# Patient Record
Sex: MALE | Race: WHITE | NOT HISPANIC OR LATINO | Employment: FULL TIME | URBAN - METROPOLITAN AREA
[De-identification: names, ages, dates, MRNs, and addresses within clinical notes are randomized per-mention and may not be internally consistent; named-entity substitution may affect disease eponyms.]

---

## 2019-03-24 ENCOUNTER — HOSPITAL ENCOUNTER (INPATIENT)
Facility: HOSPITAL | Age: 54
LOS: 30 days | Discharge: HOME | End: 2019-04-23
Attending: PHYSICAL MEDICINE & REHABILITATION | Admitting: PHYSICAL MEDICINE & REHABILITATION

## 2019-03-24 DIAGNOSIS — E66.01 MORBID OBESITY (HCC): ICD-10-CM

## 2019-03-24 DIAGNOSIS — I10 HYPERTENSION, UNSPECIFIED TYPE: ICD-10-CM

## 2019-03-24 DIAGNOSIS — I63.532 ACUTE ISCHEMIC LEFT PCA STROKE (HCC): Primary | ICD-10-CM

## 2019-03-24 PROBLEM — G43.909 MIGRAINE: Status: ACTIVE | Noted: 2019-03-24

## 2019-03-24 PROBLEM — Z87.19 H/O HERNIA REPAIR: Status: ACTIVE | Noted: 2019-03-24

## 2019-03-24 PROBLEM — Z98.890 H/O HERNIA REPAIR: Status: ACTIVE | Noted: 2019-03-24

## 2019-03-24 PROBLEM — M54.9 BACK PAIN: Status: ACTIVE | Noted: 2019-03-24

## 2019-03-24 PROBLEM — Z95.818 STATUS POST PLACEMENT OF IMPLANTABLE LOOP RECORDER: Status: ACTIVE | Noted: 2019-03-22

## 2019-03-24 PROBLEM — E11.9 DIABETES MELLITUS (HCC): Status: ACTIVE | Noted: 2019-03-24

## 2019-03-24 PROBLEM — E78.5 HYPERLIPIDEMIA: Status: ACTIVE | Noted: 2019-03-24

## 2019-03-24 PROBLEM — F41.9 ANXIETY DISORDER: Status: ACTIVE | Noted: 2019-03-24

## 2019-03-24 PROBLEM — I63.9 STROKE (CEREBRUM) (HCC): Status: ACTIVE | Noted: 2019-03-24

## 2019-03-24 LAB
GLUCOSE BLDC GLUCOMTR-MCNC: 136 MG/DL (ref 70–130)
GLUCOSE BLDC GLUCOMTR-MCNC: 157 MG/DL (ref 70–130)

## 2019-03-24 PROCEDURE — 82962 GLUCOSE BLOOD TEST: CPT

## 2019-03-24 PROCEDURE — 63710000001 INSULIN GLARGINE PER 5 UNITS: Performed by: PHYSICAL MEDICINE & REHABILITATION

## 2019-03-24 PROCEDURE — 63710000001 INSULIN LISPRO (HUMAN) PER 5 UNITS: Performed by: PHYSICAL MEDICINE & REHABILITATION

## 2019-03-24 RX ORDER — ALPRAZOLAM 0.5 MG/1
1 TABLET ORAL NIGHTLY PRN
Status: DISCONTINUED | OUTPATIENT
Start: 2019-03-24 | End: 2019-04-19

## 2019-03-24 RX ORDER — INSULIN GLARGINE 100 [IU]/ML
50 INJECTION, SOLUTION SUBCUTANEOUS NIGHTLY
Status: DISCONTINUED | OUTPATIENT
Start: 2019-03-24 | End: 2019-03-28

## 2019-03-24 RX ORDER — TRIAMTERENE AND HYDROCHLOROTHIAZIDE 37.5; 25 MG/1; MG/1
1 CAPSULE ORAL DAILY
Status: DISCONTINUED | OUTPATIENT
Start: 2019-03-25 | End: 2019-03-26

## 2019-03-24 RX ORDER — LISINOPRIL 40 MG/1
40 TABLET ORAL DAILY
Status: DISCONTINUED | OUTPATIENT
Start: 2019-03-25 | End: 2019-03-26

## 2019-03-24 RX ORDER — METHOCARBAMOL 750 MG/1
750 TABLET, FILM COATED ORAL EVERY 6 HOURS PRN
Status: DISCONTINUED | OUTPATIENT
Start: 2019-03-24 | End: 2019-03-24

## 2019-03-24 RX ORDER — ACETAMINOPHEN 325 MG/1
650 TABLET ORAL EVERY 6 HOURS PRN
Status: DISCONTINUED | OUTPATIENT
Start: 2019-03-24 | End: 2019-04-23 | Stop reason: HOSPADM

## 2019-03-24 RX ORDER — ALPRAZOLAM 1 MG/1
1 TABLET ORAL 3 TIMES DAILY PRN
Status: ON HOLD | COMMUNITY
End: 2019-04-23 | Stop reason: SDUPTHER

## 2019-03-24 RX ORDER — NICOTINE POLACRILEX 4 MG
15 LOZENGE BUCCAL
Status: DISCONTINUED | OUTPATIENT
Start: 2019-03-24 | End: 2019-04-23

## 2019-03-24 RX ORDER — ATENOLOL 50 MG/1
50 TABLET ORAL
Status: DISCONTINUED | OUTPATIENT
Start: 2019-03-25 | End: 2019-03-26

## 2019-03-24 RX ORDER — CLOPIDOGREL BISULFATE 75 MG/1
75 TABLET ORAL DAILY
Status: DISCONTINUED | OUTPATIENT
Start: 2019-03-25 | End: 2019-04-23 | Stop reason: HOSPADM

## 2019-03-24 RX ORDER — MIRTAZAPINE 15 MG/1
15 TABLET, FILM COATED ORAL NIGHTLY
Status: DISCONTINUED | OUTPATIENT
Start: 2019-03-24 | End: 2019-03-27

## 2019-03-24 RX ORDER — ATORVASTATIN CALCIUM 20 MG/1
20 TABLET, FILM COATED ORAL NIGHTLY
COMMUNITY
End: 2019-04-23 | Stop reason: HOSPADM

## 2019-03-24 RX ORDER — ATORVASTATIN CALCIUM 80 MG/1
80 TABLET, FILM COATED ORAL NIGHTLY
Status: DISCONTINUED | OUTPATIENT
Start: 2019-03-24 | End: 2019-04-23 | Stop reason: HOSPADM

## 2019-03-24 RX ORDER — OXYCODONE AND ACETAMINOPHEN 7.5; 325 MG/1; MG/1
1 TABLET ORAL EVERY 6 HOURS PRN
COMMUNITY

## 2019-03-24 RX ORDER — DICYCLOMINE HCL 20 MG
20 TABLET ORAL EVERY 6 HOURS PRN
COMMUNITY
End: 2019-04-23 | Stop reason: HOSPADM

## 2019-03-24 RX ORDER — DEXTROSE MONOHYDRATE 25 G/50ML
25 INJECTION, SOLUTION INTRAVENOUS
Status: DISCONTINUED | OUTPATIENT
Start: 2019-03-24 | End: 2019-04-23

## 2019-03-24 RX ORDER — HYDROCODONE BITARTRATE AND ACETAMINOPHEN 7.5; 325 MG/1; MG/1
1 TABLET ORAL EVERY 6 HOURS PRN
Status: DISCONTINUED | OUTPATIENT
Start: 2019-03-24 | End: 2019-03-24

## 2019-03-24 RX ORDER — CLONIDINE HYDROCHLORIDE 0.1 MG/1
0.1 TABLET ORAL EVERY 12 HOURS SCHEDULED
Status: DISCONTINUED | OUTPATIENT
Start: 2019-03-24 | End: 2019-03-26

## 2019-03-24 RX ORDER — AMLODIPINE BESYLATE 10 MG/1
10 TABLET ORAL DAILY
Status: DISCONTINUED | OUTPATIENT
Start: 2019-03-25 | End: 2019-03-26

## 2019-03-24 RX ORDER — HYDROCHLOROTHIAZIDE 25 MG/1
25 TABLET ORAL DAILY
Status: CANCELLED | OUTPATIENT
Start: 2019-03-25

## 2019-03-24 RX ORDER — ATENOLOL 100 MG/1
100 TABLET ORAL DAILY
COMMUNITY
End: 2019-04-23 | Stop reason: HOSPADM

## 2019-03-24 RX ORDER — BISACODYL 10 MG
10 SUPPOSITORY, RECTAL RECTAL DAILY PRN
Status: DISCONTINUED | OUTPATIENT
Start: 2019-03-24 | End: 2019-04-23

## 2019-03-24 RX ADMIN — INSULIN GLARGINE 50 UNITS: 100 INJECTION, SOLUTION SUBCUTANEOUS at 21:27

## 2019-03-24 RX ADMIN — ATORVASTATIN CALCIUM 80 MG: 80 TABLET, FILM COATED ORAL at 21:26

## 2019-03-24 RX ADMIN — CLONIDINE HYDROCHLORIDE 0.1 MG: 0.1 TABLET ORAL at 21:26

## 2019-03-24 RX ADMIN — INSULIN LISPRO 2 UNITS: 100 INJECTION, SOLUTION INTRAVENOUS; SUBCUTANEOUS at 21:27

## 2019-03-25 PROBLEM — E55.9 VITAMIN D DEFICIENCY: Status: ACTIVE | Noted: 2019-03-25

## 2019-03-25 PROBLEM — Z87.19 HISTORY OF FATTY INFILTRATION OF LIVER: Status: ACTIVE | Noted: 2019-03-25

## 2019-03-25 LAB
25(OH)D3 SERPL-MCNC: 19.1 NG/ML (ref 30–100)
GLUCOSE BLDC GLUCOMTR-MCNC: 164 MG/DL (ref 70–130)
GLUCOSE BLDC GLUCOMTR-MCNC: 176 MG/DL (ref 70–130)
GLUCOSE BLDC GLUCOMTR-MCNC: 180 MG/DL (ref 70–130)
GLUCOSE BLDC GLUCOMTR-MCNC: 218 MG/DL (ref 70–130)
VIT B12 BLD-MCNC: 457 PG/ML (ref 211–946)

## 2019-03-25 PROCEDURE — 83090 ASSAY OF HOMOCYSTEINE: CPT | Performed by: PHYSICAL MEDICINE & REHABILITATION

## 2019-03-25 PROCEDURE — 82607 VITAMIN B-12: CPT | Performed by: PHYSICAL MEDICINE & REHABILITATION

## 2019-03-25 PROCEDURE — 97110 THERAPEUTIC EXERCISES: CPT

## 2019-03-25 PROCEDURE — 97535 SELF CARE MNGMENT TRAINING: CPT

## 2019-03-25 PROCEDURE — 97162 PT EVAL MOD COMPLEX 30 MIN: CPT

## 2019-03-25 PROCEDURE — 97166 OT EVAL MOD COMPLEX 45 MIN: CPT

## 2019-03-25 PROCEDURE — 63710000001 INSULIN GLARGINE PER 5 UNITS: Performed by: PHYSICAL MEDICINE & REHABILITATION

## 2019-03-25 PROCEDURE — 82306 VITAMIN D 25 HYDROXY: CPT | Performed by: PHYSICAL MEDICINE & REHABILITATION

## 2019-03-25 PROCEDURE — 96125 COGNITIVE TEST BY HC PRO: CPT

## 2019-03-25 PROCEDURE — 63710000001 INSULIN LISPRO (HUMAN) PER 5 UNITS: Performed by: PHYSICAL MEDICINE & REHABILITATION

## 2019-03-25 PROCEDURE — 99254 IP/OBS CNSLTJ NEW/EST MOD 60: CPT | Performed by: INTERNAL MEDICINE

## 2019-03-25 PROCEDURE — 82962 GLUCOSE BLOOD TEST: CPT

## 2019-03-25 RX ORDER — ERGOCALCIFEROL 1.25 MG/1
50000 CAPSULE ORAL WEEKLY
Status: DISCONTINUED | OUTPATIENT
Start: 2019-03-25 | End: 2019-04-23 | Stop reason: HOSPADM

## 2019-03-25 RX ORDER — METFORMIN HYDROCHLORIDE 500 MG/1
500 TABLET, EXTENDED RELEASE ORAL 2 TIMES DAILY WITH MEALS
Status: DISCONTINUED | OUTPATIENT
Start: 2019-03-26 | End: 2019-04-23 | Stop reason: HOSPADM

## 2019-03-25 RX ADMIN — INSULIN LISPRO 4 UNITS: 100 INJECTION, SOLUTION INTRAVENOUS; SUBCUTANEOUS at 12:25

## 2019-03-25 RX ADMIN — CLONIDINE HYDROCHLORIDE 0.1 MG: 0.1 TABLET ORAL at 20:24

## 2019-03-25 RX ADMIN — MIRTAZAPINE 15 MG: 15 TABLET, FILM COATED ORAL at 20:24

## 2019-03-25 RX ADMIN — ATORVASTATIN CALCIUM 80 MG: 80 TABLET, FILM COATED ORAL at 20:24

## 2019-03-25 RX ADMIN — INSULIN LISPRO 2 UNITS: 100 INJECTION, SOLUTION INTRAVENOUS; SUBCUTANEOUS at 17:31

## 2019-03-25 RX ADMIN — CLOPIDOGREL 75 MG: 75 TABLET, FILM COATED ORAL at 08:52

## 2019-03-25 RX ADMIN — ERGOCALCIFEROL 50000 UNITS: 1.25 CAPSULE ORAL at 17:32

## 2019-03-25 RX ADMIN — AMLODIPINE BESYLATE 10 MG: 10 TABLET ORAL at 08:52

## 2019-03-25 RX ADMIN — TRIAMTERENE AND HYDROCHLOROTHIAZIDE 1 CAPSULE: 37.5; 25 CAPSULE ORAL at 08:53

## 2019-03-25 RX ADMIN — INSULIN LISPRO 2 UNITS: 100 INJECTION, SOLUTION INTRAVENOUS; SUBCUTANEOUS at 20:24

## 2019-03-25 RX ADMIN — INSULIN GLARGINE 50 UNITS: 100 INJECTION, SOLUTION SUBCUTANEOUS at 20:25

## 2019-03-25 RX ADMIN — LISINOPRIL 40 MG: 40 TABLET ORAL at 08:52

## 2019-03-25 RX ADMIN — CLONIDINE HYDROCHLORIDE 0.1 MG: 0.1 TABLET ORAL at 08:52

## 2019-03-25 RX ADMIN — ATENOLOL 50 MG: 50 TABLET ORAL at 08:52

## 2019-03-26 ENCOUNTER — APPOINTMENT (OUTPATIENT)
Dept: CT IMAGING | Facility: HOSPITAL | Age: 54
End: 2019-03-26

## 2019-03-26 LAB
ALBUMIN UR-MCNC: 8.6 MG/L
ANION GAP SERPL CALCULATED.3IONS-SCNC: 15.5 MMOL/L
BUN BLD-MCNC: 24 MG/DL (ref 6–20)
BUN/CREAT SERPL: 20.2 (ref 7–25)
CALCIUM SPEC-SCNC: 9 MG/DL (ref 8.6–10.5)
CHLORIDE SERPL-SCNC: 102 MMOL/L (ref 98–107)
CHOLEST SERPL-MCNC: 191 MG/DL (ref 0–200)
CO2 SERPL-SCNC: 24.5 MMOL/L (ref 22–29)
CREAT BLD-MCNC: 1.19 MG/DL (ref 0.76–1.27)
CREAT UR-MCNC: 127.9 MG/DL
GFR SERPL CREATININE-BSD FRML MDRD: 64 ML/MIN/1.73
GLUCOSE BLD-MCNC: 181 MG/DL (ref 65–99)
GLUCOSE BLDC GLUCOMTR-MCNC: 135 MG/DL (ref 70–130)
GLUCOSE BLDC GLUCOMTR-MCNC: 158 MG/DL (ref 70–130)
GLUCOSE BLDC GLUCOMTR-MCNC: 160 MG/DL (ref 70–130)
GLUCOSE BLDC GLUCOMTR-MCNC: 213 MG/DL (ref 70–130)
HCYS SERPL-SCNC: 14.8 UMOL/L (ref 0–15)
HDLC SERPL-MCNC: 27 MG/DL (ref 40–60)
LDLC SERPL CALC-MCNC: 133 MG/DL (ref 0–100)
LDLC/HDLC SERPL: 4.91 {RATIO}
MICROALBUMIN/CREAT UR: 67.2 MG/G
PA ADP PRP-ACNC: 151 PRU (ref 194–418)
POTASSIUM BLD-SCNC: 3.8 MMOL/L (ref 3.5–5.2)
SODIUM BLD-SCNC: 142 MMOL/L (ref 136–145)
TRIGL SERPL-MCNC: 157 MG/DL (ref 0–150)
TSH SERPL DL<=0.05 MIU/L-ACNC: 1.87 MIU/ML (ref 0.27–4.2)
VLDLC SERPL-MCNC: 31.4 MG/DL (ref 5–40)

## 2019-03-26 PROCEDURE — 97535 SELF CARE MNGMENT TRAINING: CPT

## 2019-03-26 PROCEDURE — 84244 ASSAY OF RENIN: CPT | Performed by: INTERNAL MEDICINE

## 2019-03-26 PROCEDURE — 82570 ASSAY OF URINE CREATININE: CPT | Performed by: INTERNAL MEDICINE

## 2019-03-26 PROCEDURE — 63710000001 INSULIN LISPRO (HUMAN) PER 5 UNITS: Performed by: PHYSICAL MEDICINE & REHABILITATION

## 2019-03-26 PROCEDURE — 82962 GLUCOSE BLOOD TEST: CPT

## 2019-03-26 PROCEDURE — 84443 ASSAY THYROID STIM HORMONE: CPT | Performed by: INTERNAL MEDICINE

## 2019-03-26 PROCEDURE — 80061 LIPID PANEL: CPT | Performed by: INTERNAL MEDICINE

## 2019-03-26 PROCEDURE — 82088 ASSAY OF ALDOSTERONE: CPT | Performed by: INTERNAL MEDICINE

## 2019-03-26 PROCEDURE — 80048 BASIC METABOLIC PNL TOTAL CA: CPT | Performed by: INTERNAL MEDICINE

## 2019-03-26 PROCEDURE — 82043 UR ALBUMIN QUANTITATIVE: CPT | Performed by: INTERNAL MEDICINE

## 2019-03-26 PROCEDURE — 92507 TX SP LANG VOICE COMM INDIV: CPT

## 2019-03-26 PROCEDURE — 99232 SBSQ HOSP IP/OBS MODERATE 35: CPT | Performed by: INTERNAL MEDICINE

## 2019-03-26 PROCEDURE — 63710000001 INSULIN GLARGINE PER 5 UNITS: Performed by: PHYSICAL MEDICINE & REHABILITATION

## 2019-03-26 PROCEDURE — 97110 THERAPEUTIC EXERCISES: CPT

## 2019-03-26 PROCEDURE — 85576 BLOOD PLATELET AGGREGATION: CPT | Performed by: PSYCHIATRY & NEUROLOGY

## 2019-03-26 PROCEDURE — 70450 CT HEAD/BRAIN W/O DYE: CPT

## 2019-03-26 PROCEDURE — 92610 EVALUATE SWALLOWING FUNCTION: CPT

## 2019-03-26 RX ORDER — SODIUM CHLORIDE 0.9 % (FLUSH) 0.9 %
5 SYRINGE (ML) INJECTION AS NEEDED
Status: DISCONTINUED | OUTPATIENT
Start: 2019-03-26 | End: 2019-04-23

## 2019-03-26 RX ORDER — SODIUM CHLORIDE 9 MG/ML
100 INJECTION, SOLUTION INTRAVENOUS CONTINUOUS
Status: DISCONTINUED | OUTPATIENT
Start: 2019-03-26 | End: 2019-03-28

## 2019-03-26 RX ORDER — ASPIRIN 325 MG
325 TABLET, DELAYED RELEASE (ENTERIC COATED) ORAL DAILY
Status: DISCONTINUED | OUTPATIENT
Start: 2019-03-26 | End: 2019-04-23 | Stop reason: HOSPADM

## 2019-03-26 RX ORDER — SODIUM CHLORIDE 0.9 % (FLUSH) 0.9 %
3 SYRINGE (ML) INJECTION EVERY 12 HOURS SCHEDULED
Status: DISCONTINUED | OUTPATIENT
Start: 2019-03-26 | End: 2019-04-01

## 2019-03-26 RX ADMIN — CLONIDINE HYDROCHLORIDE 0.1 MG: 0.1 TABLET ORAL at 08:11

## 2019-03-26 RX ADMIN — ATENOLOL 50 MG: 50 TABLET ORAL at 08:11

## 2019-03-26 RX ADMIN — INSULIN GLARGINE 50 UNITS: 100 INJECTION, SOLUTION SUBCUTANEOUS at 21:35

## 2019-03-26 RX ADMIN — INSULIN LISPRO 2 UNITS: 100 INJECTION, SOLUTION INTRAVENOUS; SUBCUTANEOUS at 21:35

## 2019-03-26 RX ADMIN — SODIUM CHLORIDE 500 ML: 9 INJECTION, SOLUTION INTRAVENOUS at 16:41

## 2019-03-26 RX ADMIN — TRIAMTERENE AND HYDROCHLOROTHIAZIDE 1 CAPSULE: 37.5; 25 CAPSULE ORAL at 08:13

## 2019-03-26 RX ADMIN — METFORMIN HYDROCHLORIDE 500 MG: 500 TABLET, EXTENDED RELEASE ORAL at 17:27

## 2019-03-26 RX ADMIN — ATORVASTATIN CALCIUM 80 MG: 80 TABLET, FILM COATED ORAL at 19:43

## 2019-03-26 RX ADMIN — SODIUM CHLORIDE, PRESERVATIVE FREE 5 ML: 5 INJECTION INTRAVENOUS at 17:27

## 2019-03-26 RX ADMIN — INSULIN LISPRO 4 UNITS: 100 INJECTION, SOLUTION INTRAVENOUS; SUBCUTANEOUS at 11:53

## 2019-03-26 RX ADMIN — INSULIN LISPRO 2 UNITS: 100 INJECTION, SOLUTION INTRAVENOUS; SUBCUTANEOUS at 08:12

## 2019-03-26 RX ADMIN — LISINOPRIL 40 MG: 40 TABLET ORAL at 08:11

## 2019-03-26 RX ADMIN — SODIUM CHLORIDE 100 ML/HR: 9 INJECTION, SOLUTION INTRAVENOUS at 23:00

## 2019-03-26 RX ADMIN — SODIUM CHLORIDE 500 ML: 9 INJECTION, SOLUTION INTRAVENOUS at 16:09

## 2019-03-26 RX ADMIN — SODIUM CHLORIDE, PRESERVATIVE FREE 3 ML: 5 INJECTION INTRAVENOUS at 19:44

## 2019-03-26 RX ADMIN — METFORMIN HYDROCHLORIDE 500 MG: 500 TABLET, EXTENDED RELEASE ORAL at 08:11

## 2019-03-26 RX ADMIN — AMLODIPINE BESYLATE 10 MG: 10 TABLET ORAL at 08:11

## 2019-03-26 RX ADMIN — CLOPIDOGREL 75 MG: 75 TABLET, FILM COATED ORAL at 08:11

## 2019-03-26 RX ADMIN — ASPIRIN 325 MG: 325 TABLET, COATED ORAL at 19:43

## 2019-03-27 LAB
ANION GAP SERPL CALCULATED.3IONS-SCNC: 15 MMOL/L
BUN BLD-MCNC: 23 MG/DL (ref 6–20)
BUN/CREAT SERPL: 19.7 (ref 7–25)
CALCIUM SPEC-SCNC: 8.6 MG/DL (ref 8.6–10.5)
CHLORIDE SERPL-SCNC: 102 MMOL/L (ref 98–107)
CO2 SERPL-SCNC: 24 MMOL/L (ref 22–29)
CREAT BLD-MCNC: 1.17 MG/DL (ref 0.76–1.27)
GFR SERPL CREATININE-BSD FRML MDRD: 65 ML/MIN/1.73
GLUCOSE BLD-MCNC: 130 MG/DL (ref 65–99)
GLUCOSE BLDC GLUCOMTR-MCNC: 125 MG/DL (ref 70–130)
GLUCOSE BLDC GLUCOMTR-MCNC: 134 MG/DL (ref 70–130)
GLUCOSE BLDC GLUCOMTR-MCNC: 134 MG/DL (ref 70–130)
GLUCOSE BLDC GLUCOMTR-MCNC: 140 MG/DL (ref 70–130)
MAGNESIUM SERPL-MCNC: 2 MG/DL (ref 1.6–2.6)
POTASSIUM BLD-SCNC: 3.8 MMOL/L (ref 3.5–5.2)
SODIUM BLD-SCNC: 141 MMOL/L (ref 136–145)

## 2019-03-27 PROCEDURE — 97535 SELF CARE MNGMENT TRAINING: CPT

## 2019-03-27 PROCEDURE — 82962 GLUCOSE BLOOD TEST: CPT

## 2019-03-27 PROCEDURE — 99232 SBSQ HOSP IP/OBS MODERATE 35: CPT | Performed by: INTERNAL MEDICINE

## 2019-03-27 PROCEDURE — 80048 BASIC METABOLIC PNL TOTAL CA: CPT | Performed by: PHYSICAL MEDICINE & REHABILITATION

## 2019-03-27 PROCEDURE — 92507 TX SP LANG VOICE COMM INDIV: CPT

## 2019-03-27 PROCEDURE — 83735 ASSAY OF MAGNESIUM: CPT | Performed by: PHYSICAL MEDICINE & REHABILITATION

## 2019-03-27 PROCEDURE — 97110 THERAPEUTIC EXERCISES: CPT

## 2019-03-27 PROCEDURE — 99253 IP/OBS CNSLTJ NEW/EST LOW 45: CPT | Performed by: PSYCHIATRY & NEUROLOGY

## 2019-03-27 PROCEDURE — 63710000001 INSULIN GLARGINE PER 5 UNITS: Performed by: PHYSICAL MEDICINE & REHABILITATION

## 2019-03-27 PROCEDURE — 97112 NEUROMUSCULAR REEDUCATION: CPT

## 2019-03-27 RX ORDER — PAROXETINE 10 MG/1
10 TABLET, FILM COATED ORAL DAILY
Status: DISCONTINUED | OUTPATIENT
Start: 2019-03-27 | End: 2019-04-23 | Stop reason: HOSPADM

## 2019-03-27 RX ORDER — ALPRAZOLAM 0.5 MG/1
0.5 TABLET ORAL 2 TIMES DAILY PRN
Status: ACTIVE | OUTPATIENT
Start: 2019-03-27 | End: 2019-04-06

## 2019-03-27 RX ORDER — OXYCODONE HYDROCHLORIDE AND ACETAMINOPHEN 5; 325 MG/1; MG/1
1 TABLET ORAL EVERY 12 HOURS PRN
Status: DISPENSED | OUTPATIENT
Start: 2019-03-27 | End: 2019-04-06

## 2019-03-27 RX ADMIN — INSULIN GLARGINE 50 UNITS: 100 INJECTION, SOLUTION SUBCUTANEOUS at 21:45

## 2019-03-27 RX ADMIN — OXYCODONE AND ACETAMINOPHEN 1 TABLET: 5; 325 TABLET ORAL at 20:12

## 2019-03-27 RX ADMIN — Medication 1 TABLET: at 15:51

## 2019-03-27 RX ADMIN — SODIUM CHLORIDE 100 ML/HR: 9 INJECTION, SOLUTION INTRAVENOUS at 08:59

## 2019-03-27 RX ADMIN — METFORMIN HYDROCHLORIDE 500 MG: 500 TABLET, EXTENDED RELEASE ORAL at 08:01

## 2019-03-27 RX ADMIN — CLOPIDOGREL 75 MG: 75 TABLET, FILM COATED ORAL at 08:01

## 2019-03-27 RX ADMIN — PAROXETINE HYDROCHLORIDE 10 MG: 10 TABLET, FILM COATED ORAL at 15:51

## 2019-03-27 RX ADMIN — SODIUM CHLORIDE, PRESERVATIVE FREE 3 ML: 5 INJECTION INTRAVENOUS at 08:02

## 2019-03-27 RX ADMIN — ASPIRIN 325 MG: 325 TABLET, COATED ORAL at 08:01

## 2019-03-27 RX ADMIN — METFORMIN HYDROCHLORIDE 500 MG: 500 TABLET, EXTENDED RELEASE ORAL at 17:54

## 2019-03-27 RX ADMIN — ATORVASTATIN CALCIUM 80 MG: 80 TABLET, FILM COATED ORAL at 20:11

## 2019-03-28 LAB
ANION GAP SERPL CALCULATED.3IONS-SCNC: 14.4 MMOL/L
BUN BLD-MCNC: 17 MG/DL (ref 6–20)
BUN/CREAT SERPL: 21.5 (ref 7–25)
CALCIUM SPEC-SCNC: 8.8 MG/DL (ref 8.6–10.5)
CHLORIDE SERPL-SCNC: 104 MMOL/L (ref 98–107)
CO2 SERPL-SCNC: 22.6 MMOL/L (ref 22–29)
CREAT BLD-MCNC: 0.79 MG/DL (ref 0.76–1.27)
GFR SERPL CREATININE-BSD FRML MDRD: 102 ML/MIN/1.73
GLUCOSE BLD-MCNC: 153 MG/DL (ref 65–99)
GLUCOSE BLDC GLUCOMTR-MCNC: 101 MG/DL (ref 70–130)
GLUCOSE BLDC GLUCOMTR-MCNC: 125 MG/DL (ref 70–130)
GLUCOSE BLDC GLUCOMTR-MCNC: 134 MG/DL (ref 70–130)
GLUCOSE BLDC GLUCOMTR-MCNC: 165 MG/DL (ref 70–130)
POTASSIUM BLD-SCNC: 3.8 MMOL/L (ref 3.5–5.2)
SODIUM BLD-SCNC: 141 MMOL/L (ref 136–145)

## 2019-03-28 PROCEDURE — 63710000001 INSULIN GLARGINE PER 5 UNITS: Performed by: INTERNAL MEDICINE

## 2019-03-28 PROCEDURE — 82962 GLUCOSE BLOOD TEST: CPT

## 2019-03-28 PROCEDURE — 97110 THERAPEUTIC EXERCISES: CPT

## 2019-03-28 PROCEDURE — 80048 BASIC METABOLIC PNL TOTAL CA: CPT | Performed by: PHYSICAL MEDICINE & REHABILITATION

## 2019-03-28 PROCEDURE — 63710000001 INSULIN LISPRO (HUMAN) PER 5 UNITS: Performed by: PHYSICAL MEDICINE & REHABILITATION

## 2019-03-28 PROCEDURE — 97535 SELF CARE MNGMENT TRAINING: CPT

## 2019-03-28 PROCEDURE — 99231 SBSQ HOSP IP/OBS SF/LOW 25: CPT | Performed by: INTERNAL MEDICINE

## 2019-03-28 PROCEDURE — 92507 TX SP LANG VOICE COMM INDIV: CPT

## 2019-03-28 PROCEDURE — 97112 NEUROMUSCULAR REEDUCATION: CPT

## 2019-03-28 RX ORDER — ATENOLOL 25 MG/1
12.5 TABLET ORAL EVERY 12 HOURS SCHEDULED
Status: DISCONTINUED | OUTPATIENT
Start: 2019-03-28 | End: 2019-03-29

## 2019-03-28 RX ORDER — INSULIN GLARGINE 100 [IU]/ML
46 INJECTION, SOLUTION SUBCUTANEOUS NIGHTLY
Status: DISCONTINUED | OUTPATIENT
Start: 2019-03-28 | End: 2019-03-29

## 2019-03-28 RX ORDER — LISINOPRIL 10 MG/1
10 TABLET ORAL EVERY 12 HOURS SCHEDULED
Status: DISCONTINUED | OUTPATIENT
Start: 2019-03-28 | End: 2019-03-29

## 2019-03-28 RX ADMIN — ATENOLOL 12.5 MG: 25 TABLET ORAL at 11:55

## 2019-03-28 RX ADMIN — ATENOLOL 12.5 MG: 25 TABLET ORAL at 20:44

## 2019-03-28 RX ADMIN — SODIUM CHLORIDE 100 ML/HR: 9 INJECTION, SOLUTION INTRAVENOUS at 05:57

## 2019-03-28 RX ADMIN — LISINOPRIL 10 MG: 10 TABLET ORAL at 11:55

## 2019-03-28 RX ADMIN — METFORMIN HYDROCHLORIDE 500 MG: 500 TABLET, EXTENDED RELEASE ORAL at 17:15

## 2019-03-28 RX ADMIN — INSULIN GLARGINE 46 UNITS: 100 INJECTION, SOLUTION SUBCUTANEOUS at 20:44

## 2019-03-28 RX ADMIN — OXYCODONE AND ACETAMINOPHEN 1 TABLET: 5; 325 TABLET ORAL at 17:13

## 2019-03-28 RX ADMIN — SODIUM CHLORIDE, PRESERVATIVE FREE 3 ML: 5 INJECTION INTRAVENOUS at 08:10

## 2019-03-28 RX ADMIN — Medication 1 TABLET: at 08:06

## 2019-03-28 RX ADMIN — PAROXETINE HYDROCHLORIDE 10 MG: 10 TABLET, FILM COATED ORAL at 08:06

## 2019-03-28 RX ADMIN — INSULIN LISPRO 2 UNITS: 100 INJECTION, SOLUTION INTRAVENOUS; SUBCUTANEOUS at 11:56

## 2019-03-28 RX ADMIN — ASPIRIN 325 MG: 325 TABLET, COATED ORAL at 08:06

## 2019-03-28 RX ADMIN — LISINOPRIL 10 MG: 10 TABLET ORAL at 20:44

## 2019-03-28 RX ADMIN — CLOPIDOGREL 75 MG: 75 TABLET, FILM COATED ORAL at 08:06

## 2019-03-28 RX ADMIN — ALPRAZOLAM 1 MG: 0.5 TABLET ORAL at 22:44

## 2019-03-28 RX ADMIN — ATORVASTATIN CALCIUM 80 MG: 80 TABLET, FILM COATED ORAL at 20:44

## 2019-03-28 RX ADMIN — METFORMIN HYDROCHLORIDE 500 MG: 500 TABLET, EXTENDED RELEASE ORAL at 08:06

## 2019-03-29 LAB
GLUCOSE BLDC GLUCOMTR-MCNC: 101 MG/DL (ref 70–130)
GLUCOSE BLDC GLUCOMTR-MCNC: 102 MG/DL (ref 70–130)
GLUCOSE BLDC GLUCOMTR-MCNC: 114 MG/DL (ref 70–130)
GLUCOSE BLDC GLUCOMTR-MCNC: 118 MG/DL (ref 70–130)

## 2019-03-29 PROCEDURE — 82962 GLUCOSE BLOOD TEST: CPT

## 2019-03-29 PROCEDURE — 99231 SBSQ HOSP IP/OBS SF/LOW 25: CPT | Performed by: INTERNAL MEDICINE

## 2019-03-29 PROCEDURE — 92507 TX SP LANG VOICE COMM INDIV: CPT

## 2019-03-29 PROCEDURE — 97110 THERAPEUTIC EXERCISES: CPT

## 2019-03-29 PROCEDURE — 97535 SELF CARE MNGMENT TRAINING: CPT

## 2019-03-29 PROCEDURE — 63710000001 INSULIN GLARGINE PER 5 UNITS: Performed by: INTERNAL MEDICINE

## 2019-03-29 RX ORDER — ATENOLOL 25 MG/1
25 TABLET ORAL EVERY 12 HOURS SCHEDULED
Status: DISCONTINUED | OUTPATIENT
Start: 2019-03-29 | End: 2019-04-23 | Stop reason: HOSPADM

## 2019-03-29 RX ORDER — INSULIN GLARGINE 100 [IU]/ML
42 INJECTION, SOLUTION SUBCUTANEOUS NIGHTLY
Status: DISCONTINUED | OUTPATIENT
Start: 2019-03-29 | End: 2019-03-31

## 2019-03-29 RX ORDER — LISINOPRIL 20 MG/1
20 TABLET ORAL EVERY 12 HOURS SCHEDULED
Status: DISCONTINUED | OUTPATIENT
Start: 2019-03-29 | End: 2019-04-23 | Stop reason: HOSPADM

## 2019-03-29 RX ADMIN — ATENOLOL 25 MG: 25 TABLET ORAL at 21:56

## 2019-03-29 RX ADMIN — METFORMIN HYDROCHLORIDE 500 MG: 500 TABLET, EXTENDED RELEASE ORAL at 08:27

## 2019-03-29 RX ADMIN — LISINOPRIL 20 MG: 20 TABLET ORAL at 21:56

## 2019-03-29 RX ADMIN — LISINOPRIL 20 MG: 20 TABLET ORAL at 09:22

## 2019-03-29 RX ADMIN — SODIUM CHLORIDE, PRESERVATIVE FREE 3 ML: 5 INJECTION INTRAVENOUS at 08:29

## 2019-03-29 RX ADMIN — ALPRAZOLAM 1 MG: 0.5 TABLET ORAL at 21:56

## 2019-03-29 RX ADMIN — ATORVASTATIN CALCIUM 80 MG: 80 TABLET, FILM COATED ORAL at 21:56

## 2019-03-29 RX ADMIN — PAROXETINE HYDROCHLORIDE 10 MG: 10 TABLET, FILM COATED ORAL at 08:28

## 2019-03-29 RX ADMIN — CLOPIDOGREL 75 MG: 75 TABLET, FILM COATED ORAL at 08:27

## 2019-03-29 RX ADMIN — METFORMIN HYDROCHLORIDE 500 MG: 500 TABLET, EXTENDED RELEASE ORAL at 17:01

## 2019-03-29 RX ADMIN — ASPIRIN 325 MG: 325 TABLET, COATED ORAL at 08:27

## 2019-03-29 RX ADMIN — Medication 1 TABLET: at 08:27

## 2019-03-29 RX ADMIN — ATENOLOL 25 MG: 25 TABLET ORAL at 09:22

## 2019-03-29 RX ADMIN — INSULIN GLARGINE 42 UNITS: 100 INJECTION, SOLUTION SUBCUTANEOUS at 21:56

## 2019-03-30 LAB
GLUCOSE BLDC GLUCOMTR-MCNC: 107 MG/DL (ref 70–130)
GLUCOSE BLDC GLUCOMTR-MCNC: 117 MG/DL (ref 70–130)
GLUCOSE BLDC GLUCOMTR-MCNC: 140 MG/DL (ref 70–130)
GLUCOSE BLDC GLUCOMTR-MCNC: 146 MG/DL (ref 70–130)

## 2019-03-30 PROCEDURE — 63710000001 INSULIN GLARGINE PER 5 UNITS: Performed by: INTERNAL MEDICINE

## 2019-03-30 PROCEDURE — 82962 GLUCOSE BLOOD TEST: CPT

## 2019-03-30 PROCEDURE — 97110 THERAPEUTIC EXERCISES: CPT

## 2019-03-30 RX ORDER — AMLODIPINE BESYLATE 2.5 MG/1
2.5 TABLET ORAL
Status: DISCONTINUED | OUTPATIENT
Start: 2019-03-30 | End: 2019-03-31

## 2019-03-30 RX ADMIN — INSULIN GLARGINE 42 UNITS: 100 INJECTION, SOLUTION SUBCUTANEOUS at 20:38

## 2019-03-30 RX ADMIN — METFORMIN HYDROCHLORIDE 500 MG: 500 TABLET, EXTENDED RELEASE ORAL at 17:37

## 2019-03-30 RX ADMIN — ATENOLOL 25 MG: 25 TABLET ORAL at 20:38

## 2019-03-30 RX ADMIN — ATENOLOL 25 MG: 25 TABLET ORAL at 08:47

## 2019-03-30 RX ADMIN — Medication 1 TABLET: at 08:47

## 2019-03-30 RX ADMIN — ASPIRIN 325 MG: 325 TABLET, COATED ORAL at 08:47

## 2019-03-30 RX ADMIN — METFORMIN HYDROCHLORIDE 500 MG: 500 TABLET, EXTENDED RELEASE ORAL at 08:47

## 2019-03-30 RX ADMIN — PAROXETINE HYDROCHLORIDE 10 MG: 10 TABLET, FILM COATED ORAL at 08:47

## 2019-03-30 RX ADMIN — AMLODIPINE BESYLATE 2.5 MG: 2.5 TABLET ORAL at 14:40

## 2019-03-30 RX ADMIN — OXYCODONE AND ACETAMINOPHEN 1 TABLET: 5; 325 TABLET ORAL at 20:18

## 2019-03-30 RX ADMIN — ATORVASTATIN CALCIUM 80 MG: 80 TABLET, FILM COATED ORAL at 20:38

## 2019-03-30 RX ADMIN — LISINOPRIL 20 MG: 20 TABLET ORAL at 08:48

## 2019-03-30 RX ADMIN — CLOPIDOGREL 75 MG: 75 TABLET, FILM COATED ORAL at 08:47

## 2019-03-30 RX ADMIN — LISINOPRIL 20 MG: 20 TABLET ORAL at 20:38

## 2019-03-31 LAB
GLUCOSE BLDC GLUCOMTR-MCNC: 109 MG/DL (ref 70–130)
GLUCOSE BLDC GLUCOMTR-MCNC: 118 MG/DL (ref 70–130)
GLUCOSE BLDC GLUCOMTR-MCNC: 122 MG/DL (ref 70–130)
GLUCOSE BLDC GLUCOMTR-MCNC: 95 MG/DL (ref 70–130)

## 2019-03-31 PROCEDURE — 99233 SBSQ HOSP IP/OBS HIGH 50: CPT | Performed by: INTERNAL MEDICINE

## 2019-03-31 PROCEDURE — 82962 GLUCOSE BLOOD TEST: CPT

## 2019-03-31 PROCEDURE — 63710000001 INSULIN GLARGINE PER 5 UNITS: Performed by: INTERNAL MEDICINE

## 2019-03-31 RX ORDER — INSULIN GLARGINE 100 [IU]/ML
32 INJECTION, SOLUTION SUBCUTANEOUS NIGHTLY
Status: DISCONTINUED | OUTPATIENT
Start: 2019-03-31 | End: 2019-04-23 | Stop reason: HOSPADM

## 2019-03-31 RX ORDER — AMLODIPINE BESYLATE 5 MG/1
5 TABLET ORAL
Status: DISCONTINUED | OUTPATIENT
Start: 2019-04-01 | End: 2019-04-01

## 2019-03-31 RX ADMIN — ALPRAZOLAM 1 MG: 0.5 TABLET ORAL at 22:17

## 2019-03-31 RX ADMIN — CLOPIDOGREL 75 MG: 75 TABLET, FILM COATED ORAL at 08:58

## 2019-03-31 RX ADMIN — METFORMIN HYDROCHLORIDE 500 MG: 500 TABLET, EXTENDED RELEASE ORAL at 20:28

## 2019-03-31 RX ADMIN — ASPIRIN 325 MG: 325 TABLET, COATED ORAL at 08:57

## 2019-03-31 RX ADMIN — INSULIN GLARGINE 32 UNITS: 100 INJECTION, SOLUTION SUBCUTANEOUS at 20:36

## 2019-03-31 RX ADMIN — ATORVASTATIN CALCIUM 80 MG: 80 TABLET, FILM COATED ORAL at 20:28

## 2019-03-31 RX ADMIN — Medication 1 TABLET: at 08:58

## 2019-03-31 RX ADMIN — ATENOLOL 25 MG: 25 TABLET ORAL at 20:28

## 2019-03-31 RX ADMIN — ALPRAZOLAM 1 MG: 0.5 TABLET ORAL at 00:05

## 2019-03-31 RX ADMIN — LISINOPRIL 20 MG: 20 TABLET ORAL at 08:57

## 2019-03-31 RX ADMIN — METFORMIN HYDROCHLORIDE 500 MG: 500 TABLET, EXTENDED RELEASE ORAL at 08:57

## 2019-03-31 RX ADMIN — ATENOLOL 25 MG: 25 TABLET ORAL at 08:57

## 2019-03-31 RX ADMIN — PAROXETINE HYDROCHLORIDE 10 MG: 10 TABLET, FILM COATED ORAL at 08:57

## 2019-03-31 RX ADMIN — AMLODIPINE BESYLATE 2.5 MG: 2.5 TABLET ORAL at 08:57

## 2019-03-31 RX ADMIN — LISINOPRIL 20 MG: 20 TABLET ORAL at 20:27

## 2019-04-01 LAB
ALBUMIN SERPL-MCNC: 3.7 G/DL (ref 3.5–5.2)
ALBUMIN/GLOB SERPL: 1.1 G/DL
ALDOST SERPL-MCNC: 3.8 NG/DL (ref 0–30)
ALP SERPL-CCNC: 101 U/L (ref 39–117)
ALT SERPL W P-5'-P-CCNC: 23 U/L (ref 1–41)
ANION GAP SERPL CALCULATED.3IONS-SCNC: 12.4 MMOL/L
AST SERPL-CCNC: 13 U/L (ref 1–40)
BASOPHILS # BLD AUTO: 0.05 10*3/MM3 (ref 0–0.2)
BASOPHILS NFR BLD AUTO: 0.6 % (ref 0–1.5)
BILIRUB SERPL-MCNC: 0.5 MG/DL (ref 0.2–1.2)
BUN BLD-MCNC: 13 MG/DL (ref 6–20)
BUN/CREAT SERPL: 15.9 (ref 7–25)
CALCIUM SPEC-SCNC: 9 MG/DL (ref 8.6–10.5)
CHLORIDE SERPL-SCNC: 104 MMOL/L (ref 98–107)
CO2 SERPL-SCNC: 26.6 MMOL/L (ref 22–29)
CREAT BLD-MCNC: 0.82 MG/DL (ref 0.76–1.27)
DEPRECATED RDW RBC AUTO: 40.6 FL (ref 37–54)
EOSINOPHIL # BLD AUTO: 0.28 10*3/MM3 (ref 0–0.4)
EOSINOPHIL NFR BLD AUTO: 3.5 % (ref 0.3–6.2)
ERYTHROCYTE [DISTWIDTH] IN BLOOD BY AUTOMATED COUNT: 13.1 % (ref 12.3–15.4)
GFR SERPL CREATININE-BSD FRML MDRD: 98 ML/MIN/1.73
GLOBULIN UR ELPH-MCNC: 3.5 GM/DL
GLUCOSE BLD-MCNC: 111 MG/DL (ref 65–99)
GLUCOSE BLDC GLUCOMTR-MCNC: 111 MG/DL (ref 70–130)
GLUCOSE BLDC GLUCOMTR-MCNC: 137 MG/DL (ref 70–130)
GLUCOSE BLDC GLUCOMTR-MCNC: 90 MG/DL (ref 70–130)
GLUCOSE BLDC GLUCOMTR-MCNC: 91 MG/DL (ref 70–130)
HCT VFR BLD AUTO: 40.3 % (ref 37.5–51)
HGB BLD-MCNC: 13 G/DL (ref 13–17.7)
IMM GRANULOCYTES # BLD AUTO: 0.02 10*3/MM3 (ref 0–0.05)
IMM GRANULOCYTES NFR BLD AUTO: 0.2 % (ref 0–0.5)
LYMPHOCYTES # BLD AUTO: 2.12 10*3/MM3 (ref 0.7–3.1)
LYMPHOCYTES NFR BLD AUTO: 26.3 % (ref 19.6–45.3)
MCH RBC QN AUTO: 27.5 PG (ref 26.6–33)
MCHC RBC AUTO-ENTMCNC: 32.3 G/DL (ref 31.5–35.7)
MCV RBC AUTO: 85.2 FL (ref 79–97)
MONOCYTES # BLD AUTO: 0.83 10*3/MM3 (ref 0.1–0.9)
MONOCYTES NFR BLD AUTO: 10.3 % (ref 5–12)
NEUTROPHILS # BLD AUTO: 4.75 10*3/MM3 (ref 1.4–7)
NEUTROPHILS NFR BLD AUTO: 59.1 % (ref 42.7–76)
NRBC BLD AUTO-RTO: 0 /100 WBC (ref 0–0)
PLATELET # BLD AUTO: 347 10*3/MM3 (ref 140–450)
PMV BLD AUTO: 10 FL (ref 6–12)
POTASSIUM BLD-SCNC: 3.8 MMOL/L (ref 3.5–5.2)
PROT SERPL-MCNC: 7.2 G/DL (ref 6–8.5)
RBC # BLD AUTO: 4.73 10*6/MM3 (ref 4.14–5.8)
RENIN PLAS-CCNC: 0.4 NG/ML/HR (ref 0.17–5.38)
SODIUM BLD-SCNC: 143 MMOL/L (ref 136–145)
WBC NRBC COR # BLD: 8.05 10*3/MM3 (ref 3.4–10.8)

## 2019-04-01 PROCEDURE — 85025 COMPLETE CBC W/AUTO DIFF WBC: CPT | Performed by: PHYSICAL MEDICINE & REHABILITATION

## 2019-04-01 PROCEDURE — 97535 SELF CARE MNGMENT TRAINING: CPT

## 2019-04-01 PROCEDURE — 80053 COMPREHEN METABOLIC PANEL: CPT | Performed by: PHYSICAL MEDICINE & REHABILITATION

## 2019-04-01 PROCEDURE — 82962 GLUCOSE BLOOD TEST: CPT

## 2019-04-01 PROCEDURE — 97110 THERAPEUTIC EXERCISES: CPT

## 2019-04-01 PROCEDURE — 63710000001 INSULIN GLARGINE PER 5 UNITS: Performed by: INTERNAL MEDICINE

## 2019-04-01 PROCEDURE — 92507 TX SP LANG VOICE COMM INDIV: CPT

## 2019-04-01 RX ORDER — AMLODIPINE BESYLATE 5 MG/1
5 TABLET ORAL
Status: DISCONTINUED | OUTPATIENT
Start: 2019-04-02 | End: 2019-04-23 | Stop reason: HOSPADM

## 2019-04-01 RX ADMIN — CLOPIDOGREL 75 MG: 75 TABLET, FILM COATED ORAL at 07:50

## 2019-04-01 RX ADMIN — ASPIRIN 325 MG: 325 TABLET, COATED ORAL at 07:50

## 2019-04-01 RX ADMIN — LISINOPRIL 20 MG: 20 TABLET ORAL at 20:56

## 2019-04-01 RX ADMIN — PAROXETINE HYDROCHLORIDE 10 MG: 10 TABLET, FILM COATED ORAL at 07:48

## 2019-04-01 RX ADMIN — AMLODIPINE BESYLATE 5 MG: 5 TABLET ORAL at 07:49

## 2019-04-01 RX ADMIN — Medication 1 TABLET: at 07:48

## 2019-04-01 RX ADMIN — ATENOLOL 25 MG: 25 TABLET ORAL at 20:56

## 2019-04-01 RX ADMIN — METFORMIN HYDROCHLORIDE 500 MG: 500 TABLET, EXTENDED RELEASE ORAL at 17:08

## 2019-04-01 RX ADMIN — LISINOPRIL 20 MG: 20 TABLET ORAL at 07:50

## 2019-04-01 RX ADMIN — INSULIN GLARGINE 32 UNITS: 100 INJECTION, SOLUTION SUBCUTANEOUS at 21:35

## 2019-04-01 RX ADMIN — METFORMIN HYDROCHLORIDE 500 MG: 500 TABLET, EXTENDED RELEASE ORAL at 07:50

## 2019-04-01 RX ADMIN — OXYCODONE AND ACETAMINOPHEN 1 TABLET: 5; 325 TABLET ORAL at 06:07

## 2019-04-01 RX ADMIN — ALPRAZOLAM 1 MG: 0.5 TABLET ORAL at 22:19

## 2019-04-01 RX ADMIN — ERGOCALCIFEROL 50000 UNITS: 1.25 CAPSULE ORAL at 07:50

## 2019-04-01 RX ADMIN — OXYCODONE AND ACETAMINOPHEN 1 TABLET: 5; 325 TABLET ORAL at 20:56

## 2019-04-01 RX ADMIN — ATORVASTATIN CALCIUM 80 MG: 80 TABLET, FILM COATED ORAL at 20:56

## 2019-04-01 RX ADMIN — ATENOLOL 25 MG: 25 TABLET ORAL at 07:50

## 2019-04-01 NOTE — PLAN OF CARE
Problem: Stroke (IRF) (Adult)  Goal: Promote Optimal Functional [place]  Outcome: Ongoing (interventions implemented as appropriate)   04/01/19 0124   Stroke (IRF) (Adult)   Promote Optimal Functional [place] demonstrating adequate progress       Problem: Fall Risk (Adult)  Goal: Absence of Fall  Outcome: Ongoing (interventions implemented as appropriate)   04/01/19 0124   Fall Risk (Adult)   Absence of Fall making progress toward outcome       Problem: Diabetes, Type 2 (Adult)  Goal: Signs and Symptoms of Listed Potential Problems Will be Absent, Minimized or Managed (Diabetes, Type 2)  Outcome: Ongoing (interventions implemented as appropriate)   04/01/19 0124   Goal/Outcome Evaluation   Problems Assessed (Type 2 Diabetes) all   Problems Present (Type 2 Diabetes) none       Problem: Skin Injury Risk (Adult)  Goal: Skin Health and Integrity  Outcome: Ongoing (interventions implemented as appropriate)   04/01/19 0124   Skin Injury Risk (Adult)   Skin Health and Integrity making progress toward outcome

## 2019-04-01 NOTE — PROGRESS NOTES
"   LOS: 8 days   Patient Care Team:  Qasim Asif MD as PCP - General  Qasim Asif MD as PCP - Family Medicine    Chief Complaint:     Subjective     History of Present Illness    Subjective \"I feel like a million bucks\" Pt was seen in dining room.     History taken from: patient    Objective     Vital Signs  Temp:  [98.1 °F (36.7 °C)-98.7 °F (37.1 °C)] 98.1 °F (36.7 °C)  Heart Rate:  [89-93] 93  Resp:  [18] 18  BP: (139-168)/(73-89) 168/89    Objective exam unchanged BP still running high . NIH remains stable.     Results Review:     I reviewed the patient's new clinical results.    Medication Review:     Assessment/Plan       Acute ischemic left PCA stroke (CMS/HCC)    Status post placement of implantable loop recorder    HTN (hypertension)    Diabetes mellitus (CMS/HCC)    Hyperlipidemia    Morbid obesity (CMS/HCC)    Anxiety disorder    Abdominal wall abscess    Stroke (cerebrum) (CMS/HCC)    Vitamin D deficiency    History of fatty infiltration of liver      Assessment & Plan Continue to prepare for dc. SBP target of 140. I increased Amlodipine from 5.0 to 7.5. Will monitor bp. DC 2-3 weeks. Will dc [facility]     Santo Noonan MD  04/01/19  10:00 AM    Time:       "

## 2019-04-01 NOTE — THERAPY TREATMENT NOTE
"Inpatient Rehabilitation - Occupational Therapy Treatment Note    [facility]     Patient Name: Nayan Ryan  : 1965  MRN: [id number]    Today's Date: 2019                 Admit Date: 3/24/2019      Visit Dx:  No diagnosis found.    Patient Active Problem List   Diagnosis   • Acute ischemic left PCA stroke (CMS/HCC)   • Status post placement of implantable loop recorder   • HTN (hypertension)   • Diabetes mellitus (CMS/HCC)   • Hyperlipidemia   • Morbid obesity (CMS/HCC)   • Anxiety disorder   • Migraine   • H/O hernia repair   • Abdominal wall abscess   • Back pain   • Stroke (cerebrum) (CMS/HCC)   • Vitamin D deficiency   • History of fatty infiltration of liver         Therapy Treatment    IRF Treatment Summary     Row Name 19 0952 19 0819 08       Evaluation/Treatment Time and Intent    Subjective Information  no complaints  -LH  no complaints  -RP  no complaints  -SA    Existing Precautions/Restrictions  fall  -LH  fall  -RP  fall swallow  -SA    Document Type  therapy note (daily note)  -LH  therapy note (daily note)  -RP  therapy note (daily note)  -SA    Mode of Treatment  individual therapy;physical therapy  -LH  occupational therapy  -RP  speech-language pathology  -SA    Patient/Family Observations  pt seated in WC no acute distress  -  pt seated in w/c w/ no signs of acute distress  -RP  up in w/c; pt stated \"I feel like i am doing better\"  -SA    Start Time (Evaluation/Treatment)  --  --  0800  -SA    Stop Time (Evaluation/Treatment)  --  --  0830  -SA    Recorded by [LH] Clau Ayon, PT [RP] Gloria Sinclair, OT [SA] Narcisa Sanchez MS CCC-SLP    Row Name 1952 19 0830          Cognition/Psychosocial- PT/OT    Affect/Mental Status (Cognitive)  flat/blunted affect  -LH  flat/blunted affect  -RP     Orientation Status (Cognition)  oriented x 4  -LH  oriented x 3  -RP     Follows Commands (Cognition)  follows one step commands;75-90% accuracy;verbal " cues/prompting required;repetition of directions required  -  follows one step commands;over 90% accuracy;repetition of directions required  -     Personal Safety Interventions  fall prevention program maintained;gait belt;nonskid shoes/slippers when out of bed;supervised activity  -  fall prevention program maintained;gait belt;muscle strengthening facilitated;nonskid shoes/slippers when out of bed  -     Cognitive Function (Cognitive)  executive function deficit  -  --     Executive Function Deficit (Cognition)  severe deficit  -  --     Safety Deficit (Cognitive)  --  severe deficit  -RP     Recorded by [] Clau Ayon, PT [RP] Gloria Sinclair, OT     Row Name 04/01/19 6430             Transfer Assessment/Treatment    Transfer Assessment/Treatment  shower transfer  -RP      Recorded by [RP] Gloria Sinclair, OT      Row Name 04/01/19 3652             Sit-Stand Transfer    Sit-Stand [place] (Transfers)  minimum assist (75% patient effort);2 person assist  -      Assistive Device (Sit-Stand Transfers)  walker, front-wheeled pistol  walker  -      Recorded by [] Clau Ayon, PT      Row Name 04/01/19 8052             Stand-Sit Transfer    Stand-Sit [place] (Transfers)  minimum assist (75% patient effort);2 person assist;verbal cues;nonverbal cues (demo/gesture);moderate assist (50% patient effort)  -      Assistive Device (Stand-Sit Transfers)  walker, front-wheeled pistol   -      Recorded by [] Clau Ayon, PT      Row Name 04/01/19 4530             Shower Transfer    Type (Shower Transfer)  stand pivot/stand step;sit-stand;stand-sit  -      [place] Level (Shower Transfer)  moderate assist (50% patient effort);verbal cues;nonverbal cues (demo/gesture)  -      Assistive Device (Shower Transfer)  grab bars/tub rail;shower chair;wheelchair  -RP      Recorded by [] Gloria Sinclair, OT      Row Name 04/01/19 3252             Gait/Stairs Assessment/Training     [place] Level (Gait)  minimum assist (75% patient effort);moderate assist (50% patient effort);2 person assist  -LH      Assistive Device (Gait)  walker, front-wheeled pistol   -LH      Distance in Feet (Gait)  25x2  -LH      Pattern (Gait)  step-to TC/VCs to adv to swing through RLE (inc step)  -LH      Deviations/Abnormal Patterns (Gait)  nancy decreased  -LH      Bilateral Gait Deviations  forward flexed posture;heel strike decreased  -LH      Left Sided Gait Deviations  weight shift ability decreased  -LH      Right Sided Gait Deviations  foot drop/toe drag;heel strike decreased;weight shift ability decreased;knee hyperextension dec step length  -LH      Comment (Gait/Stairs)  cueing for inc step length, slider RLE  -LH      Recorded by [LH] Clau Ayon, PT      Row Name 04/01/19 0830             Basic Activities of Daily Living (BADLs)    Basic Activities of Daily Living  bathing;upper body dressing;lower body dressing;grooming  -RP      Recorded by [RP] Gloria Sinclair, OT      Row Name 04/01/19 0858             Bathing Assessment/Treatment    Bathing [place] Level  bathing skills;upper body;lower body;moderate assist (50% patient effort);verbal cues;nonverbal cues (demo/gesture)  -RP      Assistive Device (Bathing)  grab bar/tub rail;hand held shower spray hose;shower chair  -RP      Bathing Position  supported sitting;supported standing  -RP      Bathing Setup Assistance  obtain supplies  -RP      Recorded by [RP] Gloria Sinclair, MADAY      Row Name 04/01/19 0829             Upper Body Dressing Assessment/Treatment    Upper Body Dressing Task  doff;don;pull over garment;standby assist;verbal cues  -RP      Upper Body Dressing Position  supported sitting  -RP      Set-up Assistance (Upper Body Dressing)  obtain clothing  -RP      Recorded by [RP] Gloria Sinclair, OT      Row Name 04/01/19 0830             Lower Body Dressing Assessment/Treatment    Lower Body Dressing [place] Level   doff;don;underwear;pants/bottoms;socks;shoes/slippers;shoelaces;moderate assist (50% patient effort);maximum assist (25% patient effort);verbal cues  -RP      Lower Body Dressing Position  supported sitting;supported standing  -RP      Lower Body Dressing Setup Assistance  obtain clothing  -RP      Comment (Lower Body Dressing)  jay dressing technique  -RP      Recorded by [RP] Gloria Sinclair, OT      Row Name 04/01/19 0830             Grooming Assessment/Treatment    Grooming [place] Level  grooming skills;deodorant application;hair care, combing/brushing;oral care regimen;shave face;wash face, hands;minimum assist (75% patient effort);verbal cues  -RP      Grooming Position  sink side;supported sitting  -RP      Grooming Setup Assistance  obtain supplies  -RP      Comment (Grooming)  min A for shaving   -RP      Recorded by [RP] Gloria Sinclair, OT      Row Name 04/01/19 0952 04/01/19 0830          Pain Scale: Numbers Pre/Post-Treatment    Pain Scale: Numbers, Pretreatment  0/10 - no pain  -LH  0/10 - no pain  -RP     Pain Scale: Numbers, Post-Treatment  0/10 - no pain  -LH  0/10 - no pain  -RP     Recorded by [LH] Clua Ayon, PT [RP] Gloria Sinclair, OT     Row Name 04/01/19 0952             Therapeutic Exercise    Therapeutic Exercise  seated, lower extremities  -      Recorded by [LH] Clau Ayon, PT      Row Name 04/01/19 0830             Upper Extremity Seated Therapeutic Exercise    Performed, Seated Upper Extremity (Therapeutic Exercise)  shoulder flexion/extension;shoulder external/internal rotation;shoulder horizontal abduction/adduction;scapular protraction/retraction;elbow flexion/extension  -RP      Exercise Type, Seated Upper Extremity (Therapeutic Exercise)  AROM (active range of motion)  -RP      Expected Outcomes, Seated Upper Extremity (Therapeutic Exercise)  improve functional tolerance, single extremity activity;improve functional tolerance, self-care activity;improve performance,  BADLs;improve performance, reaching/manipulating objects;improve performance, gross motor coordination skills;improve neuromuscular control  -      Sets/Reps Detail, Seated Upper Extremity (Therapeutic Exercise)  10 reps x 1 set  -RP      Recorded by [RP] Gloria Sinclair OT      Row Name 04/01/19 0952             Lower Extremity Seated Therapeutic Exercise    Performed, Seated Lower Extremity (Therapeutic Exercise)  hip flexion/extension;hip abduction/adduction;LAQ (long arc quad), knee extension;ankle dorsiflexion/plantarflexion  -      Device, Seated Lower Extremity (Therapeutic Exercise)  elastic bands/tubing RTB  -      Exercise Type, Seated Lower Extremity (Therapeutic Exercise)  AROM (active range of motion);AAROM (active assistive range of motion)  -      Sets/Reps Detail, Seated Lower Extremity (Therapeutic Exercise)  2/10  -      Comment, Seated Lower Extremity (Therapeutic Exercise)  assist for heel slides RLE - sheet for AAROM- poor hamstring activation  -      Recorded by [] Clau Ayon PT      Row Name 04/01/19 0952 04/01/19 0830          Positioning and Restraints    Pre-Treatment Position  sitting in chair/recliner  -  sitting in chair/recliner  -     Post Treatment Position  wheelchair  -  wheelchair  -RP     In Wheelchair  sitting;call light within reach;encouraged to call for assist;exit alarm on;patient within staff view  -  sitting;call light within reach;encouraged to call for assist;exit alarm on;patient within staff view  -     Recorded by [] Clau Ayon, PT [RP] Gloria Sinclair, OT       User Key  (r) = Recorded By, (t) = Taken By, (c) = Cosigned By    Initials Name Effective Dates     Clau Ayon, PT 04/03/18 -     Narcisa Gaston, MS CCC-SLP 04/03/18 -     Gloria Givens OT 05/03/18 -           Wound 03/26/19 0900 Left chest incision (Active)   Dressing Appearance open to air 4/1/2019  8:00 AM   Closure Liquid skin adhesive;Approximated 4/1/2019   8:00 AM   Base clean;dry 4/1/2019  8:00 AM   Drainage Amount none 3/31/2019  8:28 PM         OT Recommendation and Plan                 OT IRF GOALS     Row Name 03/25/19 1649             Bathing Goal 1 (OT-IRF)    Activity/Device (Bathing Goal 1, OT-IRF)  bathing skills, all;tub bench;hand-held shower spray hose;grab bar/tub rail  -DN      [place] Level (Bathing Goal 1, OT-IRF)  minimum assist (75% or more patient effort);verbal cues required;set-up required;tactile cues required  -DN      Time Frame (Bathing Goal 1, OT-IRF)  short term goal (STG)  -DN      Progress/Outcomes (Bathing Goal 1, OT-IRF)  continuing progress toward goal  -DN         Bathing Goal 2 (OT-IRF)    Activity/Device (Bathing Goal 2, OT-IRF)  bathing skills, all;tub bench;hand-held shower spray hose;grab bar/tub rail  -DN      [place] Level (Bathing Goal 2, OT-IRF)  supervision required;verbal cues required  -DN      Time Frame (Bathing Goal 2, OT-IRF)  long term goal (LTG)  -DN      Progress/Outcomes (Bathing Goal 2, OT-IRF)  continuing progress toward goal  -DN         UB Dressing Goal 1 (OT-IRF)    Activity/Device (UB Dressing Goal 1, OT-IRF)  upper body dressing  -DN      [place] (UB Dress Goal 1, OT-IRF)  minimum assist (75% or more patient effort);verbal cues required  -DN      Time Frame (UB Dressing Goal 1, OT-IRF)  short term goal (STG)  -DN      Progress/Outcomes (UB Dressing Goal 1, OT-IRF)  continuing progress toward goal  -DN         UB Dressing Goal 2 (OT-IRF)    Activity/Device (UB Dressing Goal 2, OT-IRF)  upper body dressing  -DN      [place] (UB Dress Goal 2, OT-IRF)  supervision required;verbal cues required  -DN      Time Frame (UB Dressing Goal 2, OT-IRF)  long term goal (LTG)  -DN      Progress/Outcomes (UB Dressing Goal 2, OT-IRF)  continuing progress toward goal  -DN         LB Dressing Goal 1 (OT-IRF)    Activity/Device (LB Dressing Goal 1, OT-IRF)  lower body dressing  -DN      [place] (LB  Dressing Goal 1, OT-IRF)  moderate assist (50-74% patient effort);verbal cues required;set-up required;tactile cues required  -DN      Time Frame (LB Dressing Goal 1, OT-IRF)  short term goal (STG)  -DN      Progress/Outcomes (LB Dressing Goal 1, OT-IRF)  continuing progress toward goal  -DN         LB Dressing Goal 2 (OT-IRF)    Activity/Device (LB Dressing Goal 2, OT-IRF)  lower body dressing  -DN      [place] (LB Dressing Goal 2, OT-IRF)  verbal cues required;tactile cues required;minimum assist (75% or more patient effort)  -DN      Time Frame (LB Dressing Goal 2, OT-IRF)  long term goal (LTG)  -DN      Progress/Outcomes (LB Dressing Goal 2, OT-IRF)  continuing progress toward goal  -DN         Grooming Goal 1 (OT-IRF)    Activity/Device (Grooming Goal 1, OT-IRF)  grooming skills, all  -DN      [place] (Grooming Goal 1, OT-IRF)  supervision required;verbal cues required;minimum assist (75% or more patient effort)  -DN      Time Frame (Grooming Goal 1, OT-IRF)  short term goal (STG)  -DN      Progress/Outcomes (Grooming Goal 1, OT-IRF)  continuing progress toward goal  -DN         Grooming Goal 2 (OT-IRF)    Activity/Device (Grooming Goal 2, OT-IRF)  grooming skills, all  -DN      [place] (Grooming Goal 2, OT-IRF)  set-up required;supervision required;verbal cues required;tactile cues required  -DN      Time Frame (Grooming Goal 2, OT-IRF)  long term goal (LTG)  -DN      Progress/Outcomes (Grooming Goal 2, OT-IRF)  continuing progress toward goal  -DN         Toileting Goal 1 (OT-IRF)    Activity/Device (Toileting Goal 1, OT-IRF)  toileting skills, all;adjust/manage clothing;perform perineal hygiene;grab bar/safety frame;raised toilet seat  -DN      [place] Level (Toileting Goal 1, OT-IRF)  moderate assist (50-74% patient effort);verbal cues required;tactile cues required  -DN      Time Frame (Toileting Goal 1, OT-IRF)  short term goal (STG)  -DN      Progress/Outcomes (Toileting Goal 1,  OT-IRF)  continuing progress toward goal  -DN         Toileting Goal 2 (OT-IRF)    Activity/Device (Toileting Goal 2, OT-IRF)  toileting skills, all;adjust/manage clothing;perform perineal hygiene  -DN      [place] Level (Toileting Goal 2, OT-IRF)  contact guard assist;verbal cues required;tactile cues required  -DN      Time Frame (Toileting Goal 2, OT-IRF)  long term goal (LTG)  -DN      Progress/Outcomes (Toileting Goal 2, OT-IRF)  continuing progress toward goal  -DN         Self-Feeding Goal 1 (OT-IRF)    Activity/Device (Self-Feeding Goal 1, OT-IRF)  self-feeding skills, all  -DN      [place] (Self-Feeding Goal 1, OT-IRF)  supervision required;tactile cues required  -DN      Time Frame (Self-Feeding Goal 1, OT-IRF)  short term goal (STG)  -DN      Progress/Outcomes 1 (Self-Feeding Goal, OT-IRF)  continuing progress toward goal  -DN         Self-Feeding Goal 2 (OT-IRF)    Activity/Device (Self-Feeding Goal 2, OT-IRF)  self-feeding skills, all  -DN      [place] (Self-Feeding Goal 2, OT-IRF)  supervision required;set-up required  -DN      Time Frame (Self-Feeding Goal 2, OT-IRF)  long term goal (LTG)  -DN      Progress/Outcomes (Self-Feeding Goal 2, OT-IRF)  continuing progress toward goal  -DN         Caregiver Training Goal 2 (OT-IRF)    Caregiver Training Goal 2 (OT-IRF)  pt caregiver to be independent with any assist pt needs with adls, transfers and HEP for d/c home  -DN      Time Frame (Caregiver Training Goal 2, OT-IRF)  long term goal (LTG)  -DN      Progress/Outcomes (Caregiver Training Goal 2, OT-IRF)  continuing progress toward goal  -DN        User Key  (r) = Recorded By, (t) = Taken By, (c) = Cosigned By    Initials Name Provider Type    Reg Bolden OT Occupational Therapist          Occupational Therapy Education     Title: PT OT SLP Therapies (In Progress)     Topic: Occupational Therapy (In Progress)     Point: ADL training (In Progress)     Description: Instruct learner(s)  on proper safety adaptation and remediation techniques during self care or transfers.   Instruct in proper use of assistive devices.    Learning Progress Summary           Patient Acceptance, E,TB,D, NR by DN at 3/26/2019 12:06 PM    Comment:  jay adls and commode/shower transfers, still max vc for all due to cognition and visual impairments    Acceptance, E,TB,D, VU,NR by DN at 3/25/2019  5:23 PM    Comment:  OT role in rehab, transfer traning, jay adls                   Point: Home exercise program (In Progress)     Description: Instruct learner(s) on appropriate technique for monitoring, assisting and/or progressing therapeutic exercises/activities.    Learning Progress Summary           Patient Acceptance, E,TB,D, NR by DN at 3/26/2019 12:06 PM    Comment:  jay adls and commode/shower transfers, still max vc for all due to cognition and visual impairments    Acceptance, E,TB,D, VU,NR by DN at 3/25/2019  5:23 PM    Comment:  OT role in rehab, transfer traning, jay adls                   Point: Precautions (In Progress)     Description: Instruct learner(s) on prescribed precautions during self-care and functional transfers.    Learning Progress Summary           Patient Acceptance, E,TB,D, NR by DN at 3/26/2019 12:06 PM    Comment:  jay adls and commode/shower transfers, still max vc for all due to cognition and visual impairments    Acceptance, E,TB,D, VU,NR by DN at 3/25/2019  5:23 PM    Comment:  OT role in rehab, transfer traning, jay adls                   Point: Body mechanics (In Progress)     Description: Instruct learner(s) on proper positioning and spine alignment during self-care, functional mobility activities and/or exercises.    Learning Progress Summary           Patient Acceptance, E,TB,D, NR by DN at 3/26/2019 12:06 PM    Comment:  jay adls and commode/shower transfers, still max vc for all due to cognition and visual impairments    Acceptance, E,TB,D, VU,NR by DN at 3/25/2019  5:23 PM     Comment:  OT role in rehab, transfer traning, jay adls                               User Key     Initials Effective Dates Name Provider Type Discipline    DN 06/08/18 -  Reg Mckinney OT Occupational Therapist OT                       Time Calculation:     Time Calculation- OT     Row Name 04/01/19 1156             Time Calculation- OT    OT Start Time  0830  -RP      OT Stop Time  0930  -RP      OT Time Calculation (min)  60 min  -RP      OT Received On  04/01/19  -RP        User Key  (r) = Recorded By, (t) = Taken By, (c) = Cosigned By    Initials Name Provider Type    RP Gloria Sinclair, OT Occupational Therapist          Therapy Charges for Today     Code Description Service Date Service Provider Modifiers Qty    63718769762 HC OT SELF CARE/MGMT/TRAIN EA 15 MIN 4/1/2019 Gloria Sinclair OT GO 3    22331785001 HC OT THER PROC EA 15 MIN 4/1/2019 Gloria Sinclair OT GO 1                   Gloria Sinclair OT  4/1/2019

## 2019-04-01 NOTE — THERAPY TREATMENT NOTE
Inpatient Rehabilitation - Speech Language Pathology Treatment Note    [facility]       Patient Name: Nayan Ryan  : 1965  MRN: [id number]    Today's Date: 2019           Admit Date: 3/24/2019      Visit Dx:      No diagnosis found.    Patient Active Problem List   Diagnosis   • Acute ischemic left PCA stroke (CMS/HCC)   • Status post placement of implantable loop recorder   • HTN (hypertension)   • Diabetes mellitus (CMS/HCC)   • Hyperlipidemia   • Morbid obesity (CMS/HCC)   • Anxiety disorder   • Migraine   • H/O hernia repair   • Abdominal wall abscess   • Back pain   • Stroke (cerebrum) (CMS/HCC)   • Vitamin D deficiency   • History of fatty infiltration of liver          Therapy Treatment    Evaluation/Coping    Evaluation/Treatment Time and Intent  Subjective Information: no complaints (19 1100 : Narcisa Sanchez MS CCC-SLP)  Existing Precautions/Restrictions: fall(swallow) (19 1100 : Narcisa Sanchez MS CCC-SLP)  Document Type: therapy note (daily note) (19 1100 : Narcisa Sanchez MS CCC-SLP)  Mode of Treatment: speech-language pathology (19 1100 : Narcisa Sanchez MS CCC-SLP)  Patient/Family Observations: up in w/c at [facility] station; ready for ST (19 1100 : Narcisa Sanchez MS CCC-SLP)  Start Time (Evaluation/Treatment): 1100 (19 1100 : Narcisa Sanchez MS CCC-SLP)  Stop Time (Evaluation/Treatment): 1130 (19 1100 : Narcisa Sanchez MS CCC-SLP)    Vitals/Pain/Safety         Cognition/Communication         Oral Motor/Eating         Mobility/Basic Activities/Instrumental Activities/Motor/Modality                   ROM/MMT                   Sensory/Myotome/Dermatome/Edema               Posture/Balance/Special Tests/Exercise/Transportation/Sexual Function                   Orthotics/Residual Limb/Prosthetic Management              Outcome Summary         EDUCATION    The patient has been educated in the following areas:     Communication Impairment.    SLP Recommendation and  Plan    SLP Diagnosis: Moderate Anomic Aphasia    SLP Diagnosis: Moderate Anomic Aphasia    Rehab Potential/Prognosis: good    Swallow Criteria for Skilled Therapeutic Interventions Met: no problems identified which require skilled intervention, other (see comments)(however, will monitor closely for s/s asp/dysphagia)    Anticipated Dischage Disposition: home with OP services, anticipate therapy at next level of care         Therapy Frequency (Swallow): 5 days per week    Predicted Duration Therapy Intervention (Days): until discharge           Plan of Care Reviewed With: patient      SLP GOALS     Row Name 04/01/19 1100 04/01/19 0800          Words/Phrases/Sentences Goal 1 (SLP)    Progress (Ability to Contruct Words/Phrases/Sentences Goal 1, SLP)  90%;independently (over 90% accuracy)  -SA  --     Progress/Outcomes (Identify Objects and Pictures Goal 1, SLP)  good progress toward goal  -SA  --     Comment (Words/Phrases/Sentences Goal 1, SLP)  CT: ID picture categories: level 1  -SA  --        Comprehend Questions Goal 1 (SLP)    Improve Ability to Comprehend Questions Goal 1 (SLP)  multi-unit questions  -SA  --     Progress (Ability to Comprehend Questions Goal 1, SLP)  50%;with moderate cues (50-74%)  -SA  --     Progress/Outcomes (Comprehend Questions Goal 1, SLP)  goal ongoing  -SA  --     Comment (Comprehend Questions Goal 1, SLP)  CT: Inferencing Voicemail: level 1; needed mod cues and repetition of listening to voicemail  -SA  --        Reading Comprehension at Word Level Goal 1 (SLP)    Progress (Reading Comprehension at Word Level Goal 1, SLP)  --  90%;independently (over 90% accuracy)  -SA     Progress/Outcomes (Reading Comprehension at Word Level Goal 1, SLP)  --  good progress toward goal  -SA     Comment (Reading Comprehension at Word Level Goal 1, SLP)  --  93% answering questions with choice of 3 answers  -SA        Word Retrieval Skills Goal 1 (SLP)    Progress (Word Retrieval Skills Goal 1, SLP)   independently (over 90% accuracy)  -SA  with moderate cues (50-74%)  -SA     Progress/Outcomes (Word Retrieval Goal 1, SLP)  good progress toward goal  -SA  goal ongoing;continuing progress toward goal  -SA     Comment (Word Retrieval Goal 1, SLP)  95%; completed open ended sentences verbally  -SA  confrontational naming objects: 67%; actions: 64%; assisted with phonemic cues 95%; open ended sentences  -SA        Awareness of Basic Personal Information Goal 1 (SLP)    Progress (Awareness of Basic Personal Information Goal 1, SLP)  --  with minimal cues (75-90%)  -SA     Progress/Outcomes (Awareness of Basic Personal Information Goal 1, SLP)  --  continuing progress toward goal  -SA     Comment (Awareness of Basic Personal Information Goal 1, SLP)  --  73%; answering questions related to personal info: name, address, city, state, wifes name, childrens names, dogs names, , situation; place; phone number home and cell and wife's phone number.   -       User Key  (r) = Recorded By, (t) = Taken By, (c) = Cosigned By    Initials Name Provider Type    Narcisa Gaston MS CCC-SLP Speech and Language Pathologist                  Time Calculation:       Time Calculation- SLP     Row Name 19 1236 19 0848          Time Calculation- [facility]    SLP Start Time  1100  -  0800  -     SLP Stop Time  1130  -  0830  -     SLP Time Calculation (min)  30 min  -  30 min  -     SLP Received On  --  19  -       User Key  (r) = Recorded By, (t) = Taken By, (c) = Cosigned By    Initials Name Provider Type    Narcisa Gaston MS CCC-SLP Speech and Language Pathologist            Therapy Charges for Today     Code Description Service Date Service Provider Modifiers Qty    [id number]  ST TREATMENT SPEECH 4 2019 Narcisa Sanchez MS CCC-SLP GN 1                           Narcisa Sanchez MS CCC-JOSIAS  2019

## 2019-04-01 NOTE — THERAPY TREATMENT NOTE
"Inpatient Rehabilitation - Physical Therapy Treatment Note  [facility]     Patient Name: Nayan Ryan  : 1965  MRN: [id number]    Today's Date: 2019                 Admit Date: 3/24/2019      Visit Dx:    No diagnosis found.    Patient Active Problem List   Diagnosis   • Acute ischemic left PCA stroke (CMS/HCC)   • Status post placement of implantable loop recorder   • HTN (hypertension)   • Diabetes mellitus (CMS/HCC)   • Hyperlipidemia   • Morbid obesity (CMS/HCC)   • Anxiety disorder   • Migraine   • H/O hernia repair   • Abdominal wall abscess   • Back pain   • Stroke (cerebrum) (CMS/HCC)   • Vitamin D deficiency   • History of fatty infiltration of liver       Therapy Treatment    IRF Treatment Summary     Row Name 19 1100 19 0952 19 0830       Evaluation/Treatment Time and Intent    Subjective Information  no complaints  -SA  no complaints  -  no complaints  -RP    Existing Precautions/Restrictions  fall swallow  -SA  fall  -LH  fall  -RP    Document Type  therapy note (daily note)  -SA  therapy note (daily note)  -  therapy note (daily note)  -    Mode of Treatment  speech-language pathology  -SA  individual therapy;physical therapy  -  occupational therapy  -RP    Patient/Family Observations  up in w/c at [facility] station; ready for ST  -  pt seated in WC no acute distress  -  pt seated in w/c w/ no signs of acute distress  -RP    Start Time (Evaluation/Treatment)  1100  -SA  --  --    Stop Time (Evaluation/Treatment)  1130  -SA  --  --    Recorded by [SA] Narcisa Sanchez, MS CCC-SLP [LH] Clau Ayon, PT [RP] Gloria Sinclair, MADAY    Row Name 19 0800             Evaluation/Treatment Time and Intent    Subjective Information  no complaints  -SA      Existing Precautions/Restrictions  fall swallow  -SA      Document Type  therapy note (daily note)  -SA      Mode of Treatment  speech-language pathology  -SA      Patient/Family Observations  up in w/c; pt stated \"I feel " "like i am doing better\"  -SA      Start Time (Evaluation/Treatment)  0800  -SA      Stop Time (Evaluation/Treatment)  0830  -SA      Recorded by [SA] Narcisa Sanchez MS CCC-SLP      Row Name 04/01/19 0952 04/01/19 0830          Cognition/Psychosocial- PT/OT    Affect/Mental Status (Cognitive)  flat/blunted affect  -  flat/blunted affect  -RP     Orientation Status (Cognition)  oriented x 4  -LH  oriented x 3  -RP     Follows Commands (Cognition)  follows one step commands;75-90% accuracy;verbal cues/prompting required;repetition of directions required  -LH  follows one step commands;over 90% accuracy;repetition of directions required  -RP     Personal Safety Interventions  fall prevention program maintained;gait belt;nonskid shoes/slippers when out of bed;supervised activity  -  fall prevention program maintained;gait belt;muscle strengthening facilitated;nonskid shoes/slippers when out of bed  -RP     Cognitive Function (Cognitive)  executive function deficit  -  --     Executive Function Deficit (Cognition)  severe deficit  -  --     Safety Deficit (Cognitive)  --  severe deficit  -RP     Recorded by [LH] Clau Ayon, PT [RP] Gloria Sinclair OT     Row Name 04/01/19 0830             Transfer Assessment/Treatment    Transfer Assessment/Treatment  shower transfer  -RP      Recorded by [RP] Gloria Sinclair OT      Row Name 04/01/19 0952             Sit-Stand Transfer    Sit-Stand [place] (Transfers)  minimum assist (75% patient effort);2 person assist;verbal cues;set up  -      Assistive Device (Sit-Stand Transfers)  walker, front-wheeled pistol  walker  -      Recorded by [] Clau Ayon PT      Row Name 04/01/19 0952             Stand-Sit Transfer    Stand-Sit [place] (Transfers)  minimum assist (75% patient effort);2 person assist;verbal cues;nonverbal cues (demo/gesture);moderate assist (50% patient effort)  -      Assistive Device (Stand-Sit Transfers)  walker, front-wheeled " pistol   -LH      Recorded by [] Clau Ayon, PT      Row Name 04/01/19 0830             Shower Transfer    Type (Shower Transfer)  stand pivot/stand step;sit-stand;stand-sit  -RP      [place] Level (Shower Transfer)  moderate assist (50% patient effort);verbal cues;nonverbal cues (demo/gesture)  -RP      Assistive Device (Shower Transfer)  grab bars/tub rail;shower chair;wheelchair  -RP      Recorded by [RP] Gloria Sinclair, OT      Row Name 04/01/19 0952             Gait/Stairs Assessment/Training    [place] Level (Gait)  minimum assist (75% patient effort);moderate assist (50% patient effort);2 person assist;set up;verbal cues;nonverbal cues (demo/gesture)  -      Assistive Device (Gait)  walker, front-wheeled pistol   -      Distance in Feet (Gait)  25x2  -      Pattern (Gait)  step-to TC/VCs to adv to swing through RLE (inc step)  -      Deviations/Abnormal Patterns (Gait)  nancy decreased  -      Bilateral Gait Deviations  forward flexed posture;heel strike decreased  -      Left Sided Gait Deviations  weight shift ability decreased  -      Right Sided Gait Deviations  foot drop/toe drag;heel strike decreased;weight shift ability decreased;knee hyperextension dec step length  -      Comment (Gait/Stairs)  cueing for inc step length, slider RLE. following w WC  -LH      Recorded by [] Clau Ayon, PT      Row Name 04/01/19 0830             Basic Activities of Daily Living (BADLs)    Basic Activities of Daily Living  bathing;upper body dressing;lower body dressing;grooming  -RP      Recorded by [RP] Gloria Sinclair, OT      Row Name 04/01/19 0830             Bathing Assessment/Treatment    Bathing [place] Level  bathing skills;upper body;lower body;moderate assist (50% patient effort);verbal cues;nonverbal cues (demo/gesture)  -RP      Assistive Device (Bathing)  grab bar/tub rail;hand held shower spray hose;shower chair  -RP      Bathing Position  supported  sitting;supported standing  -RP      Bathing Setup Assistance  obtain supplies  -RP      Recorded by [RP] Gloria Sinclair, OT      Row Name 04/01/19 0830             Upper Body Dressing Assessment/Treatment    Upper Body Dressing Task  doff;don;pull over garment;standby assist;verbal cues  -RP      Upper Body Dressing Position  supported sitting  -RP      Set-up Assistance (Upper Body Dressing)  obtain clothing  -RP      Recorded by [RP] Gloria Sinclair, OT      Row Name 04/01/19 0830             Lower Body Dressing Assessment/Treatment    Lower Body Dressing [place] Level  doff;don;underwear;pants/bottoms;socks;shoes/slippers;shoelaces;moderate assist (50% patient effort);maximum assist (25% patient effort);verbal cues  -RP      Lower Body Dressing Position  supported sitting;supported standing  -RP      Lower Body Dressing Setup Assistance  obtain clothing  -RP      Comment (Lower Body Dressing)  jay dressing technique  -RP      Recorded by [RP] Gloria Sinclair, OT      Row Name 04/01/19 0830             Grooming Assessment/Treatment    Grooming [place] Level  grooming skills;deodorant application;hair care, combing/brushing;oral care regimen;shave face;wash face, hands;minimum assist (75% patient effort);verbal cues  -RP      Grooming Position  sink side;supported sitting  -RP      Grooming Setup Assistance  obtain supplies  -RP      Comment (Grooming)  min A for shaving   -RP      Recorded by [RP] Gloria Sinclair, OT      Row Name 04/01/19 0952 04/01/19 0830          Pain Scale: Numbers Pre/Post-Treatment    Pain Scale: Numbers, Pretreatment  0/10 - no pain  -LH  0/10 - no pain  -RP     Pain Scale: Numbers, Post-Treatment  0/10 - no pain  -LH  0/10 - no pain  -RP     Recorded by [LH] Clau Ayon, PT [RP] Gloria Sinclair, OT     Row Name 04/01/19 0952             Therapeutic Exercise    Therapeutic Exercise  seated, lower extremities  -LH      Recorded by [LH] Clau Ayon, PT      Row Name 04/01/19  0830             Upper Extremity Seated Therapeutic Exercise    Performed, Seated Upper Extremity (Therapeutic Exercise)  shoulder flexion/extension;shoulder external/internal rotation;shoulder horizontal abduction/adduction;scapular protraction/retraction;elbow flexion/extension  -      Exercise Type, Seated Upper Extremity (Therapeutic Exercise)  AROM (active range of motion)  -      Expected Outcomes, Seated Upper Extremity (Therapeutic Exercise)  improve functional tolerance, single extremity activity;improve functional tolerance, self-care activity;improve performance, BADLs;improve performance, reaching/manipulating objects;improve performance, gross motor coordination skills;improve neuromuscular control  -      Sets/Reps Detail, Seated Upper Extremity (Therapeutic Exercise)  10 reps x 1 set  -RP      Recorded by [] Gloria Sinclair OT      Row Name 04/01/19 0952             Lower Extremity Seated Therapeutic Exercise    Performed, Seated Lower Extremity (Therapeutic Exercise)  hip flexion/extension;hip abduction/adduction;LAQ (long arc quad), knee extension;ankle dorsiflexion/plantarflexion  -      Device, Seated Lower Extremity (Therapeutic Exercise)  elastic bands/tubing RTB  -      Exercise Type, Seated Lower Extremity (Therapeutic Exercise)  AROM (active range of motion);AAROM (active assistive range of motion)  -      Sets/Reps Detail, Seated Lower Extremity (Therapeutic Exercise)  2/10  -      Comment, Seated Lower Extremity (Therapeutic Exercise)  assist for heel slides RLE - sheet for AAROM- poor hamstring activation  -      Recorded by [] Clau Ayon PT      Row Name 04/01/19 0952 04/01/19 0830          Positioning and Restraints    Pre-Treatment Position  sitting in chair/recliner  -  sitting in chair/recliner  -     Post Treatment Position  wheelchair  -  wheelchair  -     In Wheelchair  sitting;call light within reach;encouraged to call for assist;exit alarm  on;patient within staff view  -  sitting;call light within reach;encouraged to call for assist;exit alarm on;patient within staff view  -RP     Recorded by [] Clau Ayno, PT [RP] Gloria Sinclair OT       User Key  (r) = Recorded By, (t) = Taken By, (c) = Cosigned By    Initials Name Effective Dates     Clau Ayon, PT 04/03/18 -     SA Narcisa Sanchez, MS CCC-SLP 04/03/18 -     RP Gloria Sinclair OT 05/03/18 -         Wound 03/26/19 0900 Left chest incision (Active)   Dressing Appearance open to air 4/1/2019  8:00 AM   Closure Liquid skin adhesive;Approximated 4/1/2019  8:00 AM   Base clean;dry 4/1/2019  8:00 AM   Drainage Amount none 3/31/2019  8:28 PM     Physical Therapy Education     Title: PT OT SLP Therapies (In Progress)     Topic: Physical Therapy (In Progress)     Point: Mobility training (In Progress)     Learning Progress Summary           Patient Acceptance, E, NR by  at 4/1/2019 10:32 AM    Acceptance, E,TB,D, NR by ELISSA at 3/30/2019 11:44 AM    Acceptance, E,TB,D, NR by EE at 3/29/2019  2:58 PM    Acceptance, E,TB,D, NR by EE at 3/28/2019 11:38 AM    Acceptance, E,TB,D, NR by EE at 3/27/2019 10:05 AM    Acceptance, E,TB,D, NR by EE at 3/26/2019  9:48 AM    Acceptance, E,TB,D, NR by EE at 3/25/2019  3:09 PM                   Point: Home exercise program (In Progress)     Learning Progress Summary           Patient Acceptance, E, NR by  at 4/1/2019 10:32 AM    Acceptance, E,TB,D, NR by EE at 3/29/2019  2:58 PM    Acceptance, E,TB,D, NR by EE at 3/28/2019 11:38 AM    Acceptance, E,TB,D, NR by EE at 3/27/2019 10:05 AM    Acceptance, E,TB,D, NR by EE at 3/26/2019  9:48 AM    Acceptance, E,TB,D, NR by EE at 3/25/2019  3:09 PM                   Point: Body mechanics (In Progress)     Learning Progress Summary           Patient Acceptance, E, NR by  at 4/1/2019 10:32 AM    Acceptance, E,TBFLAVIO, NR by EE at 3/29/2019  2:58 PM    Acceptance, E,TB,D, NR by EE at 3/28/2019 11:38 AM    Acceptance,  E,TB,D, NR by EE at 3/27/2019 10:05 AM    Acceptance, E,TB,D, NR by EE at 3/26/2019  9:48 AM    Acceptance, E,TB,D, NR by EE at 3/25/2019  3:09 PM                   Point: Precautions (In Progress)     Learning Progress Summary           Patient Acceptance, E, NR by  at 4/1/2019 10:32 AM    Acceptance, E,TB,D, NR by EE at 3/29/2019  2:58 PM    Acceptance, E,TB,D, NR by EE at 3/28/2019 11:38 AM    Acceptance, E,TB,D, NR by EE at 3/27/2019 10:05 AM    Acceptance, E,TB,D, NR by EE at 3/26/2019  9:48 AM    Acceptance, E,TB,D, NR by EE at 3/25/2019  3:09 PM                               User Key     Initials Effective Dates Name Provider Type Discipline     06/08/18 -  Donna Inman, PT Physical Therapist PT     04/03/18 -  Clau Ayon, PT Physical Therapist PT     04/03/18 -  Ariadne Garcia, PT Physical Therapist PT                  PT Recommendation and Plan     Plan of Care Reviewed With: patient  Progress, Functional Goals: demonstrating adequate progress  Outcome Summary: pt has met 3 out of 4 STGs, all LTGs ongoing. cog deficits limiting optimal progress. transitioned last week to pistol  walker. needs TC/VCs for proper  advancement of RLE. will cont to prepare  for DC.       PT IRF GOALS     Row Name 04/01/19 1000             Bed Mobility Goal 1 (PT-IRF)    Progress/Outcomes (Bed Mobility Goal 1, PT-IRF)  goal met  -         Bed Mobility Goal 2 (PT-IRF)    Progress/Outcomes (Bed Mobility Goal 2, PT-IRF)  goal ongoing  -         Transfer Goal 1 (PT-IRF)    Progress/Outcomes (Transfer Goal 1, PT-IRF)  goal not met  -         Transfer Goal 2 (PT-IRF)    Progress/Outcomes (Transfer Goal 2, PT-IRF)  goal ongoing  -         Transfer Goal 3 (PT-IRF)    Progress/Outcomes (Transfer Goal 3, PT-IRF)  goal ongoing  -         Gait/Walking Locomotion Goal 1 (PT-IRF)    Progress/Outcomes (Gait/Walking Locomotion Goal 1, PT-IRF)  goal partially met  -         Gait/Walking Locomotion Goal 2 (PT-IRF)     Progress/Outcomes (Gait/Walking Locomotion Goal 2, PT-IRF)  goal ongoing  -         Wheelchair Locomotion Goal 1 (PT-IRF)    Progress/Outcomes (Wheelchair Locomotion Goal 1, PT-IRF)  goal met  -         Stairs Goal 1 (PT-IRF)    Progress/Outcomes (Stairs Goal 1, PT-IRF)  goal ongoing  -        User Key  (r) = Recorded By, (t) = Taken By, (c) = Cosigned By    Initials Name Provider Type     Clau Ayon, PT Physical Therapist               Time Calculation:     PT Charges     Row Name 04/01/19 1310 04/01/19 1032 04/01/19 0953       Time Calculation    Start Time  1230  -  --  0930  -    Stop Time  1300  -  --  1000  -    Time Calculation (min)  30 min  -  --  30 min  -    PT Received On  --  --  04/01/19  -    PT - Next Appointment  --  --  04/02/19  -    PT Goal Re-Cert Due Date  --  04/08/19  -  --      User Key  (r) = Recorded By, (t) = Taken By, (c) = Cosigned By    Initials Name Provider Type     Clau Ayon, PT Physical Therapist          Therapy Charges for Today     Code Description Service Date Service Provider Modifiers Qty    [id number]  PT THER PROC EA 15 MIN 4/1/2019 Clau Ayon, PT GP 4                   Clau Ayon, PT  4/1/2019

## 2019-04-01 NOTE — PROGRESS NOTES
Inpatient Rehabilitation Plan of Care Note    Plan of Care  Care Plan Reviewed - No updates at this time.    Sphincter Control    Performed Intervention(s)  Assistance with urinal  Assistance to bathroom  Incontinence care PRN    Signed by: Fina Torres RN

## 2019-04-01 NOTE — PLAN OF CARE
Problem: Stroke (IRF) (Adult)  Goal: Promote Optimal Functional [place]  Outcome: Ongoing (interventions implemented as appropriate)      Problem: Fall Risk (Adult)  Goal: Absence of Fall  Outcome: Ongoing (interventions implemented as appropriate)      Problem: Diabetes, Type 2 (Adult)  Goal: Signs and Symptoms of Listed Potential Problems Will be Absent, Minimized or Managed (Diabetes, Type 2)  Outcome: Ongoing (interventions implemented as appropriate)      Problem: Skin Injury Risk (Adult)  Goal: Skin Health and Integrity  Outcome: Ongoing (interventions implemented as appropriate)      Problem: Patient Care Overview  Goal: Plan of Care Review  Outcome: Ongoing (interventions implemented as appropriate)   04/01/19 1801   Patient Care Overview   IRF Plan of Care Review progress ongoing, continue   Progress, Functional Goals demonstrating adequate progress   Coping/Psychosocial   Plan of Care Reviewed With patient   OTHER   Outcome Summary Weakness of right side. Transfers mod assist of 1. Skin is intact. Voids per urinal. No unsafe behavior.

## 2019-04-01 NOTE — PROGRESS NOTES
Inpatient Rehabilitation Functional Measures Assessment    Functional Measures  VANITA Eating:  [facility] Grooming: [facility] Bathing:  [facility] Upper Body Dressing:  [facility] Lower Body Dressing:  [facility] Toileting:  [facility] Bladder Management  Level of Assistance:  [facility]  Frequency/Number of Accidents this Shift:  [facility] Bowel Management  Level of Assistance: [facility]  Frequency/Number of Accidents this Shift: [facility] Bed/Chair/Wheelchair Transfer:  [facility] Toilet Transfer:  [facility] Tub/Shower Transfer:  [facility]    Previously Documented Mode of Locomotion at Discharge: Field  VANITA Expected Mode of Locomotion at Discharge: [facility] Walk/Wheelchair:  [facility] Stairs:  [facility] Comprehension:  Auditory comprehension is the usual mode. Patient does not  comprehend complex/abstract information in their primary language without  assistance from a helper. Comprehension Score = 5, Supervision. Patient  comprehends basic daily needs or ideas greater than 90% of the time. Patient  requires stand by/rare prompting. Patient requires the following assistive  device(s): Glasses.  VANITA Expression:  Vocal expression is the usual mode. Patient does not express  complex/abstract information in their primary language without a helper.  Expression Score = 4, Minimal Prompting. Patient expresses basic daily needs or  ideas 75-90% of the time.  Patient requires minimal/occasional prompting. No  assistive devices were required.  VANITA Social Interaction:  Social Interaction Score = 6, Modified Independent.  Patient is modified independent for social interaction, requiring: Medications.    VANITA Problem Solving:  Activity was not observed.  VANITA Memory:  Memory Score = 3, Moderate Prompting. Patient recognizes and  remembers 50-74% of the time. Patient requires moderate/some prompting  for  memory for the following:    Therapy Mode Minutes  Occupational Therapy: [facility]  Physical Therapy:  Branch  Speech Language Pathology:  Branch    Signed by: Fina Torres RN

## 2019-04-01 NOTE — PROGRESS NOTES
Inpatient Rehabilitation Functional Measures Assessment    Functional Measures  VANITA Eating:  Branch  [facility] Grooming: [facility]  [facility] Bathing:  Branch  [facility] Upper Body Dressing:  Branch  [facility] Lower Body Dressing:  [facility] Toileting:  [facility] Bladder Management  Level of Assistance:  [place]  Frequency/Number of Accidents this Shift:  [facility] Bowel Management  Level of Assistance: [place]  Frequency/Number of Accidents this Shift: [facility] Bed/Chair/Wheelchair Transfer:  Activity was not observed.  VANITA Toilet Transfer:  [facility] Tub/Shower Transfer:  [place]    Previously Documented Mode of Locomotion at Discharge: Field  VANITA Expected Mode of Locomotion at Discharge: [facility] Walk/Wheelchair:  WHEELCHAIR OBSERVATION   Activity was not observed.    WALK OBSERVATION   Walk Distance Scale = 1.  Distance walked is less than 50 feet. Walk Score = 1.   Patient performs 50-74% of effort and requires moderate assistance of two or  more people for walking. safety . Patient walked a distance of 25 feet. Patient  requires the following assistive device(s): Rolling walker.  VANITA Stairs:  Activity was not observed.    VANITA Comprehension:  [facility] Expression:  [facility] Social Interaction:  [facility] Problem Solving:  [facility] Memory:  [place]    Therapy Mode Minutes  Occupational Therapy: [place]  Physical Therapy: Individual: 60 minutes.  Speech Language Pathology:  [place]    Signed by: Clau Ayon, PT

## 2019-04-01 NOTE — PROGRESS NOTES
Occupational Therapy: [facility]    Physical Therapy: [facility]    Speech Language Pathology:  Individual: 60 minutes.    Signed by: Narcisa Sanchez SLP

## 2019-04-01 NOTE — PROGRESS NOTES
Occupational Therapy: Individual: 60 minutes.    Physical Therapy: [facility]    Speech Language Pathology:  [facility]    Signed by: Gloria Sinclair OT

## 2019-04-01 NOTE — PROGRESS NOTES
Inpatient Rehabilitation Plan of Care Note    Plan of Care  Care Plan Reviewed - No updates at this time.    Safety    Performed Intervention(s)  Hourly rounding , items within reach  Assistance with out of bed activities  Falls protocol      Psychosocial    Performed Intervention(s)   PRN  Patient to verbalize feelings and cocnerns  Psychologist PRN      Body Systems    Performed Intervention(s)  Accuchecks ACHS  Medication as ordered  consistent carb diet      Sphincter Control    Performed Intervention(s)  Assistance with urinal  Assistance to bathroom  Incontinence care PRN    Signed by: Rosa Isela Fleming RN

## 2019-04-01 NOTE — PLAN OF CARE
Problem: Patient Care Overview  Goal: Plan of Care Review   04/01/19 1030   Patient Care Overview   Progress, Functional Goals demonstrating adequate progress   Coping/Psychosocial   Plan of Care Reviewed With patient   OTHER   Outcome Summary pt has met 3 out of 4 STGs, all LTGs ongoing. cog deficits limiting optimal progress. transitioned last week to pistol  walker. needs TC/VCs for proper advancement of RLE. will cont to prepare for DC.

## 2019-04-01 NOTE — PROGRESS NOTES
Inpatient Rehabilitation Functional Measures Assessment    Functional Measures  [facility] Eating:  [facility] Grooming: [facility] Bathing:  [facility] Upper Body Dressing:  [facility] Lower Body Dressing:  [facility] Toileting:  [facility] Bladder Management  Level of Assistance:  [place]  Frequency/Number of Accidents this Shift:  [facility] Bowel Management  Level of Assistance: [place]  Frequency/Number of Accidents this Shift: [facility] Bed/Chair/Wheelchair Transfer:  [facility] Toilet Transfer:  [facility] Tub/Shower Transfer:  [place]    Previously Documented Mode of Locomotion at Discharge: Field  VANITA Expected Mode of Locomotion at Discharge: [facility] Walk/Wheelchair:  [facility] Stairs:  [facility] Comprehension:  Auditory comprehension is the usual mode. Comprehension  Score = 6, Modified [place].  Patient comprehends complex/abstract  information in their primary language, requiring:  [facility] Expression:  Vocal expression is the usual mode. Expression Score = 6,  Modified Independent.  Patient expresses complex/abstract information in their  primary language, requiring:  [facility] Social Interaction:  Social Interaction Score = 6, Modified Independent.  Patient is modified independent for social interaction, requiring:  [facility] Problem Solving:  Activity was not observed.  [facility] Memory:  Memory Score = 5, Supervision.  Patient recognizes and remembers  with prompting only under stressful or unfamiliar conditions, but no more than  10% of the time, for the following behavior(s):    Therapy Mode Minutes  Occupational Therapy: [facility]  Physical Therapy: [place]  Speech Language Pathology:  [place]    Signed by: Rosa Isela Fleming RN

## 2019-04-01 NOTE — PROGRESS NOTES
Inpatient Rehabilitation Functional Measures Assessment and Plan of Care    Plan of Care  Updated Problems/Interventions  Mobility    [OT] Toilet Transfers(Active)  Current Status(04/01/2019): Mod/Min SPS  Weekly Goal(04/09/2019): CGA  Discharge Goal: `SBA    [OT] Tub/Shower Transfers(Active)  Current Status(04/01/2019): Mod/Min  Weekly Goal(04/09/2019): CGA  Discharge Goal: SBA        Self Care    [OT] Bathing(Active)  Current Status(04/01/2019): Mod  Weekly Goal(04/09/2019): Min  Discharge Goal: SBA    [OT] Dressing (Lower)(Active)  Current Status(04/01/2019): Max/Mod  Weekly Goal(04/09/2019): Mod  Discharge Goal: Min    [OT] Dressing (Upper)(Active)  Current Status(04/01/2019): SBA with max cues  Weekly Goal(04/09/2019): SBA  Discharge Goal: SBA    [OT] Eating(Active)  Current Status(04/01/2019): Min  Weekly Goal(04/09/2019): SBA  Discharge Goal: SBA    [OT] Grooming(Active)  Current Status(04/01/2019): Min A  Weekly Goal(04/09/2019): SBA  Discharge Goal: SBA    [OT] Toileting(Active)  Current Status(04/01/2019): dep/max  Weekly Goal(04/09/2019): Mod  Discharge Goal: SBA    Functional Measures  VANITA Eating:  [facility]anch  [facility]ANITA Grooming: [facility]  [facility]ANITA Bathing:  [facility]  [facility]ANITA Upper Body Dressing:  [facility]  [facility]AN[facility] Lower Body Dressing:  [facility]  [facility]ANITA Toileting:  [facility]anch    [facility] Bladder Management  Level of Assistance:  [facility]  Frequency/Number of Accidents this Shift:  [facility]anch    [facility] Bowel Management  Level of Assistance: [place]  Frequency/Number of Accidents this Shift: [facility]    VANITA Bed/Chair/Wheelchair Transfer:  [facility]  [facility]ANITA Toilet Transfer:  [facility]  VANITA Tub/Shower Transfer:  [facility]    Previously Documented Mode of Locomotion at Discharge: Field  VANITA Expected Mode of Locomotion at Discharge: [facility]  VANITA Walk/Wheelchair:  [facility]anch  VANITA Stairs:  [facility]anch    VANITA Comprehension:  [facility]  VANITA Expression:  [facility]anch  [facility]ANITA Social Interaction:  [facility]  [facility]ANITA Problem Solving:  [facility]  VANITA Memory:  [facility]anch    Therapy Mode Minutes  Occupational Therapy:  [facility]anch  Physical Therapy: [facility]  Speech Language Pathology:  Branch    Signed by: Gloria Sinclair OT

## 2019-04-01 NOTE — PROGRESS NOTES
Inpatient Rehabilitation Plan of Care Note    Plan of Care  Updated Problems/Interventions  Medical Problem(s)    BNE (Active)  Att'n. - Mild-Mod. Imp.  Exec. Fx. - Mod. Imp., flat affect, poor insight  Rsng/Jgmnt - Severely Imp. for abstract reasoning  Arith - Severely Imp.  Visuospatial Skills - Mod. Imp.  Visual mem. - Mildly Imp.  Vebral Mem. - Severely Imp.  Emot - Pt minimized emotional distress, appeared very flat    Signed by: Matthew Real Psy.d

## 2019-04-02 LAB
GLUCOSE BLDC GLUCOMTR-MCNC: 106 MG/DL (ref 70–130)
GLUCOSE BLDC GLUCOMTR-MCNC: 111 MG/DL (ref 70–130)
GLUCOSE BLDC GLUCOMTR-MCNC: 94 MG/DL (ref 70–130)
GLUCOSE BLDC GLUCOMTR-MCNC: 98 MG/DL (ref 70–130)

## 2019-04-02 PROCEDURE — 82962 GLUCOSE BLOOD TEST: CPT

## 2019-04-02 PROCEDURE — 97110 THERAPEUTIC EXERCISES: CPT

## 2019-04-02 PROCEDURE — 97535 SELF CARE MNGMENT TRAINING: CPT

## 2019-04-02 PROCEDURE — 63710000001 INSULIN GLARGINE PER 5 UNITS: Performed by: INTERNAL MEDICINE

## 2019-04-02 PROCEDURE — 92507 TX SP LANG VOICE COMM INDIV: CPT

## 2019-04-02 RX ADMIN — ASPIRIN 325 MG: 325 TABLET, COATED ORAL at 08:35

## 2019-04-02 RX ADMIN — INSULIN GLARGINE 32 UNITS: 100 INJECTION, SOLUTION SUBCUTANEOUS at 21:25

## 2019-04-02 RX ADMIN — LISINOPRIL 20 MG: 20 TABLET ORAL at 21:25

## 2019-04-02 RX ADMIN — METFORMIN HYDROCHLORIDE 500 MG: 500 TABLET, EXTENDED RELEASE ORAL at 08:35

## 2019-04-02 RX ADMIN — LISINOPRIL 20 MG: 20 TABLET ORAL at 08:35

## 2019-04-02 RX ADMIN — Medication 1 TABLET: at 08:35

## 2019-04-02 RX ADMIN — ALPRAZOLAM 1 MG: 0.5 TABLET ORAL at 21:24

## 2019-04-02 RX ADMIN — OXYCODONE AND ACETAMINOPHEN 1 TABLET: 5; 325 TABLET ORAL at 16:41

## 2019-04-02 RX ADMIN — PAROXETINE HYDROCHLORIDE 10 MG: 10 TABLET, FILM COATED ORAL at 08:34

## 2019-04-02 RX ADMIN — ATORVASTATIN CALCIUM 80 MG: 80 TABLET, FILM COATED ORAL at 21:25

## 2019-04-02 RX ADMIN — AMLODIPINE BESYLATE 5 MG: 5 TABLET ORAL at 08:34

## 2019-04-02 RX ADMIN — CLOPIDOGREL 75 MG: 75 TABLET, FILM COATED ORAL at 08:35

## 2019-04-02 RX ADMIN — METFORMIN HYDROCHLORIDE 500 MG: 500 TABLET, EXTENDED RELEASE ORAL at 16:41

## 2019-04-02 RX ADMIN — ATENOLOL 25 MG: 25 TABLET ORAL at 08:35

## 2019-04-02 RX ADMIN — ATENOLOL 25 MG: 25 TABLET ORAL at 21:25

## 2019-04-02 NOTE — PROGRESS NOTES
Inpatient Rehabilitation Functional Measures Assessment    Functional Measures  VANITA Eating:  [facility] Grooming: [facility] Bathing:  [facility] Upper Body Dressing:  [facility] Lower Body Dressing:  [facility] Toileting:  [facility] Bladder Management  Level of Assistance:  [place]  Frequency/Number of Accidents this Shift:  [facility] Bowel Management  Level of Assistance: [place]  Frequency/Number of Accidents this Shift: [facility] Bed/Chair/Wheelchair Transfer:  [facility] Toilet Transfer:  [facility] Tub/Shower Transfer:  [place]    Previously Documented Mode of Locomotion at Discharge: Field  VANITA Expected Mode of Locomotion at Discharge: [facility] Walk/Wheelchair:  [facility] Stairs:  [facility] Comprehension:  Auditory comprehension is the usual mode. Patient does not  comprehend complex/abstract information in their primary language without  assistance from a helper. Comprehension Score = 3, Moderate Prompting. Patient  comprehends basic daily needs or ideas 50-74% of the time. Patient requires  moderate/some prompting. No assistive devices were required.  VANITA Expression:  Vocal expression is the usual mode. Patient does not express  complex/abstract information in their primary language without a helper.  Expression Score = 4, Minimal Prompting. Patient expresses basic daily needs or  ideas 75-90% of the time.  Patient requires minimal/occasional prompting. No  assistive devices were required.  VANITA Social Interaction:  Social Interaction Score = 6, Modified Independent.  Patient is modified independent for social interaction, requiring: Medications.    VANITA Problem Solving:  Activity was not observed.  VANITA Memory:  Memory Score = 3, Moderate Prompting. Patient recognizes and  remembers 50-74% of the time. Patient requires moderate/some prompting  for  memory for the following:    Therapy Mode Minutes  Occupational Therapy: [facility]  Physical Therapy: [place]  Speech Language Pathology:   [place]    Signed by: Fina Torres RN

## 2019-04-02 NOTE — PROGRESS NOTES
Inpatient Rehabilitation Plan of Care Note    Plan of Care  Care Plan Reviewed - Updates as Follows    Body Systems    [RN] Endocrine(Active)  Current Status(04/02/2019): Patient at risk for altered blood sugars related to  recent health change.  Weekly Goal(04/09/2019): Patient will be compliant with accuchecks, diet, and  insulin coverage.  Discharge Goal: Patient will be independent with blood sugar management and be  compliant with treatment.    Performed Intervention(s)  Accuchecks ACHS  Medication as ordered  consistent carb diet      Psychosocial    [RN] Coping/Adjustment(Active)  Current Status(04/02/2019): Patient at risk for ineffective coping related to  recent change in health status and hospitalization.  Weekly Goal(04/09/2019): Patient will verbalize feelings and ask questions if he  has them.  Discharge Goal: Patient will develop coping skills to accomodate his health  changes.    Performed Intervention(s)   PRN  Patient to verbalize feelings and cocnerns      Safety    [RN] Potential for Injury(Active)  Current Status(04/02/2019): Patient at risk for falls related to impaired vision  and impaired mobility.  Weekly Goal(04/09/2019): Patient will be free of falls on rehab and will be  compliant with call light use.  Discharge Goal: No falls while here on rehab. Pt family aware of fall/safety in  the home setting.    Performed Intervention(s)  Hourly rounding , items within reach  Assistance with out of bed activities  Falls protocol  bed/chair alarm      Sphincter Control    [RN] Bladder Management(Active)  Current Status(04/02/2019): Patient is continent of bladder.  Weekly Goal(04/09/2019): Patient will remain continent.  Discharge Goal: Patient will remain continent.    [RN] Bowel Management(Active)  Current Status(04/02/2019): Patient is continent of bowels.  Weekly Goal(04/09/2019): Patient will remain continent.  Discharge Goal: Patient will remain continent.    Performed  Intervention(s)  Assistance with urinal  Assistance to bathroom  Incontinence care PRN    Signed by: Fina Torres RN

## 2019-04-02 NOTE — PROGRESS NOTES
Case Management  Inpatient Rehabilitation Team Conference    Conference Date/Time: 4/2/2019 9:01:42 AM    Team Conference Attendees:  Dr. Santo Real, Mercedes Madrid, Pharmacist  Germania Sampson, SARKISW  Clau Ayon, PT  Reg Mckinney, OT  Narcisa Sanchez, SLP  Narcisa Case, CTRS  Camilo Albright, RN  Any Torres, RN  Chaplain Sylvia    Demographics            Age: 53Y            Gender: Male    Admission Date: 3/24/2019 3:37:00 PM  Rehabilitation Diagnosis:  CVA  Past Medical History: HTN, HLD; hernia repair; migraines; IDDM; anxiety back  pain      Plan of Care  Anticipated Discharge Date/Estimated Length of Stay: ELOS: 3 weeks  Anticipated Discharge Destination: Community discharge with assistance  Discharge Plan : Pt lives with his wife and 2 adult children in a 2 story house  withsteps to enter. Pt will have 24 hour assistance at discharge.  Medical Necessity Expected Level Rationale: MIN  Intensity and Duration: an average of 3 hours/5 days per week  Medical Supervision and 24 Hour Rehab Nursing: x  Physical Therapy: x  PT Intensity/Duration: 60 minutes/day, 5 days/week  Occupational Therapy: x  OT Intensity/Duration: 60 minutes/day, 5 days/week  Speech and Language Therapy: x  SLP Intensity/Duration: 60 minutes/day, 5 days/week  Social Work: x  Therapeutic Recreation: x  Psychology: x  Updated (if changes indicated)    Anticipated Discharge Date/Estimated Length of Stay:   ELOS: 3 weeks    Based on the patient's medical and functional status, their prognosis and  expected level of functional improvement is: MIN      Interdisciplinary Problem/Goals/Status    All Rehab Problems:  Body Systems    [RN] Endocrine(Active)  Current Status(03/28/2019): Patient at risk for altered blood sugars related to  recent health change.  Weekly Goal(04/04/2019): Patient will be compliant with accuchecks, diet, and  insulin coverage.  Discharge Goal: Patient will be have stable blood sugars and compliant  with  treatment.        Cognition    [ST] Executive Functions(Active)  Current Status(04/01/2019): Severe cognitive deficits: mod to severe attention,  exec function, and visuospatial skills (right neglect). Moderate memory  deficits; impulsivity noted.  Weekly Goal(04/08/2019): Attend to tasks and recall details of daily activities  Discharge Goal: Improved cognitive skills        Communication    [ST] Expression(Active)  Current Status(04/01/2019): Moderate anomic aphasia; word finding and  comprehension deficits. Improving in both expression and comprehension  Weekly Goal(04/08/2019): improve word retrieval with min phonemic cues and  follow commands with min assist  Discharge Goal: Improve receptive and expressive language skills        Mobility    [OT] Toilet Transfers(Active)  Current Status(04/01/2019): Mod/Min SPS  Weekly Goal(04/09/2019): CGA  Discharge Goal: `SBA    [OT] Tub/Shower Transfers(Active)  Current Status(04/01/2019): Mod/Min  Weekly Goal(04/09/2019): CGA  Discharge Goal: SBA    [PT] Walk(Active)  Current Status(04/01/2019): 25' Min/Mod x 2 pistol  walker (R slider)  Weekly Goal(04/08/2019): PT only  Discharge Goal: 80' CGA with appropriate AD    [PT] Bed/Chair/Wheelchair(Active)  Current Status(04/01/2019): Min/Mod and CGA of another  Weekly Goal(04/08/2019): Mod  Discharge Goal: CGA    [PT] Bed Mobility(Active)  Current Status(04/01/2019): MIn  Weekly Goal(04/08/2019): CGA  Discharge Goal: SBA    [PT] Wheelchair(Active)  Current Status(04/01/2019): 100ft supervision  Weekly Goal(04/08/2019): 150ft MOd I  Discharge Goal: 150ft Mod I        Psychosocial    [RN] Coping/Adjustment(Active)  Current Status(03/28/2019): Patient at risk for ineffective coping related to  recent change in health status and hospitalization.  Weekly Goal(04/04/2019): Patient will verbalize feelings and ask questions if he  has them.  Discharge Goal: Patient will develop coping skills to accomodate his  health  changes.        Safety    [RN] Potential for Injury(Active)  Current Status(03/28/2019): Patient at risk for falls related to impaired vision  and impaired mobility.  Weekly Goal(04/04/2019): Patient will be free of falls on rehab and will be  compliant with call light use.  Discharge Goal: No falls while here on rehab.        Self Care    [OT] Bathing(Active)  Current Status(04/01/2019): Mod  Weekly Goal(04/09/2019): Min  Discharge Goal: SBA    [OT] Dressing (Lower)(Active)  Current Status(04/01/2019): Max/Mod  Weekly Goal(04/09/2019): Mod  Discharge Goal: Min    [OT] Dressing (Upper)(Active)  Current Status(04/01/2019): SBA with max cues  Weekly Goal(04/09/2019): SBA  Discharge Goal: SBA    [OT] Eating(Active)  Current Status(04/01/2019): Min  Weekly Goal(04/09/2019): SBA  Discharge Goal: SBA    [OT] Grooming(Active)  Current Status(04/01/2019): Min A  Weekly Goal(04/09/2019): SBA  Discharge Goal: SBA    [OT] Toileting(Active)  Current Status(04/01/2019): dep/max  Weekly Goal(04/09/2019): Mod  Discharge Goal: SBA        Sphincter Control    [RN] Bladder Management(Active)  Current Status(03/28/2019): Patient is continent of bladder.  Weekly Goal(04/04/2019): Patient will remain continent.  Discharge Goal: Patient will remain continent.    [RN] Bowel Management(Active)  Current Status(03/28/2019): Patient is continent of bowels.  Weekly Goal(04/04/2019): Patient will remain continent.  Discharge Goal: Patient will remain continent.        Swallow Function    [ST] Swallowing(Active)  Current Status(04/01/2019): On reg/thin; Mild dysphagia. Suspect mistiming of  swallow and question poss asp/pen. Pt needs verbal cues for small sips/bites and  NO talking with foodl/liquid in mouth. Monitoring for diet dilma and possible need  for VFSS if clinically warranted.  Weekly Goal(04/08/2019): Tolerate diet; Recall swallow strategies (no talking;  small sip/bite)  Discharge Goal: Tolerate least restrictive  diet        Comments: 3/26: RLE strength poor; inconsistent when answering questions re:  number of steps in home, ages of children, etc.; impulsive, very poor safety  awareness, NSG not noting any unsafe behavior at night; right visual  deficit/neglect; needing step by step cues; BNE ordered;    4/2: needs constant cues for sequencing;    Signed by: Camilo Albright RN    Physician CoSigned By: Santo Noonan 04/02/2019 09:26:45

## 2019-04-02 NOTE — PROGRESS NOTES
Occupational Therapy: Individual: 105 minutes.    Physical Therapy: [place]    Speech Language Pathology:  [place]    Signed by: RAMO Cox/SOFIA

## 2019-04-02 NOTE — PROGRESS NOTES
Inpatient Rehabilitation Functional Measures Assessment    Functional Measures  VANITA Eating:  [facility] Grooming: [facility] Bathing:  [facility] Upper Body Dressing:  [facility] Lower Body Dressing:  [facility] Toileting:  [facility] Bladder Management  Level of Assistance:  [place]  Frequency/Number of Accidents this Shift:  [facility] Bowel Management  Level of Assistance: [place]  Frequency/Number of Accidents this Shift: [facility] Bed/Chair/Wheelchair Transfer:  Activity was not observed.  VANITA Toilet Transfer:  [facility] Tub/Shower Transfer:  [place]    Previously Documented Mode of Locomotion at Discharge: Field  VANITA Expected Mode of Locomotion at Discharge: [facility] Walk/Wheelchair:  WHEELCHAIR OBSERVATION   Wheelchair locomotion was observed using a manual wheelchair. Wheelchair  Distance Scale = 2.  Distance traveled in wheelchair is 50 -149 feet. Wheelchair  Score = 2.  Patient is able to go at least 50 feet (household distance) in  wheelchair but requires assistance. Patient was able to propel a distance of  120 feet in a wheelchair.  No other assistive devices were required.    WALK OBSERVATION   Activity was not observed.  VANITA Stairs:  Stairs did not occur because activity was unsafe for patient.    VANITA Comprehension:  [facility] Expression:  [facility] Social Interaction:  [facility] Problem Solving:  [facility] Memory:  [place]    Therapy Mode Minutes  Occupational Therapy: [place]  Physical Therapy: Individual: 60 minutes.  Speech Language Pathology:  [place]    Signed by: Clau Ayon PT

## 2019-04-02 NOTE — PLAN OF CARE
Problem: Stroke (IRF) (Adult)  Goal: Promote Optimal Functional [place]  Outcome: Ongoing (interventions implemented as appropriate)   04/02/19 0101   Stroke (IRF) (Adult)   Promote Optimal Functional [place] demonstrating adequate progress       Problem: Fall Risk (Adult)  Goal: Absence of Fall  Outcome: Ongoing (interventions implemented as appropriate)   04/02/19 0101   Fall Risk (Adult)   Absence of Fall making progress toward outcome       Problem: Diabetes, Type 2 (Adult)  Goal: Signs and Symptoms of Listed Potential Problems Will be Absent, Minimized or Managed (Diabetes, Type 2)  Outcome: Ongoing (interventions implemented as appropriate)   04/02/19 0101   Goal/Outcome Evaluation   Problems Assessed (Type 2 Diabetes) all   Problems Present (Type 2 Diabetes) none       Problem: Skin Injury Risk (Adult)  Goal: Skin Health and Integrity  Outcome: Ongoing (interventions implemented as appropriate)   04/02/19 0101   Skin Injury Risk (Adult)   Skin Health and Integrity making progress toward outcome

## 2019-04-02 NOTE — THERAPY TREATMENT NOTE
Inpatient Rehabilitation - Occupational Therapy Treatment Note    [facility]     Patient Name: Nayan Ryan  : 1965  MRN: [id number]    Today's Date: 2019                 Admit Date: 3/24/2019      Visit Dx:  No diagnosis found.    Patient Active Problem List   Diagnosis   • Acute ischemic left PCA stroke (CMS/HCC)   • Status post placement of implantable loop recorder   • HTN (hypertension)   • Diabetes mellitus (CMS/HCC)   • Hyperlipidemia   • Morbid obesity (CMS/HCC)   • Anxiety disorder   • Migraine   • H/O hernia repair   • Abdominal wall abscess   • Back pain   • Stroke (cerebrum) (CMS/HCC)   • Vitamin D deficiency   • History of fatty infiltration of liver         Therapy Treatment    IRF Treatment Summary     Row Name 19 1210 19 1100 19 0800       Evaluation/Treatment Time and Intent    Subjective Information  complains of pt discouraged having 3 weeks of tx to go  -DN  no complaints  -SA  no complaints  -SA    Existing Precautions/Restrictions  fall  -DN  fall swallow  -SA  fall swallow  -SA    Document Type  therapy note (daily note)  -DN  therapy note (daily note)  -SA  therapy note (daily note)  -SA    Mode of Treatment  occupational therapy  -DN  speech-language pathology  -SA  speech-language pathology  -SA    Patient/Family Observations  sitting in w/c  -DN  up in w/c; on phone with sister; ready for tx  -SA  up in w/c; from DR  -SA    Start Time (Evaluation/Treatment)  --  1100  -SA  0800  -SA    Stop Time (Evaluation/Treatment)  --  1130  -SA  0830  -SA    Recorded by [DN] Reg Mckinney OT [SA] Narcisa Sanchez MS CCC-SLP [SA] Narcisa Sanchez MS CCC-SLP    Row Name 19 1210             Cognition/Psychosocial- PT/OT    Affect/Mental Status (Cognitive)  flat/blunted affect  -DN      Behavioral Issues (Cognitive)  withdrawn  -DN      Orientation Status (Cognition)  oriented to;person;place;time  -DN      Follows Commands (Cognition)  follows one step commands;50-74%  accuracy  -DN      Personal Safety Interventions  fall prevention program maintained;gait belt  -DN      Recorded by [DN] Reg Mckinney, OT      Row Name 04/02/19 1210             Shower Transfer    Type (Shower Transfer)  stand pivot/stand step  -DN      [place] Level (Shower Transfer)  minimum assist (75% patient effort);verbal cues use of grab bars for UE support  -DN      Assistive Device (Shower Transfer)  wheelchair;tub bench;grab bars/tub rail mod to max vc for sequencing adls and transfers  -DN      Recorded by [DN] Reg Mckinney, OT      Row Name 04/02/19 1210             Bathing Assessment/Treatment    Bathing [place] Level  bathing skills;minimum assist (75% patient effort);verbal cues;set up;nonverbal cues (demo/gesture)  -DN      Assistive Device (Bathing)  grab bar/tub rail;hand held shower spray hose;tub bench  -DN      Bathing Position  supported sitting;supported standing  -DN      Bathing Setup Assistance  obtain supplies  -DN      Comment (Bathing)  again max/mod vc for all sequencing, initiation during adls   -DN      Recorded by [DN] Reg Mckinney, OT      Row Name 04/02/19 1210             Upper Body Dressing Assessment/Treatment    Upper Body Dressing Task  upper body dressing skills;set up assistance;supervision;verbal cues;nonverbal cues (demo/gesture)  -DN      Upper Body Dressing Position  supported sitting  -DN      Set-up Assistance (Upper Body Dressing)  obtain clothing  -DN      Recorded by [DN] Reg Mckinney, OT      Row Name 04/02/19 1210             Lower Body Dressing Assessment/Treatment    Lower Body Dressing [place] Level  doff;don;pants/bottoms;shoes/slippers;socks;underwear;clothes fastener management;moderate assist (50% patient effort)  -DN      Lower Body Dressing Position  supported sitting  -DN      Lower Body Dressing Setup Assistance  obtain clothing  -DN      Comment (Lower Body Dressing)  jay dressing techniques utilized  -DN      Recorded by [DN]  Reg Mckinney OT      Row Name 04/02/19 1210             Grooming Assessment/Treatment    Grooming [place] Level  grooming skills;deodorant application;oral care regimen;supervision;set up;verbal cues;nonverbal cues (demo/gesture)  -DN      Grooming Position  sink side;supported sitting  -DN      Recorded by [BRENDAN] Reg Mckinney OT      Row Name 04/02/19 1210             Toileting Assessment/Treatment    Comment (Toileting)  pt did not need to use commode this am  -DN      Recorded by [BRENDAN] Reg Mckinney, OT      Row Name 04/02/19 1210 04/02/19 0800          Pain Scale: Numbers Pre/Post-Treatment    Pain Scale: Numbers, Pretreatment  0/10 - no pain  -DN  0/10 - no pain  -SA     Pain Scale: Numbers, Post-Treatment  0/10 - no pain  -DN  0/10 - no pain  -SA     Recorded by [DN] Reg Mckinney, MADAY [SA] Narcisa Sanchez MS CCC-SLP       User Key  (r) = Recorded By, (t) = Taken By, (c) = Cosigned By    Initials Name Effective Dates    Reg Bolden, OT 06/08/18 -     SA Narcisa Sanchez MS CCC-SLP 04/03/18 -           Wound 03/26/19 0900 Left chest incision (Active)   Dressing Appearance open to air 4/1/2019  8:56 PM   Closure Liquid skin adhesive;Approximated 4/1/2019  8:56 PM   Drainage Amount none 4/1/2019  8:56 PM         OT Recommendation and Plan    Anticipated Equipment Needs At Discharge (OT Eval): commode, 3-in-1, shower chair, tub bench  Planned Therapy Interventions (OT Eval): BADL retraining, cognitive/visual perception retraining, functional balance retraining, neuromuscular control/coordination retraining, strengthening exercise, transfer/mobility retraining            OT IRF GOALS     Row Name 03/25/19 6749             Bathing Goal 1 (OT-IRF)    Activity/Device (Bathing Goal 1, OT-IRF)  bathing skills, all;tub bench;hand-held shower spray hose;grab bar/tub rail  -DN      [place] Level (Bathing Goal 1, OT-IRF)  minimum assist (75% or more patient effort);verbal cues required;set-up required;tactile  cues required  -DN      Time Frame (Bathing Goal 1, OT-IRF)  short term goal (STG)  -DN      Progress/Outcomes (Bathing Goal 1, OT-IRF)  continuing progress toward goal  -DN         Bathing Goal 2 (OT-IRF)    Activity/Device (Bathing Goal 2, OT-IRF)  bathing skills, all;tub bench;hand-held shower spray hose;grab bar/tub rail  -DN      [place] Level (Bathing Goal 2, OT-IRF)  supervision required;verbal cues required  -DN      Time Frame (Bathing Goal 2, OT-IRF)  long term goal (LTG)  -DN      Progress/Outcomes (Bathing Goal 2, OT-IRF)  continuing progress toward goal  -DN         UB Dressing Goal 1 (OT-IRF)    Activity/Device (UB Dressing Goal 1, OT-IRF)  upper body dressing  -DN      [place] (UB Dress Goal 1, OT-IRF)  minimum assist (75% or more patient effort);verbal cues required  -DN      Time Frame (UB Dressing Goal 1, OT-IRF)  short term goal (STG)  -DN      Progress/Outcomes (UB Dressing Goal 1, OT-IRF)  continuing progress toward goal  -DN         UB Dressing Goal 2 (OT-IRF)    Activity/Device (UB Dressing Goal 2, OT-IRF)  upper body dressing  -DN      [place] (UB Dress Goal 2, OT-IRF)  supervision required;verbal cues required  -DN      Time Frame (UB Dressing Goal 2, OT-IRF)  long term goal (LTG)  -DN      Progress/Outcomes (UB Dressing Goal 2, OT-IRF)  continuing progress toward goal  -DN         LB Dressing Goal 1 (OT-IRF)    Activity/Device (LB Dressing Goal 1, OT-IRF)  lower body dressing  -DN      [place] (LB Dressing Goal 1, OT-IRF)  moderate assist (50-74% patient effort);verbal cues required;set-up required;tactile cues required  -DN      Time Frame (LB Dressing Goal 1, OT-IRF)  short term goal (STG)  -DN      Progress/Outcomes (LB Dressing Goal 1, OT-IRF)  continuing progress toward goal  -DN         LB Dressing Goal 2 (OT-IRF)    Activity/Device (LB Dressing Goal 2, OT-IRF)  lower body dressing  -DN      [place] (LB Dressing Goal 2, OT-IRF)  verbal cues required;tactile  cues required;minimum assist (75% or more patient effort)  -DN      Time Frame (LB Dressing Goal 2, OT-IRF)  long term goal (LTG)  -DN      Progress/Outcomes (LB Dressing Goal 2, OT-IRF)  continuing progress toward goal  -DN         Grooming Goal 1 (OT-IRF)    Activity/Device (Grooming Goal 1, OT-IRF)  grooming skills, all  -DN      [place] (Grooming Goal 1, OT-IRF)  supervision required;verbal cues required;minimum assist (75% or more patient effort)  -DN      Time Frame (Grooming Goal 1, OT-IRF)  short term goal (STG)  -DN      Progress/Outcomes (Grooming Goal 1, OT-IRF)  continuing progress toward goal  -DN         Grooming Goal 2 (OT-IRF)    Activity/Device (Grooming Goal 2, OT-IRF)  grooming skills, all  -DN      [place] (Grooming Goal 2, OT-IRF)  set-up required;supervision required;verbal cues required;tactile cues required  -DN      Time Frame (Grooming Goal 2, OT-IRF)  long term goal (LTG)  -DN      Progress/Outcomes (Grooming Goal 2, OT-IRF)  continuing progress toward goal  -DN         Toileting Goal 1 (OT-IRF)    Activity/Device (Toileting Goal 1, OT-IRF)  toileting skills, all;adjust/manage clothing;perform perineal hygiene;grab bar/safety frame;raised toilet seat  -DN      [place] Level (Toileting Goal 1, OT-IRF)  moderate assist (50-74% patient effort);verbal cues required;tactile cues required  -DN      Time Frame (Toileting Goal 1, OT-IRF)  short term goal (STG)  -DN      Progress/Outcomes (Toileting Goal 1, OT-IRF)  continuing progress toward goal  -DN         Toileting Goal 2 (OT-IRF)    Activity/Device (Toileting Goal 2, OT-IRF)  toileting skills, all;adjust/manage clothing;perform perineal hygiene  -DN      [place] Level (Toileting Goal 2, OT-IRF)  contact guard assist;verbal cues required;tactile cues required  -DN      Time Frame (Toileting Goal 2, OT-IRF)  long term goal (LTG)  -DN      Progress/Outcomes (Toileting Goal 2, OT-IRF)  continuing progress toward goal  -DN          Self-Feeding Goal 1 (OT-IRF)    Activity/Device (Self-Feeding Goal 1, OT-IRF)  self-feeding skills, all  -DN      [place] (Self-Feeding Goal 1, OT-IRF)  supervision required;tactile cues required  -DN      Time Frame (Self-Feeding Goal 1, OT-IRF)  short term goal (STG)  -DN      Progress/Outcomes 1 (Self-Feeding Goal, OT-IRF)  continuing progress toward goal  -DN         Self-Feeding Goal 2 (OT-IRF)    Activity/Device (Self-Feeding Goal 2, OT-IRF)  self-feeding skills, all  -DN      [place] (Self-Feeding Goal 2, OT-IRF)  supervision required;set-up required  -DN      Time Frame (Self-Feeding Goal 2, OT-IRF)  long term goal (LTG)  -DN      Progress/Outcomes (Self-Feeding Goal 2, OT-IRF)  continuing progress toward goal  -DN         Caregiver Training Goal 2 (OT-IRF)    Caregiver Training Goal 2 (OT-IRF)  pt caregiver to be independent with any assist pt needs with adls, transfers and HEP for d/c home  -DN      Time Frame (Caregiver Training Goal 2, OT-IRF)  long term goal (LTG)  -DN      Progress/Outcomes (Caregiver Training Goal 2, OT-IRF)  continuing progress toward goal  -DN        User Key  (r) = Recorded By, (t) = Taken By, (c) = Cosigned By    Initials Name Provider Type    Reg Bolden OT Occupational Therapist          Occupational Therapy Education     Title: PT OT SLP Therapies (In Progress)     Topic: Occupational Therapy (In Progress)     Point: ADL training (In Progress)     Description: Instruct learner(s) on proper safety adaptation and remediation techniques during self care or transfers.   Instruct in proper use of assistive devices.    Learning Progress Summary           Patient Acceptance, E,TB,D, NR by DN at 3/26/2019 12:06 PM    Comment:  jay adls and commode/shower transfers, still max vc for all due to cognition and visual impairments    Acceptance, E,TB,D, VU,NR by DN at 3/25/2019  5:23 PM    Comment:  OT role in rehab, transfer traning, jay adls                   Point:  Home exercise program (In Progress)     Description: Instruct learner(s) on appropriate technique for monitoring, assisting and/or progressing therapeutic exercises/activities.    Learning Progress Summary           Patient Acceptance, E,TB,D, NR by DN at 3/26/2019 12:06 PM    Comment:  jay adls and commode/shower transfers, still max vc for all due to cognition and visual impairments    Acceptance, E,TB,D, VU,NR by DN at 3/25/2019  5:23 PM    Comment:  OT role in rehab, transfer traning, jay adls                   Point: Precautions (In Progress)     Description: Instruct learner(s) on prescribed precautions during self-care and functional transfers.    Learning Progress Summary           Patient Acceptance, E,TB,D, NR by DN at 3/26/2019 12:06 PM    Comment:  jay adls and commode/shower transfers, still max vc for all due to cognition and visual impairments    Acceptance, E,TB,D, VU,NR by DN at 3/25/2019  5:23 PM    Comment:  OT role in rehab, transfer traning, jay adls                   Point: Body mechanics (In Progress)     Description: Instruct learner(s) on proper positioning and spine alignment during self-care, functional mobility activities and/or exercises.    Learning Progress Summary           Patient Acceptance, E,TB,D, NR by DN at 3/26/2019 12:06 PM    Comment:  jay adls and commode/shower transfers, still max vc for all due to cognition and visual impairments    Acceptance, E,TB,D, VU,NR by DN at 3/25/2019  5:23 PM    Comment:  OT role in rehab, transfer traning, jay adls                               User Key     Initials Effective Dates Name Provider Type Discipline    DN 06/08/18 -  Reg Mckinney OT Occupational Therapist OT                       Time Calculation:     Time Calculation- OT     Row Name 04/02/19 1215             Time Calculation- OT    OT Start Time  0830  -DN      OT Stop Time  0930  -DN      OT Time Calculation (min)  60 min  -DN      OT Non-Billable Time (min)  15 min team  rounds  -DN        User Key  (r) = Recorded By, (t) = Taken By, (c) = Cosigned By    Initials Name Provider Type    Reg Bolden OT Occupational Therapist          Therapy Charges for Today     Code Description Service Date Service Provider Modifiers Qty    21171928986 HC OT SELF CARE/MGMT/TRAIN EA 15 MIN 4/2/2019 Reg Mckinney, OT GO 3    11074725213 HC OT THER PROC EA 15 MIN 4/2/2019 Reg Mckinney, OT GO 2    31234179618 HC OT CARE PLAN EA 15 MIN 4/2/2019 Reg Mckinney OT GO 1                   Reg Mckinney OT  4/2/2019

## 2019-04-02 NOTE — THERAPY TREATMENT NOTE
Inpatient Rehabilitation - Speech Language Pathology Treatment Note    [facility]       Patient Name: Nayan Ryan  : 1965  MRN: [id number]    Today's Date: 2019           Admit Date: 3/24/2019      Visit Dx:      No diagnosis found.    Patient Active Problem List   Diagnosis   • Acute ischemic left PCA stroke (CMS/HCC)   • Status post placement of implantable loop recorder   • HTN (hypertension)   • Diabetes mellitus (CMS/HCC)   • Hyperlipidemia   • Morbid obesity (CMS/HCC)   • Anxiety disorder   • Migraine   • H/O hernia repair   • Abdominal wall abscess   • Back pain   • Stroke (cerebrum) (CMS/HCC)   • Vitamin D deficiency   • History of fatty infiltration of liver          Therapy Treatment    Evaluation/Coping    Evaluation/Treatment Time and Intent  Subjective Information: no complaints (19 1100 : Narcisa Sanchez MS CCC-SLP)  Existing Precautions/Restrictions: fall(swallow) (19 1100 : Narcisa Sanchez MS CCC-SLP)  Document Type: therapy note (daily note) (19 1100 : Narcisa Sanchez MS CCC-SLP)  Mode of Treatment: speech-language pathology (19 1100 : Narcisa Sanchez MS CCC-SLP)  Patient/Family Observations: up in w/c; on phone with sister; ready for tx (19 1100 : Narcisa Sanchez MS CCC-SLP)  Start Time (Evaluation/Treatment): 1100 (19 1100 : Narcisa Sanchez MS CCC-SLP)  Stop Time (Evaluation/Treatment): 1130 (19 1100 : Narcisa Sanchez MS CCC-SLP)    Vitals/Pain/Safety    Pain Scale: Numbers Pre/Post-Treatment  Pain Scale: Numbers, Pretreatment: 0/10 - no pain (19 0800 : Narcisa Sanchez MS CCC-SLP)  Pain Scale: Numbers, Post-Treatment: 0/10 - no pain (19 0800 : Narcisa Sanchez MS CCC-SLP)    Cognition/Communication         Oral Motor/Eating         Mobility/Basic Activities/Instrumental Activities/Motor/Modality                   ROM/MMT                   Sensory/Myotome/Dermatome/Edema               Posture/Balance/Special Tests/Exercise/Transportation/Sexual  Function                   Orthotics/Residual Limb/Prosthetic Management              Outcome Summary         EDUCATION    The patient has been educated in the following areas:     Cognitive Impairment Communication Impairment Dysphagia (Swallowing Impairment).    SLP Recommendation and Plan    SLP Diagnosis: Moderate Anomic Aphasia    SLP Diagnosis: Moderate Anomic Aphasia    Rehab Potential/Prognosis: good    Swallow Criteria for Skilled Therapeutic Interventions Met: no problems identified which require skilled intervention, other (see comments)(however, will monitor closely for s/s asp/dysphagia)    Anticipated Dischage Disposition: home with OP services, anticipate therapy at next level of care         Therapy Frequency (Swallow): 5 days per week    Predicted Duration Therapy Intervention (Days): until discharge           Plan of Care Reviewed With: patient      SLP GOALS     Row Name 04/02/19 1100 04/02/19 0800 04/01/19 1100       Words/Phrases/Sentences Goal 1 (SLP)    Improve Ability to Comprehend Words/Phrases/Sentences Through: Goal 1 (SLP)  identify familiar objects  -SA  --  --    Progress (Ability to Contruct Words/Phrases/Sentences Goal 1, SLP)  100%;independently (over 90% accuracy)  -SA  --  90%;independently (over 90% accuracy)  -SA    Progress/Outcomes (Identify Objects and Pictures Goal 1, SLP)  goal met;good progress toward goal  -SA  --  good progress toward goal  -SA    Comment (Words/Phrases/Sentences Goal 1, SLP)  ID pics and point to 2 items on request  -SA  --  CT: ID picture categories: level 1  -SA       Comprehend Questions Goal 1 (SLP)    Improve Ability to Comprehend Questions Goal 1 (SLP)  simple general questions  -SA  complex yes/no questions  -SA  multi-unit questions  -SA    Progress (Ability to Comprehend Questions Goal 1, SLP)  100%;independently (over 90% accuracy)  -SA  100%;80%;with minimal cues (75-90%)  -SA  50%;with moderate cues (50-74%)  -SA    Progress/Outcomes  (Comprehend Questions Goal 1, SLP)  good progress toward goal  -SA  good progress toward goal  -SA  goal ongoing  -SA    Comment (Comprehend Questions Goal 1, SLP)  answer questions given choice of 3; independently read and circled ansewr  -SA  100%; yes/no mod complex questions; reading independently w/ min assist; answer questions given a choice of 3 answers: 83%  -SA  CT: Inferencing Voicemail: level 1; needed mod cues and repetition of listening to voicemail  -SA       Follow Directions Goal 2 (SLP)    Improve Ability to Follow Directions Goal 1 (SLP)  2 step commands  -SA  --  --    Progress (Ability to Follow Directions Goal 1, [facility])  100%;independently (over 90% accuracy)  -SA  --  --    Progress/Outcomes (Follow Directions Goal 1, SLP)  good progress toward goal  -SA  --  --    Comment (Follow Directions Goal 1, SLP)  2 step motor commands  -SA  --  --       Comprehension at Phrase and Sentence Level Goal 1 (SLP)    Improve Reading Comprehension at Phrase and Sentence Level Goal 1 (SLP)  answer written wh-questions  -SA  --  --    Time Frame (Reading Comprehension at Phrase and Sentence Level Goal 1, SLP)  by discharge  -SA  --  --    Comment (Reading Comprehension at Phrase and Sentence Level Goal 1, SLP)  reading simple wh questions and answering given a choice of 3  -SA  --  --       Word Retrieval Skills Goal 1 (SLP)    Improve Word Retrieval Skills By Goal 1 (SLP)  --  supplying an antonym;supplying a synonym;completing a divergent task  -SA  --    Progress (Word Retrieval Skills Goal 1, SLP)  --  90%;60%;independently (over 90% accuracy);with moderate cues (50-74%);100%  -SA  independently (over 90% accuracy)  -SA    Progress/Outcomes (Word Retrieval Goal 1, SLP)  --  good progress toward goal  -SA  good progress toward goal  -SA    Comment (Word Retrieval Goal 1, SLP)  --  92%: synonyms given choice of 3; opposites (naming) no cues: 67%; divergent task: able to name 12 colors in 1 min without cues;  perseverations observed: 100%  -SA  95%; completed open ended sentences verbally  -SA       Attention Goal 1 (SLP)    Improve Attention by Goal 1 (SLP)  complete selective attention task;complete divided attention task;complete sustained attention task;90%;independently (over 90% accuracy)  -SA  --  --    Time Frame (Attention Goal 1, SLP)  by discharge  -SA  --  --       Memory Skills Goal 1 (SLP)    Improve Memory Skills Through Goal 1 (SLP)  visual memory task;listen to a paragraph and answer questions;recalling unrelated word lists immediately;recalling unrelated word lists with an imposed delay;90%;select a word from a list by exclusion;independently (over 90% accuracy)  -SA  --  --    Time Frame (Memory Skills Goal 1, SLP)  by discharge  -SA  --  --       Organizational Skills Goal 1 (SLP)    Improve Thought Organization Through Goal 1 (SLP)  completing a divergent naming task;completing a convergent naming task;naming similarities and differences;naming by category exclusion;completing a verbal sequencing task;90%;independently (over 90% accuracy)  -SA  --  --    Time Frame (Thought Organization Skills Goal 1, SLP)  by discharge  -SA  --  --    Progress (Thought Organization Skills Goal 1, SLP)  50%;independently (over 90% accuracy);100%;with moderate cues (50-74%)  -SA  --  --    Progress/Outcomes (Thought Organization Skills Goal 1, SLP)  goal ongoing  -SA  --  --    Comment (Thought Organization Skills Goal 1, SLP)  label category items beginning with a specific letter  -SA  --  --       Reasoning Goal 1 (SLP)    Improve Reasoning Through Goal 1 (SLP)  complete deductive reasoning task;complete analogies;90%;independently (over 90% accuracy)  -SA  --  --    Time Frame (Reasoning Goal 1, SLP)  by discharge  -SA  --  --       Functional Problem Solving Skills Goal 1 (SLP)    Improve Problem Solving Through Goal 1 (SLP)  complete organization/home management task;sequence steps in a task;90%;independently  (over 90% accuracy)  -SA  --  --    Time Frame (Problem Solving Goal 1, SLP)  by discharge  -SA  --  --       Functional Math Skills Goal 1 (SLP)    Improve Functional Math Skills Through Goal 1 (SLP)  complete word problems involving time;complete word problems involving money;complete simple math problems;complete functional math task;complete checkbook task;90%;independently (over 90% accuracy)  -SA  --  --    Time Frame (Functional Math Skills Goal 1, SLP)  by discharge  -SA  --  --       Executive Functional Skills Goal 1 (SLP)    Improve Executive Function Skills Goal 1 (SLP)  home management activity;organization/planning activity;time management activity;90%;independently (over 90% accuracy)  -SA  --  --    Time Frame (Executive Function Skills Goal 1, SLP)  by discharge  -SA  --  --    Row Name 04/01/19 0800             Reading Comprehension at Word Level Goal 1 (SLP)    Progress (Reading Comprehension at Word Level Goal 1, SLP)  90%;independently (over 90% accuracy)  -SA      Progress/Outcomes (Reading Comprehension at Word Level Goal 1, SLP)  good progress toward goal  -SA      Comment (Reading Comprehension at Word Level Goal 1, SLP)  93% answering questions with choice of 3 answers  -SA         Word Retrieval Skills Goal 1 (SLP)    Progress (Word Retrieval Skills Goal 1, SLP)  with moderate cues (50-74%)  -SA      Progress/Outcomes (Word Retrieval Goal 1, SLP)  goal ongoing;continuing progress toward goal  -SA      Comment (Word Retrieval Goal 1, SLP)  confrontational naming objects: 67%; actions: 64%; assisted with phonemic cues 95%; open ended sentences  -         Awareness of Basic Personal Information Goal 1 (SLP)    Progress (Awareness of Basic Personal Information Goal 1, SLP)  with minimal cues (75-90%)  -SA      Progress/Outcomes (Awareness of Basic Personal Information Goal 1, SLP)  continuing progress toward goal  -SA      Comment (Awareness of Basic Personal Information Goal 1, SLP)  73%;  answering questions related to personal info: name, address, city, state, wifes name, childrens names, dogs names, , situation; place; phone number home and cell and wife's phone number.   -        User Key  (r) = Recorded By, (t) = Taken By, (c) = Cosigned By    Initials Name Provider Type    Narcisa Gaston MS CCC-SLP Speech and Language Pathologist                  Time Calculation:       Time Calculation- SLP     Row Name 19 1150 19 0841 19 0813       Time Calculation- SLP    SLP Start Time  1100  -SA  --  0800  -    SLP Stop Time  1130  -SA  --  0830  -    SLP Time Calculation (min)  30 min  -SA  --  30 min  -    SLP Non-Billable Time (min)  --  15 min rounds  -  --    SLP Received On  --  --  19  -      User Key  (r) = Recorded By, (t) = Taken By, (c) = Cosigned By    Initials Name Provider Type    Narcisa Gaston MS CCC-SLP Speech and Language Pathologist            Therapy Charges for Today     Code Description Service Date Service Provider Modifiers Qty    18413991133 HC ST TREATMENT SPEECH 4 2019 Narcisa Sanchez MS CCC-SLP GN 1    13288904766 HC ST TREATMENT SPEECH 4 2019 Narcisa Sanchez MS CCC-SLP GN 1                           MS CHARLES Meléndez  2019

## 2019-04-02 NOTE — PROGRESS NOTES
LOS: 9 days   Patient Care Team:  Qasim Asif MD as PCP - General  Qasim Asif MD as PCP - Family Medicine    Chief Complaint: same    Subjective     History of Present Illness    Subjective Pt is awake and alert. Pt c/o burning sensation in R leg yesterday. Resolved with repositioning of leg    History taken from: patient    Objective     Vital Signs  Temp:  [98.2 °F (36.8 °C)-98.8 °F (37.1 °C)] 98.8 °F (37.1 °C)  Heart Rate:  [80-86] 83  Resp:  [18-20] 20  BP: (122-151)/(57-86) 151/84    Objective    Results Review:     I reviewed the patient's new clinical results.    Medication Review:     Assessment/Plan       Acute ischemic left PCA stroke (CMS/HCC)    Status post placement of implantable loop recorder    HTN (hypertension)    Diabetes mellitus (CMS/HCC)    Hyperlipidemia    Morbid obesity (CMS/HCC)    Anxiety disorder    Abdominal wall abscess    Stroke (cerebrum) (CMS/HCC)    Vitamin D deficiency    History of fatty infiltration of liver      Assessment & Plan Continue to prepare for dc. I participated in care coordination conference and returned to room to discuss updated plan with pt. EBENEZER 3 weeks    Santo Noonan MD  04/02/19  9:03 AM    Time:

## 2019-04-02 NOTE — PLAN OF CARE
Problem: Stroke (IRF) (Adult)  Goal: Promote Optimal Functional [place]  Outcome: Ongoing (interventions implemented as appropriate)      Problem: Fall Risk (Adult)  Goal: Absence of Fall  Outcome: Ongoing (interventions implemented as appropriate)      Problem: Diabetes, Type 2 (Adult)  Goal: Signs and Symptoms of Listed Potential Problems Will be Absent, Minimized or Managed (Diabetes, Type 2)  Outcome: Ongoing (interventions implemented as appropriate)      Problem: Skin Injury Risk (Adult)  Goal: Skin Health and Integrity  Outcome: Ongoing (interventions implemented as appropriate)      Problem: Patient Care Overview  Goal: Plan of Care Review  Outcome: Ongoing (interventions implemented as appropriate)   04/02/19 1624   Patient Care Overview   IRF Plan of Care Review progress ongoing, continue   Progress, Functional Goals demonstrating adequate progress   Coping/Psychosocial   Plan of Care Reviewed With patient   OTHER   Outcome Summary Flat affect, cognitive and processing impairments and also rt visual field cut. Transfers mod assist of 1. No unsafe behavior. Continent of bladder/bowel. Skin is intact.

## 2019-04-02 NOTE — THERAPY TREATMENT NOTE
Inpatient Rehabilitation - Physical Therapy Treatment Note  [facility]     Patient Name: Nayan Ryan  : 1965  MRN: [id number]    Today's Date: 2019                 Admit Date: 3/24/2019      Visit Dx:    No diagnosis found.    Patient Active Problem List   Diagnosis   • Acute ischemic left PCA stroke (CMS/HCC)   • Status post placement of implantable loop recorder   • HTN (hypertension)   • Diabetes mellitus (CMS/HCC)   • Hyperlipidemia   • Morbid obesity (CMS/HCC)   • Anxiety disorder   • Migraine   • H/O hernia repair   • Abdominal wall abscess   • Back pain   • Stroke (cerebrum) (CMS/HCC)   • Vitamin D deficiency   • History of fatty infiltration of liver       Therapy Treatment    IRF Treatment Summary     Row Name 19 1455 19 1228 19 1210       Evaluation/Treatment Time and Intent    Subjective Information  complains of;fatigue  -DN  no complaints  -LH  complains of pt discouraged having 3 weeks of tx to go  -DN    Existing Precautions/Restrictions  fall  -DN  fall  -LH  fall  -DN    Document Type  therapy note (daily note)  -DN  therapy note (daily note)  -  therapy note (daily note)  -DN    Mode of Treatment  occupational therapy  -DN  individual therapy;physical therapy  -LH  occupational therapy  -DN    Patient/Family Observations  seated in w/c  -DN  seated in WC no acute distress  -LH  sitting in w/c  -DN    Recorded by [DN] Reg Mckinney, OT [] Clau Ayon PT [DN] Reg Mckinney, OT    Row Name 19 1100 19 0800          Evaluation/Treatment Time and Intent    Subjective Information  no complaints  -SA  no complaints  -SA     Existing Precautions/Restrictions  fall swallow  -SA  fall swallow  -SA     Document Type  therapy note (daily note)  -SA  therapy note (daily note)  -SA     Mode of Treatment  speech-language pathology  -SA  speech-language pathology  -SA     Patient/Family Observations  up in w/c; on phone with sister; ready for tx  -SA  up in w/c;  from DR  -SA     Start Time (Evaluation/Treatment)  1100  -SA  0800  -SA     Stop Time (Evaluation/Treatment)  1130  -SA  0830  -SA     Recorded by [SA] Narcisa Sanchez MS CCC-SLP [SA] Narcisa Sanchez MS CCC-SLP     Row Name 04/02/19 1228 04/02/19 1210          Cognition/Psychosocial- PT/OT    Affect/Mental Status (Cognitive)  flat/blunted affect  -LH  flat/blunted affect  -DN     Behavioral Issues (Cognitive)  withdrawn  -LH  withdrawn  -DN     Orientation Status (Cognition)  oriented x 3  -LH  oriented to;person;place;time  -DN     Follows Commands (Cognition)  follows one step commands;75-90% accuracy;initiation impaired;physical/tactile prompts required;repetition of directions required;verbal cues/prompting required  -LH  follows one step commands;50-74% accuracy  -DN     Personal Safety Interventions  fall prevention program maintained;gait belt;nonskid shoes/slippers when out of bed;supervised activity  -LH  fall prevention program maintained;gait belt  -DN     Attention Deficit (Cognitive)  severe deficit  -LH  --     Executive Function Deficit (Cognition)  severe deficit  -  --     Memory Deficit (Cognitive)  severe deficit  -LH  --     Recorded by [] Clau Ayon, PT [DN] Reg Mckinney, OT     Row Name 04/02/19 1228             Bed Mobility Assessment/Treatment    Comment (Bed Mobility)  NT  -LH      Recorded by [] Clau Ayno, PT      Row Name 04/02/19 1228             Transfer Assessment/Treatment    Comment (Transfers)  practiced sit pivot tsfs WC<->therapy mat. max directional cueing for set up. poor initiation/poor carryover  -      Recorded by [] Clau Ayon PT      Row Name 04/02/19 1228             Bed-Chair Transfer    Bed-Chair [place] (Transfers)  minimum assist (75% patient effort);1 person to manage equipment;moderate assist (50% patient effort)  -      Assistive Device (Bed-Chair Transfers)  wheelchair  -      Recorded by [] Clau Ayon PT      Row Name 04/02/19  1455 04/02/19 1228          Chair-Bed Transfer    Chair-Bed [place] (Transfers)  minimum assist (75% patient effort);nonverbal cues (demo/gesture);verbal cues  -DN  minimum assist (75% patient effort);moderate assist (50% patient effort);1 person to manage equipment  -LH     Assistive Device (Chair-Bed Transfers)  wheelchair  -DN  wheelchair  -LH     Recorded by [DN] Reg Mckinney, OT [] Clau Ayon, PT     Row Name 04/02/19 1228             Sit-Stand Transfer    Sit-Stand [place] (Transfers)  minimum assist (75% patient effort);1 person to manage equipment  -LH      Assistive Device (Sit-Stand Transfers)  other (see comments) hemibars  -LH      Recorded by [] Clau Ayon, PT      Row Name 04/02/19 1228             Stand-Sit Transfer    Stand-Sit [place] (Transfers)  minimum assist (75% patient effort);1 person to manage equipment  -LH      Assistive Device (Stand-Sit Transfers)  other (see comments) hemibars  -LH      Recorded by [] Clau Ayon, PT      Row Name 04/02/19 1210             Shower Transfer    Type (Shower Transfer)  stand pivot/stand step  -DN      [place] Level (Shower Transfer)  minimum assist (75% patient effort);verbal cues use of grab bars for UE support  -DN      Assistive Device (Shower Transfer)  wheelchair;tub bench;grab bars/tub rail mod to max vc for sequencing adls and transfers  -DN      Recorded by [DN] Reg Mckinney, OT      Row Name 04/02/19 1228             Gait/Stairs Assessment/Training    [place] Level (Gait)  moderate assist (50% patient effort);1 person to manage equipment;minimum assist (75% patient effort) 2nd person CGA for safety  -LH      Assistive Device (Gait)  jay-bars  -LH      Distance in Feet (Gait)  32x2  -LH      Pattern (Gait)  step-through  -LH      Deviations/Abnormal Patterns (Gait)  nancy decreased  -LH      Bilateral Gait Deviations  forward flexed posture;heel strike decreased  -LH      Left Sided Gait Deviations   weight shift ability decreased  -      Right Sided Gait Deviations  foot drop/toe drag;knee hyperextension;weight shift ability decreased  -      Comment (Gait/Stairs)  MIn A to advance/block RLE, slider ace wrap  -      Recorded by [] Clau Ayon, PT      Row Name 04/02/19 1228             Wheelchair Mobility/Management    Method of Wheelchair Locomotion (Mobility)  jay-bipedal propulsion (left sided);bimanual (upper extremity) propulsion  -      Mobility Activities (Wheelchair)  forward propulsion;steering;turning;doorway navigation  -      Forward Propulsion [place] (Wheelchair)  stand by assist  -      Steering [place] (Wheelchair)  stand by assist  -      Turning [place] (Wheelchair)  stand by assist  -      Doorway Navigation [place] (Wheelchair)  stand by assist;verbal cues  -      Distance Propelled in Feet (Wheelchair)  120  -LH      Recorded by [] Clau Ayon, PT      Row Name 04/02/19 1210             Bathing Assessment/Treatment    Bathing [place] Level  bathing skills;minimum assist (75% patient effort);verbal cues;set up;nonverbal cues (demo/gesture)  -DN      Assistive Device (Bathing)  grab bar/tub rail;hand held shower spray hose;tub bench  -DN      Bathing Position  supported sitting;supported standing  -DN      Bathing Setup Assistance  obtain supplies  -DN      Comment (Bathing)  again max/mod vc for all sequencing, initiation during adls   -DN      Recorded by [DN] Reg Mckinney, OT      Row Name 04/02/19 1210             Upper Body Dressing Assessment/Treatment    Upper Body Dressing Task  upper body dressing skills;set up assistance;supervision;verbal cues;nonverbal cues (demo/gesture)  -DN      Upper Body Dressing Position  supported sitting  -DN      Set-up Assistance (Upper Body Dressing)  obtain clothing  -DN      Recorded by [DN] Reg Mckinney, OT      Row Name 04/02/19 1210             Lower Body Dressing Assessment/Treatment    Lower  Body Dressing [place] Level  doff;don;pants/bottoms;shoes/slippers;socks;underwear;clothes fastener management;moderate assist (50% patient effort)  -DN      Lower Body Dressing Position  supported sitting  -DN      Lower Body Dressing Setup Assistance  obtain clothing  -DN      Comment (Lower Body Dressing)  jay dressing techniques utilized  -DN      Recorded by [DN] Reg Mckinney, OT      Row Name 04/02/19 1210             Grooming Assessment/Treatment    Grooming [place] Level  grooming skills;deodorant application;oral care regimen;supervision;set up;verbal cues;nonverbal cues (demo/gesture)  -DN      Grooming Position  sink side;supported sitting  -DN      Recorded by [DN] Reg Mckinney, OT      Row Name 04/02/19 1210             Toileting Assessment/Treatment    Comment (Toileting)  pt did not need to use commode this am  -DN      Recorded by [BRENDAN] Reg Mckinney, OT      Row Name 04/02/19 1455             Fine Motor Testing & Training    Comment, Fine Motor Coordination  nuts and bolts activity with RUE , need for vc to use RUE mostly for fasteners rather than LUE  -DN      Recorded by [DN] Reg Mckinney, OT      Row Name 04/02/19 1455 04/02/19 1228 04/02/19 1210       Pain Scale: Numbers Pre/Post-Treatment    Pain Scale: Numbers, Pretreatment  0/10 - no pain  -DN  0/10 - no pain  -LH  0/10 - no pain  -DN    Pain Scale: Numbers, Post-Treatment  0/10 - no pain  -DN  0/10 - no pain  -LH  0/10 - no pain  -DN    Recorded by [DN] Reg Mckinney, OT [LH] Clau Ayon, PT [DN] Reg Mckinney, OT    Row Name 04/02/19 0800             Pain Scale: Numbers Pre/Post-Treatment    Pain Scale: Numbers, Pretreatment  0/10 - no pain  -SA      Pain Scale: Numbers, Post-Treatment  0/10 - no pain  -SA      Recorded by [SA] Narcisa Sanchez MS CCC-SLP      Row Name 04/02/19 1228             Therapeutic Exercise    Therapeutic Exercise  standing, lower extremities  -LH      Recorded by [LH] Clau Ayon, PT      Ayana  Name 04/02/19 1455             Upper Extremity Standing Therapeutic Exercise    Performed, Standing Upper Extremity (Therapeutic Exercise)  shoulder flexion/extension;shoulder abduction/adduction;shoulder external/internal rotation;shoulder horizontal abduction/adduction;elbow flexion/extension;wrist flexion/extension  -DN      Comment, Standing Upper Extremity (Therapeutic Exercise)  standing at table pt needed work on sequencing, visual attention and FMC with RUE while standing while placeing beads and cothespins on dowel in specific order, Min vc for attention  -DN      Recorded by [DN] Reg Mckinney OT      Row Name 04/02/19 1228             Lower Extremity Standing Therapeutic Exercise    Performed, Standing Lower Extremity (Therapeutic Exercise)  mini-squats  -      Exercise Type, Standing Lower Extremity (Therapeutic Exercise)  AROM (active range of motion)  -      Sets/Reps Detail, Standing Lower Extremity (Therapeutic Exercise)  1/10  -      Comment, Standing Lower Extremity (Therapeutic Exercise)  BUE supported at hemibars. blocking RLE for inc control, Min A  -LH      Recorded by [LH] Clau Ayon, PT      Row Name 04/02/19 1228             Lower Extremity Seated Therapeutic Exercise    Performed, Seated Lower Extremity (Therapeutic Exercise)  hip flexion/extension;LAQ (long arc quad), knee extension;ankle dorsiflexion/plantarflexion  -      Exercise Type, Seated Lower Extremity (Therapeutic Exercise)  AROM (active range of motion);AAROM (active assistive range of motion)  -      Sets/Reps Detail, Seated Lower Extremity (Therapeutic Exercise)  2/15  -      Comment, Seated Lower Extremity (Therapeutic Exercise)  assist for heel slides RLE w sheet  -LH      Recorded by [LH] Clau Ayon, PT      Row Name 04/02/19 1455 04/02/19 1228          Positioning and Restraints    Pre-Treatment Position  sitting in chair/recliner  -DN  sitting in chair/recliner  -LH     Post Treatment Position  bed   -DN  wheelchair  -LH     In Bed  supine;call light within reach;encouraged to call for assist;exit alarm on;with family/caregiver;with other staff  -DN  --     In Wheelchair  --  sitting;call light within reach;encouraged to call for assist;exit alarm on  -LH     Recorded by [DN] Reg Mckinney, OT [] Clau Ayon, PT       User Key  (r) = Recorded By, (t) = Taken By, (c) = Cosigned By    Initials Name Effective Dates    Reg Bolden OT 06/08/18 -      Clau Ayon, PT 04/03/18 -     Narcisa Gaston, MS CCC-SLP 04/03/18 -         Wound 03/26/19 0900 Left chest incision (Active)   Dressing Appearance open to air 4/2/2019  8:00 AM   Closure Liquid skin adhesive;Approximated 4/2/2019  8:00 AM   Base clean;dry 4/2/2019  8:00 AM   Periwound dry;intact 4/2/2019  8:00 AM   Drainage Amount none 4/1/2019  8:56 PM     Physical Therapy Education     Title: PT OT SLP Therapies (In Progress)     Topic: Physical Therapy (In Progress)     Point: Mobility training (In Progress)     Learning Progress Summary           Patient Acceptance, E, NR by  at 4/2/2019  3:18 PM    Acceptance, E, NR by  at 4/1/2019 10:32 AM    Acceptance, E,TB,D, NR by  at 3/30/2019 11:44 AM    Acceptance, E,TB,D, NR by EE at 3/29/2019  2:58 PM    Acceptance, E,TB,D, NR by EE at 3/28/2019 11:38 AM    Acceptance, E,TB,D, NR by EE at 3/27/2019 10:05 AM    Acceptance, E,TB,D, NR by EE at 3/26/2019  9:48 AM    Acceptance, E,TB,D, NR by EE at 3/25/2019  3:09 PM                   Point: Home exercise program (In Progress)     Learning Progress Summary           Patient Acceptance, E, NR by  at 4/2/2019  3:18 PM    Acceptance, E, NR by  at 4/1/2019 10:32 AM    Acceptance, E,TB,D, NR by EE at 3/29/2019  2:58 PM    Acceptance, E,TB,D, NR by EE at 3/28/2019 11:38 AM    Acceptance, E,TB,D, NR by EE at 3/27/2019 10:05 AM    Acceptance, E,TB,D, NR by EE at 3/26/2019  9:48 AM    Acceptance, E,TB,D, NR by EE at 3/25/2019  3:09 PM                    Point: Body mechanics (In Progress)     Learning Progress Summary           Patient Acceptance, E, NR by  at 4/2/2019  3:18 PM    Acceptance, E, NR by  at 4/1/2019 10:32 AM    Acceptance, E,TB,D, NR by EE at 3/29/2019  2:58 PM    Acceptance, E,TB,D, NR by EE at 3/28/2019 11:38 AM    Acceptance, E,TB,D, NR by EE at 3/27/2019 10:05 AM    Acceptance, E,TB,D, NR by EE at 3/26/2019  9:48 AM    Acceptance, E,TB,D, NR by EE at 3/25/2019  3:09 PM                   Point: Precautions (In Progress)     Learning Progress Summary           Patient Acceptance, E, NR by  at 4/2/2019  3:18 PM    Acceptance, E, NR by  at 4/1/2019 10:32 AM    Acceptance, E,TB,D, NR by EE at 3/29/2019  2:58 PM    Acceptance, E,TB,D, NR by EE at 3/28/2019 11:38 AM    Acceptance, E,TB,D, NR by EE at 3/27/2019 10:05 AM    Acceptance, E,TB,D, NR by EE at 3/26/2019  9:48 AM    Acceptance, E,TB,D, NR by EE at 3/25/2019  3:09 PM                               User Key     Initials Effective Dates Name Provider Type Discipline     06/08/18 -  Donna Inman, PT Physical Therapist PT     04/03/18 -  Clau Ayon, PT Physical Therapist PT     04/03/18 -  Ariadne Garcia, PT Physical Therapist PT                  PT Recommendation and Plan     Plan of Care Reviewed With: patient  Progress, Functional Goals: demonstrating adequate progress  Outcome Summary: pt has met 3 out of 4 STGs, all LTGs ongoing. cog deficits limiting optimal progress. transitioned last week to pistol  walker. needs TC/VCs for proper  advancement of RLE. will cont to prepare  for DC.                Time Calculation:     PT Charges     Row Name 04/02/19 1313 04/02/19 131          Time Calculation    Start Time  1230  -  0930  -     Stop Time  1300  -LH  1000  -LH     Time Calculation (min)  30 min  -  30 min  -     PT Received On  --  04/02/19  -     PT - Next Appointment  --  04/03/19  -       User Key  (r) = Recorded By, (t) = Taken By, (c) = Cosigned By     Initials Name Provider Type     Clau Ayon, PT Physical Therapist          Therapy Charges for Today     Code Description Service Date Service Provider Modifiers Qty    63088327789 HC PT THER PROC EA 15 MIN 4/1/2019 Clau Ayon, PT GP 4    89276816204 HC PT THER PROC EA 15 MIN 4/2/2019 Clau Ayon, PT GP 4                   Clau Ayon, PT  4/2/2019

## 2019-04-02 NOTE — PROGRESS NOTES
Weekly progress report and length of stay discussed with pt, his wife and sister.  Initially, pt was upset by his length of stay, but he was accepting of the projected LOS after his family and several staff members talked with him about why it is to his benefit to continue inpatient treatment.  Will continue to provide support and assist with plans.

## 2019-04-02 NOTE — THERAPY TREATMENT NOTE
Inpatient Rehabilitation - Occupational Therapy Treatment Note    [facility]     Patient Name: Nayan Ryan  : 1965  MRN: [id number]    Today's Date: 2019                 Admit Date: 3/24/2019      Visit Dx:  No diagnosis found.    Patient Active Problem List   Diagnosis   • Acute ischemic left PCA stroke (CMS/HCC)   • Status post placement of implantable loop recorder   • HTN (hypertension)   • Diabetes mellitus (CMS/HCC)   • Hyperlipidemia   • Morbid obesity (CMS/HCC)   • Anxiety disorder   • Migraine   • H/O hernia repair   • Abdominal wall abscess   • Back pain   • Stroke (cerebrum) (CMS/HCC)   • Vitamin D deficiency   • History of fatty infiltration of liver         Therapy Treatment    IRF Treatment Summary     Row Name 19 1455 19 1228 19 1210       Evaluation/Treatment Time and Intent    Subjective Information  complains of;fatigue  -DN  no complaints  -LH  complains of pt discouraged having 3 weeks of tx to go  -DN    Existing Precautions/Restrictions  fall  -DN  fall  -LH  fall  -DN    Document Type  therapy note (daily note)  -DN  therapy note (daily note)  -  therapy note (daily note)  -DN    Mode of Treatment  occupational therapy  -DN  individual therapy;physical therapy  -LH  occupational therapy  -DN    Patient/Family Observations  seated in w/c  -DN  seated in wC no acute distress  -LH  sitting in w/c  -DN    Recorded by [DN] Reg Mckinney, OT [] Clau Ayon PT [DN] Reg Mckinney, OT    Row Name 19 1100 19 0800          Evaluation/Treatment Time and Intent    Subjective Information  no complaints  -SA  no complaints  -SA     Existing Precautions/Restrictions  fall swallow  -SA  fall swallow  -SA     Document Type  therapy note (daily note)  -SA  therapy note (daily note)  -SA     Mode of Treatment  speech-language pathology  -SA  speech-language pathology  -SA     Patient/Family Observations  up in w/c; on phone with sister; ready for tx  -SA  up in  w/c; from DR  -     Start Time (Evaluation/Treatment)  1100  -SA  0800  -SA     Stop Time (Evaluation/Treatment)  1130  -SA  0830  -SA     Recorded by [SA] Narcisa Sanchez MS CCC-SLP [SA] Narcias Sanchez MS CCC-SLP     Row Name 04/02/19 1228 04/02/19 1210          Cognition/Psychosocial- PT/OT    Affect/Mental Status (Cognitive)  flat/blunted affect  -LH  flat/blunted affect  -DN     Behavioral Issues (Cognitive)  withdrawn  -LH  withdrawn  -DN     Orientation Status (Cognition)  oriented x 3  -LH  oriented to;person;place;time  -DN     Follows Commands (Cognition)  follows one step commands;75-90% accuracy;initiation impaired;physical/tactile prompts required;repetition of directions required;verbal cues/prompting required  -LH  follows one step commands;50-74% accuracy  -DN     Personal Safety Interventions  fall prevention program maintained;gait belt;nonskid shoes/slippers when out of bed;supervised activity  -LH  fall prevention program maintained;gait belt  -DN     Attention Deficit (Cognitive)  severe deficit  -LH  --     Executive Function Deficit (Cognition)  severe deficit  -LH  --     Memory Deficit (Cognitive)  severe deficit  -LH  --     Recorded by [] Clau Ayon, PT [DN] Reg Mckinney OT     Row Name 04/02/19 1228             Bed Mobility Assessment/Treatment    Comment (Bed Mobility)  NT  -LH      Recorded by [] Clau Ayon PT      Row Name 04/02/19 1228             Transfer Assessment/Treatment    Comment (Transfers)  practiced sit pivot tsfs WC<->therapy mat  -LH      Recorded by [] Clau Ayon, PT      Row Name 04/02/19 1228             Bed-Chair Transfer    Bed-Chair [place] (Transfers)  minimum assist (75% patient effort);1 person to manage equipment;moderate assist (50% patient effort)  -      Assistive Device (Bed-Chair Transfers)  wheelchair  -LH      Recorded by [] Clau Ayon PT      Row Name 04/02/19 1455 04/02/19 1228          Chair-Bed Transfer    Chair-Bed  [employer] (Transfers)  minimum assist (75% patient effort);nonverbal cues (demo/gesture);verbal cues  -DN  minimum assist (75% patient effort);moderate assist (50% patient effort);1 person to manage equipment  -     Assistive Device (Chair-Bed Transfers)  wheelchair  -DN  wheelchair  -LH     Recorded by [DN] Reg Mckinney, OT [] Clau Ayon, PT     Row Name 04/02/19 1228             Sit-Stand Transfer    Sit-Stand [employer] (Transfers)  minimum assist (75% patient effort);1 person to manage equipment  -LH      Recorded by [] Clau Ayon, PT      Row Name 04/02/19 1228             Stand-Sit Transfer    Stand-Sit [employer] (Transfers)  minimum assist (75% patient effort);1 person to manage equipment  -LH      Recorded by [] Clau Ayon, PT      Row Name 04/02/19 1210             Shower Transfer    Type (Shower Transfer)  stand pivot/stand step  -DN      [employer] Level (Shower Transfer)  minimum assist (75% patient effort);verbal cues use of grab bars for UE support  -DN      Assistive Device (Shower Transfer)  wheelchair;tub bench;grab bars/tub rail mod to max vc for sequencing adls and transfers  -DN      Recorded by [DN] Reg Mckinnye, OT      Row Name 04/02/19 1228             Wheelchair Mobility/Management    Method of Wheelchair Locomotion (Mobility)  jay-bipedal propulsion (left sided);bimanual (upper extremity) propulsion  -      Mobility Activities (Wheelchair)  forward propulsion;steering;turning;doorway navigation  -      Forward Propulsion [employer] (Wheelchair)  stand by assist  -      Steering [employer] (Wheelchair)  stand by assist  -      Turning [employer] (Wheelchair)  stand by assist  -      Doorway Navigation [employer] (Wheelchair)  stand by assist;verbal cues  -      Distance Propelled in Feet (Wheelchair)  120  -LH      Recorded by [] Clau Ayon, PT      Row Name 04/02/19 1210             Bathing Assessment/Treatment    Bathing [employer]  Level  bathing skills;minimum assist (75% patient effort);verbal cues;set up;nonverbal cues (demo/gesture)  -DN      Assistive Device (Bathing)  grab bar/tub rail;hand held shower spray hose;tub bench  -DN      Bathing Position  supported sitting;supported standing  -DN      Bathing Setup Assistance  obtain supplies  -DN      Comment (Bathing)  again max/mod vc for all sequencing, initiation during adls   -DN      Recorded by [BRENDAN] Rge Mckinney, OT      Row Name 04/02/19 1210             Upper Body Dressing Assessment/Treatment    Upper Body Dressing Task  upper body dressing skills;set up assistance;supervision;verbal cues;nonverbal cues (demo/gesture)  -DN      Upper Body Dressing Position  supported sitting  -DN      Set-up Assistance (Upper Body Dressing)  obtain clothing  -DN      Recorded by [BRENDAN] Reg Mckinney, OT      Row Name 04/02/19 1210             Lower Body Dressing Assessment/Treatment    Lower Body Dressing [place] Level  doff;don;pants/bottoms;shoes/slippers;socks;underwear;clothes fastener management;moderate assist (50% patient effort)  -DN      Lower Body Dressing Position  supported sitting  -DN      Lower Body Dressing Setup Assistance  obtain clothing  -DN      Comment (Lower Body Dressing)  jay dressing techniques utilized  -DN      Recorded by [BRENDAN] Reg Mckinney, OT      Row Name 04/02/19 1210             Grooming Assessment/Treatment    Grooming [place] Level  grooming skills;deodorant application;oral care regimen;supervision;set up;verbal cues;nonverbal cues (demo/gesture)  -DN      Grooming Position  sink side;supported sitting  -DN      Recorded by [BRENDAN] Reg Mckinney, OT      Row Name 04/02/19 1210             Toileting Assessment/Treatment    Comment (Toileting)  pt did not need to use commode this am  -DN      Recorded by [BRENDAN] Reg Mckinney, OT      Row Name 04/02/19 4353             Fine Motor Testing & Training    Comment, Fine Motor Coordination  nuts and bolts  activity with RUE , need for vc to use RUE mostly for fasteners rather than LUE  -DN      Recorded by [DN] Reg Mckinney, OT      Row Name 04/02/19 1455 04/02/19 1228 04/02/19 1210       Pain Scale: Numbers Pre/Post-Treatment    Pain Scale: Numbers, Pretreatment  0/10 - no pain  -DN  0/10 - no pain  -LH  0/10 - no pain  -DN    Pain Scale: Numbers, Post-Treatment  0/10 - no pain  -DN  0/10 - no pain  -LH  0/10 - no pain  -DN    Recorded by [DN] Reg Mckinney, OT [LH] Clau Ayon, PT [DN] Reg Mckinney, OT    Row Name 04/02/19 0800             Pain Scale: Numbers Pre/Post-Treatment    Pain Scale: Numbers, Pretreatment  0/10 - no pain  -SA      Pain Scale: Numbers, Post-Treatment  0/10 - no pain  -SA      Recorded by [SA] Narcisa Sanchez MS CCC-SLP      Row Name 04/02/19 1455             Upper Extremity Standing Therapeutic Exercise    Performed, Standing Upper Extremity (Therapeutic Exercise)  shoulder flexion/extension;shoulder abduction/adduction;shoulder external/internal rotation;shoulder horizontal abduction/adduction;elbow flexion/extension;wrist flexion/extension  -DN      Comment, Standing Upper Extremity (Therapeutic Exercise)  standing at table pt needed work on sequencing, visual attention and FMC with RUE while standing while placeing beads and cothespins on dowel in specific order, Min vc for attention  -DN      Recorded by [BRENDAN] Reg Mckinney, OT      Row Name 04/02/19 1455 04/02/19 1228          Positioning and Restraints    Pre-Treatment Position  sitting in chair/recliner  -DN  sitting in chair/recliner  -LH     Post Treatment Position  bed  -DN  wheelchair  -LH     In Bed  supine;call light within reach;encouraged to call for assist;exit alarm on;with family/caregiver;with other staff  -DN  --     In Wheelchair  --  sitting;call light within reach;encouraged to call for assist;exit alarm on  -LH     Recorded by [DN] Reg Mckinney, OT [LH] Clau Ayon, PT       User Key  (r) = Recorded By, (t) =  Taken By, (c) = Cosigned By    Initials Name Effective Dates    DN Reg Mckinney, OT 06/08/18 -     LH Clau Ayon, PT 04/03/18 -     Narcisa Gaston, MS CCC-SLP 04/03/18 -           Wound 03/26/19 0900 Left chest incision (Active)   Dressing Appearance open to air 4/2/2019  8:00 AM   Closure Liquid skin adhesive;Approximated 4/2/2019  8:00 AM   Base clean;dry 4/2/2019  8:00 AM   Periwound dry;intact 4/2/2019  8:00 AM   Drainage Amount none 4/1/2019  8:56 PM         OT Recommendation and Plan    Anticipated Equipment Needs At Discharge (OT Eval): commode, 3-in-1, shower chair, tub bench  Planned Therapy Interventions (OT Eval): BADL retraining, cognitive/visual perception retraining, functional balance retraining, neuromuscular control/coordination retraining, strengthening exercise, transfer/mobility retraining            OT IRF GOALS     Row Name 04/02/19 1500 03/25/19 1649          Bathing Goal 1 (OT-IRF)    Activity/Device (Bathing Goal 1, OT-IRF)  bathing skills, all  -DN  bathing skills, all;tub bench;hand-held shower spray hose;grab bar/tub rail  -DN     [place] Level (Bathing Goal 1, OT-IRF)  contact guard assist;verbal cues required  -DN  minimum assist (75% or more patient effort);verbal cues required;set-up required;tactile cues required  -DN     Time Frame (Bathing Goal 1, OT-IRF)  short term goal (STG)  -DN  short term goal (STG)  -DN     Progress/Outcomes (Bathing Goal 1, OT-IRF)  goal met;goal revised this date  -DN  continuing progress toward goal  -DN        Bathing Goal 2 (OT-IRF)    Activity/Device (Bathing Goal 2, OT-IRF)  bathing skills, all  -DN  bathing skills, all;tub bench;hand-held shower spray hose;grab bar/tub rail  -DN     [place] Level (Bathing Goal 2, OT-IRF)  supervision required  -DN  supervision required;verbal cues required  -DN     Time Frame (Bathing Goal 2, OT-IRF)  long term goal (LTG)  -DN  long term goal (LTG)  -DN     Progress/Outcomes (Bathing Goal 2, OT-IRF)   goal ongoing  -DN  continuing progress toward goal  -DN        UB Dressing Goal 1 (OT-IRF)    Activity/Device (UB Dressing Goal 1, OT-IRF)  upper body dressing  -DN  upper body dressing  -DN     [place] (UB Dress Goal 1, OT-IRF)  supervision required  -DN  minimum assist (75% or more patient effort);verbal cues required  -DN     Time Frame (UB Dressing Goal 1, OT-IRF)  short term goal (STG)  -DN  short term goal (STG)  -DN     Progress/Outcomes (UB Dressing Goal 1, OT-IRF)  goal met;goal revised this date  -DN  continuing progress toward goal  -DN        UB Dressing Goal 2 (OT-IRF)    Activity/Device (UB Dressing Goal 2, OT-IRF)  upper body dressing  -DN  upper body dressing  -DN     [place] (UB Dress Goal 2, OT-IRF)  supervision required  -DN  supervision required;verbal cues required  -DN     Time Frame (UB Dressing Goal 2, OT-IRF)  long term goal (LTG)  -DN  long term goal (LTG)  -DN     Progress/Outcomes (UB Dressing Goal 2, OT-IRF)  goal ongoing;goal met  -DN  continuing progress toward goal  -DN        LB Dressing Goal 1 (OT-IRF)    Activity/Device (LB Dressing Goal 1, OT-IRF)  lower body dressing  -DN  lower body dressing  -DN     [place] (LB Dressing Goal 1, OT-IRF)  minimum assist (75% or more patient effort);verbal cues required;tactile cues required  -DN  moderate assist (50-74% patient effort);verbal cues required;set-up required;tactile cues required  -DN     Time Frame (LB Dressing Goal 1, OT-IRF)  short term goal (STG)  -DN  short term goal (STG)  -DN     Progress/Outcomes (LB Dressing Goal 1, OT-IRF)  goal met;goal revised this date  -DN  continuing progress toward goal  -DN        LB Dressing Goal 2 (OT-IRF)    Activity/Device (LB Dressing Goal 2, OT-IRF)  lower body dressing  -DN  lower body dressing  -DN     [place] (LB Dressing Goal 2, OT-IRF)  verbal cues required;tactile cues required;minimum assist (75% or more patient effort)  -DN  verbal cues required;tactile cues  required;minimum assist (75% or more patient effort)  -DN     Time Frame (LB Dressing Goal 2, OT-IRF)  long term goal (LTG)  -DN  long term goal (LTG)  -DN     Progress/Outcomes (LB Dressing Goal 2, OT-IRF)  goal ongoing  -DN  continuing progress toward goal  -DN        Grooming Goal 1 (OT-IRF)    Activity/Device (Grooming Goal 1, OT-IRF)  grooming skills, all  -DN  grooming skills, all  -DN     [place] (Grooming Goal 1, OT-IRF)  supervision required  -DN  supervision required;verbal cues required;minimum assist (75% or more patient effort)  -DN     Time Frame (Grooming Goal 1, OT-IRF)  short term goal (STG)  -DN  short term goal (STG)  -DN     Progress/Outcomes (Grooming Goal 1, OT-IRF)  goal partially met;goal ongoing  -DN  continuing progress toward goal  -DN        Grooming Goal 2 (OT-IRF)    Activity/Device (Grooming Goal 2, OT-IRF)  grooming skills, all  -DN  grooming skills, all  -DN     [place] (Grooming Goal 2, OT-IRF)  supervision required  -DN  set-up required;supervision required;verbal cues required;tactile cues required  -DN     Time Frame (Grooming Goal 2, OT-IRF)  long term goal (LTG)  -DN  long term goal (LTG)  -DN     Progress/Outcomes (Grooming Goal 2, OT-IRF)  goal revised this date  -DN  continuing progress toward goal  -DN        Toileting Goal 1 (OT-IRF)    Activity/Device (Toileting Goal 1, OT-IRF)  toileting skills, all  -DN  toileting skills, all;adjust/manage clothing;perform perineal hygiene;grab bar/safety frame;raised toilet seat  -DN     [place] Level (Toileting Goal 1, OT-IRF)  minimum assist (75% or more patient effort);verbal cues required;tactile cues required  -DN  moderate assist (50-74% patient effort);verbal cues required;tactile cues required  -DN     Time Frame (Toileting Goal 1, OT-IRF)  short term goal (STG)  -DN  short term goal (STG)  -DN     Progress/Outcomes (Toileting Goal 1, OT-IRF)  goal met;goal revised this date  -DN  continuing progress toward goal   -DN        Toileting Goal 2 (OT-IRF)    Activity/Device (Toileting Goal 2, OT-IRF)  toileting skills, all  -DN  toileting skills, all;adjust/manage clothing;perform perineal hygiene  -DN     [place] Level (Toileting Goal 2, OT-IRF)  contact guard assist;verbal cues required;tactile cues required  -DN  contact guard assist;verbal cues required;tactile cues required  -DN     Time Frame (Toileting Goal 2, OT-IRF)  long term goal (LTG)  -DN  long term goal (LTG)  -DN     Progress/Outcomes (Toileting Goal 2, OT-IRF)  goal ongoing  -DN  continuing progress toward goal  -DN        Self-Feeding Goal 1 (OT-IRF)    Activity/Device (Self-Feeding Goal 1, OT-IRF)  self-feeding skills, all  -DN  self-feeding skills, all  -DN     [place] (Self-Feeding Goal 1, OT-IRF)  supervision required  -DN  supervision required;tactile cues required  -DN     Time Frame (Self-Feeding Goal 1, OT-IRF)  short term goal (STG)  -DN  short term goal (STG)  -DN     Progress/Outcomes 1 (Self-Feeding Goal, OT-IRF)  goal met;goal ongoing  -DN  continuing progress toward goal  -DN        Self-Feeding Goal 2 (OT-IRF)    Activity/Device (Self-Feeding Goal 2, OT-IRF)  self-feeding skills, all  -DN  self-feeding skills, all  -DN     [place] (Self-Feeding Goal 2, OT-IRF)  supervision required  -DN  supervision required;set-up required  -DN     Time Frame (Self-Feeding Goal 2, OT-IRF)  long term goal (LTG)  -DN  long term goal (LTG)  -DN     Progress/Outcomes (Self-Feeding Goal 2, OT-IRF)  goal met;goal ongoing  -DN  continuing progress toward goal  -DN        Caregiver Training Goal 2 (OT-IRF)    Caregiver Training Goal 2 (OT-IRF)  pt caregiver to be independent with any assist pt needs with adls, transfers and HEP for d/c home  -DN  pt caregiver to be independent with any assist pt needs with adls, transfers and HEP for d/c home  -DN     Time Frame (Caregiver Training Goal 2, OT-IRF)  long term goal (LTG)  -DN  long term goal (LTG)  -DN      Progress/Outcomes (Caregiver Training Goal 2, OT-IRF)  goal ongoing  -DN  continuing progress toward goal  -DN       User Key  (r) = Recorded By, (t) = Taken By, (c) = Cosigned By    Initials Name Provider Type    Reg Bolden OT Occupational Therapist          Occupational Therapy Education     Title: PT OT SLP Therapies (In Progress)     Topic: Occupational Therapy (In Progress)     Point: ADL training (In Progress)     Description: Instruct learner(s) on proper safety adaptation and remediation techniques during self care or transfers.   Instruct in proper use of assistive devices.    Learning Progress Summary           Patient Acceptance, E,TB,D, NR by DN at 3/26/2019 12:06 PM    Comment:  jay adls and commode/shower transfers, still max vc for all due to cognition and visual impairments    Acceptance, E,TB,D, VU,NR by DN at 3/25/2019  5:23 PM    Comment:  OT role in rehab, transfer traning, jay adls                   Point: Home exercise program (In Progress)     Description: Instruct learner(s) on appropriate technique for monitoring, assisting and/or progressing therapeutic exercises/activities.    Learning Progress Summary           Patient Acceptance, E,TB,D, NR by DN at 3/26/2019 12:06 PM    Comment:  jay adls and commode/shower transfers, still max vc for all due to cognition and visual impairments    Acceptance, E,TB,D, VU,NR by DN at 3/25/2019  5:23 PM    Comment:  OT role in rehab, transfer traning, jay adls                   Point: Precautions (In Progress)     Description: Instruct learner(s) on prescribed precautions during self-care and functional transfers.    Learning Progress Summary           Patient Acceptance, E,TB,D, NR by DN at 3/26/2019 12:06 PM    Comment:  jay adls and commode/shower transfers, still max vc for all due to cognition and visual impairments    Acceptance, E,TB,D, VU,NR by DN at 3/25/2019  5:23 PM    Comment:  OT role in rehab, transfer traning, jay adls                    Point: Body mechanics (In Progress)     Description: Instruct learner(s) on proper positioning and spine alignment during self-care, functional mobility activities and/or exercises.    Learning Progress Summary           Patient Acceptance, E,TB,D, NR by DN at 3/26/2019 12:06 PM    Comment:  jay adls and commode/shower transfers, still max vc for all due to cognition and visual impairments    Acceptance, E,TB,D, VU,NR by DN at 3/25/2019  5:23 PM    Comment:  OT role in rehab, transfer traning, jay adls                               User Key     Initials Effective Dates Name Provider Type Discipline    DN 06/08/18 -  Reg Mckinney OT Occupational Therapist OT                       Time Calculation:     Time Calculation- OT     Row Name 04/02/19 1505 04/02/19 1215          Time Calculation- OT    OT Start Time  1400  -DN  0830  -DN     OT Stop Time  1445  -DN  0930  -DN     OT Time Calculation (min)  45 min  -DN  60 min  -DN     OT Non-Billable Time (min)  --  15 min team rounds  -DN     OT Received On  04/02/19  -DN  --       User Key  (r) = Recorded By, (t) = Taken By, (c) = Cosigned By    Initials Name Provider Type    Reg Bolden OT Occupational Therapist          Therapy Charges for Today     Code Description Service Date Service Provider Modifiers Qty    66856433783 HC OT SELF CARE/MGMT/TRAIN EA 15 MIN 4/2/2019 Reg Mckinney OT GO 3    46758169930 HC OT THER PROC EA 15 MIN 4/2/2019 Reg Mckinney OT GO 2    17262746101 HC OT CARE PLAN EA 15 MIN 4/2/2019 Reg Mckinney OT GO 1    72107076602 HC OT SELF CARE/MGMT/TRAIN EA 15 MIN 4/2/2019 Reg Mckinney OT GO 1    63394664946 HC OT THER PROC EA 15 MIN 4/2/2019 Reg Mckinney OT GO 2                   Reg Mckinney OT  4/2/2019

## 2019-04-02 NOTE — PROGRESS NOTES
Inpatient Rehabilitation Functional Measures Assessment    Functional Measures  [facility] Eating:  [facility] Grooming: [facility] Bathing:  [facility] Upper Body Dressing:  [facility] Lower Body Dressing:  [facility] Toileting:  [facility] Bladder Management  Level of Assistance:  [place]  Frequency/Number of Accidents this Shift:  [facility] Bowel Management  Level of Assistance: [place]  Frequency/Number of Accidents this Shift: [facility] Bed/Chair/Wheelchair Transfer:  [facility] Toilet Transfer:  [facility] Tub/Shower Transfer:  [place]    Previously Documented Mode of Locomotion at Discharge: Field  VANITA Expected Mode of Locomotion at Discharge: [facility] Walk/Wheelchair:  [facility] Stairs:  [facility] Comprehension:  Auditory comprehension is the usual mode. Comprehension  Score = 6, Modified [place].  Patient comprehends complex/abstract  information in their primary language, requiring:  [facility] Expression:  Vocal expression is the usual mode. Expression Score = 6,  Modified Independent.  Patient expresses complex/abstract information in their  primary language, requiring:  [facility] Social Interaction:  Social Interaction Score = 6, Modified Independent.  Patient is modified independent for social interaction, requiring:  [facility] Problem Solving:  Activity was not observed.  [facility] Memory:  Memory Score = 5, Supervision.  Patient recognizes and remembers  with prompting only under stressful or unfamiliar conditions, but no more than  10% of the time, for the following behavior(s):    Therapy Mode Minutes  Occupational Therapy: [place]  Physical Therapy: [place]  Speech Language Pathology:  [place]    Signed by: Rosa Isela Fleming RN

## 2019-04-03 LAB
GLUCOSE BLDC GLUCOMTR-MCNC: 102 MG/DL (ref 70–130)
GLUCOSE BLDC GLUCOMTR-MCNC: 105 MG/DL (ref 70–130)
GLUCOSE BLDC GLUCOMTR-MCNC: 124 MG/DL (ref 70–130)
GLUCOSE BLDC GLUCOMTR-MCNC: 97 MG/DL (ref 70–130)

## 2019-04-03 PROCEDURE — 63710000001 INSULIN GLARGINE PER 5 UNITS: Performed by: INTERNAL MEDICINE

## 2019-04-03 PROCEDURE — 82962 GLUCOSE BLOOD TEST: CPT

## 2019-04-03 PROCEDURE — 92526 ORAL FUNCTION THERAPY: CPT

## 2019-04-03 PROCEDURE — 97110 THERAPEUTIC EXERCISES: CPT

## 2019-04-03 PROCEDURE — 92507 TX SP LANG VOICE COMM INDIV: CPT

## 2019-04-03 PROCEDURE — 97535 SELF CARE MNGMENT TRAINING: CPT

## 2019-04-03 RX ADMIN — ALPRAZOLAM 1 MG: 0.5 TABLET ORAL at 21:36

## 2019-04-03 RX ADMIN — METFORMIN HYDROCHLORIDE 500 MG: 500 TABLET, EXTENDED RELEASE ORAL at 17:26

## 2019-04-03 RX ADMIN — ATORVASTATIN CALCIUM 80 MG: 80 TABLET, FILM COATED ORAL at 21:36

## 2019-04-03 RX ADMIN — AMLODIPINE BESYLATE 5 MG: 5 TABLET ORAL at 08:35

## 2019-04-03 RX ADMIN — ATENOLOL 25 MG: 25 TABLET ORAL at 08:35

## 2019-04-03 RX ADMIN — ASPIRIN 325 MG: 325 TABLET, COATED ORAL at 08:35

## 2019-04-03 RX ADMIN — METFORMIN HYDROCHLORIDE 500 MG: 500 TABLET, EXTENDED RELEASE ORAL at 08:35

## 2019-04-03 RX ADMIN — LISINOPRIL 20 MG: 20 TABLET ORAL at 21:36

## 2019-04-03 RX ADMIN — CLOPIDOGREL 75 MG: 75 TABLET, FILM COATED ORAL at 08:36

## 2019-04-03 RX ADMIN — PAROXETINE HYDROCHLORIDE 10 MG: 10 TABLET, FILM COATED ORAL at 08:35

## 2019-04-03 RX ADMIN — Medication 1 TABLET: at 08:35

## 2019-04-03 RX ADMIN — OXYCODONE AND ACETAMINOPHEN 1 TABLET: 5; 325 TABLET ORAL at 18:59

## 2019-04-03 RX ADMIN — LISINOPRIL 20 MG: 20 TABLET ORAL at 08:35

## 2019-04-03 RX ADMIN — INSULIN GLARGINE 32 UNITS: 100 INJECTION, SOLUTION SUBCUTANEOUS at 21:39

## 2019-04-03 RX ADMIN — ATENOLOL 25 MG: 25 TABLET ORAL at 21:36

## 2019-04-03 NOTE — PROGRESS NOTES
Inpatient Rehabilitation Functional Measures Assessment    Functional Measures  VANITA Eating:  [facility] Grooming: [facility] Bathing:  Branch  [facility] Upper Body Dressing:  Branch  [facility] Lower Body Dressing:  Branch  [facility] Toileting:  [facility] Bladder Management  Level of Assistance:  Nice  Frequency/Number of Accidents this Shift:  [facility] Bowel Management  Level of Assistance: Nice  Frequency/Number of Accidents this Shift: Branch    [facility] Bed/Chair/Wheelchair Transfer:  [facility] Toilet Transfer:  [facility] Tub/Shower Transfer:  Nice    Previously Documented Mode of Locomotion at Discharge: Field  VANITA Expected Mode of Locomotion at Discharge: [facility] Walk/Wheelchair:  [facility] Stairs:  [facility] Comprehension:  Auditory comprehension is the usual mode. Patient does not  comprehend complex/abstract information in their primary language without  assistance from a helper. Comprehension Score = 5, Supervision. Patient  comprehends basic daily needs or ideas greater than 90% of the time. Patient  requires stand by/rare prompting. No assistive devices were required.  VANITA Expression:  Vocal expression is the usual mode. Patient does not express  complex/abstract information in their primary language without a helper.  Expression Score = 4, Minimal Prompting. Patient expresses basic daily needs or  ideas 75-90% of the time.  Patient requires minimal/occasional prompting. No  assistive devices were required.  VANITA Social Interaction:  Social Interaction Score = 6, Modified Independent.  Patient is modified independent for social interaction, requiring: Requires  additional time.  VANITA Problem Solving:  Patient does not make appropriate decisions in order to  solve complex problems without assistance from a helper. Problem Solving Score =  4, Minimal Direction. Patient makes appropriate decisions in order to solve  routine problems 75-90% of the time. Patient requires minimal/occasional  direction for  the following behavior(s):  VANITA Memory:  Memory Score = 4, Minimal Prompting. Patient recognizes and  remembers 75-90% of the time. Patient requires minimal/occasional prompting for  memory for the following:    Therapy Mode Minutes  Occupational Therapy: Branch  Physical Therapy: Branch  Speech Language Pathology:  Branch    Signed by: Apoorva Love RN

## 2019-04-03 NOTE — PLAN OF CARE
Problem: Stroke (IRF) (Adult)  Goal: Promote Optimal Functional [place]  Outcome: Ongoing (interventions implemented as appropriate)   04/03/19 0128   Stroke (IRF) (Adult)   Promote Optimal Functional [place] demonstrating adequate progress       Problem: Fall Risk (Adult)  Goal: Absence of Fall  Outcome: Ongoing (interventions implemented as appropriate)   04/03/19 0128   Fall Risk (Adult)   Absence of Fall making progress toward outcome       Problem: Diabetes, Type 2 (Adult)  Goal: Signs and Symptoms of Listed Potential Problems Will be Absent, Minimized or Managed (Diabetes, Type 2)  Outcome: Ongoing (interventions implemented as appropriate)   04/03/19 0128   Goal/Outcome Evaluation   Problems Assessed (Type 2 Diabetes) all   Problems Present (Type 2 Diabetes) none       Problem: Skin Injury Risk (Adult)  Goal: Skin Health and Integrity  Outcome: Ongoing (interventions implemented as appropriate)   04/03/19 0128   Skin Injury Risk (Adult)   Skin Health and Integrity making progress toward outcome       Problem: Patient Care Overview  Goal: Plan of Care Review  Outcome: Ongoing (interventions implemented as appropriate)   04/03/19 0128   Patient Care Overview   IRF Plan of Care Review progress ongoing, continue   Progress, Functional Goals demonstrating adequate progress   Coping/Psychosocial   Plan of Care Reviewed With patient   OTHER   Outcome Summary Patient calm and cooperative. Very animated when daughter and friend came in to visit. Transfers mod assist x 1. Leans forward when transferring. Xanax given to help promote sleep. Pain medication given at 1641. Uses urinal at night. No unsafe behaviors.

## 2019-04-03 NOTE — PROGRESS NOTES
LOS: 10 days   Patient Care Team:  Qasim Asif MD as PCP - General  Qasim Asif MD as PCP - Family Medicine    Chief Complaint: same    Subjective     History of Present Illness    Subjective Pt is sleeping but s easily awakened    History taken from: patient    Objective     Vital Signs  Temp:  [98 °F (36.7 °C)-99 °F (37.2 °C)] 98.1 °F (36.7 °C)  Heart Rate:  [83-94] 83  Resp:  [20] 20  BP: (134-170)/(69-89) 170/89    Objective exam calve soft and NT resp unlabored and regular    Results Review:     I reviewed the patient's new clinical results.    Medication Review:     Assessment/Plan       Acute ischemic left PCA stroke (CMS/HCC)    Status post placement of implantable loop recorder    HTN (hypertension)    Diabetes mellitus (CMS/HCC)    Hyperlipidemia    Morbid obesity (CMS/HCC)    Anxiety disorder    Abdominal wall abscess    Stroke (cerebrum) (CMS/HCC)    Vitamin D deficiency    History of fatty infiltration of liver      Assessment & Plan Continue to prepare for dc . ELOS 3 weeks.     Santo Noonan MD  04/03/19  7:23 AM    Time:

## 2019-04-03 NOTE — THERAPY TREATMENT NOTE
Inpatient Rehabilitation - Occupational Therapy Treatment Note    [facility]     Patient Name: Nayan Ryan  : 1965  MRN: [id number]    Today's Date: 4/3/2019                 Admit Date: 3/24/2019      Visit Dx:  No diagnosis found.    Patient Active Problem List   Diagnosis   • Acute ischemic left PCA stroke (CMS/HCC)   • Status post placement of implantable loop recorder   • HTN (hypertension)   • Diabetes mellitus (CMS/HCC)   • Hyperlipidemia   • Morbid obesity (CMS/HCC)   • Anxiety disorder   • Migraine   • H/O hernia repair   • Abdominal wall abscess   • Back pain   • Stroke (cerebrum) (CMS/HCC)   • Vitamin D deficiency   • History of fatty infiltration of liver         Therapy Treatment    IRF Treatment Summary     Row Name 19 1453 19 1002 19 0800       Evaluation/Treatment Time and Intent    Subjective Information  complains of;fatigue  -DN  no complaints  -  no complaints  -SA    Existing Precautions/Restrictions  fall  -DN  fall  -LH  -- swallow  -SA    Document Type  therapy note (daily note)  -DN  therapy note (daily note)  -  therapy note (daily note)  -SA    Mode of Treatment  occupational therapy  -DN  individual therapy;physical therapy  -  speech-language pathology  -SA    Patient/Family Observations  sitting in w/c in am and in bed in pm  -DN  seated in WC no acute distress  -  up in w/c eating breakfast in DR; SLP observed pt for swallow tx during first 15 min of session; last 15 min returned to ST room for tx  -SA    Start Time (Evaluation/Treatment)  --  --  0800  -SA    Stop Time (Evaluation/Treatment)  --  --  0830  -SA    Recorded by [DN] Reg Mckinney, OT [LH] Clau Ayon, PT [SA] Narcisa Sanchez, MS CCC-SLP    Row Name 19 1453             Safety Awareness/Health Promotion    Additional Documentation  Fall Prevention (Row)  -DN      Recorded by [DN] Reg Mckinney OT      Row Name 19 1453 19 1002          Cognition/Psychosocial- PT/OT     Affect/Mental Status (Cognitive)  flat/blunted affect;sad/depressed affect  -DN  flat/blunted affect  -     Behavioral Issues (Cognitive)  withdrawn  -DN  withdrawn  -     Orientation Status (Cognition)  oriented x 3  -DN  oriented x 3  -LH     Follows Commands (Cognition)  follows one step commands;25-49% accuracy  -DN  follows one step commands;75-90% accuracy;initiation impaired;physical/tactile prompts required;repetition of directions required;verbal cues/prompting required  -     Personal Safety Interventions  gait belt;fall prevention program maintained  -DN  fall prevention program maintained;gait belt;nonskid shoes/slippers when out of bed;supervised activity  -     Cognitive Function (Cognitive)  attention deficit;executive function deficit;memory deficit;safety deficit  -DN  --     Attention Deficit (Cognitive)  moderate deficit  -DN  severe deficit  -LH     Executive Function Deficit (Cognition)  severe deficit  -DN  severe deficit  -LH     Memory Deficit (Cognitive)  severe deficit  -DN  severe deficit  -LH     Safety Deficit (Cognitive)  severe deficit  -DN  severe deficit per nsg report, pt attempted to get self back to bed   -LH     Recorded by [DN] Reg Mckinney, OT [] Clau Ayon, PT     Row Name 04/03/19 1002             Bed Mobility Assessment/Treatment    Supine-Sit [place] (Bed Mobility)  contact guard;minimum assist (75% patient effort);verbal cues;nonverbal cues (demo/gesture) at trunk  -LH      Sit-Supine [place] (Bed Mobility)  moderate assist (50% patient effort);verbal cues;nonverbal cues (demo/gesture) at BLEs  -LH      Bed Mobility, Safety Issues  cognitive deficits limit understanding;decreased use of arms for pushing/pulling;decreased use of legs for bridging/pushing  -      Comment (Bed Mobility)  tx mat   -      Recorded by [] Clau Ayon, PT      Row Name 04/03/19 1002             Transfer Assessment/Treatment    Comment (Transfers)  max VCs requires  for sit->standing tsf 2' poor carryover/poor sequenncing  -LH      Recorded by [] Clau Ayon, PT      Row Name 04/03/19 1002             Bed-Chair Transfer    Bed-Chair [place] (Transfers)  minimum assist (75% patient effort);moderate assist (50% patient effort);verbal cues;nonverbal cues (demo/gesture);set up WC<->tx mat  -      Assistive Device (Bed-Chair Transfers)  wheelchair sit pivot, max cueing for set up  -LH      Recorded by [] Clau Ayon, PT      Row Name 04/03/19 1002             Chair-Bed Transfer    Chair-Bed [place] (Transfers)  minimum assist (75% patient effort);moderate assist (50% patient effort);set up;verbal cues;nonverbal cues (demo/gesture)  -      Assistive Device (Chair-Bed Transfers)  wheelchair sit pivot, max cueing for set up  -      Recorded by [] Clau Ayon, PT      Row Name 04/03/19 1002             Sit-Stand Transfer    Sit-Stand [place] (Transfers)  minimum assist (75% patient effort);1 person to manage equipment  -      Assistive Device (Sit-Stand Transfers)  wheelchair at [facility]      Recorded by [] Clau Ayon, PT      Row Name 04/03/19 1002             Stand-Sit Transfer    Stand-Sit [place] (Transfers)  minimum assist (75% patient effort);1 person to manage equipment  -      Assistive Device (Stand-Sit Transfers)  wheelchair at [facility]      Recorded by [] Clau Ayon, PT      Row Name 04/03/19 1453             Toilet Transfer    Type (Toilet Transfer)  stand pivot/stand step  -DN      [place] Level (Toilet Transfer)  minimum assist (75% patient effort);nonverbal cues (demo/gesture);verbal cues  -DN      Assistive Device (Toilet Transfer)  raised toilet seat;grab bars/safety frame am and PM with need for max vc for technique  -DN      Recorded by [DN] Reg Mckinney OT      Row Name 04/03/19 1002             Gait/Stairs Assessment/Training    [place] Level (Gait)  moderate assist (50% patient effort);1  person to manage equipment  -      Assistive Device (Gait)  jay-bars  -      Distance in Feet (Gait)  40, 32  -      Pattern (Gait)  step-through  -      Deviations/Abnormal Patterns (Gait)  nancy decreased  -      Bilateral Gait Deviations  forward flexed posture;heel strike decreased  -      Left Sided Gait Deviations  weight shift ability decreased  -      Right Sided Gait Deviations  foot drop/toe drag;forward flexed posture;heel strike decreased;knee hyperextension  -      Comment (Gait/Stairs)  MOd A to advance/stabilize RLE (hyperextension and poor swing through)  -      Recorded by [] Clau Ayon, PT      Row Name 04/03/19 1002             Wheelchair Mobility/Management    Method of Wheelchair Locomotion (Mobility)  bimanual (upper extremity) propulsion;jay-bipedal propulsion (left sided)  -      Mobility Activities (Wheelchair)  forward propulsion;steering;turning;doorway navigation  -      Forward Propulsion [place] (Wheelchair)  stand by assist  -      Steering [place] (Wheelchair)  stand by assist  -      Turning [place] (Wheelchair)  stand by assist  -      Doorway Navigation [place] (Wheelchair)  stand by assist  -      Distance Propelled in Feet (Wheelchair)  150  -      Comment, Mobility Activities (Wheelchair)  Vcs to attend to R side  -      Recorded by [] Clau Ayon, PT      Row Name 04/03/19 1002             Safety Issues, Functional Mobility    Safety Issues Affecting Function (Mobility)  ability to follow commands;at risk behavior observed;awareness of need for assistance;judgment;problem solving;sequencing abilities  (Significant)   -      Impairments Affecting Function (Mobility)  balance;cognition  -      Recorded by [] Clau Ayon, PT      Row Name 04/03/19 1453             Bathing Assessment/Treatment    Bathing [place] Level  bathing skills;verbal cues;nonverbal cues (demo/gesture);minimum assist (75% patient  effort)  -DN      Assistive Device (Bathing)  grab bar/tub rail;hand held shower spray hose;tub bench  -DN      Bathing Position  supported sitting;supported standing  -DN      Bathing Setup Assistance  obtain supplies  -DN      Comment (Bathing)  max vc for sequencing  -DN      Recorded by [DN] Reg Mckinney, OT      Row Name 04/03/19 1453             Upper Body Dressing Assessment/Treatment    Upper Body Dressing Task  upper body dressing skills;doff;don;pull over garment;verbal cues;nonverbal cues (demo/gesture);supervision  -DN      Upper Body Dressing Position  supported sitting  -DN      Set-up Assistance (Upper Body Dressing)  obtain clothing  -DN      Recorded by [DN] Reg Mckinney, OT      Row Name 04/03/19 1453             Lower Body Dressing Assessment/Treatment    Lower Body Dressing [place] Level  doff;don;pants/bottoms;shoes/slippers;socks;underwear;moderate assist (50% patient effort);nonverbal cues (demo/gesture);verbal cues;set up  -DN      Lower Body Dressing Position  supported standing;supported sitting  -DN      Lower Body Dressing Setup Assistance  obtain clothing  -DN      Recorded by [DN] Reg Mckinney, OT      Row Name 04/03/19 1453             Grooming Assessment/Treatment    Grooming [place] Level  grooming skills;deodorant application;hair care, combing/brushing;oral care regimen;supervision;verbal cues;nonverbal cues (demo/gesture)  -DN      Grooming Position  sink side;supported sitting  -DN      Grooming Setup Assistance  obtain supplies  -DN      Comment (Grooming)  vc for sequencing  -DN      Recorded by [DN] Reg Mckinney, OT      Row Name 04/03/19 1453             Toileting Assessment/Treatment    Toileting [place] Level  toileting skills;adjust/manage clothing;perform perineal hygiene;minimum assist (75% patient effort);moderate assist (50% patient effort);verbal cues;nonverbal cues (demo/gesture) am and pm  -DN      Assistive Device Use (Toileting)  raised  toilet seat;grab bar/safety frame  -DN      Toileting Position  supported sitting;supported standing  -DN      Toileting Setup Assistance  change pad/brief;obtain supplies  -DN      Comment (Toileting)  pt inc in pm needed assist with change of clothing due to being soiled  -DN      Recorded by [DN] Reg Mckinney, OT      Row Name 04/03/19 1453             Fine Motor Testing & Training    Comment, Fine Motor Coordination  marble exercise with min assist difficulty with FMC and visual attention for pattern placement  -DN      Recorded by [DN] Reg Mckinney OT      Row Name 04/03/19 1002             Vision Assessment/Intervention    Visual Field Deficit  homonymous hemianopsia, right  (Significant)   -LH      Recorded by [] Clau Ayon, PT      Row Name 04/03/19 1453 04/03/19 1002          Pain Scale: Numbers Pre/Post-Treatment    Pain Scale: Numbers, Pretreatment  0/10 - no pain  -DN  0/10 - no pain  -LH     Pain Scale: Numbers, Post-Treatment  0/10 - no pain  -DN  0/10 - no pain  -LH     Recorded by [DN] Reg Mckinney, OT [LH] Clau Ayon, PT     Row Name 04/03/19 1002             Static Sitting Balance    Level of [place] (Unsupported Sitting, Static Balance)  contact guard assist  -      Sitting Position (Unsupported Sitting, Static Balance)  sitting edge of mat  -      Time Able to Maintain Position (Unsupported Sitting, Static Balance)  more than 5 minutes  -LH      Recorded by [] Clau Ayon, PT      Row Name 04/03/19 1002             Orthotic Management    Orthosis Location  lower extremity orthosis  -      Recorded by [] Clau Ayon, PT      Row Name 04/03/19 1002             Lower Extremity Orthosis Management    Type (Lower Extremity Orthosis)  posterior leaf carbon fiber AFO trial w/ ambulation this date at [facility]  -      Therapeutic Indications (Lower Extremity Orthosis)  compensation for lost function;stabilization and support;range of motion deficit improvement  -       Orthosis Training (Lower Extremity Orthosis)  patient  -      Skin Assessment (Lower Extremity Orthosis)  skin intact  -LH      Recorded by [] Clau Ayon PT      Row Name 04/03/19 1002             Lower Extremity Seated Therapeutic Exercise    Performed, Seated Lower Extremity (Therapeutic Exercise)  hip flexion/extension;LAQ (long arc quad), knee extension;ankle dorsiflexion/plantarflexion;hip abduction/adduction  -      Exercise Type, Seated Lower Extremity (Therapeutic Exercise)  AROM (active range of motion);AAROM (active assistive range of motion)  -      Sets/Reps Detail, Seated Lower Extremity (Therapeutic Exercise)  2/20  -      Comment, Seated Lower Extremity (Therapeutic Exercise)  assisted heel slided w pillow case RLE  -LH      Recorded by [] Clau Ayon PT      Row Name 04/03/19 1002             Lower Extremity Supine Therapeutic Exercise    Performed, Supine Lower Extremity (Therapeutic Exercise)  SAQ (short arc quad) over bolster;ankle pumps;bridging (bilateral w/bolster);SLR (straight leg raise);hip abduction/adduction  -      Exercise Type, Supine Lower Extremity (Therapeutic Exercise)  AROM (active range of motion);AAROM (active assistive range of motion)  -      Sets/Reps Detail, Supine Lower Extremity (Therapeutic Exercise)  2/10  -      Comment, Supine Lower Extremity (Therapeutic Exercise)  PNF diagonals RLE 10 reps  -      Recorded by [] Clau Ayon PT      Row Name 04/03/19 1453 04/03/19 1002          Positioning and Restraints    Pre-Treatment Position  sitting in chair/recliner  -DN  sitting in chair/recliner  -     Post Treatment Position  wheelchair  -DN  wheelchair  -LH     In Bed  with SLP  -DN  --     In Wheelchair  --  sitting;call light within reach;encouraged to call for assist;exit alarm on;notified nsg  -LH     Recorded by [DN] Reg Mckinney, OT [] Clau Ayon, PT       User Key  (r) = Recorded By, (t) = Taken By, (c) = Cosigned By     Initials Name Effective Dates    DN Reg Mckinney, OT 06/08/18 -     LH Clau Ayon, PT 04/03/18 -     Narcisa Gaston, MS CCC-SLP 04/03/18 -           Wound 03/26/19 0900 Left chest incision (Active)   Dressing Appearance open to air 4/3/2019  8:42 AM   Closure Liquid skin adhesive;Approximated 4/2/2019  9:24 PM   Base clean;dry 4/3/2019  8:42 AM   Drainage Amount none 4/2/2019  9:24 PM   Dressing Care, Wound open to air 4/2/2019  9:24 PM         OT Recommendation and Plan    Anticipated Equipment Needs At Discharge (OT Eval): commode, 3-in-1, shower chair, tub bench  Planned Therapy Interventions (OT Eval): BADL retraining, cognitive/visual perception retraining, functional balance retraining, neuromuscular control/coordination retraining, strengthening exercise, transfer/mobility retraining            OT IRF GOALS     Row Name 04/02/19 1500 03/25/19 1649          Bathing Goal 1 (OT-IRF)    Activity/Device (Bathing Goal 1, OT-IRF)  bathing skills, all  -DN  bathing skills, all;tub bench;hand-held shower spray hose;grab bar/tub rail  -DN     [place] Level (Bathing Goal 1, OT-IRF)  contact guard assist;verbal cues required  -DN  minimum assist (75% or more patient effort);verbal cues required;set-up required;tactile cues required  -DN     Time Frame (Bathing Goal 1, OT-IRF)  short term goal (STG)  -DN  short term goal (STG)  -DN     Progress/Outcomes (Bathing Goal 1, OT-IRF)  goal met;goal revised this date  -DN  continuing progress toward goal  -DN        Bathing Goal 2 (OT-IRF)    Activity/Device (Bathing Goal 2, OT-IRF)  bathing skills, all  -DN  bathing skills, all;tub bench;hand-held shower spray hose;grab bar/tub rail  -DN     [place] Level (Bathing Goal 2, OT-IRF)  supervision required  -DN  supervision required;verbal cues required  -DN     Time Frame (Bathing Goal 2, OT-IRF)  long term goal (LTG)  -DN  long term goal (LTG)  -DN     Progress/Outcomes (Bathing Goal 2, OT-IRF)  goal ongoing  -DN   continuing progress toward goal  -DN        UB Dressing Goal 1 (OT-IRF)    Activity/Device (UB Dressing Goal 1, OT-IRF)  upper body dressing  -DN  upper body dressing  -DN     [place] (UB Dress Goal 1, OT-IRF)  supervision required  -DN  minimum assist (75% or more patient effort);verbal cues required  -DN     Time Frame (UB Dressing Goal 1, OT-IRF)  short term goal (STG)  -DN  short term goal (STG)  -DN     Progress/Outcomes (UB Dressing Goal 1, OT-IRF)  goal met;goal revised this date  -DN  continuing progress toward goal  -DN        UB Dressing Goal 2 (OT-IRF)    Activity/Device (UB Dressing Goal 2, OT-IRF)  upper body dressing  -DN  upper body dressing  -DN     [place] (UB Dress Goal 2, OT-IRF)  supervision required  -DN  supervision required;verbal cues required  -DN     Time Frame (UB Dressing Goal 2, OT-IRF)  long term goal (LTG)  -DN  long term goal (LTG)  -DN     Progress/Outcomes (UB Dressing Goal 2, OT-IRF)  goal ongoing;goal met  -DN  continuing progress toward goal  -DN        LB Dressing Goal 1 (OT-IRF)    Activity/Device (LB Dressing Goal 1, OT-IRF)  lower body dressing  -DN  lower body dressing  -DN     [place] (LB Dressing Goal 1, OT-IRF)  minimum assist (75% or more patient effort);verbal cues required;tactile cues required  -DN  moderate assist (50-74% patient effort);verbal cues required;set-up required;tactile cues required  -DN     Time Frame (LB Dressing Goal 1, OT-IRF)  short term goal (STG)  -DN  short term goal (STG)  -DN     Progress/Outcomes (LB Dressing Goal 1, OT-IRF)  goal met;goal revised this date  -DN  continuing progress toward goal  -DN        LB Dressing Goal 2 (OT-IRF)    Activity/Device (LB Dressing Goal 2, OT-IRF)  lower body dressing  -DN  lower body dressing  -DN     [place] (LB Dressing Goal 2, OT-IRF)  verbal cues required;tactile cues required;minimum assist (75% or more patient effort)  -DN  verbal cues required;tactile cues required;minimum assist  (75% or more patient effort)  -DN     Time Frame (LB Dressing Goal 2, OT-IRF)  long term goal (LTG)  -DN  long term goal (LTG)  -DN     Progress/Outcomes (LB Dressing Goal 2, OT-IRF)  goal ongoing  -DN  continuing progress toward goal  -DN        Grooming Goal 1 (OT-IRF)    Activity/Device (Grooming Goal 1, OT-IRF)  grooming skills, all  -DN  grooming skills, all  -DN     [place] (Grooming Goal 1, OT-IRF)  supervision required  -DN  supervision required;verbal cues required;minimum assist (75% or more patient effort)  -DN     Time Frame (Grooming Goal 1, OT-IRF)  short term goal (STG)  -DN  short term goal (STG)  -DN     Progress/Outcomes (Grooming Goal 1, OT-IRF)  goal partially met;goal ongoing  -DN  continuing progress toward goal  -DN        Grooming Goal 2 (OT-IRF)    Activity/Device (Grooming Goal 2, OT-IRF)  grooming skills, all  -DN  grooming skills, all  -DN     [place] (Grooming Goal 2, OT-IRF)  supervision required  -DN  set-up required;supervision required;verbal cues required;tactile cues required  -DN     Time Frame (Grooming Goal 2, OT-IRF)  long term goal (LTG)  -DN  long term goal (LTG)  -DN     Progress/Outcomes (Grooming Goal 2, OT-IRF)  goal revised this date  -DN  continuing progress toward goal  -DN        Toileting Goal 1 (OT-IRF)    Activity/Device (Toileting Goal 1, OT-IRF)  toileting skills, all  -DN  toileting skills, all;adjust/manage clothing;perform perineal hygiene;grab bar/safety frame;raised toilet seat  -DN     [place] Level (Toileting Goal 1, OT-IRF)  minimum assist (75% or more patient effort);verbal cues required;tactile cues required  -DN  moderate assist (50-74% patient effort);verbal cues required;tactile cues required  -DN     Time Frame (Toileting Goal 1, OT-IRF)  short term goal (STG)  -DN  short term goal (STG)  -DN     Progress/Outcomes (Toileting Goal 1, OT-IRF)  goal met;goal revised this date  -DN  continuing progress toward goal  -DN        Toileting  Goal 2 (OT-IRF)    Activity/Device (Toileting Goal 2, OT-IRF)  toileting skills, all  -DN  toileting skills, all;adjust/manage clothing;perform perineal hygiene  -DN     [place] Level (Toileting Goal 2, OT-IRF)  contact guard assist;verbal cues required;tactile cues required  -DN  contact guard assist;verbal cues required;tactile cues required  -DN     Time Frame (Toileting Goal 2, OT-IRF)  long term goal (LTG)  -DN  long term goal (LTG)  -DN     Progress/Outcomes (Toileting Goal 2, OT-IRF)  goal ongoing  -DN  continuing progress toward goal  -DN        Self-Feeding Goal 1 (OT-IRF)    Activity/Device (Self-Feeding Goal 1, OT-IRF)  self-feeding skills, all  -DN  self-feeding skills, all  -DN     [place] (Self-Feeding Goal 1, OT-IRF)  supervision required  -DN  supervision required;tactile cues required  -DN     Time Frame (Self-Feeding Goal 1, OT-IRF)  short term goal (STG)  -DN  short term goal (STG)  -DN     Progress/Outcomes 1 (Self-Feeding Goal, OT-IRF)  goal met;goal ongoing  -DN  continuing progress toward goal  -DN        Self-Feeding Goal 2 (OT-IRF)    Activity/Device (Self-Feeding Goal 2, OT-IRF)  self-feeding skills, all  -DN  self-feeding skills, all  -DN     [place] (Self-Feeding Goal 2, OT-IRF)  supervision required  -DN  supervision required;set-up required  -DN     Time Frame (Self-Feeding Goal 2, OT-IRF)  long term goal (LTG)  -DN  long term goal (LTG)  -DN     Progress/Outcomes (Self-Feeding Goal 2, OT-IRF)  goal met;goal ongoing  -DN  continuing progress toward goal  -DN        Caregiver Training Goal 2 (OT-IRF)    Caregiver Training Goal 2 (OT-IRF)  pt caregiver to be independent with any assist pt needs with adls, transfers and HEP for d/c home  -DN  pt caregiver to be independent with any assist pt needs with adls, transfers and HEP for d/c home  -DN     Time Frame (Caregiver Training Goal 2, OT-IRF)  long term goal (LTG)  -DN  long term goal (LTG)  -DN     Progress/Outcomes  (Caregiver Training Goal 2, OT-IRF)  goal ongoing  -DN  continuing progress toward goal  -DN       User Key  (r) = Recorded By, (t) = Taken By, (c) = Cosigned By    Initials Name Provider Type    Reg Bolden OT Occupational Therapist          Occupational Therapy Education     Title: PT OT SLP Therapies (In Progress)     Topic: Occupational Therapy (In Progress)     Point: ADL training (In Progress)     Description: Instruct learner(s) on proper safety adaptation and remediation techniques during self care or transfers.   Instruct in proper use of assistive devices.    Learning Progress Summary           Patient Acceptance, E,TB,D, NR by DN at 3/26/2019 12:06 PM    Comment:  jay adls and commode/shower transfers, still max vc for all due to cognition and visual impairments    Acceptance, E,TB,D, VU,NR by DN at 3/25/2019  5:23 PM    Comment:  OT role in rehab, transfer traning, jay adls                   Point: Home exercise program (In Progress)     Description: Instruct learner(s) on appropriate technique for monitoring, assisting and/or progressing therapeutic exercises/activities.    Learning Progress Summary           Patient Acceptance, E,TB,D, NR by DN at 3/26/2019 12:06 PM    Comment:  jay adls and commode/shower transfers, still max vc for all due to cognition and visual impairments    Acceptance, E,TB,D, VU,NR by DN at 3/25/2019  5:23 PM    Comment:  OT role in rehab, transfer traning, jay adls                   Point: Precautions (In Progress)     Description: Instruct learner(s) on prescribed precautions during self-care and functional transfers.    Learning Progress Summary           Patient Acceptance, E,TB,D, NR by DN at 3/26/2019 12:06 PM    Comment:  jay adls and commode/shower transfers, still max vc for all due to cognition and visual impairments    Acceptance, E,TB,D, VU,NR by DN at 3/25/2019  5:23 PM    Comment:  OT role in rehab, transfer traning, jay adls                   Point:  Body mechanics (In Progress)     Description: Instruct learner(s) on proper positioning and spine alignment during self-care, functional mobility activities and/or exercises.    Learning Progress Summary           Patient Acceptance, E,TB,D, NR by DN at 3/26/2019 12:06 PM    Comment:  jay adls and commode/shower transfers, still max vc for all due to cognition and visual impairments    Acceptance, E,TB,D, VU,NR by DN at 3/25/2019  5:23 PM    Comment:  OT role in rehab, transfer traning, jay adls                               User Key     Initials Effective Dates Name Provider Type Discipline    DN 06/08/18 -  Reg Mckinney OT Occupational Therapist OT                       Time Calculation:     Time Calculation- OT     Row Name 04/03/19 1510 04/03/19 0845          Time Calculation- OT    OT Start Time  1400  -DN  0845  -DN     OT Stop Time  1430  -DN  0930  -DN     OT Time Calculation (min)  30 min  -DN  45 min  -DN     OT Non-Billable Time (min)  --  0 min  -DN     OT Received On  04/03/19  -DN  --       User Key  (r) = Recorded By, (t) = Taken By, (c) = Cosigned By    Initials Name Provider Type    Reg Bolden, OT Occupational Therapist          Therapy Charges for Today     Code Description Service Date Service Provider Modifiers Qty    16044899965 HC OT SELF CARE/MGMT/TRAIN EA 15 MIN 4/2/2019 Reg Mckinney OT GO 3    76440207343 HC OT THER PROC EA 15 MIN 4/2/2019 Reg Mckinney OT GO 2    12169469492 HC OT CARE PLAN EA 15 MIN 4/2/2019 Reg Mckinney OT GO 1    72126850416 HC OT SELF CARE/MGMT/TRAIN EA 15 MIN 4/2/2019 Reg Mckinney OT GO 1    70060701201 HC OT THER PROC EA 15 MIN 4/2/2019 Reg Mckinney OT GO 2    44995750004 HC OT SELF CARE/MGMT/TRAIN EA 15 MIN 4/3/2019 Reg Mckinney OT GO 4    42138125208 HC OT THER PROC EA 15 MIN 4/3/2019 Reg Mckinney OT GO 1                   Reg Mckinney OT  4/3/2019

## 2019-04-03 NOTE — PROGRESS NOTES
Inpatient Rehabilitation Functional Measures Assessment    Functional Measures  VANITA Eating:  [facility] Grooming: [facility] Bathing:  [facility] Upper Body Dressing:  [facility] Lower Body Dressing:  [facility] Toileting:  [facility] Bladder Management  Level of Assistance:  [place]  Frequency/Number of Accidents this Shift:  [facility] Bowel Management  Level of Assistance: [place]  Frequency/Number of Accidents this Shift: [facility] Bed/Chair/Wheelchair Transfer:  [facility] Toilet Transfer:  [facility] Tub/Shower Transfer:  [place]    Previously Documented Mode of Locomotion at Discharge: Field  VANITA Expected Mode of Locomotion at Discharge: [facility] Walk/Wheelchair:  [facility] Stairs:  [facility] Comprehension:  Auditory comprehension is the usual mode. Comprehension  Score = 6, Modified [place].  Patient comprehends complex/abstract  information in their primary language with only mild difficulty.  VANITA Expression:  Vocal expression is the usual mode. Expression Score = 6,  Modified Independent.  Patient expresses complex/abstract information in their  primary language, requiring:  [facility] Social Interaction:  Activity was not observed.  VANITA Problem Solving:  Activity was not observed.  VANITA Memory:  Memory Score = 4, Minimal Prompting. Patient recognizes and  remembers 75-90% of the time. Patient requires minimal/occasional prompting for  memory for the following: Disoriented to person, place, time or situation. word  finding problems    Therapy Mode Minutes  Occupational Therapy: Branch  Physical Therapy: Branch  Speech Language Pathology:  [place]    Signed by: Ana Thomas RN

## 2019-04-03 NOTE — PROGRESS NOTES
Inpatient Rehabilitation Plan of Care Note    Plan of Care  Care Plan Reviewed - No updates at this time.    Safety    Performed Intervention(s)  Hourly rounding , items within reach  Assistance with out of bed activities  Falls protocol  bed/chair alarm      Psychosocial    Performed Intervention(s)   PRN  Patient to verbalize feelings and cocnerns  Psychologist PRN      Body Systems    Performed Intervention(s)  Accuchecks ACHS  Medication as ordered  consistent carb diet      Sphincter Control    Performed Intervention(s)  Assistance with urinal  Assistance to bathroom  Incontinence care PRN    Signed by: Apoorva Love RN

## 2019-04-03 NOTE — PROGRESS NOTES
Occupational Therapy: Individual: 75 minutes.    Physical Therapy: Branch    Speech Language Pathology:  [facility]    Signed by: RAMO Cox/SOFIA

## 2019-04-03 NOTE — PROGRESS NOTES
Inpatient Rehabilitation Plan of Care Note    Plan of Care  Care Plan Reviewed - No updates at this time.    Safety    Performed Intervention(s)  Hourly rounding , items within reach  Assistance with out of bed activities  Falls protocol  bed/chair alarm      Psychosocial    Performed Intervention(s)   PRN  Patient to verbalize feelings and cocnerns  Psychologist PRN      Body Systems    Performed Intervention(s)  Accuchecks ACHS  Medication as ordered  consistent carb diet      Sphincter Control    Performed Intervention(s)  Assistance with urinal  Assistance to bathroom  Incontinence care PRN    Signed by: Ana Thomas RN

## 2019-04-03 NOTE — THERAPY TREATMENT NOTE
"Inpatient Rehabilitation - Speech Language Pathology Treatment Note    [facility]       Patient Name: Nayan Ryan  : 1965  MRN: [id number]    Today's Date: 4/3/2019           Admit Date: 3/24/2019      Visit Dx:      No diagnosis found.    Patient Active Problem List   Diagnosis   • Acute ischemic left PCA stroke (CMS/HCC)   • Status post placement of implantable loop recorder   • HTN (hypertension)   • Diabetes mellitus (CMS/HCC)   • Hyperlipidemia   • Morbid obesity (CMS/HCC)   • Anxiety disorder   • Migraine   • H/O hernia repair   • Abdominal wall abscess   • Back pain   • Stroke (cerebrum) (CMS/HCC)   • Vitamin D deficiency   • History of fatty infiltration of liver          Therapy Treatment    Evaluation/Coping    Evaluation/Treatment Time and Intent  Subjective Information: no complaints (19 1500 : Narcisa Sanchez MS CCC-SLP)  Existing Precautions/Restrictions: fall (19 1500 : Narcisa Sanchez MS CCC-SLP)  Document Type: therapy note (daily note) (19 1500 : Narcisa Sanchez MS CCC-SLP)  Mode of Treatment: speech-language pathology (19 1500 : Narcisa Sanchez, MS CCC-SLP)  Patient/Family Observations: up in w/c; wife entered into ST tx session ~ 10 minutes into tx; initially pt upset d/t having an \"accident\". SLP provided encouragement and support by listening.  (19 1500 : Narcisa Sanchez, MS CCC-SLP)  Start Time (Evaluation/Treatment): 1430 (19 1500 : Narcisa Sanchez, MS CCC-SLP)  Stop Time (Evaluation/Treatment): 1500 (19 1500 : Narcisa Sanchez, MS CCC-SLP)    Vitals/Pain/Safety         Cognition/Communication         Oral Motor/Eating         Mobility/Basic Activities/Instrumental Activities/Motor/Modality                   ROM/MMT                   Sensory/Myotome/Dermatome/Edema               Posture/Balance/Special Tests/Exercise/Transportation/Sexual Function                   Orthotics/Residual Limb/Prosthetic Management              Outcome Summary     "     EDUCATION    The patient has been educated in the following areas:     Communication Impairment; Cognitive Impairment    SLP Recommendation and Plan    SLP Diagnosis: Moderate Anomic Aphasia    SLP Diagnosis: Moderate Anomic Aphasia    Rehab Potential/Prognosis: good    [place] Criteria for Skilled Therapeutic Interventions Met: no problems identified which require skilled intervention, other (see comments)(however, will monitor closely for s/s asp/dysphagia)    Anticipated Dischage Disposition: home with OP services, anticipate therapy at next level of care         Therapy Frequency (Swallow): 5 days per week    Predicted Duration Therapy Intervention (Days): until discharge           Plan of Care Reviewed With: patient      SLP GOALS     Row Name 04/03/19 1500 04/03/19 0800 04/02/19 1100       Oral Nutrition/Hydration Goal 1 (SLP)    Oral Nutrition/Hydration Goal 1, SLP  --  Tolerate diet w/o clinical s/s asp/dysphagia  -SA  --    Time Frame (Oral Nutrition/Hydration Goal 1, SLP)  --  by discharge  -SA  --    Barriers (Oral Nutrition/Hydration Goal 1, SLP)  --  On regular/ thin diet. Pt observed to be using consecutive drinking and throat clear x 3; SLP encouraged pt to take small sips; pt verbalized understanding. Afebrile, breath sounds are clear per RN note; and po intake good % each meal. Pt stated no difficulty with coughing or choking with meals. Will follow for diet dilma while in rehab.  -SA  --    Progress/Outcomes (Oral Nutrition/Hydration Goal 1, SLP)  --  good progress toward goal  -SA  --       Words/Phrases/Sentences Goal 1 (SLP)    Improve Ability to Comprehend Words/Phrases/Sentences Through: Goal 1 (SLP)  --  --  identify familiar objects  -SA    Progress (Ability to Contruct Words/Phrases/Sentences Goal 1, SLP)  --  --  100%;independently (over 90% accuracy)  -SA    Progress/Outcomes (Identify Objects and Pictures Goal 1, SLP)  --  --  goal met;good progress toward goal  -SA    Comment  (Words/Phrases/Sentences Goal 1, SLP)  --  --  ID pics and point to 2 items on request  -SA       Comprehend Questions Goal 1 (SLP)    Improve Ability to Comprehend Questions Goal 1 (SLP)  --  --  simple general questions  -SA    Progress (Ability to Comprehend Questions Goal 1, SLP)  --  --  100%;independently (over 90% accuracy)  -SA    Progress/Outcomes (Comprehend Questions Goal 1, SLP)  --  --  good progress toward goal  -SA    Comment (Comprehend Questions Goal 1, SLP)  --  --  answer questions given choice of 3; independently read and circled ansewr  -SA       Follow Directions Goal 2 (SLP)    Improve Ability to Follow Directions Goal 1 (SLP)  --  --  2 step commands  -SA    Progress (Ability to Follow Directions Goal 1, SLP)  --  --  100%;independently (over 90% accuracy)  -SA    Progress/Outcomes (Follow Directions Goal 1, SLP)  --  --  good progress toward goal  -SA    Comment (Follow Directions Goal 1, SLP)  --  --  2 step motor commands  -SA       Comprehension at Phrase and Sentence Level Goal 1 (SLP)    Improve Reading Comprehension at Phrase and Sentence Level Goal 1 (SLP)  --  --  answer written wh-questions  -SA    Time Frame (Reading Comprehension at Phrase and Sentence Level Goal 1, SLP)  --  --  by discharge  -SA    Comment (Reading Comprehension at Phrase and Sentence Level Goal 1, SLP)  --  --  reading simple wh questions and answering given a choice of 3  -SA       Word Retrieval Skills Goal 1 (SLP)    Progress (Word Retrieval Skills Goal 1, SLP)  90%;independently (over 90% accuracy)  -SA  --  --    Progress/Outcomes (Word Retrieval Goal 1, SLP)  good progress toward goal  -SA  --  --    Comment (Word Retrieval Goal 1, SLP)  96% naming antonyms: no cues  -SA  --  --       Awareness of Basic Personal Information Goal 1 (SLP)    Progress (Awareness of Basic Personal Information Goal 1, SLP)  --  independently (over 90% accuracy)  -SA  --    Progress/Outcomes (Awareness of Basic Personal  "Information Goal 1, SLP)  --  good progress toward goal  -SA  --    Comment (Awareness of Basic Personal Information Goal 1, SLP)  --  93%; answering questions related to personal info: name, address, city, state, wifes name, childrens names, dogs names, , situation; place; phone number home and cell and wife's phone number.   -  --       Attention Goal 1 (SLP)    Improve Attention by Goal 1 (SLP)  --  --  complete selective attention task;complete divided attention task;complete sustained attention task;90%;independently (over 90% accuracy)  -SA    Time Frame (Attention Goal 1, SLP)  --  --  by discharge  -SA    Progress (Attention Goal 1, SLP)  --  90%;independently (over 90% accuracy)  -SA  --    Progress/Outcomes (Attention Goal 1, SLP)  --  goal ongoing  -SA  --    Comment (Attention Goal 1, SLP)  --  92%; find the coin; sustained and divided attention to task; initially pt neglected right half of page; but then pt independently returned to right half of the page and completed without any cues; good attention to task; pt verbalized \"i messed up\"   -  --       Memory Skills Goal 1 (SLP)    Improve Memory Skills Through Goal 1 (SLP)  --  --  visual memory task;listen to a paragraph and answer questions;recalling unrelated word lists immediately;recalling unrelated word lists with an imposed delay;90%;select a word from a list by exclusion;independently (over 90% accuracy)  -SA    Time Frame (Memory Skills Goal 1, SLP)  --  --  by discharge  -SA    Progress (Memory Skills Goal 1, SLP)  40%;independently (over 90% accuracy)  -SA  --  --    Progress/Outcomes (Memory Skills Goal 1, SLP)  goal ongoing  -  --  --    Comment (Memory Skills Goal 1, SLP)  visual memory of 5 first names only (name to pic assoc)  -  --  --       Organizational Skills Goal 1 (SLP)    Improve Thought Organization Through Goal 1 (SLP)  --  --  completing a divergent naming task;completing a convergent naming task;naming similarities " and differences;naming by category exclusion;completing a verbal sequencing task;90%;independently (over 90% accuracy)  -SA    Time Frame (Thought Organization Skills Goal 1, SLP)  --  --  by discharge  -SA    Progress (Thought Organization Skills Goal 1, SLP)  --  --  50%;independently (over 90% accuracy);100%;with moderate cues (50-74%)  -SA    Progress/Outcomes (Thought Organization Skills Goal 1, SLP)  --  --  goal ongoing  -SA    Comment (Thought Organization Skills Goal 1, SLP)  --  --  label category items beginning with a specific letter  -SA       Reasoning Goal 1 (SLP)    Improve Reasoning Through Goal 1 (SLP)  --  --  complete deductive reasoning task;complete analogies;90%;independently (over 90% accuracy)  -SA    Time Frame (Reasoning Goal 1, SLP)  --  --  by discharge  -SA       Functional Problem Solving Skills Goal 1 (SLP)    Improve Problem Solving Through Goal 1 (SLP)  sequence steps in a task  -SA  --  complete organization/home management task;sequence steps in a task;90%;independently (over 90% accuracy)  -SA    Time Frame (Problem Solving Goal 1, SLP)  --  --  by discharge  -SA    Progress (Problem Solving Goal 1, SLP)  with minimal cues (75-90%)  -SA  --  --    Progress/Outcomes (Problem Solving Goal 1, SLP)  continuing progress toward goal  -SA  --  --    Comment (Problem Solving Goal 1, SLP)  88%; 4 step sequencing of ADL's  -SA  --  --       Functional Math Skills Goal 1 (SLP)    Improve Functional Math Skills Through Goal 1 (SLP)  --  --  complete word problems involving time;complete word problems involving money;complete simple math problems;complete functional math task;complete checkbook task;90%;independently (over 90% accuracy)  -SA    Time Frame (Functional Math Skills Goal 1, SLP)  --  --  by discharge  -SA    Progress (Functional Math Skills Goal 1, SLP)  100%;independently (over 90% accuracy)  -SA  --  --    Progress/Outcomes (Functional Math Skills Goal 1, SLP)  good progress  toward goal  -SA  --  --    Comment (Functional Math Skills Goal 1, SLP)  math language: telling time and answering questions re: using a clock   -  --  --       Executive Functional Skills Goal 1 (SLP)    Improve Executive Function Skills Goal 1 (SLP)  --  --  home management activity;organization/planning activity;time management activity;90%;independently (over 90% accuracy)  -SA    Time Frame (Executive Function Skills Goal 1, SLP)  --  --  by discharge  -SA    Row Name 04/02/19 0800 04/01/19 1100 04/01/19 0800       Words/Phrases/Sentences Goal 1 (SLP)    Progress (Ability to Contruct Words/Phrases/Sentences Goal 1, SLP)  --  90%;independently (over 90% accuracy)  -  --    Progress/Outcomes (Identify Objects and Pictures Goal 1, SLP)  --  good progress toward goal  -SA  --    Comment (Words/Phrases/Sentences Goal 1, SLP)  --  CT: ID picture categories: level 1  -SA  --       Comprehend Questions Goal 1 (SLP)    Improve Ability to Comprehend Questions Goal 1 (SLP)  complex yes/no questions  -SA  multi-unit questions  -SA  --    Progress (Ability to Comprehend Questions Goal 1, SLP)  100%;80%;with minimal cues (75-90%)  -SA  50%;with moderate cues (50-74%)  -SA  --    Progress/Outcomes (Comprehend Questions Goal 1, SLP)  good progress toward goal  -SA  goal ongoing  -SA  --    Comment (Comprehend Questions Goal 1, SLP)  100%; yes/no mod complex questions; reading independently w/ min assist; answer questions given a choice of 3 answers: 83%  -SA  CT: Inferencing Voicemail: level 1; needed mod cues and repetition of listening to voicemail  -SA  --       Reading Comprehension at Word Level Goal 1 (SLP)    Progress (Reading Comprehension at Word Level Goal 1, SLP)  --  --  90%;independently (over 90% accuracy)  -SA    Progress/Outcomes (Reading Comprehension at Word Level Goal 1, SLP)  --  --  good progress toward goal  -SA    Comment (Reading Comprehension at Word Level Goal 1, SLP)  --  --  93% answering  questions with choice of 3 answers  -       Word Retrieval Skills Goal 1 (SLP)    Improve Word Retrieval Skills By Goal 1 (SLP)  supplying an antonym;supplying a synonym;completing a divergent task  -  --  --    Progress (Word Retrieval Skills Goal 1, SLP)  90%;60%;independently (over 90% accuracy);with moderate cues (50-74%);100%  -SA  independently (over 90% accuracy)  -SA  with moderate cues (50-74%)  -SA    Progress/Outcomes (Word Retrieval Goal 1, SLP)  good progress toward goal  -SA  good progress toward goal  -SA  goal ongoing;continuing progress toward goal  -SA    Comment (Word Retrieval Goal 1, SLP)  92%: synonyms given choice of 3; opposites (naming) no cues: 67%; divergent task: able to name 12 colors in 1 min without cues; perseverations observed: 100%  -SA  95%; completed open ended sentences verbally  -  confrontational naming objects: 67%; actions: 64%; assisted with phonemic cues 95%; open ended sentences  -       Awareness of Basic Personal Information Goal 1 (SLP)    Progress (Awareness of Basic Personal Information Goal 1, SLP)  --  --  with minimal cues (75-90%)  -SA    Progress/Outcomes (Awareness of Basic Personal Information Goal 1, SLP)  --  --  continuing progress toward goal  -SA    Comment (Awareness of Basic Personal Information Goal 1, SLP)  --  --  73%; answering questions related to personal info: name, address, city, state, wifes name, childrens names, dogs names, , situation; place; phone number home and cell and wife's phone number.   -      User Key  (r) = Recorded By, (t) = Taken By, (c) = Cosigned By    Initials Name Provider Type    Narcisa Gaston MS CCC-SLP Speech and Language Pathologist                  Time Calculation:       Time Calculation- SLP     Row Name 19 1545 19 0825          Time Calculation- [facility]    SLP Start Time  1430  -SA  0800  -     SLP Stop Time  1500  -SA  0830  -     SLP Time Calculation (min)  30 min  -  30 min  [facility]      SLP Received On  04/03/19  -  04/03/19  -       User Key  (r) = Recorded By, (t) = Taken By, (c) = Cosigned By    Initials Name Provider Type    Narcisa Gaston MS CCC-SLP Speech and Language Pathologist            Therapy Charges for Today     Code Description Service Date Service Provider Modifiers Qty    93592344125 HC ST TREATMENT SPEECH 4 4/2/2019 Narcisa Sanchez MS CCC-SLP GN 1    59599442953 HC ST TREATMENT SWALLOW 1 4/3/2019 Narcisa Sanchez MS CCC-SLP GN 1    [id number] HC ST TREATMENT SPEECH 3 4/3/2019 Narcisa Sanchez MS CCC-SLP GN 1                           Narcisa Sanchez MS CCC-JOSIAS  4/3/2019

## 2019-04-03 NOTE — PLAN OF CARE
Problem: Stroke (IRF) (Adult)  Goal: Promote Optimal Functional [place]  Outcome: Ongoing (interventions implemented as appropriate)      Problem: Fall Risk (Adult)  Goal: Absence of Fall  Outcome: Ongoing (interventions implemented as appropriate)      Problem: Diabetes, Type 2 (Adult)  Goal: Signs and Symptoms of Listed Potential Problems Will be Absent, Minimized or Managed (Diabetes, Type 2)  Outcome: Ongoing (interventions implemented as appropriate)      Problem: Skin Injury Risk (Adult)  Goal: Skin Health and Integrity  Outcome: Ongoing (interventions implemented as appropriate)      Problem: Patient Care Overview  Goal: Plan of Care Review  Outcome: Ongoing (interventions implemented as appropriate)   04/03/19 0842 04/03/19 1727   Coping/Psychosocial   Plan of Care Reviewed With patient --    OTHER   Outcome Summary --  Pt has attended all therpaies and is making progress regarding word finding issues.      Goal: Individualization and Mutuality  Outcome: Ongoing (interventions implemented as appropriate)    Goal: Discharge Needs Assessment  Outcome: Ongoing (interventions implemented as appropriate)    Goal: Home Safety Plan  Outcome: Ongoing (interventions implemented as appropriate)    Goal: Coping Plan  Outcome: Ongoing (interventions implemented as appropriate)    Goal: Community Reintegration Plan  Outcome: Ongoing (interventions implemented as appropriate)

## 2019-04-03 NOTE — THERAPY TREATMENT NOTE
Inpatient Rehabilitation - Physical Therapy Treatment Note  [facility]     Patient Name: Nayan Ryan  : 1965  MRN: [id number]    Today's Date: 4/3/2019                 Admit Date: 3/24/2019      Visit Dx:    No diagnosis found.    Patient Active Problem List   Diagnosis   • Acute ischemic left PCA stroke (CMS/HCC)   • Status post placement of implantable loop recorder   • HTN (hypertension)   • Diabetes mellitus (CMS/HCC)   • Hyperlipidemia   • Morbid obesity (CMS/HCC)   • Anxiety disorder   • Migraine   • H/O hernia repair   • Abdominal wall abscess   • Back pain   • Stroke (cerebrum) (CMS/HCC)   • Vitamin D deficiency   • History of fatty infiltration of liver       Therapy Treatment    IRF Treatment Summary     Row Name 19 1002 19 0800          Evaluation/Treatment Time and Intent    Subjective Information  no complaints  -  no complaints  -     Existing Precautions/Restrictions  fall  -  -- swallow  -SA     Document Type  therapy note (daily note)  -  therapy note (daily note)  -SA     Mode of Treatment  individual therapy;physical therapy  -  speech-language pathology  -SA     Patient/Family Observations  seated in WC no acute distress  -  up in w/c eating breakfast in DR; SLP observed pt for swallow tx during first 15 min of session; last 15 min returned to ST room for tx  -SA     Start Time (Evaluation/Treatment)  --  0800  -SA     Stop Time (Evaluation/Treatment)  --  0830  -SA     Recorded by [] Clau Ayon, PT [SA] Narcisa Sanchez MS CCC-SLP     Row Name 19 1002             Cognition/Psychosocial- PT/OT    Affect/Mental Status (Cognitive)  flat/blunted affect  -      Behavioral Issues (Cognitive)  withdrawn  -      Orientation Status (Cognition)  oriented x 3  -      Follows Commands (Cognition)  follows one step commands;75-90% accuracy;initiation impaired;physical/tactile prompts required;repetition of directions required;verbal cues/prompting required  -       Personal Safety Interventions  fall prevention program maintained;gait belt;nonskid shoes/slippers when out of bed;supervised activity  -      Attention Deficit (Cognitive)  severe deficit  -      Executive Function Deficit (Cognition)  severe deficit  -LH      Memory Deficit (Cognitive)  severe deficit  -LH      Safety Deficit (Cognitive)  severe deficit per nsg report, pt attempted to get self back to bed   -LH      Recorded by [] Clau Ayon, PT      Row Name 04/03/19 1002             Bed Mobility Assessment/Treatment    Supine-Sit [place] (Bed Mobility)  contact guard;minimum assist (75% patient effort);verbal cues;nonverbal cues (demo/gesture) at trunk  -LH      Sit-Supine [place] (Bed Mobility)  moderate assist (50% patient effort);verbal cues;nonverbal cues (demo/gesture) at BLEs  -LH      Bed Mobility, Safety Issues  cognitive deficits limit understanding;decreased use of arms for pushing/pulling;decreased use of legs for bridging/pushing  -      Comment (Bed Mobility)  tx mat   -      Recorded by [] Clau Ayon, PT      Row Name 04/03/19 1002             Transfer Assessment/Treatment    Comment (Transfers)  max VCs requires for sit->standing tsf 2' poor carryover/poor sequenncing  -      Recorded by [] Clau Ayon, PT      Row Name 04/03/19 1002             Bed-Chair Transfer    Bed-Chair [place] (Transfers)  minimum assist (75% patient effort);moderate assist (50% patient effort);verbal cues;nonverbal cues (demo/gesture);set up WC<->tx mat  -      Assistive Device (Bed-Chair Transfers)  wheelchair sit pivot, max cueing for set up  -      Recorded by [] Clau Ayon, PT      Row Name 04/03/19 1002             Chair-Bed Transfer    Chair-Bed [place] (Transfers)  minimum assist (75% patient effort);moderate assist (50% patient effort);set up;verbal cues;nonverbal cues (demo/gesture)  -      Assistive Device (Chair-Bed Transfers)  wheelchair sit pivot, max  cueing for set up  -      Recorded by [] Clau Ayon, PT      Row Name 04/03/19 1002             Sit-Stand Transfer    Sit-Stand [place] (Transfers)  minimum assist (75% patient effort);1 person to manage equipment  -      Assistive Device (Sit-Stand Transfers)  wheelchair at [facility]  -      Recorded by [] Clau Ayon, PT      Row Name 04/03/19 1002             Stand-Sit Transfer    Stand-Sit [place] (Transfers)  minimum assist (75% patient effort);1 person to manage equipment  -      Assistive Device (Stand-Sit Transfers)  wheelchair at [facility]  -      Recorded by [] Clau Ayon, PT      Row Name 04/03/19 1002             Gait/Stairs Assessment/Training    [place] Level (Gait)  moderate assist (50% patient effort);1 person to manage equipment  -      Assistive Device (Gait)  jay-bars  -      Distance in Feet (Gait)  40, 32  -      Pattern (Gait)  step-through  -      Deviations/Abnormal Patterns (Gait)  nancy decreased  -      Bilateral Gait Deviations  forward flexed posture;heel strike decreased  -      Left Sided Gait Deviations  weight shift ability decreased  -      Right Sided Gait Deviations  foot drop/toe drag;forward flexed posture;heel strike decreased;knee hyperextension  -      Comment (Gait/Stairs)  MOd A to advance/stabilize RLE (hyperextension and poor swing through)  -      Recorded by [] Clau Ayon, PT      Row Name 04/03/19 1002             Wheelchair Mobility/Management    Method of Wheelchair Locomotion (Mobility)  bimanual (upper extremity) propulsion;jay-bipedal propulsion (left sided)  -      Mobility Activities (Wheelchair)  forward propulsion;steering;turning;doorway navigation  -      Forward Propulsion [place] (Wheelchair)  stand by assist  -      Steering [place] (Wheelchair)  stand by assist  -      Turning [place] (Wheelchair)  stand by assist  -      Doorway Navigation [place] (Wheelchair)   stand by assist  -      Distance Propelled in Feet (Wheelchair)  150  -      Comment, Mobility Activities (Wheelchair)  Vcs to attend to R side  -      Recorded by [] Clau Ayon, PT      Row Name 04/03/19 1002             Safety Issues, Functional Mobility    Safety Issues Affecting Function (Mobility)  ability to follow commands;at risk behavior observed;awareness of need for assistance;judgment;problem solving;sequencing abilities  (Significant)   -      Impairments Affecting Function (Mobility)  balance;cognition  -      Recorded by [] Clau Ayon, PT      Row Name 04/03/19 1002             Vision Assessment/Intervention    Visual Field Deficit  homonymous hemianopsia, right  (Significant)   -LH      Recorded by [] Clau Ayon, PT      Row Name 04/03/19 1002             Pain Scale: Numbers Pre/Post-Treatment    Pain Scale: Numbers, Pretreatment  0/10 - no pain  -      Pain Scale: Numbers, Post-Treatment  0/10 - no pain  -      Recorded by [] Clau Ayon, PT      Row Name 04/03/19 1002             Static Sitting Balance    Level of [place] (Unsupported Sitting, Static Balance)  contact guard assist  -      Sitting Position (Unsupported Sitting, Static Balance)  sitting edge of mat  -      Time Able to Maintain Position (Unsupported Sitting, Static Balance)  more than 5 minutes  -      Recorded by [] Clau Ayon, PT      Row Name 04/03/19 1002             Orthotic Management    Orthosis Location  lower extremity orthosis  -      Recorded by [] Clau Ayon, PT      Row Name 04/03/19 1002             Lower Extremity Orthosis Management    Type (Lower Extremity Orthosis)  posterior leaf carbon fiber AFO trial w/ ambulation this date at [facility]  -      Therapeutic Indications (Lower Extremity Orthosis)  compensation for lost function;stabilization and support;range of motion deficit improvement  -      Orthosis Training (Lower Extremity Orthosis)  patient  -       Skin Assessment (Lower Extremity Orthosis)  skin intact  -      Recorded by [] Clau Ayon PT      Row Name 04/03/19 1002             Lower Extremity Seated Therapeutic Exercise    Performed, Seated Lower Extremity (Therapeutic Exercise)  hip flexion/extension;LAQ (long arc quad), knee extension;ankle dorsiflexion/plantarflexion;hip abduction/adduction  -      Exercise Type, Seated Lower Extremity (Therapeutic Exercise)  AROM (active range of motion);AAROM (active assistive range of motion)  -      Sets/Reps Detail, Seated Lower Extremity (Therapeutic Exercise)  2/20  -      Comment, Seated Lower Extremity (Therapeutic Exercise)  assisted heel slided w pillow case RLE  -      Recorded by [] Clau Ayon PT      Row Name 04/03/19 1002             Lower Extremity Supine Therapeutic Exercise    Performed, Supine Lower Extremity (Therapeutic Exercise)  SAQ (short arc quad) over bolster;ankle pumps;bridging (bilateral w/bolster);SLR (straight leg raise);hip abduction/adduction  -      Exercise Type, Supine Lower Extremity (Therapeutic Exercise)  AROM (active range of motion);AAROM (active assistive range of motion)  -      Sets/Reps Detail, Supine Lower Extremity (Therapeutic Exercise)  2/10  -      Comment, Supine Lower Extremity (Therapeutic Exercise)  PNF diagonals RLE 10 reps  -      Recorded by [] Clau Ayon PT      Row Name 04/03/19 1002             Positioning and Restraints    Pre-Treatment Position  sitting in chair/recliner  -      Post Treatment Position  wheelchair  -      In Wheelchair  sitting;call light within reach;encouraged to call for assist;exit alarm on;notified nsg  -LH      Recorded by [] Clau Ayon, PT        User Key  (r) = Recorded By, (t) = Taken By, (c) = Cosigned By    Initials Name Effective Dates     Clau Ayon PT 04/03/18 -     Narcisa Gaston MS CCC-SLP 04/03/18 -         Wound 03/26/19 0900 Left chest incision (Active)   Dressing  Appearance open to air 4/3/2019  8:42 AM   Closure Liquid skin adhesive;Approximated 4/2/2019  9:24 PM   Base clean;dry 4/3/2019  8:42 AM   Drainage Amount none 4/2/2019  9:24 PM   Dressing Care, Wound open to air 4/2/2019  9:24 PM     Physical Therapy Education     Title: PT OT SLP Therapies (In Progress)     Topic: Physical Therapy (In Progress)     Point: Mobility training (In Progress)     Learning Progress Summary           Patient Acceptance, E, NR by  at 4/3/2019 10:16 AM    Acceptance, E, NR by  at 4/2/2019  3:18 PM    Acceptance, E, NR by  at 4/1/2019 10:32 AM    Acceptance, E,TB,D, NR by  at 3/30/2019 11:44 AM    Acceptance, E,TB,D, NR by  at 3/29/2019  2:58 PM    Acceptance, E,TB,D, NR by  at 3/28/2019 11:38 AM    Acceptance, E,TB,D, NR by  at 3/27/2019 10:05 AM    Acceptance, E,TB,D, NR by  at 3/26/2019  9:48 AM    Acceptance, E,TB,D, NR by  at 3/25/2019  3:09 PM                   Point: Home exercise program (In Progress)     Learning Progress Summary           Patient Acceptance, E, NR by  at 4/3/2019 10:16 AM    Acceptance, E, NR by  at 4/2/2019  3:18 PM    Acceptance, E, NR by  at 4/1/2019 10:32 AM    Acceptance, E,TB,D, NR by  at 3/29/2019  2:58 PM    Acceptance, E,TB,D, NR by  at 3/28/2019 11:38 AM    Acceptance, E,TB,D, NR by  at 3/27/2019 10:05 AM    Acceptance, E,TB,D, NR by EE at 3/26/2019  9:48 AM    Acceptance, E,TB,D, NR by  at 3/25/2019  3:09 PM                   Point: Body mechanics (In Progress)     Learning Progress Summary           Patient Acceptance, E, NR by  at 4/3/2019 10:16 AM    Acceptance, E, NR by  at 4/2/2019  3:18 PM    Acceptance, E, NR by  at 4/1/2019 10:32 AM    Acceptance, E,TB,D, NR by EE at 3/29/2019  2:58 PM    Acceptance, E,TB,D, NR by EE at 3/28/2019 11:38 AM    Acceptance, E,TB,D, NR by EE at 3/27/2019 10:05 AM    Acceptance, E,TB,D, NR by EE at 3/26/2019  9:48 AM    Acceptance, E,TB,D, NR by EE at 3/25/2019  3:09 PM                    Point: Precautions (In Progress)     Learning Progress Summary           Patient Acceptance, E, NR by  at 4/3/2019 10:16 AM    Acceptance, E, NR by  at 4/2/2019  3:18 PM    Acceptance, E, NR by  at 4/1/2019 10:32 AM    Acceptance, E,TB,D, NR by  at 3/29/2019  2:58 PM    Acceptance, E,TB,D, NR by  at 3/28/2019 11:38 AM    Acceptance, E,TB,D, NR by  at 3/27/2019 10:05 AM    Acceptance, E,TB,D, NR by  at 3/26/2019  9:48 AM    Acceptance, E,TB,D, NR by  at 3/25/2019  3:09 PM                               User Key     Initials Effective Dates Name Provider Type [facility] 06/08/18 -  Donna Inman, PT Physical Therapist PT     04/03/18 -  Clau Ayon, PT Physical Therapist PT     04/03/18 -  Ariadne Garcia, PT Physical Therapist PT                  PT Recommendation and Plan     Plan of Care Reviewed With: patient  Progress, Functional Goals: demonstrating adequate progress  Outcome Summary: pt has met 3 out of 4 STGs, all LTGs ongoing. cog deficits limiting optimal progress. transitioned last week to pistol  walker. needs TC/VCs for proper  advancement of RLE. will cont to prepare  for DC.                Time Calculation:     PT Charges     Row Name 04/03/19 1443 04/03/19 1016          Time Calculation    Start Time  1230  -  0930  -     Stop Time  1300  -  1000  -     Time Calculation (min)  30 min  -  30 min  -     PT Received On  --  04/03/19  -     PT - Next Appointment  --  04/04/19  -       User Key  (r) = Recorded By, (t) = Taken By, (c) = Cosigned By    Initials Name Provider Type     Clau Ayon, PT Physical Therapist          Therapy Charges for Today     Code Description Service Date Service Provider Modifiers Qty    00565893516 HC PT THER PROC EA 15 MIN 4/2/2019 Clau Ayon, PT GP 4    36248123804 HC PT THER PROC EA 15 MIN 4/3/2019 Clau Ayon, PT GP 4                   Clau Ayon PT  4/3/2019

## 2019-04-03 NOTE — PROGRESS NOTES
Inpatient Rehabilitation Functional Measures Assessment    Functional Measures  VANITA Eating:  [facility] Grooming: [facility] Bathing:  [facility] Upper Body Dressing:  [facility] Lower Body Dressing:  [facility] Toileting:  [facility] Bladder Management  Level of Assistance:  [place]  Frequency/Number of Accidents this Shift:  [facility] Bowel Management  Level of Assistance: [place]  Frequency/Number of Accidents this Shift: [facility] Bed/Chair/Wheelchair Transfer:  Bed/chair/wheelchair Transfer Score = 3.  Patient performs 50-74% of effort and requires moderate assistance (some  lifting)  for transferring to and from the bed/chair/wheelchair, including  assist lifting both legs. Patient requires the following assistive device(s):  Arm rest. Seating system of wheelchair.  VANITA Toilet Transfer:  [facility] Tub/Shower Transfer:  [place]    Previously Documented Mode of Locomotion at Discharge: Field  VANITA Expected Mode of Locomotion at Discharge: [facility] Walk/Wheelchair:  WHEELCHAIR OBSERVATION   Wheelchair locomotion was observed using a manual wheelchair. Wheelchair  Distance Scale = 3.  Distance traveled in wheelchair is greater than 150 feet.  Wheelchair Score = 5.  Patient is supervision or set up only for propelling  wheelchair, requiring: Stand by assistance. Patient was able to propel a  distance of 150 feet in a wheelchair. No other assistive devices were required.    WALK OBSERVATION   Activity was not observed.  VANITA Stairs:  Stairs did not occur because activity was unsafe for patient.    VANITA Comprehension:  [facility] Expression:  [facility] Social Interaction:  [facility] Problem Solving:  [facility] Memory:  [place]    Therapy Mode Minutes  Occupational Therapy: [place]  Physical Therapy: Individual: 60 minutes.  Speech Language Pathology:  [place]    Signed by: Clau Ayon PT

## 2019-04-04 LAB
GLUCOSE BLDC GLUCOMTR-MCNC: 101 MG/DL (ref 70–130)
GLUCOSE BLDC GLUCOMTR-MCNC: 102 MG/DL (ref 70–130)
GLUCOSE BLDC GLUCOMTR-MCNC: 103 MG/DL (ref 70–130)
GLUCOSE BLDC GLUCOMTR-MCNC: 135 MG/DL (ref 70–130)
GLUCOSE BLDC GLUCOMTR-MCNC: 97 MG/DL (ref 70–130)

## 2019-04-04 PROCEDURE — 97110 THERAPEUTIC EXERCISES: CPT

## 2019-04-04 PROCEDURE — 97535 SELF CARE MNGMENT TRAINING: CPT

## 2019-04-04 PROCEDURE — 92507 TX SP LANG VOICE COMM INDIV: CPT

## 2019-04-04 PROCEDURE — 82962 GLUCOSE BLOOD TEST: CPT

## 2019-04-04 PROCEDURE — 63710000001 INSULIN GLARGINE PER 5 UNITS: Performed by: INTERNAL MEDICINE

## 2019-04-04 PROCEDURE — 97112 NEUROMUSCULAR REEDUCATION: CPT

## 2019-04-04 RX ADMIN — CLOPIDOGREL 75 MG: 75 TABLET, FILM COATED ORAL at 07:56

## 2019-04-04 RX ADMIN — OXYCODONE AND ACETAMINOPHEN 1 TABLET: 5; 325 TABLET ORAL at 20:26

## 2019-04-04 RX ADMIN — AMLODIPINE BESYLATE 5 MG: 5 TABLET ORAL at 07:57

## 2019-04-04 RX ADMIN — ASPIRIN 325 MG: 325 TABLET, COATED ORAL at 07:58

## 2019-04-04 RX ADMIN — PAROXETINE HYDROCHLORIDE 10 MG: 10 TABLET, FILM COATED ORAL at 07:59

## 2019-04-04 RX ADMIN — ATENOLOL 25 MG: 25 TABLET ORAL at 20:26

## 2019-04-04 RX ADMIN — LISINOPRIL 20 MG: 20 TABLET ORAL at 07:56

## 2019-04-04 RX ADMIN — LISINOPRIL 20 MG: 20 TABLET ORAL at 20:26

## 2019-04-04 RX ADMIN — ATENOLOL 25 MG: 25 TABLET ORAL at 07:58

## 2019-04-04 RX ADMIN — INSULIN GLARGINE 32 UNITS: 100 INJECTION, SOLUTION SUBCUTANEOUS at 20:22

## 2019-04-04 RX ADMIN — Medication 1 TABLET: at 07:58

## 2019-04-04 RX ADMIN — METFORMIN HYDROCHLORIDE 500 MG: 500 TABLET, EXTENDED RELEASE ORAL at 07:56

## 2019-04-04 RX ADMIN — METFORMIN HYDROCHLORIDE 500 MG: 500 TABLET, EXTENDED RELEASE ORAL at 16:49

## 2019-04-04 RX ADMIN — ATORVASTATIN CALCIUM 80 MG: 80 TABLET, FILM COATED ORAL at 20:26

## 2019-04-04 NOTE — PLAN OF CARE
Problem: Stroke (IRF) (Adult)  Goal: Promote Optimal Functional [place]  Outcome: Ongoing (interventions implemented as appropriate)      Problem: Fall Risk (Adult)  Goal: Absence of Fall  Outcome: Ongoing (interventions implemented as appropriate)      Problem: Diabetes, Type 2 (Adult)  Goal: Signs and Symptoms of Listed Potential Problems Will be Absent, Minimized or Managed (Diabetes, Type 2)  Outcome: Ongoing (interventions implemented as appropriate)      Problem: Skin Injury Risk (Adult)  Goal: Skin Health and Integrity  Outcome: Ongoing (interventions implemented as appropriate)      Problem: Patient Care Overview  Goal: Plan of Care Review  Outcome: Ongoing (interventions implemented as appropriate)   04/04/19 6965   Patient Care Overview   IRF Plan of Care Review progress ongoing, continue   Progress, Functional Goals demonstrating adequate progress   Coping/Psychosocial   Plan of Care Reviewed With patient   OTHER   Outcome Summary Pt. participated in therapies today. Take meds whole with water. Can be forgetful at times. No unsafe behaviors. Denies pain. Episode of diarrhea during the day, will continue to monitor.

## 2019-04-04 NOTE — PLAN OF CARE
Problem: Stroke (IRF) (Adult)  Goal: Promote Optimal Functional [place]  Outcome: Ongoing (interventions implemented as appropriate)   04/04/19 0239   Stroke (IRF) (Adult)   Promote Optimal Functional [place] demonstrating adequate progress       Problem: Fall Risk (Adult)  Goal: Absence of Fall  Outcome: Ongoing (interventions implemented as appropriate)   04/04/19 0239   Fall Risk (Adult)   Absence of Fall making progress toward outcome       Problem: Diabetes, Type 2 (Adult)  Goal: Signs and Symptoms of Listed Potential Problems Will be Absent, Minimized or Managed (Diabetes, Type 2)  Outcome: Ongoing (interventions implemented as appropriate)   04/04/19 0239   Goal/Outcome Evaluation   Problems Assessed (Type 2 Diabetes) all   Problems Present (Type 2 Diabetes) none       Problem: Skin Injury Risk (Adult)  Goal: Skin Health and Integrity  Outcome: Ongoing (interventions implemented as appropriate)   04/04/19 0239   Skin Injury Risk (Adult)   Skin Health and Integrity making progress toward outcome       Problem: Patient Care Overview  Goal: Plan of Care Review   04/04/19 0239   Patient Care Overview   IRF Plan of Care Review progress ongoing, continue   Progress, Functional Goals demonstrating adequate progress   Coping/Psychosocial   Plan of Care Reviewed With patient   OTHER   Outcome Summary Patient calm and cooperative. Presents with aphasia. Uses urinal at night. Takes meds with H2O. Took a pain pill for lower back pain and Xanax to help promotes sleep. No unsafe behaviors.

## 2019-04-04 NOTE — PROGRESS NOTES
Inpatient Rehabilitation Functional Measures Assessment    Functional Measures  VANITA Eating:  [facility] Grooming: [facility] Bathing:  [facility] Upper Body Dressing:  [facility] Lower Body Dressing:  [facility] Toileting:  [facility] Bladder Management  Level of Assistance:  [place]  Frequency/Number of Accidents this Shift:  [facility] Bowel Management  Level of Assistance: [place]  Frequency/Number of Accidents this Shift: [facility] Bed/Chair/Wheelchair Transfer:  Activity was not observed.  VANITA Toilet Transfer:  [facility] Tub/Shower Transfer:  [place]    Previously Documented Mode of Locomotion at Discharge: Field  VANITA Expected Mode of Locomotion at Discharge: [facility] Walk/Wheelchair:  WHEELCHAIR OBSERVATION   Wheelchair locomotion was observed using a manual wheelchair. Wheelchair  Distance Scale = 3.  Distance traveled in wheelchair is greater than 150 feet.  Wheelchair Score = 5.  Patient is supervision or set up only for propelling  wheelchair, requiring: Stand by assistance. Patient was able to propel a  distance of 150 feet in a wheelchair. No other assistive devices were required.    WALK OBSERVATION   Activity was not observed.  VANITA Stairs:  Stairs did not occur because activity was unsafe for patient.    VANITA Comprehension:  [facility] Expression:  [facility] Social Interaction:  [facility] Problem Solving:  [facility] Memory:  [place]    Therapy Mode Minutes  Occupational Therapy: [place]  Physical Therapy: Individual: 60 minutes.  Speech Language Pathology:  [place]    Signed by: Clau Ayon PT

## 2019-04-04 NOTE — THERAPY TREATMENT NOTE
Inpatient Rehabilitation - Speech Language Pathology Treatment Note    [facility]       Patient Name: Nayan Ryan  : 1965  MRN: [id number]    Today's Date: 2019           Admit Date: 3/24/2019      Visit Dx:      No diagnosis found.    Patient Active Problem List   Diagnosis   • Acute ischemic left PCA stroke (CMS/HCC)   • Status post placement of implantable loop recorder   • HTN (hypertension)   • Diabetes mellitus (CMS/HCC)   • Hyperlipidemia   • Morbid obesity (CMS/HCC)   • Anxiety disorder   • Migraine   • H/O hernia repair   • Abdominal wall abscess   • Back pain   • Stroke (cerebrum) (CMS/HCC)   • Vitamin D deficiency   • History of fatty infiltration of liver          Therapy Treatment    Evaluation/Coping    Evaluation/Treatment Time and Intent  Subjective Information: no complaints (19 1100 : Narcisa Sanchez MS CCC-SLP)  Existing Precautions/Restrictions: fall(swallow) (19 1100 : Narcisa Sanchez MS CCC-SLP)  Document Type: therapy note (daily note) (19 1100 : Narcisa Sanchez MS CCC-SLP)  Mode of Treatment: speech-language pathology (19 1100 : Narcisa Sanchez MS CCC-SLP)  Patient/Family Observations: up in w/c; from RN station (19 1100 : Narcisa Sanchez MS CCC-SLP)  Start Time (Evaluation/Treatment): 1105 (19 1100 : Narcisa Sanchez MS CCC-SLP)  Stop Time (Evaluation/Treatment): 1135 (19 1100 : Narcisa Sanchez MS CCC-SLP)    Vitals/Pain/Safety         Cognition/Communication         Oral Motor/Eating         Mobility/Basic Activities/Instrumental Activities/Motor/Modality                   ROM/MMT                   Sensory/Myotome/Dermatome/Edema               Posture/Balance/Special Tests/Exercise/Transportation/Sexual Function                   Orthotics/Residual Limb/Prosthetic Management              Outcome Summary         EDUCATION    The patient has been educated in the following areas:     Cognitive Impairment Communication Impairment.    SLP Recommendation  and Plan    SLP Diagnosis: Moderate Anomic Aphasia    SLP Diagnosis: Moderate Anomic Aphasia    Rehab Potential/Prognosis: good    Swallow Criteria for Skilled Therapeutic Interventions Met: no problems identified which require skilled intervention, other (see comments)(however, will monitor closely for s/s asp/dysphagia)    Anticipated Dischage Disposition: home with OP services, anticipate therapy at next level of care         Therapy Frequency (Swallow): 5 days per week    Predicted Duration Therapy Intervention (Days): until discharge           Plan of Care Reviewed With: patient      SLP GOALS     Row Name 04/04/19 1100 04/04/19 0800 04/03/19 1500       Comprehend Questions Goal 1 (SLP)    Progress (Ability to Comprehend Questions Goal 1, SLP)  100%;independently (over 90% accuracy)  -SA  --  --    Progress/Outcomes (Comprehend Questions Goal 1, SLP)  goal met  -SA  --  --    Comment (Comprehend Questions Goal 1, SLP)  answer wh questions general information  -SA  --  --       Word Retrieval Skills Goal 1 (SLP)    Progress (Word Retrieval Skills Goal 1, SLP)  --  --  90%;independently (over 90% accuracy)  -SA    Progress/Outcomes (Word Retrieval Goal 1, SLP)  --  --  good progress toward goal  -SA    Comment (Word Retrieval Goal 1, SLP)  --  --  96% naming antonyms: no cues  -SA       Memory Skills Goal 1 (SLP)    Progress (Memory Skills Goal 1, SLP)  --  --  40%;independently (over 90% accuracy)  -SA    Progress/Outcomes (Memory Skills Goal 1, SLP)  --  --  goal ongoing  -SA    Comment (Memory Skills Goal 1, SLP)  --  --  visual memory of 5 first names only (name to pic assoc)  -SA       Organizational Skills Goal 1 (SLP)    Improve Thought Organization Through Goal 1 (SLP)  completing a divergent naming task  -SA  completing a divergent naming task  -SA  --    Progress (Thought Organization Skills Goal 1, SLP)  70%;independently (over 90% accuracy)  -SA  --  --    Progress/Outcomes (Thought Organization  Skills Goal 1, SLP)  continuing progress toward goal  -SA  continuing progress toward goal  -SA  --    Comment (Thought Organization Skills Goal 1, SLP)  75% Id'ly naming 8/12 items in a category within 1 minute  -SA  56% Id'ly and 100% with semantic cues; label category items beginning with a specific letter  -SA  --       Functional Problem Solving Skills Goal 1 (SLP)    Improve Problem Solving Through Goal 1 (SLP)  --  --  sequence steps in a task  -SA    Progress (Problem Solving Goal 1, SLP)  --  --  with minimal cues (75-90%)  -SA    Progress/Outcomes (Problem Solving Goal 1, SLP)  --  --  continuing progress toward goal  -SA    Comment (Problem Solving Goal 1, SLP)  --  --  88%; 4 step sequencing of ADL's  -SA       Functional Math Skills Goal 1 (SLP)    Progress (Functional Math Skills Goal 1, SLP)  40%;independently (over 90% accuracy)  -SA  --  100%;independently (over 90% accuracy)  -SA    Progress/Outcomes (Functional Math Skills Goal 1, SLP)  goal ongoing  -SA  goal ongoing  -SA  good progress toward goal  -SA    Comment (Functional Math Skills Goal 1, SLP)  Continued and Completed money management set 1 figuring change using a calculator; much more difficulty this pm session.   -SA  initiated money management set 1 figuring change using a calculator; unable to complete d/t time constraints; thus far achieved 100%  -SA  math language: telling time and answering questions re: using a clock   -SA    Row Name 04/03/19 0800 04/02/19 1100 04/02/19 0800       Oral Nutrition/Hydration Goal 1 (SLP)    Oral Nutrition/Hydration Goal 1, SLP  Tolerate diet w/o clinical s/s asp/dysphagia  -SA  --  --    Time Frame (Oral Nutrition/Hydration Goal 1, SLP)  by discharge  -SA  --  --    Barriers (Oral Nutrition/Hydration Goal 1, SLP)  On regular/ thin diet. Pt observed to be using consecutive drinking and throat clear x 3; SLP encouraged pt to take small sips; pt verbalized understanding. Afebrile, breath sounds are  clear per RN note; and po intake good % each meal. Pt stated no difficulty with coughing or choking with meals. Will follow for diet dilma while in rehab.  -SA  --  --    Progress/Outcomes (Oral Nutrition/Hydration Goal 1, SLP)  good progress toward goal  -SA  --  --       Words/Phrases/Sentences Goal 1 (SLP)    Improve Ability to Comprehend Words/Phrases/Sentences Through: Goal 1 (SLP)  --  identify familiar objects  -SA  --    Progress (Ability to Contruct Words/Phrases/Sentences Goal 1, SLP)  --  100%;independently (over 90% accuracy)  -SA  --    Progress/Outcomes (Identify Objects and Pictures Goal 1, SLP)  --  goal met;good progress toward goal  -SA  --    Comment (Words/Phrases/Sentences Goal 1, SLP)  --  ID pics and point to 2 items on request  -SA  --       Comprehend Questions Goal 1 (SLP)    Improve Ability to Comprehend Questions Goal 1 (SLP)  --  simple general questions  -SA  complex yes/no questions  -SA    Progress (Ability to Comprehend Questions Goal 1, SLP)  --  100%;independently (over 90% accuracy)  -SA  100%;80%;with minimal cues (75-90%)  -SA    Progress/Outcomes (Comprehend Questions Goal 1, SLP)  --  good progress toward goal  -SA  good progress toward goal  -SA    Comment (Comprehend Questions Goal 1, SLP)  --  answer questions given choice of 3; independently read and circled ansewr  -SA  100%; yes/no mod complex questions; reading independently w/ min assist; answer questions given a choice of 3 answers: 83%  -SA       Follow Directions Goal 2 (SLP)    Improve Ability to Follow Directions Goal 1 (SLP)  --  2 step commands  -SA  --    Progress (Ability to Follow Directions Goal 1, SLP)  --  100%;independently (over 90% accuracy)  -SA  --    Progress/Outcomes (Follow Directions Goal 1, SLP)  --  good progress toward goal  -SA  --    Comment (Follow Directions Goal 1, SLP)  --  2 step motor commands  -SA  --       Comprehension at Phrase and Sentence Level Goal 1 (SLP)    Improve Reading  Comprehension at Phrase and Sentence Level Goal 1 (SLP)  --  answer written wh-questions  -SA  --    Time Frame (Reading Comprehension at Phrase and Sentence Level Goal 1, SLP)  --  by discharge  -SA  --    Comment (Reading Comprehension at Phrase and Sentence Level Goal 1, SLP)  --  reading simple wh questions and answering given a choice of 3  -SA  --       Word Retrieval Skills Goal 1 (SLP)    Improve Word Retrieval Skills By Goal 1 (SLP)  --  --  supplying an antonym;supplying a synonym;completing a divergent task  -SA    Progress (Word Retrieval Skills Goal 1, SLP)  --  --  90%;60%;independently (over 90% accuracy);with moderate cues (50-74%);100%  -SA    Progress/Outcomes (Word Retrieval Goal 1, SLP)  --  --  good progress toward goal  -SA    Comment (Word Retrieval Goal 1, SLP)  --  --  92%: synonyms given choice of 3; opposites (naming) no cues: 67%; divergent task: able to name 12 colors in 1 min without cues; perseverations observed: 100%  -SA       Awareness of Basic Personal Information Goal 1 (SLP)    Progress (Awareness of Basic Personal Information Goal 1, SLP)  independently (over 90% accuracy)  -SA  --  --    Progress/Outcomes (Awareness of Basic Personal Information Goal 1, SLP)  good progress toward goal  -SA  --  --    Comment (Awareness of Basic Personal Information Goal 1, SLP)  93%; answering questions related to personal info: name, address, city, state, wifes name, childrens names, dogs names, , situation; place; phone number home and cell and wife's phone number.   -SA  --  --       Attention Goal 1 (SLP)    Improve Attention by Goal 1 (SLP)  --  complete selective attention task;complete divided attention task;complete sustained attention task;90%;independently (over 90% accuracy)  -SA  --    Time Frame (Attention Goal 1, SLP)  --  by discharge  -SA  --    Progress (Attention Goal 1, SLP)  90%;independently (over 90% accuracy)  -SA  --  --    Progress/Outcomes (Attention Goal 1, SLP)   "goal ongoing  -SA  --  --    Comment (Attention Goal 1, SLP)  92%; find the coin; sustained and divided attention to task; initially pt neglected right half of page; but then pt independently returned to right half of the page and completed without any cues; good attention to task; pt verbalized \"i messed up\"   -SA  --  --       Memory Skills Goal 1 (SLP)    Improve Memory Skills Through Goal 1 (SLP)  --  visual memory task;listen to a paragraph and answer questions;recalling unrelated word lists immediately;recalling unrelated word lists with an imposed delay;90%;select a word from a list by exclusion;independently (over 90% accuracy)  -SA  --    Time Frame (Memory Skills Goal 1, SLP)  --  by discharge  -SA  --       Organizational Skills Goal 1 (SLP)    Improve Thought Organization Through Goal 1 (SLP)  --  completing a divergent naming task;completing a convergent naming task;naming similarities and differences;naming by category exclusion;completing a verbal sequencing task;90%;independently (over 90% accuracy)  -SA  --    Time Frame (Thought Organization Skills Goal 1, SLP)  --  by discharge  -SA  --    Progress (Thought Organization Skills Goal 1, SLP)  --  50%;independently (over 90% accuracy);100%;with moderate cues (50-74%)  -SA  --    Progress/Outcomes (Thought Organization Skills Goal 1, SLP)  --  goal ongoing  -SA  --    Comment (Thought Organization Skills Goal 1, SLP)  --  label category items beginning with a specific letter  -SA  --       Reasoning Goal 1 (SLP)    Improve Reasoning Through Goal 1 (SLP)  --  complete deductive reasoning task;complete analogies;90%;independently (over 90% accuracy)  -SA  --    Time Frame (Reasoning Goal 1, SLP)  --  by discharge  -SA  --       Functional Problem Solving Skills Goal 1 (SLP)    Improve Problem Solving Through Goal 1 (SLP)  --  complete organization/home management task;sequence steps in a task;90%;independently (over 90% accuracy)  -SA  --    Time Frame " (Problem Solving Goal 1, SLP)  --  by discharge  -SA  --       Functional Math Skills Goal 1 (SLP)    Improve Functional Math Skills Through Goal 1 (SLP)  --  complete word problems involving time;complete word problems involving money;complete simple math problems;complete functional math task;complete checkbook task;90%;independently (over 90% accuracy)  -SA  --    Time Frame (Functional Math Skills Goal 1, SLP)  --  by discharge  -SA  --       Executive Functional Skills Goal 1 (SLP)    Improve Executive Function Skills Goal 1 (SLP)  --  home management activity;organization/planning activity;time management activity;90%;independently (over 90% accuracy)  -SA  --    Time Frame (Executive Function Skills Goal 1, SLP)  --  by discharge  -SA  --      User Key  (r) = Recorded By, (t) = Taken By, (c) = Cosigned By    Initials Name Provider Type    Narcisa Gaston MS CCC-SLP Speech and Language Pathologist                  Time Calculation:       Time Calculation- SLP     Row Name 04/04/19 1130 04/04/19 0900          Time Calculation- SLP    SLP Start Time  1105  -SA  0800  -     SLP Stop Time  1135  -SA  0830  -     SLP Time Calculation (min)  30 min  -SA  30 min  -     SLP Received On  --  04/04/19  -       User Key  (r) = Recorded By, (t) = Taken By, (c) = Cosigned By    Initials Name Provider Type    Narcisa Gaston MS CCC-SLP Speech and Language Pathologist            Therapy Charges for Today     Code Description Service Date Service Provider Modifiers Qty    78930011484 HC ST TREATMENT SWALLOW 1 4/3/2019 Narcisa Sanchez MS CCC-SLP GN 1    35284373117 HC ST TREATMENT SPEECH 3 4/3/2019 Narcisa Sanchez MS CCC-SLP GN 1    39851743987 HC ST TREATMENT SPEECH 4 4/4/2019 Narcisa Sanchez MS CCC-SLP GN 1                           MS CHARLES Meléndez  4/4/2019

## 2019-04-04 NOTE — PROGRESS NOTES
Inpatient Rehabilitation Functional Measures Assessment and Plan of Care    Plan of Care  Updated Problems/Interventions  Field    Functional Measures  VANITA Eating:  [facility] Grooming: [facility] Bathing:  [facility] Upper Body Dressing:  [facility] Lower Body Dressing:  [facility] Toileting:  [facility] Bladder Management  Level of Assistance:  [place]  Frequency/Number of Accidents this Shift:  [place]    VANITA Bowel Management  Level of Assistance: [place]  Frequency/Number of Accidents this Shift: [facility]    [facility] Bed/Chair/Wheelchair Transfer:  [place]  VANITA Toilet Transfer:  [place]  VANITA Tub/Shower Transfer:  [place]    Previously Documented Mode of Locomotion at Discharge: Field  VANITA Expected Mode of Locomotion at Discharge: [facility] Walk/Wheelchair:  [facility] Stairs:  [facility] Comprehension:  Auditory comprehension is the usual mode. Patient does not  comprehend complex/abstract information in their primary language without  assistance from a helper. Comprehension Score = 4, Minimal Prompting. Patient  comprehends basic daily needs or ideas 75-90% of the time. Patient requires  minimal/occasional prompting. Patient requires the following assistive  device(s): Glasses.  VANITA Expression:  Vocal expression is the usual mode. Patient does not express  complex/abstract information in their primary language without a helper.  Expression Score = 4, Minimal Prompting. Patient expresses basic daily needs or  ideas 75-90% of the time.  Patient requires minimal/occasional prompting. No  assistive devices were required.  VANITA Social Interaction:  Social Interaction Score = 6, Modified Independent.  Patient is modified independent for social interaction, requiring: Requires  additional time.  VANITA Problem Solving:  Activity was not observed.  VANITA Memory:  Memory Score = 4, Minimal Prompting. Patient recognizes and  remembers 75-90% of the time. Patient requires minimal/occasional prompting for  memory for the  following: Inability to follow multi-step commands.    Therapy Mode Minutes  Occupational Therapy: [facility]  Physical Therapy: [facility]  Speech Language Pathology:  [facility]anch    Signed by: Subhash Duran RN

## 2019-04-04 NOTE — PROGRESS NOTES
LOS: 11 days   Patient Care Team:  Qasim Asif MD as PCP - General  Qasim Asif MD as PCP - Family Medicine    Chief Complaint: same    Subjective     History of Present Illness    Subjective Pt is awake and alert.     History taken from: patient    Objective     Vital Signs  Temp:  [97.9 °F (36.6 °C)-98.6 °F (37 °C)] 97.9 °F (36.6 °C)  Heart Rate:  [75-85] 75  Resp:  [20] 20  BP: (136-166)/(61-91) 154/91    Objective exam unchanged    Results Review:     I reviewed the patient's new clinical results.    Medication Review:     Assessment/Plan       Acute ischemic left PCA stroke (CMS/HCC)    Status post placement of implantable loop recorder    HTN (hypertension)    Diabetes mellitus (CMS/HCC)    Hyperlipidemia    Morbid obesity (CMS/HCC)    Anxiety disorder    Abdominal wall abscess    Stroke (cerebrum) (CMS/HCC)    Vitamin D deficiency    History of fatty infiltration of liver      Assessment & Plan Continue to prepare for dc.     Santo Noonan MD  04/04/19  7:31 AM    Time:

## 2019-04-04 NOTE — THERAPY TREATMENT NOTE
Inpatient Rehabilitation - Physical Therapy Treatment Note  [facility]     Patient Name: Nayan Ryan  : 1965  MRN: [id number]    Today's Date: 2019                 Admit Date: 3/24/2019      Visit Dx:    No diagnosis found.    Patient Active Problem List   Diagnosis   • Acute ischemic left PCA stroke (CMS/HCC)   • Status post placement of implantable loop recorder   • HTN (hypertension)   • Diabetes mellitus (CMS/HCC)   • Hyperlipidemia   • Morbid obesity (CMS/HCC)   • Anxiety disorder   • Migraine   • H/O hernia repair   • Abdominal wall abscess   • Back pain   • Stroke (cerebrum) (CMS/HCC)   • Vitamin D deficiency   • History of fatty infiltration of liver       Therapy Treatment    IRF Treatment Summary     Row Name 19 1100 19 0948 19 0800       Evaluation/Treatment Time and Intent    Subjective Information  no complaints  -SA  no complaints  -  no complaints  -SA    Existing Precautions/Restrictions  fall swallow  -SA  fall  -LH  fall  -SA    Document Type  therapy note (daily note)  -  therapy note (daily note)  -  therapy note (daily note)  -    Mode of Treatment  speech-language pathology  -  individual therapy;physical therapy  -  speech-language pathology  -    Patient/Family Observations  up in w/c; from RN station  -SA  pt seated in WC no acute distress  -  up in w/c; from ; pt states no pain  -SA    Start Time (Evaluation/Treatment)  1105  -SA  --  0800  -SA    Stop Time (Evaluation/Treatment)  1135  -SA  --  0830  -SA    Recorded by [SA] Narcisa Sanchez MS CCC-SLP [] Clau Ayon, PT [SA] Narcisa Sanchez MS CCC-SLP    Row Name 19 0948             Cognition/Psychosocial- PT/OT    Affect/Mental Status (Cognitive)  flat/blunted affect  -      Behavioral Issues (Cognitive)  withdrawn  -      Orientation Status (Cognition)  oriented x 3  -      Follows Commands (Cognition)  follows one step commands;75-90% accuracy;repetition of directions  required;verbal cues/prompting required  -      Personal Safety Interventions  fall prevention program maintained;gait belt;nonskid shoes/slippers when out of bed;supervised activity  -      Executive Function Deficit (Cognition)  severe deficit  -      Memory Deficit (Cognitive)  severe deficit  -      Safety Deficit (Cognitive)  severe deficit  -LH      Recorded by [] Clau Ayon, PT      Row Name 04/04/19 0948             Bed Mobility Assessment/Treatment    Comment (Bed Mobility)  NT  -      Recorded by [] Clau Ayon, PT      Row Name 04/04/19 0948             Transfer Assessment/Treatment    Comment (Transfers)  5x sit<->standing min A at hemibars, occassional RLE TCs for inc stability w fatigue. Vcs for set up (poor carryover)  -      Recorded by [] Clau Ayon, PT      Row Name 04/04/19 0948             Sit-Stand Transfer    Sit-Stand [place] (Transfers)  minimum assist (75% patient effort);1 person to manage equipment hemibars  -      Assistive Device (Sit-Stand Transfers)  wheelchair  -      Recorded by [] Clau Ayon, PT      Row Name 04/04/19 0948             Stand-Sit Transfer    Stand-Sit [place] (Transfers)  minimum assist (75% patient effort);1 person to manage equipment hemibars  -      Assistive Device (Stand-Sit Transfers)  wheelchair  -      Recorded by [] Clau Ayon, PT      Row Name 04/04/19 0948             Gait/Stairs Assessment/Training    [place] Level (Gait)  moderate assist (50% patient effort);1 person to manage equipment;verbal cues;nonverbal cues (demo/gesture)  -      Assistive Device (Gait)  jay-bars  -      Distance in Feet (Gait)  40, 32  -      Pattern (Gait)  step-through  -      Deviations/Abnormal Patterns (Gait)  nancy decreased  -      Bilateral Gait Deviations  forward flexed posture;heel strike decreased  -      Left Sided Gait Deviations  weight shift ability decreased  -      Right Sided Gait  Deviations  knee hyperextension;foot drop/toe drag  -      Comment (Gait/Stairs)  Min/Mod A for stability/to advance RLE w fatigue. AFO/slider donned  -      Recorded by [] Clau Ayon, PT      Row Name 04/04/19 0929             Wheelchair Mobility/Management    Method of Wheelchair Locomotion (Mobility)  bimanual (upper extremity) propulsion;jay-bipedal propulsion (left sided)  -      Forward Propulsion [place] (Wheelchair)  stand by assist  -      Steering [place] (Wheelchair)  stand by assist  -      Turning [place] (Wheelchair)  stand by assist  -      Doorway Navigation [place] (Wheelchair)  stand by assist  -      Distance Propelled in Feet (Wheelchair)  150  -      Comment, Mobility Activities (Wheelchair)  figure 8 around 3 cones in gym- poor scanning/attending to R side, max VCs. SBA/CGA  -      Recorded by [] Clau Ayon, PT      Row Name 04/04/19 0995             Safety Issues, Functional Mobility    Safety Issues Affecting Function (Mobility)  impulsivity;insight into deficits/self awareness;safety precaution awareness;safety precautions follow-through/compliance;sequencing abilities;problem solving;judgment  -      Impairments Affecting Function (Mobility)  cognition;balance  -      Comment, Safety Issues/Impairments (Mobility)  R HH  -      Recorded by [] Clau Ayon, PT      Row Name 04/04/19 0918             Vision Assessment/Intervention    Visual Field Deficit  homonymous hemianopsia, right  (Significant)   -      Recorded by [] Clau Ayon, PT      Row Name 04/04/19 7976             Pain Scale: Numbers Pre/Post-Treatment    Pain Scale: Numbers, Pretreatment  0/10 - no pain  -      Pain Scale: Numbers, Post-Treatment  0/10 - no pain  -      Recorded by [] Clau Ayon, PT      Row Name 04/04/19 0901             Lower Extremity Orthosis Management    Type (Lower Extremity Orthosis)  posterior leaf carbon fiber AFO toe off  -       Therapeutic Indications (Lower Extremity Orthosis)  compensation for lost function  -      Orthosis Training (Lower Extremity Orthosis)  patient  -      Skin Assessment (Lower Extremity Orthosis)  skin intact  -      Recorded by [] Clau Ayon PT      Row Name 04/04/19 0948             Lower Extremity Seated Therapeutic Exercise    Performed, Seated Lower Extremity (Therapeutic Exercise)  hip flexion/extension;hip abduction/adduction;LAQ (long arc quad), knee extension;ankle dorsiflexion/plantarflexion  -      Device, Seated Lower Extremity (Therapeutic Exercise)  elastic bands/tubing RTB  -      Exercise Type, Seated Lower Extremity (Therapeutic Exercise)  AAROM (active assistive range of motion);AROM (active range of motion)  -      Sets/Reps Detail, Seated Lower Extremity (Therapeutic Exercise)  2/20  -      Comment, Seated Lower Extremity (Therapeutic Exercise)  assisted heel slides RLE w pillow case  -      Recorded by [] Clau Ayon PT      Row Name 04/04/19 0948             Positioning and Restraints    Pre-Treatment Position  sitting in chair/recliner  -      Post Treatment Position  wheelchair  -      In Wheelchair  sitting;patient within staff view  -      Recorded by [] Clau Ayon, PT        User Key  (r) = Recorded By, (t) = Taken By, (c) = Cosigned By    Initials Name Effective Dates     Clau Ayon PT 04/03/18 -     Narcisa Gaston MS CCC-SLP 04/03/18 -         Wound 03/26/19 0900 Left chest incision (Active)   Dressing Appearance open to air 4/4/2019  6:59 AM   Closure Liquid skin adhesive;Approximated 4/4/2019  6:59 AM   Base clean;dry 4/4/2019  6:59 AM   Periwound dry;intact 4/4/2019  6:59 AM   Drainage Amount none 4/4/2019  6:59 AM   Dressing Care, Wound open to air 4/4/2019  6:59 AM     Physical Therapy Education     Title: PT OT SLP Therapies (In Progress)     Topic: Physical Therapy (In Progress)     Point: Mobility training (In Progress)      Learning Progress Summary           Patient Acceptance, E, NR by  at 4/4/2019  9:52 AM    Acceptance, E, NR by  at 4/3/2019 10:16 AM    Acceptance, E, NR by  at 4/2/2019  3:18 PM    Acceptance, E, NR by  at 4/1/2019 10:32 AM    Acceptance, E,TB,D, NR by  at 3/30/2019 11:44 AM    Acceptance, E,TB,D, NR by  at 3/29/2019  2:58 PM    Acceptance, E,TB,D, NR by  at 3/28/2019 11:38 AM    Acceptance, E,TB,D, NR by  at 3/27/2019 10:05 AM    Acceptance, E,TB,D, NR by  at 3/26/2019  9:48 AM    Acceptance, E,TB,D, NR by  at 3/25/2019  3:09 PM                   Point: Home exercise program (In Progress)     Learning Progress Summary           Patient Acceptance, E, NR by  at 4/4/2019  9:52 AM    Acceptance, E, NR by  at 4/3/2019 10:16 AM    Acceptance, E, NR by  at 4/2/2019  3:18 PM    Acceptance, E, NR by  at 4/1/2019 10:32 AM    Acceptance, E,TB,D, NR by  at 3/29/2019  2:58 PM    Acceptance, E,TB,D, NR by  at 3/28/2019 11:38 AM    Acceptance, E,TB,D, NR by  at 3/27/2019 10:05 AM    Acceptance, E,TB,D, NR by  at 3/26/2019  9:48 AM    Acceptance, E,TB,D, NR by  at 3/25/2019  3:09 PM                   Point: Body mechanics (In Progress)     Learning Progress Summary           Patient Acceptance, E, NR by  at 4/4/2019  9:52 AM    Acceptance, E, NR by  at 4/3/2019 10:16 AM    Acceptance, E, NR by  at 4/2/2019  3:18 PM    Acceptance, E, NR by  at 4/1/2019 10:32 AM    Acceptance, E,TB,D, NR by  at 3/29/2019  2:58 PM    Acceptance, E,TB,D, NR by  at 3/28/2019 11:38 AM    Acceptance, E,TB,D, NR by  at 3/27/2019 10:05 AM    Acceptance, E,TB,D, NR by EE at 3/26/2019  9:48 AM    Acceptance, E,TB,D, NR by  at 3/25/2019  3:09 PM                   Point: Precautions (In Progress)     Learning Progress Summary           Patient Acceptance, E, NR by  at 4/4/2019  9:52 AM    Acceptance, E, NR by  at 4/3/2019 10:16 AM    Acceptance, E, NR by  at 4/2/2019  3:18 PM    Acceptance, E, NR by   at 4/1/2019 10:32 AM    Acceptance, E,TB,D, NR by  at 3/29/2019  2:58 PM    Acceptance, E,TB,D, NR by  at 3/28/2019 11:38 AM    Acceptance, E,TB,D, NR by  at 3/27/2019 10:05 AM    Acceptance, E,TB,D, NR by  at 3/26/2019  9:48 AM    Acceptance, E,TB,D, NR by  at 3/25/2019  3:09 PM                               User Key     Initials Effective Dates Name Provider Type Discipline     06/08/18 -  Donna Inman, PT Physical Therapist PT     04/03/18 -  Clau Ayon PT Physical Therapist PT     04/03/18 -  Ariadne Garcia, PT Physical Therapist PT                  PT Recommendation and Plan     Plan of Care Reviewed With: patient  Progress, Functional Goals: demonstrating adequate progress  Outcome Summary: pt has met 3 out of 4 STGs, all LTGs ongoing. cog deficits limiting optimal progress. transitioned last week to pistol  walker. needs TC/VCs for proper  advancement of RLE. will cont to prepare  for DC.                Time Calculation:     PT Charges     Row Name 04/04/19 1422 04/04/19 0952          Time Calculation    Start Time  1330  -  0930  -     Stop Time  1400  -  1000  -     Time Calculation (min)  30 min  -  30 min  -     PT Received On  --  04/04/19  -     PT - Next Appointment  --  04/05/19  -       User Key  (r) = Recorded By, (t) = Taken By, (c) = Cosigned By    Initials Name Provider Type     Clau Ayon, PT Physical Therapist          Therapy Charges for Today     Code Description Service Date Service Provider Modifiers Qty    10646193161 HC PT THER PROC EA 15 MIN 4/3/2019 Clau Ayon, PT GP 4    36722423901 HC PT THER PROC EA 15 MIN 4/4/2019 Clau Ayon, PT GP 4                   Clau Ayon, PT  4/4/2019

## 2019-04-04 NOTE — THERAPY TREATMENT NOTE
Inpatient Rehabilitation - Occupational Therapy Treatment Note    [facility]     Patient Name: Nayan Ryan  : 1965  MRN: [id number]    Today's Date: 2019                 Admit Date: 3/24/2019      Visit Dx:  No diagnosis found.    Patient Active Problem List   Diagnosis   • Acute ischemic left PCA stroke (CMS/HCC)   • Status post placement of implantable loop recorder   • HTN (hypertension)   • Diabetes mellitus (CMS/HCC)   • Hyperlipidemia   • Morbid obesity (CMS/HCC)   • Anxiety disorder   • Migraine   • H/O hernia repair   • Abdominal wall abscess   • Back pain   • Stroke (cerebrum) (CMS/HCC)   • Vitamin D deficiency   • History of fatty infiltration of liver         Therapy Treatment    IRF Treatment Summary     Row Name 19 1506 19 1100 19 0948       Evaluation/Treatment Time and Intent    Subjective Information  no complaints  -DN  no complaints  -SA  no complaints  -    Existing Precautions/Restrictions  fall  -DN  fall swallow  -SA  fall  -    Document Type  therapy note (daily note)  -DN  therapy note (daily note)  -SA  therapy note (daily note)  -    Mode of Treatment  occupational therapy  -DN  speech-language pathology  -  individual therapy;physical therapy  -    Patient/Family Observations  pt had some issues with diarreha last couple of days, nsg informed  -DN  up in w/c; from RN station  -  pt seated in  no acute distress  -    Start Time (Evaluation/Treatment)  --  1105  -SA  --    Stop Time (Evaluation/Treatment)  --  1135  -SA  --    Recorded by [DN] Reg Mckinney, OT [SA] Narcisa Sanchez, MS CCC-SLP [] Clau Ayon PT    Row Name 19 0800             Evaluation/Treatment Time and Intent    Subjective Information  no complaints  -SA      Existing Precautions/Restrictions  fall  -SA      Document Type  therapy note (daily note)  -      Mode of Treatment  speech-language pathology  -      Patient/Family Observations  up in w/c; from DR; pt  states no pain  -SA      Start Time (Evaluation/Treatment)  0800  -SA      Stop Time (Evaluation/Treatment)  0830  -SA      Recorded by [SA] Narcisa Sanchez MS CCC-SLP      Row Name 04/04/19 0926             Cognition/Psychosocial- PT/OT    Affect/Mental Status (Cognitive)  flat/blunted affect  -      Behavioral Issues (Cognitive)  withdrawn  -      Orientation Status (Cognition)  oriented x 3  -LH      Follows Commands (Cognition)  follows one step commands;75-90% accuracy;repetition of directions required;verbal cues/prompting required  -      Personal Safety Interventions  fall prevention program maintained;gait belt;nonskid shoes/slippers when out of bed;supervised activity  -      Executive Function Deficit (Cognition)  severe deficit  -      Memory Deficit (Cognitive)  severe deficit  -      Safety Deficit (Cognitive)  severe deficit  -LH      Recorded by [LH] Clau Ayon, PT      Row Name 04/04/19 0948             Bed Mobility Assessment/Treatment    Comment (Bed Mobility)  NT  -LH      Recorded by [] Clau Ayon, PT      Row Name 04/04/19 0948             Transfer Assessment/Treatment    Comment (Transfers)  5x sit<->standing min A at hemibars, occassional RLE TCs for inc stability w fatigue. Vcs for set up (poor carryover)  -LH      Recorded by [LH] Clau Ayon, PT      Row Name 04/04/19 1506             Chair-Bed Transfer    Chair-Bed [place] (Transfers)  moderate assist (50% patient effort);verbal cues;set up;nonverbal cues (demo/gesture)  -DN      Assistive Device (Chair-Bed Transfers)  wheelchair  -DN      Recorded by [DN] Reg Mckinney OT      Row Name 04/04/19 0959             Sit-Stand Transfer    Sit-Stand [place] (Transfers)  minimum assist (75% patient effort);1 person to manage equipment hemibars  -      Assistive Device (Sit-Stand Transfers)  wheelchair  -LH      Recorded by [LH] Clau Ayon PT      Row Name 04/04/19 0948             Stand-Sit Transfer     Stand-Sit [place] (Transfers)  minimum assist (75% patient effort);1 person to manage equipment hemibars  -      Assistive Device (Stand-Sit Transfers)  wheelchair  -LH      Recorded by [] Clau Ayon, PT      Row Name 04/04/19 1506             Toilet Transfer    Type (Toilet Transfer)  stand pivot/stand step  -DN      [place] Level (Toilet Transfer)  minimum assist (75% patient effort);verbal cues;nonverbal cues (demo/gesture)  -DN      Assistive Device (Toilet Transfer)  commode, 3-in-1;raised toilet seat;grab bars/safety frame;wheelchair  -DN      Recorded by [DN] Reg Mckinney OT      Row Name 04/04/19 1506             Shower Transfer    Type (Shower Transfer)  stand pivot/stand step  -DN      [place] Level (Shower Transfer)  nonverbal cues (demo/gesture);minimum assist (75% patient effort);verbal cues  -DN      Assistive Device (Shower Transfer)  wheelchair;tub bench;grab bars/tub rail  -DN      Recorded by [DN] Reg Mckinney, OT      Row Name 04/04/19 0948             Gait/Stairs Assessment/Training    [place] Level (Gait)  moderate assist (50% patient effort);1 person to manage equipment;verbal cues;nonverbal cues (demo/gesture)  -      Assistive Device (Gait)  jay-bars  -      Distance in Feet (Gait)  40, 32  -      Pattern (Gait)  step-through  -      Deviations/Abnormal Patterns (Gait)  nancy decreased  -      Bilateral Gait Deviations  forward flexed posture;heel strike decreased  -      Left Sided Gait Deviations  weight shift ability decreased  -      Right Sided Gait Deviations  knee hyperextension;foot drop/toe drag  -      Comment (Gait/Stairs)  Min/Mod A for stability/to advance RLE w fatigue. AFO/slider donned  -LH      Recorded by [] Clau Ayon, PT      Row Name 04/04/19 0927             Wheelchair Mobility/Management    Method of Wheelchair Locomotion (Mobility)  bimanual (upper extremity) propulsion;jay-bipedal propulsion (left sided)  -       Forward Propulsion [place] (Wheelchair)  stand by assist  -LH      Steering [place] (Wheelchair)  stand by assist  -      Turning [place] (Wheelchair)  stand by assist  -      Doorway Navigation [place] (Wheelchair)  stand by assist  -      Distance Propelled in Feet (Wheelchair)  150  -LH      Comment, Mobility Activities (Wheelchair)  figure 8 around 3 cones in gym- poor scanning/attending to R side, max VCs. SBA/CGA  -LH      Recorded by [] Clau Ayon, PT      Row Name 04/04/19 1506 04/04/19 0948          Safety Issues, Functional Mobility    Safety Issues Affecting Function (Mobility)  sequencing abilities;impulsivity;insight into deficits/self awareness;judgment;problem solving;awareness of need for assistance  -DN  impulsivity;insight into deficits/self awareness;safety precaution awareness;safety precautions follow-through/compliance;sequencing abilities;problem solving;judgment  -LH     Impairments Affecting Function (Mobility)  cognition;balance;motor planning;strength;visual/perceptual;postural/trunk control  -DN  cognition;balance  -     Comment, Safety Issues/Impairments (Mobility)  pt tends to pitch forward and to the side with tranfers increaseing  need for vc and tactile cues for safety  -DN  R HH  -LH     Recorded by [DN] Reg Mckinney, OT [] Clau Ayon, PT     Row Name 04/04/19 1506             Bathing Assessment/Treatment    Bathing [place] Level  bathing skills;verbal cues;set up;nonverbal cues (demo/gesture);minimum assist (75% patient effort)  -DN      Assistive Device (Bathing)  tub bench;hand held shower spray hose;grab bar/tub rail  -DN      Bathing Position  supported sitting;supported standing  -DN      Bathing Setup Assistance  obtain supplies  -DN      Comment (Bathing)  max vc for sequence hands on when standing due to pt wanting to turn in shower without holding on due to impulsive nature  -DN      Recorded by [DN] Reg Mckinney, OT       Row Name 04/04/19 1506             Upper Body Dressing Assessment/Treatment    Upper Body Dressing Task  upper body dressing skills;doff;don;pull over garment;verbal cues;nonverbal cues (demo/gesture)  -DN      Upper Body Dressing Position  supported sitting  -DN      Set-up Assistance (Upper Body Dressing)  obtain clothing  -DN      Comment (Upper Body Dressing)  vc to sequence and initate  -DN      Recorded by [DN] Reg Mckinney, OT      Row Name 04/04/19 1506             Lower Body Dressing Assessment/Treatment    Lower Body Dressing [place] Level  doff;don;pants/bottoms;shoes/slippers;socks;underwear;nonverbal cues (demo/gesture);verbal cues;minimum assist (75% patient effort)  -DN      Lower Body Dressing Position  supported sitting;supported standing  -DN      Lower Body Dressing Setup Assistance  obtain clothing  -DN      Comment (Lower Body Dressing)  vc for sequencing, jay dressing technique, starting to show signs of carryover with less cues  -DN      Recorded by [DN] Reg Mckinney, OT      Row Name 04/04/19 1506             Grooming Assessment/Treatment    Grooming [place] Level  grooming skills;deodorant application;hair care, combing/brushing;nail care;shave face;set up;verbal cues;nonverbal cues (demo/gesture);minimum assist (75% patient effort)  -DN      Grooming Position  sink side;supported sitting  -DN      Grooming Setup Assistance  obtain supplies;open containers  -DN      Comment (Grooming)  vc for visually finding objects to use with grooming and vc for sequence of grooming  -DN      Recorded by [DN] Reg Mckinney, OT      Row Name 04/04/19 1506             Toileting Assessment/Treatment    Toileting [place] Level  toileting skills;adjust/manage clothing;perform perineal hygiene;moderate assist (50% patient effort);minimum assist (75% patient effort)  -DN      Assistive Device Use (Toileting)  grab bar/safety frame;raised toilet seat  -DN      Toileting Position  supported  sitting;supported standing  -DN      Toileting Setup Assistance  change pad/brief;obtain supplies  -DN      Comment (Toileting)  pt inc of bowel today, min when cont and mod when incont  -DN      Recorded by [DN] Reg Mckinney, OT      Row Name 04/04/19 1505             Fine Motor Testing & Training    Comment, Fine Motor Coordination  pt sba with removal of small pegs from [place], theraputty exercises and issued Red theraputty for room use to increase RUE coord and strength  -DN      Recorded by [DN] Reg Mckinney, OT      Row Name 04/04/19 0932             Vision Assessment/Intervention    Visual Field Deficit  homonymous hemianopsia, right  (Significant)   -LH      Recorded by [] Clau Ayon, PT      Row Name 04/04/19 1506 04/04/19 0969          Pain Scale: Numbers Pre/Post-Treatment    Pain Scale: Numbers, Pretreatment  0/10 - no pain  -DN  0/10 - no pain  -LH     Pain Scale: Numbers, Post-Treatment  0/10 - no pain  -DN  0/10 - no pain  -LH     Recorded by [DN] Reg Mckinney, OT [LH] Clau Ayon, PT     Row Name 04/04/19 7297             Lower Extremity Orthosis Management    Type (Lower Extremity Orthosis)  posterior leaf carbon fiber AFO toe off  -      Therapeutic Indications (Lower Extremity Orthosis)  compensation for lost function  -      Orthosis Training (Lower Extremity Orthosis)  patient  -      Skin Assessment (Lower Extremity Orthosis)  skin intact  -      Recorded by [] Clau Ayon, PT      Row Name 04/04/19 1503             Upper Extremity Seated Therapeutic Exercise    Performed, Seated Upper Extremity (Therapeutic Exercise)  shoulder flexion/extension;shoulder abduction/adduction;digit flexion/extension;wrist flexion/extension;forearm supination/pronation;elbow flexion/extension  -DN      Device, Seated Upper Extremity (Therapeutic Exercise)  free weights, barbell  -DN      Exercise Type, Seated Upper Extremity (Therapeutic Exercise)  AROM (active range of motion);resistive  exercise  -DN      Expected Outcomes, Seated Upper Extremity (Therapeutic Exercise)  improve functional tolerance, self-care activity;improve performance, transfer skills;improve performance, BADLs  -DN      Sets/Reps Detail, Seated Upper Extremity (Therapeutic Exercise)  3 sets of 10 reps with 2# dumbell, 2# dowel and handgripper  -DN      Transfers Skills, Training to Functional Activity, Seated Upper Extremity (Therapeutic Exercise)  beginning to transfer skills to functional activity  -DN      Recorded by [DN] Reg Mckinney OT      Row Name 04/04/19 0948             Lower Extremity Seated Therapeutic Exercise    Performed, Seated Lower Extremity (Therapeutic Exercise)  hip flexion/extension;hip abduction/adduction;LAQ (long arc quad), knee extension;ankle dorsiflexion/plantarflexion  -      Device, Seated Lower Extremity (Therapeutic Exercise)  elastic bands/tubing RTB  -      Exercise Type, Seated Lower Extremity (Therapeutic Exercise)  AAROM (active assistive range of motion);AROM (active range of motion)  -      Sets/Reps Detail, Seated Lower Extremity (Therapeutic Exercise)  2/20  -      Comment, Seated Lower Extremity (Therapeutic Exercise)  assisted heel slides RLE w pillow case  -      Recorded by [] Clau Ayon, PT      Row Name 04/04/19 1506 04/04/19 0948          Positioning and Restraints    Pre-Treatment Position  sitting in chair/recliner  -DN  sitting in chair/recliner  -     Post Treatment Position  bed  -DN  wheelchair  -LH     In Bed  call light within reach;encouraged to call for assist;exit alarm on  -DN  --     In Wheelchair  --  sitting;patient within staff view  -LH     Recorded by [DN] Reg Mckinney OT [LH] Clau Ayon, PT       User Key  (r) = Recorded By, (t) = Taken By, (c) = Cosigned By    Initials Name Effective Dates    Reg Bolden OT 06/08/18 -     Clau Thomas, PT 04/03/18 -     Narcisa Gaston MS CCC-SLP 04/03/18 -           Wound 03/26/19 0900  Left chest incision (Active)   Dressing Appearance open to air 4/4/2019  6:59 AM   Closure Liquid skin adhesive;Approximated 4/4/2019  6:59 AM   Base clean;dry 4/4/2019  6:59 AM   Periwound dry;intact 4/4/2019  6:59 AM   Drainage Amount none 4/4/2019  6:59 AM   Dressing Care, Wound open to air 4/4/2019  6:59 AM         OT Recommendation and Plan    Anticipated Equipment Needs At Discharge (OT Eval): commode, 3-in-1, shower chair, tub bench  Planned Therapy Interventions (OT Eval): BADL retraining, cognitive/visual perception retraining, functional balance retraining, neuromuscular control/coordination retraining, strengthening exercise, transfer/mobility retraining            OT IRF GOALS     Row Name 04/02/19 1500 03/25/19 1649          Bathing Goal 1 (OT-IRF)    Activity/Device (Bathing Goal 1, OT-IRF)  bathing skills, all  -DN  bathing skills, all;tub bench;hand-held shower spray hose;grab bar/tub rail  -DN     [place] Level (Bathing Goal 1, OT-IRF)  contact guard assist;verbal cues required  -DN  minimum assist (75% or more patient effort);verbal cues required;set-up required;tactile cues required  -DN     Time Frame (Bathing Goal 1, OT-IRF)  short term goal (STG)  -DN  short term goal (STG)  -DN     Progress/Outcomes (Bathing Goal 1, OT-IRF)  goal met;goal revised this date  -DN  continuing progress toward goal  -DN        Bathing Goal 2 (OT-IRF)    Activity/Device (Bathing Goal 2, OT-IRF)  bathing skills, all  -DN  bathing skills, all;tub bench;hand-held shower spray hose;grab bar/tub rail  -DN     [place] Level (Bathing Goal 2, OT-IRF)  supervision required  -DN  supervision required;verbal cues required  -DN     Time Frame (Bathing Goal 2, OT-IRF)  long term goal (LTG)  -DN  long term goal (LTG)  -DN     Progress/Outcomes (Bathing Goal 2, OT-IRF)  goal ongoing  -DN  continuing progress toward goal  -DN        UB Dressing Goal 1 (OT-IRF)    Activity/Device (UB Dressing Goal 1, OT-IRF)  upper body  dressing  -DN  upper body dressing  -DN     [place] (UB Dress Goal 1, OT-IRF)  supervision required  -DN  minimum assist (75% or more patient effort);verbal cues required  -DN     Time Frame (UB Dressing Goal 1, OT-IRF)  short term goal (STG)  -DN  short term goal (STG)  -DN     Progress/Outcomes (UB Dressing Goal 1, OT-IRF)  goal met;goal revised this date  -DN  continuing progress toward goal  -DN        UB Dressing Goal 2 (OT-IRF)    Activity/Device (UB Dressing Goal 2, OT-IRF)  upper body dressing  -DN  upper body dressing  -DN     [place] (UB Dress Goal 2, OT-IRF)  supervision required  -DN  supervision required;verbal cues required  -DN     Time Frame (UB Dressing Goal 2, OT-IRF)  long term goal (LTG)  -DN  long term goal (LTG)  -DN     Progress/Outcomes (UB Dressing Goal 2, OT-IRF)  goal ongoing;goal met  -DN  continuing progress toward goal  -DN        LB Dressing Goal 1 (OT-IRF)    Activity/Device (LB Dressing Goal 1, OT-IRF)  lower body dressing  -DN  lower body dressing  -DN     [place] (LB Dressing Goal 1, OT-IRF)  minimum assist (75% or more patient effort);verbal cues required;tactile cues required  -DN  moderate assist (50-74% patient effort);verbal cues required;set-up required;tactile cues required  -DN     Time Frame (LB Dressing Goal 1, OT-IRF)  short term goal (STG)  -DN  short term goal (STG)  -DN     Progress/Outcomes (LB Dressing Goal 1, OT-IRF)  goal met;goal revised this date  -DN  continuing progress toward goal  -DN        LB Dressing Goal 2 (OT-IRF)    Activity/Device (LB Dressing Goal 2, OT-IRF)  lower body dressing  -DN  lower body dressing  -DN     [place] (LB Dressing Goal 2, OT-IRF)  verbal cues required;tactile cues required;minimum assist (75% or more patient effort)  -DN  verbal cues required;tactile cues required;minimum assist (75% or more patient effort)  -DN     Time Frame (LB Dressing Goal 2, OT-IRF)  long term goal (LTG)  -DN  long term goal (LTG)  -DN      Progress/Outcomes (LB Dressing Goal 2, OT-IRF)  goal ongoing  -DN  continuing progress toward goal  -DN        Grooming Goal 1 (OT-IRF)    Activity/Device (Grooming Goal 1, OT-IRF)  grooming skills, all  -DN  grooming skills, all  -DN     [place] (Grooming Goal 1, OT-IRF)  supervision required  -DN  supervision required;verbal cues required;minimum assist (75% or more patient effort)  -DN     Time Frame (Grooming Goal 1, OT-IRF)  short term goal (STG)  -DN  short term goal (STG)  -DN     Progress/Outcomes (Grooming Goal 1, OT-IRF)  goal partially met;goal ongoing  -DN  continuing progress toward goal  -DN        Grooming Goal 2 (OT-IRF)    Activity/Device (Grooming Goal 2, OT-IRF)  grooming skills, all  -DN  grooming skills, all  -DN     [place] (Grooming Goal 2, OT-IRF)  supervision required  -DN  set-up required;supervision required;verbal cues required;tactile cues required  -DN     Time Frame (Grooming Goal 2, OT-IRF)  long term goal (LTG)  -DN  long term goal (LTG)  -DN     Progress/Outcomes (Grooming Goal 2, OT-IRF)  goal revised this date  -DN  continuing progress toward goal  -DN        Toileting Goal 1 (OT-IRF)    Activity/Device (Toileting Goal 1, OT-IRF)  toileting skills, all  -DN  toileting skills, all;adjust/manage clothing;perform perineal hygiene;grab bar/safety frame;raised toilet seat  -DN     [place] Level (Toileting Goal 1, OT-IRF)  minimum assist (75% or more patient effort);verbal cues required;tactile cues required  -DN  moderate assist (50-74% patient effort);verbal cues required;tactile cues required  -DN     Time Frame (Toileting Goal 1, OT-IRF)  short term goal (STG)  -DN  short term goal (STG)  -DN     Progress/Outcomes (Toileting Goal 1, OT-IRF)  goal met;goal revised this date  -DN  continuing progress toward goal  -DN        Toileting Goal 2 (OT-IRF)    Activity/Device (Toileting Goal 2, OT-IRF)  toileting skills, all  -DN  toileting skills, all;adjust/manage  clothing;perform perineal hygiene  -DN     [place] Level (Toileting Goal 2, OT-IRF)  contact guard assist;verbal cues required;tactile cues required  -DN  contact guard assist;verbal cues required;tactile cues required  -DN     Time Frame (Toileting Goal 2, OT-IRF)  long term goal (LTG)  -DN  long term goal (LTG)  -DN     Progress/Outcomes (Toileting Goal 2, OT-IRF)  goal ongoing  -DN  continuing progress toward goal  -DN        Self-Feeding Goal 1 (OT-IRF)    Activity/Device (Self-Feeding Goal 1, OT-IRF)  self-feeding skills, all  -DN  self-feeding skills, all  -DN     [place] (Self-Feeding Goal 1, OT-IRF)  supervision required  -DN  supervision required;tactile cues required  -DN     Time Frame (Self-Feeding Goal 1, OT-IRF)  short term goal (STG)  -DN  short term goal (STG)  -DN     Progress/Outcomes 1 (Self-Feeding Goal, OT-IRF)  goal met;goal ongoing  -DN  continuing progress toward goal  -DN        Self-Feeding Goal 2 (OT-IRF)    Activity/Device (Self-Feeding Goal 2, OT-IRF)  self-feeding skills, all  -DN  self-feeding skills, all  -DN     [place] (Self-Feeding Goal 2, OT-IRF)  supervision required  -DN  supervision required;set-up required  -DN     Time Frame (Self-Feeding Goal 2, OT-IRF)  long term goal (LTG)  -DN  long term goal (LTG)  -DN     Progress/Outcomes (Self-Feeding Goal 2, OT-IRF)  goal met;goal ongoing  -DN  continuing progress toward goal  -DN        Caregiver Training Goal 2 (OT-IRF)    Caregiver Training Goal 2 (OT-IRF)  pt caregiver to be independent with any assist pt needs with adls, transfers and HEP for d/c home  -DN  pt caregiver to be independent with any assist pt needs with adls, transfers and HEP for d/c home  -DN     Time Frame (Caregiver Training Goal 2, OT-IRF)  long term goal (LTG)  -DN  long term goal (LTG)  -DN     Progress/Outcomes (Caregiver Training Goal 2, OT-IRF)  goal ongoing  -DN  continuing progress toward goal  -DN       User Key  (r) = Recorded By, (t) =  Taken By, (c) = Cosigned By    Initials Name Provider Type    Reg Bolden OT Occupational Therapist          Occupational Therapy Education     Title: PT OT SLP Therapies (In Progress)     Topic: Occupational Therapy (Done)     Point: ADL training (Done)     Description: Instruct learner(s) on proper safety adaptation and remediation techniques during self care or transfers.   Instruct in proper use of assistive devices.    Learning Progress Summary           Patient Acceptance, E,TB,D, VU,NR by DN at 4/4/2019  3:19 PM    Comment:  theraputty exercises, jay adls and transfer trainning    Acceptance, E,TB,D, NR by DN at 3/26/2019 12:06 PM    Comment:  jay adls and commode/shower transfers, still max vc for all due to cognition and visual impairments    Acceptance, E,TB,D, VU,NR by DN at 3/25/2019  5:23 PM    Comment:  OT role in rehab, transfer traning, jay adls                   Point: Home exercise program (Done)     Description: Instruct learner(s) on appropriate technique for monitoring, assisting and/or progressing therapeutic exercises/activities.    Learning Progress Summary           Patient Acceptance, E,TB,D, VU,NR by DN at 4/4/2019  3:19 PM    Comment:  theraputty exercises, jay adls and transfer trainning    Acceptance, E,TB,D, NR by DN at 3/26/2019 12:06 PM    Comment:  jay adls and commode/shower transfers, still max vc for all due to cognition and visual impairments    Acceptance, E,TB,D, VU,NR by DN at 3/25/2019  5:23 PM    Comment:  OT role in rehab, transfer traning, jay adls                   Point: Precautions (Done)     Description: Instruct learner(s) on prescribed precautions during self-care and functional transfers.    Learning Progress Summary           Patient Acceptance, E,TB,D, VU,NR by DN at 4/4/2019  3:19 PM    Comment:  theraputty exercises, jay adls and transfer trainning    Acceptance, E,TB,D, NR by DN at 3/26/2019 12:06 PM    Comment:  jay adls and commode/shower  transfers, still max vc for all due to cognition and visual impairments    Acceptance, E,TB,D, VU,NR by DN at 3/25/2019  5:23 PM    Comment:  OT role in rehab, transfer traning, jay adls                   Point: Body mechanics (Done)     Description: Instruct learner(s) on proper positioning and spine alignment during self-care, functional mobility activities and/or exercises.    Learning Progress Summary           Patient Acceptance, E,TB,D, VU,NR by DN at 4/4/2019  3:19 PM    Comment:  theraputty exercises, jay adls and transfer trainning    Acceptance, E,TB,D, NR by DN at 3/26/2019 12:06 PM    Comment:  jay adls and commode/shower transfers, still max vc for all due to cognition and visual impairments    Acceptance, E,TB,D, VU,NR by DN at 3/25/2019  5:23 PM    Comment:  OT role in rehab, transfer traning, jay adls                               User Key     Initials Effective Dates Name Provider Type Discipline    DN 06/08/18 -  Reg Mckinney OT Occupational Therapist OT                       Time Calculation:     Time Calculation- OT     Row Name 04/04/19 1520 04/04/19 0830          Time Calculation- OT    OT Start Time  1400  -DN  0830  -DN     OT Stop Time  1445  -DN  0930  -DN     OT Time Calculation (min)  45 min  -DN  60 min  -DN     OT Received On  04/04/19  -DN  04/04/19  -DN       User Key  (r) = Recorded By, (t) = Taken By, (c) = Cosigned By    Initials Name Provider Type    Reg Bolden OT Occupational Therapist          Therapy Charges for Today     Code Description Service Date Service Provider Modifiers Qty    79242314035 HC OT SELF CARE/MGMT/TRAIN EA 15 MIN 4/3/2019 Reg Mckinney OT GO 4    68554609842 HC OT THER PROC EA 15 MIN 4/3/2019 Reg Mckinney OT GO 1    81458436542 HC OT NEUROMUSC RE EDUCATION EA 15 MIN 4/4/2019 Reg Mckinney OT GO 1    17753738379 HC OT SELF CARE/MGMT/TRAIN EA 15 MIN 4/4/2019 Reg Mckinney OT GO 4    57564697735 HC OT THER PROC EA 15 MIN 4/4/2019  Reg Mckinney, OT GO 2                   Reg Mckinney, OT  4/4/2019

## 2019-04-04 NOTE — PROGRESS NOTES
Inpatient Rehabilitation Plan of Care Note    Plan of Care  Care Plan Reviewed - No updates at this time.    Sphincter Control    Performed Intervention(s)  Incontinence care PRN    Signed by: Subhash Duran RN

## 2019-04-05 LAB
GLUCOSE BLDC GLUCOMTR-MCNC: 102 MG/DL (ref 70–130)
GLUCOSE BLDC GLUCOMTR-MCNC: 82 MG/DL (ref 70–130)
GLUCOSE BLDC GLUCOMTR-MCNC: 91 MG/DL (ref 70–130)
GLUCOSE BLDC GLUCOMTR-MCNC: 92 MG/DL (ref 70–130)

## 2019-04-05 PROCEDURE — 97110 THERAPEUTIC EXERCISES: CPT

## 2019-04-05 PROCEDURE — 82962 GLUCOSE BLOOD TEST: CPT

## 2019-04-05 PROCEDURE — 97535 SELF CARE MNGMENT TRAINING: CPT

## 2019-04-05 PROCEDURE — 97112 NEUROMUSCULAR REEDUCATION: CPT

## 2019-04-05 PROCEDURE — G0515 COGNITIVE SKILLS DEVELOPMENT: HCPCS

## 2019-04-05 PROCEDURE — 63710000001 INSULIN GLARGINE PER 5 UNITS: Performed by: INTERNAL MEDICINE

## 2019-04-05 RX ADMIN — INSULIN GLARGINE 32 UNITS: 100 INJECTION, SOLUTION SUBCUTANEOUS at 22:12

## 2019-04-05 RX ADMIN — LISINOPRIL 20 MG: 20 TABLET ORAL at 07:42

## 2019-04-05 RX ADMIN — Medication 1 TABLET: at 07:41

## 2019-04-05 RX ADMIN — ATENOLOL 25 MG: 25 TABLET ORAL at 07:42

## 2019-04-05 RX ADMIN — ATORVASTATIN CALCIUM 80 MG: 80 TABLET, FILM COATED ORAL at 20:00

## 2019-04-05 RX ADMIN — ALPRAZOLAM 1 MG: 0.5 TABLET ORAL at 23:40

## 2019-04-05 RX ADMIN — OXYCODONE AND ACETAMINOPHEN 1 TABLET: 5; 325 TABLET ORAL at 20:01

## 2019-04-05 RX ADMIN — PAROXETINE HYDROCHLORIDE 10 MG: 10 TABLET, FILM COATED ORAL at 07:41

## 2019-04-05 RX ADMIN — ATENOLOL 25 MG: 25 TABLET ORAL at 20:01

## 2019-04-05 RX ADMIN — METFORMIN HYDROCHLORIDE 500 MG: 500 TABLET, EXTENDED RELEASE ORAL at 07:43

## 2019-04-05 RX ADMIN — METFORMIN HYDROCHLORIDE 500 MG: 500 TABLET, EXTENDED RELEASE ORAL at 17:11

## 2019-04-05 RX ADMIN — CLOPIDOGREL 75 MG: 75 TABLET, FILM COATED ORAL at 07:42

## 2019-04-05 RX ADMIN — ASPIRIN 325 MG: 325 TABLET, COATED ORAL at 07:42

## 2019-04-05 RX ADMIN — LISINOPRIL 20 MG: 20 TABLET ORAL at 20:01

## 2019-04-05 RX ADMIN — AMLODIPINE BESYLATE 5 MG: 5 TABLET ORAL at 07:42

## 2019-04-05 NOTE — PLAN OF CARE
Problem: Diabetes, Type 2 (Adult)  Goal: Signs and Symptoms of Listed Potential Problems Will be Absent, Minimized or Managed (Diabetes, Type 2)   04/04/19 2162   Goal/Outcome Evaluation   Problems Assessed (Type 2 Diabetes) all   Problems Present (Type 2 Diabetes) none

## 2019-04-05 NOTE — PROGRESS NOTES
LOS: 12 days   Patient Care Team:  Qasim Asif MD as PCP - General  Qasim Asif MD as PCP - Family Medicine    Chief Complaint: same    Subjective     History of Present Illness    Subjective Pt is awake and alert. No new issues. Pt notes single episode of diarrhea after lunch the last 2 days.     History taken from: patient    Objective     Vital Signs  Temp:  [98.3 °F (36.8 °C)-98.4 °F (36.9 °C)] 98.4 °F (36.9 °C)  Heart Rate:  [82-89] 82  Resp:  [18] 18  BP: (126-173)/(66-81) 173/81    Objective exam unchanged calves soft and NT resp unlabored and regular    Results Review:     I reviewed the patient's new clinical results.    Medication Review:     Assessment/Plan       Acute ischemic left PCA stroke (CMS/HCC)    Status post placement of implantable loop recorder    HTN (hypertension)    Diabetes mellitus (CMS/HCC)    Hyperlipidemia    Morbid obesity (CMS/HCC)    Anxiety disorder    Abdominal wall abscess    Stroke (cerebrum) (CMS/HCC)    Vitamin D deficiency    History of fatty infiltration of liver      Assessment & Plan Continue to pre[pare for dc.ELOS 2-3 weeks.      Santo Noonan MD  04/05/19  8:36 AM    Time:

## 2019-04-05 NOTE — PLAN OF CARE
Problem: Stroke (IRF) (Adult)  Goal: Promote Optimal Functional [place]  Outcome: Ongoing (interventions implemented as appropriate)      Problem: Fall Risk (Adult)  Goal: Absence of Fall  Outcome: Ongoing (interventions implemented as appropriate)      Problem: Diabetes, Type 2 (Adult)  Goal: Signs and Symptoms of Listed Potential Problems Will be Absent, Minimized or Managed (Diabetes, Type 2)  Outcome: Ongoing (interventions implemented as appropriate)      Problem: Skin Injury Risk (Adult)  Goal: Skin Health and Integrity  Outcome: Ongoing (interventions implemented as appropriate)      Problem: Patient Care Overview  Goal: Plan of Care Review  Outcome: Ongoing (interventions implemented as appropriate)   04/05/19 0555   Patient Care Overview   IRF Plan of Care Review progress ongoing, continue   Progress, Functional Goals demonstrating adequate progress   Coping/Psychosocial   Plan of Care Reviewed With patient   OTHER   Outcome Summary Calm, cooperative, no unsafe behavior. Transfers min assist of 1. weakness on right side and right visual field cut. Skin is intact. Seen by diabetic nurse today. No unsafe behavior.

## 2019-04-05 NOTE — PROGRESS NOTES
Inpatient Rehabilitation Plan of Care Note    Plan of Care  Branch    Field    Signed by: Kathy Carrera RN

## 2019-04-05 NOTE — THERAPY TREATMENT NOTE
Inpatient Rehabilitation - Occupational Therapy Treatment Note    [facility]     Patient Name: Nayan Ryan  : 1965  MRN: [id number]    Today's Date: 2019                 Admit Date: 3/24/2019      Visit Dx:  No diagnosis found.    Patient Active Problem List   Diagnosis   • Acute ischemic left PCA stroke (CMS/HCC)   • Status post placement of implantable loop recorder   • HTN (hypertension)   • Diabetes mellitus (CMS/HCC)   • Hyperlipidemia   • Morbid obesity (CMS/HCC)   • Anxiety disorder   • Migraine   • H/O hernia repair   • Abdominal wall abscess   • Back pain   • Stroke (cerebrum) (CMS/HCC)   • Vitamin D deficiency   • History of fatty infiltration of liver         Therapy Treatment    IRF Treatment Summary     Row Name 19 1507 19 1211 19 1100       Evaluation/Treatment Time and Intent    Subjective Information  no complaints  -DN  no complaints  -DN  no complaints  -SA    Existing Precautions/Restrictions  fall  -DN  fall  -DN  fall  -SA    Document Type  therapy note (daily note)  -DN  therapy note (daily note)  -DN  therapy note (daily note)  -SA    Mode of Treatment  occupational therapy  -DN  occupational therapy  -DN  speech-language pathology  -SA    Patient/Family Observations  sitting in w/c  -DN  sitting in w/c  -DN  in bed initially; able to assist pt in getting to w/c without difficulty; to ST room  -SA    Start Time (Evaluation/Treatment)  --  --  1100  -SA    Stop Time (Evaluation/Treatment)  --  --  1130  -SA    Recorded by [DN] Reg Mckinney OT [DN] Reg Mckinney OT [SA] Narcisa Sanchez MS CCC-SLP    Row Name 19 0942 19 0800          Evaluation/Treatment Time and Intent    Subjective Information  no complaints  -LB,LS,LB2  no complaints  -SA     Existing Precautions/Restrictions  fall  -LB,LS,LB2  fall swallow  -SA     Document Type  therapy note (daily note)  -LB,LS,LB2  therapy note (daily note)  -SA     Mode of Treatment  physical therapy   -LB,LS,LB2  speech-language pathology  -     Patient/Family Observations  pt sitting in w/c arriving to gym from OT  -LB,LS,LB2  from DR to ST; good mood and effort  -SA     Start Time (Evaluation/Treatment)  --  0800  -SA     Stop Time (Evaluation/Treatment)  --  0830  -SA     Recorded by [LB,LS,LB2] Eliane Blackburn, PT (r) Naomy Mendenhall, PT Student (t) Eliane Blackburn, PT (c) [SA] Narcisa Sanchez MS CCC-SLP     Row Name 04/05/19 1211 04/05/19 0942          Cognition/Psychosocial- PT/OT    Affect/Mental Status (Cognitive)  --  flat/blunted affect  -LB,LS,LB2     Behavioral Issues (Cognitive)  --  withdrawn  -LB,LS,LB2     Orientation Status (Cognition)  --  oriented x 3  -LB,LS,LB2     Follows Commands (Cognition)  75-90% accuracy;verbal cues/prompting required;repetition of directions required;initiation impaired  -DN  follows one step commands;75-90% accuracy;repetition of directions required;verbal cues/prompting required  -LB,LS,LB2     Personal Safety Interventions  fall prevention program maintained;gait belt  -DN  fall prevention program maintained;gait belt;muscle strengthening facilitated;nonskid shoes/slippers when out of bed  -LB,LS,LB2     Safety Deficit (Cognitive)  --  ability to follow commands;impulsivity;problem solving  -LB,LS,LB2     Recorded by [DN] Reg Mckinney, OT [LB,LS,LB2] Eliane Blackburn, PT (r) Naomy Mendenhall, PT Student (t) Eliane Blackburn, PT (c)     Row Name 04/05/19 0942             Bed Mobility Assessment/Treatment    Sit-Supine [place] (Bed Mobility)  minimum assist (75% patient effort);moderate assist (50% patient effort) to lift R LE into bed  -LB,LS,LB2      Bed Mobility, Safety Issues  cognitive deficits limit understanding;decreased use of arms for pushing/pulling;decreased use of legs for bridging/pushing  -LB,LS,LB2      Assistive Device (Bed Mobility)  bed rails  -LB,LS,LB2      Recorded by [LB,LS,LB2] Eliane Blackburn, PT (r) Naomy Mendenhall, ZAIDA Student (t) Eliane Blackburn, PT  (c)      Row Name 04/05/19 0942             Transfer Assessment/Treatment    Comment (Transfers)  Pt needed assist with set up for STS and foot positioning  -LB,LS,LB2      Recorded by [LB,LS,LB2] Eliane Blackburn, PT (r) Naomy Mendenhall, PT Student (t) Eliane Blackburn, PT (c)      Row Name 04/05/19 0942             Chair-Bed Transfer    Chair-Bed [place] (Transfers)  moderate assist (50% patient effort)  -LB,LS,LB2      Assistive Device (Chair-Bed Transfers)  wheelchair  -LB,LS,LB2      Recorded by [LB,LS,LB2] Eliane Blackburn, PT (r) Naomy Mendenhall, PT Student (t) Eliane Blackburn, PT (c)      Row Name 04/05/19 0942             Sit-Stand Transfer    Sit-Stand [place] (Transfers)  minimum assist (75% patient effort);1 person to manage equipment  -LB,LS,LB2      Assistive Device (Sit-Stand Transfers)  wheelchair at jay bars  -LB,LS,LB2      Recorded by [LB,LS,LB2] Eliane Blackburn, PT (r) Naomy Mendenhall, PT Student (t) Eliane Blackburn, PT (c)      Row Name 04/05/19 0942             Stand-Sit Transfer    Stand-Sit [place] (Transfers)  minimum assist (75% patient effort);1 person to manage equipment  -LB,LS,LB2      Assistive Device (Stand-Sit Transfers)  wheelchair at jay bars  -LB,LS,LB2      Recorded by [LB,LS,LB2] Eliane Blackburn, PT (r) Naomy Mendenhall, PT Student (t) Eliane Blackburn, PT (c)      Row Name 04/05/19 1211             Shower Transfer    Type (Shower Transfer)  stand pivot/stand step  -DN      [place] Level (Shower Transfer)  minimum assist (75% patient effort);nonverbal cues (demo/gesture);verbal cues  -DN      Assistive Device (Shower Transfer)  wheelchair;shower chair;grab bars/tub rail  -DN      Recorded by [DN] Reg Mckinney OT      Row Name 04/05/19 0942             Gait/Stairs Assessment/Training    [place] Level (Gait)  minimum assist (75% patient effort);moderate assist (50% patient effort);1 person to manage equipment;verbal cues  -LB,LS,LB2      Assistive Device (Gait)  jay-bars   -LB,LS,LB2      Distance in Feet (Gait)  50 x 3  -LB,LS,LB2      Pattern (Gait)  step-through  -LB,LS,LB2      Deviations/Abnormal Patterns (Gait)  base of support, narrow;nancy decreased;gait speed decreased  -LB,LS,LB2      Bilateral Gait Deviations  forward flexed posture;heel strike decreased  -LB,LS,LB2      Left Sided Gait Deviations  weight shift ability decreased  -LB,LS,LB2      Right Sided Gait Deviations  foot drop/toe drag;knee hyperextension;weight shift ability decreased  -LB,LS,LB2      Comment (Gait/Stairs)  Ambulated with slider on R foot. Min A to advance R LE when fatigued, verbal cues to prevent crossing over during turns/to widen VITALY, tactile cues to prevent adduction during swing phase and for hip/trunk extension. More fatigued noted with decreased knee control in afternoon session  -LB,LS,LB2      Recorded by [LB,LS,LB2] Eliane Blackburn, PT (r) Naomy Mendenhall, PT Student (t) Eliane Blackburn, PT (c)      Row Name 04/05/19 0942             Wheelchair Mobility/Management    Method of Wheelchair Locomotion (Mobility)  jay-bipedal propulsion (left sided)  -LB,LS,LB2      Mobility Activities (Wheelchair)  forward propulsion;steering;turning;doorway navigation  -LB,LS,LB2      Forward Propulsion [place] (Wheelchair)  supervision  -LB,LS,LB2      Steering [place] (Wheelchair)  stand by assist  -LB,LS,LB2      Turning [place] (Wheelchair)  stand by assist  -LB,LS,LB2      Doorway Navigation [place] (Wheelchair)  stand by assist  -LB,LS,LB2      Distance Propelled in Feet (Wheelchair)  100  -LB,LS,LB2      Comment, Mobility Activities (Wheelchair)  Propelled through doorway/in [place] with VCs to attend to obstacles on R side and turn head to scan to the R. Performed weaving in between cones--only 1 occasion of hitting cone on R  -LB,LS,LB2      Recorded by [LB,LS,LB2] Eliane Blackburn, PT (r) Naomy Mendenhall, PT Student (t) Eliane Blackburn, PT (c)      Row Name 04/05/19 0981              Safety Issues, Functional Mobility    Safety Issues Affecting Function (Mobility)  sequencing abilities;insight into deficits/self awareness;problem solving  -LB,LS,LB2      Impairments Affecting Function (Mobility)  cognition;balance;motor planning;strength;visual/perceptual;postural/trunk control  -LB,LS,LB2      Recorded by [LB,LS,LB2] Eliane Blackburn, PT (r) Naomy Mendenhall PT Student (t) Eliane Blackburn, PT (c)      Row Name 04/05/19 1211             Bathing Assessment/Treatment    Bathing [place] Level  bathing skills;contact guard assist;verbal cues;nonverbal cues (demo/gesture)  -DN      Assistive Device (Bathing)  tub bench;hand held shower spray hose;grab bar/tub rail  -DN      Bathing Position  supported sitting;supported standing  -DN      Bathing Setup Assistance  obtain supplies  -DN      Recorded by [DN] Reg Mckinney, OT      Row Name 04/05/19 [facility]             Upper Body Dressing Assessment/Treatment    Upper Body Dressing Task  upper body dressing skills;doff;don;pull over garment;supervision;set up assistance  -DN      Upper Body Dressing Position  supported sitting  -DN      Set-up Assistance (Upper Body Dressing)  obtain clothing  -DN      Recorded by [DN] Reg Mckinney, OT      Row Name 04/05/19 1211             Lower Body Dressing Assessment/Treatment    Lower Body Dressing [place] Level  doff;don;shoes/slippers;socks;pants/bottoms;underwear;minimum assist (75% patient effort);nonverbal cues (demo/gesture);verbal cues;set up  -DN      Lower Body Dressing Position  supported sitting;supported standing  -DN      Lower Body Dressing Setup Assistance  obtain clothing  -DN      Recorded by [DN] Reg Mckinney OT      Row Name 04/05/19 1211             Grooming Assessment/Treatment    Grooming [place] Level  grooming skills;deodorant application;hair care, combing/brushing;oral care regimen;wash face, hands;supervision;verbal cues;nonverbal cues (demo/gesture)  -DN      Grooming  Position  sink side;supported sitting  -DN      Grooming Setup Assistance  open containers  -DN      Recorded by [DN] Reg Mckinney, OT      Row Name 04/05/19 1211             Fine Motor Testing & Training    Comment, Fine Motor Coordination  theraputty exercises and clothes pin activity completed by pt to increase strength,coordiation for increased independence with adls  -DN      Recorded by [DN] Reg Mckinney, OT      Row Name 04/05/19 0942             Vision Assessment/Intervention    Visual Field Deficit  homonymous hemianopsia, right  -LB,LS,LB2      Visual Processing Deficit  jay-inattention/neglect, right;visual attention, right  -LB,LS,LB2      Recorded by [LB,LS,LB2] Eliane Blackburn, PT (r) Naomy Mendenhall, PT Student (t) Eliane Blackburn, PT (c)      Row Name 04/05/19 0942             Pain Assessment    Additional Documentation  Pain Scale: Numbers Pre/Post-Treatment (Group)  -LB,LS,LB2      Recorded by [LB,LS,LB2] Eliane Blackburn, PT (r) Naomy Mendenhall, PT Student (t) Eliane Blackburn, PT (c)      Row Name 04/05/19 1507 04/05/19 1211 04/05/19 0942       Pain Scale: Numbers Pre/Post-Treatment    Pain Scale: Numbers, Pretreatment  0/10 - no pain  -DN  0/10 - no pain  -DN  0/10 - no pain  -LB,LS,LB2    Pain Scale: Numbers, Post-Treatment  0/10 - no pain  -DN  0/10 - no pain  -DN  0/10 - no pain  -LB,LS,LB2    Recorded by [DN] Reg Mckinney, OT [DN] Reg Mckinney, OT [LB,LS,LB2] Eliane Blackburn B, PT (r) Naomy Mendenhall, PT Student (t) Eliane Blackburn, PT (c)    Row Name 04/05/19 0800             Pain Scale: Numbers Pre/Post-Treatment    Pain Scale: Numbers, Pretreatment  0/10 - no pain  -SA      Pain Scale: Numbers, Post-Treatment  0/10 - no pain  -SA      Recorded by [SA] Narcisa Sanchez, MS CCC-SLP      Row Name 04/05/19 0942             Balance    Balance  sitting balance activity  -LB,LS,LB2      Recorded by [LB,LS,LB2] Eliane Blackburn, PT (r) Naomy Mendenhall, ZAIDA Student (t) Eliane Blackburn PT (c)      Row Name 04/05/19  0942             Sitting Balance Activity    Activities Performed (Sitting, Balance Training)  sit to stand activity with arm support  -LB,LS,LB2      Support Needed (Sitting, Balance Training)  minimal external support for balance, 75% patient effort;uses at least one upper extremity for support  -LB,LS,LB2      Comment (Sitting, Balance Training)  Performed 8 reps STS with verbal cues for anterior weight shift and to prevent leaning to L. Assist at pelvis to weight shift to R, gaurding L knee throughout. Pt pushing up with arms on w/c armrests  -LB,LS,LB2      Recorded by [LB,LS,LB2] Eliane Blackburn, PT (r) Naomy Mendenhall, PT Student (t) Eliane Blackburn, PT (c)      Row Name 04/05/19 0942             Standing Balance Activity    Activities Performed (Standing, Balance Training)  standing reaching outside base of support  -LB,LS,LB2      Support Needed for Balance (Standing, Balance Training)  uses at least one upper extremity for support;minimal external support for balance, 75% patient effort;moderate external support for balance, 50-74% patient effort;1 person assist  -LB,LS,LB2      Upper Extremity Activity with Device (Standing, Balance Training)  reaching cones  -LB,LS,LB2      Comment (Standing, Balance Training)  standing with L UE support on jay bars reaching for cones with emphasis of weight shifting onto R LE and reaching with RUE. Min A for R knee control and weight shift  -LB,LS,LB2      Recorded by [LB,LS,LB2] Eliane Blackburn, PT (r) Naomy Mendenhall, PT Student (t) Eliane Blackburn, PT (c)      Row Name 04/05/19 1507             Upper Extremity Seated Therapeutic Exercise    Performed, Seated Upper Extremity (Therapeutic Exercise)  shoulder flexion/extension;shoulder abduction/adduction;shoulder external/internal rotation;shoulder horizontal abduction/adduction  -DN      Exercise Type, Seated Upper Extremity (Therapeutic Exercise)  AAROM (active assistive range of motion)  -DN      Expected Outcomes, Seated  Upper Extremity (Therapeutic Exercise)  improve performance, reaching above shoulder level  -DN      Sets/Reps Detail, Seated Upper Extremity (Therapeutic Exercise)  dowel at 2# and 2# dumbell, handgripper at 3 sets of 10 reps  -DN      Recorded by [DN] Reg Mckinney OT      Row Name 04/05/19 1507             Upper Extremity Standing Therapeutic Exercise    Performed, Standing Upper Extremity (Therapeutic Exercise)  shoulder flexion/extension;shoulder abduction/adduction;shoulder external/internal rotation  -DN      Exercise Type, Standing Upper Extremity (Therapeutic Exercise)  AROM (active range of motion)  -DN      Expected Outcomes, Standing Upper Extremity (Therapeutic Exercise)  improve performance, transfer skills  -DN      Restrictions, Standing Upper Extremity (Therapeutic Exercise)  reaching for cones with R UE and LE weight shift to work on balance and visual attention  -DN      Recorded by [DN] Reg Mckinney OT      Row Name 04/05/19 0942             Lower Extremity Seated Therapeutic Exercise    Performed, Seated Lower Extremity (Therapeutic Exercise)  hip flexion/extension;hip abduction/adduction;knee flexion/extension;LAQ (long arc quad), knee extension  -LB,LS,LB2      Device, Seated Lower Extremity (Therapeutic Exercise)  elastic bands/tubing yellow theraband  -LB,LS,LB2      Exercise Type, Seated Lower Extremity (Therapeutic Exercise)  AROM (active range of motion);resistive exercise  -LB,LS,LB2      Sets/Reps Detail, Seated Lower Extremity (Therapeutic Exercise)  2 x 12  -LB,LS,LB2      Comment, Seated Lower Extremity (Therapeutic Exercise)  hamstring curls and hip abduction with yellow theraband, heel slides with pillowcase under heel, yellow ball hip adduction  -LB,LS,LB2      Recorded by [LB,LS,LB2] Eliane Blackburn, PT (r) Naomy Mendenhall PT Student (t) Eliane Blackburn, PT (c)      Row Name 04/05/19 1507 04/05/19 1211 04/05/19 0942       Positioning and Restraints    Pre-Treatment Position   sitting in chair/recliner  -DN  sitting in chair/recliner  -DN  sitting in chair/recliner  -LB,LS,LB2    Post Treatment Position  wheelchair  -DN  wheelchair  -DN  bed  -LB,LS,LB2    In Bed  with PT  -DN  sitting;call light within reach;encouraged to call for assist;exit alarm on  -DN  supine;call light within reach;encouraged to call for assist;exit alarm on;with family/caregiver  -LB,LS,LB2    Recorded by [DN] Reg Mckinney OT [DN] Reg Mckinney, OT [LB,LS,LB2] Eliane Blackburn, PT (r) Naomy Mendenhall, PT Student (t) Eliane Blackburn, PT (c)      User Key  (r) = Recorded By, (t) = Taken By, (c) = Cosigned By    Initials Name Effective Dates    Reg Bolden, OT 06/08/18 -     Eliane Pereira, PT 04/03/18 -     Narcisa Gaston MS CCC-SLP 04/03/18 -     Naomy Armas, PT Student 02/04/19 -           Wound 03/26/19 0900 Left chest incision (Active)   Dressing Appearance open to air 4/4/2019 10:00 PM   Closure Liquid skin adhesive;Approximated 4/5/2019  8:00 AM   Base clean;dry 4/5/2019  8:00 AM   Periwound dry;intact 4/4/2019 10:00 PM   Dressing Care, Wound open to air 4/4/2019 10:00 PM         OT Recommendation and Plan    Anticipated Equipment Needs At Discharge (OT Eval): commode, 3-in-1, shower chair, tub bench  Planned Therapy Interventions (OT Eval): BADL retraining, cognitive/visual perception retraining, functional balance retraining, neuromuscular control/coordination retraining, strengthening exercise, transfer/mobility retraining            OT IRF GOALS     Row Name 04/02/19 1500 03/25/19 6129          Bathing Goal 1 (OT-IRF)    Activity/Device (Bathing Goal 1, OT-IRF)  bathing skills, all  -DN  bathing skills, all;tub bench;hand-held shower spray hose;grab bar/tub rail  -DN     [place] Level (Bathing Goal 1, OT-IRF)  contact guard assist;verbal cues required  -DN  minimum assist (75% or more patient effort);verbal cues required;set-up required;tactile cues required  -DN     Time Frame (Bathing  Goal 1, OT-IRF)  short term goal (STG)  -DN  short term goal (STG)  -DN     Progress/Outcomes (Bathing Goal 1, OT-IRF)  goal met;goal revised this date  -DN  continuing progress toward goal  -DN        Bathing Goal 2 (OT-IRF)    Activity/Device (Bathing Goal 2, OT-IRF)  bathing skills, all  -DN  bathing skills, all;tub bench;hand-held shower spray hose;grab bar/tub rail  -DN     [place] Level (Bathing Goal 2, OT-IRF)  supervision required  -DN  supervision required;verbal cues required  -DN     Time Frame (Bathing Goal 2, OT-IRF)  long term goal (LTG)  -DN  long term goal (LTG)  -DN     Progress/Outcomes (Bathing Goal 2, OT-IRF)  goal ongoing  -DN  continuing progress toward goal  -DN        UB Dressing Goal 1 (OT-IRF)    Activity/Device (UB Dressing Goal 1, OT-IRF)  upper body dressing  -DN  upper body dressing  -DN     [place] (UB Dress Goal 1, OT-IRF)  supervision required  -DN  minimum assist (75% or more patient effort);verbal cues required  -DN     Time Frame (UB Dressing Goal 1, OT-IRF)  short term goal (STG)  -DN  short term goal (STG)  -DN     Progress/Outcomes (UB Dressing Goal 1, OT-IRF)  goal met;goal revised this date  -DN  continuing progress toward goal  -DN        UB Dressing Goal 2 (OT-IRF)    Activity/Device (UB Dressing Goal 2, OT-IRF)  upper body dressing  -DN  upper body dressing  -DN     [place] (UB Dress Goal 2, OT-IRF)  supervision required  -DN  supervision required;verbal cues required  -DN     Time Frame (UB Dressing Goal 2, OT-IRF)  long term goal (LTG)  -DN  long term goal (LTG)  -DN     Progress/Outcomes (UB Dressing Goal 2, OT-IRF)  goal ongoing;goal met  -DN  continuing progress toward goal  -DN        LB Dressing Goal 1 (OT-IRF)    Activity/Device (LB Dressing Goal 1, OT-IRF)  lower body dressing  -DN  lower body dressing  -DN     [place] (LB Dressing Goal 1, OT-IRF)  minimum assist (75% or more patient effort);verbal cues required;tactile cues required  -DN   moderate assist (50-74% patient effort);verbal cues required;set-up required;tactile cues required  -DN     Time Frame (LB Dressing Goal 1, OT-IRF)  short term goal (STG)  -DN  short term goal (STG)  -DN     Progress/Outcomes (LB Dressing Goal 1, OT-IRF)  goal met;goal revised this date  -DN  continuing progress toward goal  -DN        LB Dressing Goal 2 (OT-IRF)    Activity/Device (LB Dressing Goal 2, OT-IRF)  lower body dressing  -DN  lower body dressing  -DN     [place] (LB Dressing Goal 2, OT-IRF)  verbal cues required;tactile cues required;minimum assist (75% or more patient effort)  -DN  verbal cues required;tactile cues required;minimum assist (75% or more patient effort)  -DN     Time Frame (LB Dressing Goal 2, OT-IRF)  long term goal (LTG)  -DN  long term goal (LTG)  -DN     Progress/Outcomes (LB Dressing Goal 2, OT-IRF)  goal ongoing  -DN  continuing progress toward goal  -DN        Grooming Goal 1 (OT-IRF)    Activity/Device (Grooming Goal 1, OT-IRF)  grooming skills, all  -DN  grooming skills, all  -DN     [place] (Grooming Goal 1, OT-IRF)  supervision required  -DN  supervision required;verbal cues required;minimum assist (75% or more patient effort)  -DN     Time Frame (Grooming Goal 1, OT-IRF)  short term goal (STG)  -DN  short term goal (STG)  -DN     Progress/Outcomes (Grooming Goal 1, OT-IRF)  goal partially met;goal ongoing  -DN  continuing progress toward goal  -DN        Grooming Goal 2 (OT-IRF)    Activity/Device (Grooming Goal 2, OT-IRF)  grooming skills, all  -DN  grooming skills, all  -DN     [place] (Grooming Goal 2, OT-IRF)  supervision required  -DN  set-up required;supervision required;verbal cues required;tactile cues required  -DN     Time Frame (Grooming Goal 2, OT-IRF)  long term goal (LTG)  -DN  long term goal (LTG)  -DN     Progress/Outcomes (Grooming Goal 2, OT-IRF)  goal revised this date  -DN  continuing progress toward goal  -DN        Toileting Goal 1 (OT-IRF)     Activity/Device (Toileting Goal 1, OT-IRF)  toileting skills, all  -DN  toileting skills, all;adjust/manage clothing;perform perineal hygiene;grab bar/safety frame;raised toilet seat  -DN     [place] Level (Toileting Goal 1, OT-IRF)  minimum assist (75% or more patient effort);verbal cues required;tactile cues required  -DN  moderate assist (50-74% patient effort);verbal cues required;tactile cues required  -DN     Time Frame (Toileting Goal 1, OT-IRF)  short term goal (STG)  -DN  short term goal (STG)  -DN     Progress/Outcomes (Toileting Goal 1, OT-IRF)  goal met;goal revised this date  -DN  continuing progress toward goal  -DN        Toileting Goal 2 (OT-IRF)    Activity/Device (Toileting Goal 2, OT-IRF)  toileting skills, all  -DN  toileting skills, all;adjust/manage clothing;perform perineal hygiene  -DN     [place] Level (Toileting Goal 2, OT-IRF)  contact guard assist;verbal cues required;tactile cues required  -DN  contact guard assist;verbal cues required;tactile cues required  -DN     Time Frame (Toileting Goal 2, OT-IRF)  long term goal (LTG)  -DN  long term goal (LTG)  -DN     Progress/Outcomes (Toileting Goal 2, OT-IRF)  goal ongoing  -DN  continuing progress toward goal  -DN        Self-Feeding Goal 1 (OT-IRF)    Activity/Device (Self-Feeding Goal 1, OT-IRF)  self-feeding skills, all  -DN  self-feeding skills, all  -DN     [place] (Self-Feeding Goal 1, OT-IRF)  supervision required  -DN  supervision required;tactile cues required  -DN     Time Frame (Self-Feeding Goal 1, OT-IRF)  short term goal (STG)  -DN  short term goal (STG)  -DN     Progress/Outcomes 1 (Self-Feeding Goal, OT-IRF)  goal met;goal ongoing  -DN  continuing progress toward goal  -DN        Self-Feeding Goal 2 (OT-IRF)    Activity/Device (Self-Feeding Goal 2, OT-IRF)  self-feeding skills, all  -DN  self-feeding skills, all  -DN     [place] (Self-Feeding Goal 2, OT-IRF)  supervision required  -DN  supervision  required;set-up required  -DN     Time Frame (Self-Feeding Goal 2, OT-IRF)  long term goal (LTG)  -DN  long term goal (LTG)  -DN     Progress/Outcomes (Self-Feeding Goal 2, OT-IRF)  goal met;goal ongoing  -DN  continuing progress toward goal  -DN        Caregiver Training Goal 2 (OT-IRF)    Caregiver Training Goal 2 (OT-IRF)  pt caregiver to be independent with any assist pt needs with adls, transfers and HEP for d/c home  -DN  pt caregiver to be independent with any assist pt needs with adls, transfers and HEP for d/c home  -DN     Time Frame (Caregiver Training Goal 2, OT-IRF)  long term goal (LTG)  -DN  long term goal (LTG)  -DN     Progress/Outcomes (Caregiver Training Goal 2, OT-IRF)  goal ongoing  -DN  continuing progress toward goal  -DN       User Key  (r) = Recorded By, (t) = Taken By, (c) = Cosigned By    Initials Name Provider Type    Reg Bolden OT Occupational Therapist          Occupational Therapy Education     Title: PT OT SLP Therapies (In Progress)     Topic: Occupational Therapy (Done)     Point: ADL training (Done)     Description: Instruct learner(s) on proper safety adaptation and remediation techniques during self care or transfers.   Instruct in proper use of assistive devices.    Learning Progress Summary           Patient Acceptance, E,TB,D, VU,NR by DN at 4/4/2019  3:19 PM    Comment:  theraputty exercises, jay adls and transfer trainning    Acceptance, E,TB,D, NR by DN at 3/26/2019 12:06 PM    Comment:  jay adls and commode/shower transfers, still max vc for all due to cognition and visual impairments    Acceptance, E,TB,D, VU,NR by DN at 3/25/2019  5:23 PM    Comment:  OT role in rehab, transfer traning, jay adls                   Point: Home exercise program (Done)     Description: Instruct learner(s) on appropriate technique for monitoring, assisting and/or progressing therapeutic exercises/activities.    Learning Progress Summary           Patient Acceptance, E,TB,D, VU,NR  by DN at 4/4/2019  3:19 PM    Comment:  theraputty exercises, jay adls and transfer trainning    Acceptance, E,TB,D, NR by DN at 3/26/2019 12:06 PM    Comment:  jay adls and commode/shower transfers, still max vc for all due to cognition and visual impairments    Acceptance, E,TB,D, VU,NR by DN at 3/25/2019  5:23 PM    Comment:  OT role in rehab, transfer traning, jay adls                   Point: Precautions (Done)     Description: Instruct learner(s) on prescribed precautions during self-care and functional transfers.    Learning Progress Summary           Patient Acceptance, E,TB,D, VU,NR by DN at 4/4/2019  3:19 PM    Comment:  theraputty exercises, jay adls and transfer trainning    Acceptance, E,TB,D, NR by DN at 3/26/2019 12:06 PM    Comment:  jay adls and commode/shower transfers, still max vc for all due to cognition and visual impairments    Acceptance, E,TB,D, VU,NR by DN at 3/25/2019  5:23 PM    Comment:  OT role in rehab, transfer traning, jay adls                   Point: Body mechanics (Done)     Description: Instruct learner(s) on proper positioning and spine alignment during self-care, functional mobility activities and/or exercises.    Learning Progress Summary           Patient Acceptance, E,TB,D, VU,NR by DN at 4/4/2019  3:19 PM    Comment:  theraputty exercises, jay adls and transfer trainning    Acceptance, E,TB,D, NR by DN at 3/26/2019 12:06 PM    Comment:  jay adls and commode/shower transfers, still max vc for all due to cognition and visual impairments    Acceptance, E,TB,D, VU,NR by DN at 3/25/2019  5:23 PM    Comment:  OT role in rehab, transfer traning, jay adls                               User Key     Initials Effective Dates Name Provider Type Discipline    DN 06/08/18 -  Reg Mckinney OT Occupational Therapist OT                       Time Calculation:     Time Calculation- OT     Row Name 04/05/19 1512 04/05/19 1221          Time Calculation- OT    OT Start Time  1230   -DN  0830  -DN     OT Stop Time  1300  -DN  0930  -DN     OT Time Calculation (min)  30 min  -DN  60 min  -DN     OT Received On  04/05/19  -DN  04/05/19  -DN       User Key  (r) = Recorded By, (t) = Taken By, (c) = Cosigned By    Initials Name Provider Type    Reg Bolden OT Occupational Therapist          Therapy Charges for Today     Code Description Service Date Service Provider Modifiers Qty    78178919068 HC OT NEUROMUSC RE EDUCATION EA 15 MIN 4/4/2019 Reg Mckinney OT GO 1    96336257139 HC OT SELF CARE/MGMT/TRAIN EA 15 MIN 4/4/2019 Reg Mckinney OT GO 4    [id number] HC OT THER PROC EA 15 MIN 4/4/2019 Reg Mckinney OT GO 2    [id number] HC OT SELF CARE/MGMT/TRAIN EA 15 MIN 4/5/2019 Reg Mckinney OT GO 3    [id number] HC OT THER PROC EA 15 MIN 4/5/2019 Reg Mckinney OT GO 1    [id number] HC OT THER PROC EA 15 MIN 4/5/2019 Reg Mckinney OT GO 1    [id number] HC OT NEUROMUSC RE EDUCATION EA 15 MIN 4/5/2019 Reg Mckinney OT GO 1                   Reg Mckinney OT  4/5/2019

## 2019-04-05 NOTE — THERAPY TREATMENT NOTE
Inpatient Rehabilitation - Occupational Therapy Treatment Note    [facility]     Patient Name: Nayan Ryan  : 1965  MRN: [id number]    Today's Date: 2019                 Admit Date: 3/24/2019      Visit Dx:  No diagnosis found.    Patient Active Problem List   Diagnosis   • Acute ischemic left PCA stroke (CMS/HCC)   • Status post placement of implantable loop recorder   • HTN (hypertension)   • Diabetes mellitus (CMS/HCC)   • Hyperlipidemia   • Morbid obesity (CMS/HCC)   • Anxiety disorder   • Migraine   • H/O hernia repair   • Abdominal wall abscess   • Back pain   • Stroke (cerebrum) (CMS/HCC)   • Vitamin D deficiency   • History of fatty infiltration of liver         Therapy Treatment    IRF Treatment Summary     Row Name 19 1211 19 1100 19 0942       Evaluation/Treatment Time and Intent    Subjective Information  no complaints  -DN  no complaints  -SA  no complaints  (Pended)   -LS    Existing Precautions/Restrictions  fall  -DN  fall  -SA  fall  (Pended)   -LS    Document Type  therapy note (daily note)  -DN  therapy note (daily note)  -SA  therapy note (daily note)  (Pended)   -    Mode of Treatment  occupational therapy  -DN  speech-language pathology  -SA  physical therapy  (Pended)   -LS    Patient/Family Observations  sitting in w/c  -DN  in bed initially; able to assist pt in getting to w/c without difficulty; to ST room  -SA  pt sitting in w/c arriving to gym from OT  (Pended)   -    Start Time (Evaluation/Treatment)  --  1100  -SA  --    Stop Time (Evaluation/Treatment)  --  1130  -SA  --    Recorded by [DN] Reg Mckinney, OT [SA] Narcisa Sanchez MS CCC-SLP [LS] Naomy Mendenhall, PT Student    Row Name 19 0800             Evaluation/Treatment Time and Intent    Subjective Information  no complaints  -SA      Existing Precautions/Restrictions  fall swallow  -SA      Document Type  therapy note (daily note)  -SA      Mode of Treatment  speech-language pathology  -SA       Patient/Family Observations  from DR to ST; good mood and effort  -SA      Start Time (Evaluation/Treatment)  0800  -SA      Stop Time (Evaluation/Treatment)  0830  -SA      Recorded by [SA] Narcisa Sanchez MS CCC-SLP      Row Name 04/05/19 1211 04/05/19 0942          Cognition/Psychosocial- PT/OT    Affect/Mental Status (Cognitive)  --  flat/blunted affect  (Pended)   -LS     Behavioral Issues (Cognitive)  --  withdrawn  (Pended)   -LS     Orientation Status (Cognition)  --  oriented x 3  (Pended)   -LS     Follows Commands (Cognition)  75-90% accuracy;verbal cues/prompting required;repetition of directions required;initiation impaired  -DN  follows one step commands;75-90% accuracy;repetition of directions required;verbal cues/prompting required  (Pended)   -LS     Personal Safety Interventions  fall prevention program maintained;gait belt  -DN  fall prevention program maintained;gait belt;muscle strengthening facilitated;nonskid shoes/slippers when out of bed  (Pended)   -LS     Safety Deficit (Cognitive)  --  ability to follow commands;impulsivity;problem solving  (Pended)   -LS     Recorded by [DN] Reg Mckinney, OT [LS] Naomy Mendenhall, PT Student     Row Name 04/05/19 0942             Bed Mobility Assessment/Treatment    Sit-Supine [place] (Bed Mobility)  minimum assist (75% patient effort);moderate assist (50% patient effort)  (Pended)  to lift R LE into bed  -LS      Bed Mobility, Safety Issues  cognitive deficits limit understanding;decreased use of arms for pushing/pulling;decreased use of legs for bridging/pushing  (Pended)   -LS      Assistive Device (Bed Mobility)  bed rails  (Pended)   -LS      Recorded by [LS] Naomy Mendenhall, PT Student      Row Name 04/05/19 0942             Transfer Assessment/Treatment    Comment (Transfers)  Pt needed assist with set up for STS and foot positioning  (Pended)   -LS      Recorded by [LS] Naomy Mendenhall PT Student      Row Name 04/05/19 0942              Chair-Bed Transfer    Chair-Bed [place] (Transfers)  moderate assist (50% patient effort)  (Pended)   -LS      Assistive Device (Chair-Bed Transfers)  wheelchair  (Pended)   -LS      Recorded by [LS] Naomy Mendenhall, PT Student      Row Name 04/05/19 0942             Sit-Stand Transfer    Sit-Stand [place] (Transfers)  minimum assist (75% patient effort);1 person to manage equipment  (Pended)   -LS      Assistive Device (Sit-Stand Transfers)  wheelchair  (Pended)  at jay bars  -LS      Recorded by [LS] Naomy Mendenhall, PT Student      Row Name 04/05/19 0942             Stand-Sit Transfer    Stand-Sit [place] (Transfers)  minimum assist (75% patient effort);1 person to manage equipment  (Pended)   -LS      Assistive Device (Stand-Sit Transfers)  wheelchair  (Pended)  at jay bars  -LS      Recorded by [LS] Naomy Mendenhall, PT Student      Row Name 04/05/19 1211             Shower Transfer    Type (Shower Transfer)  stand pivot/stand step  -DN      [place] Level (Shower Transfer)  minimum assist (75% patient effort);nonverbal cues (demo/gesture);verbal cues  -DN      Assistive Device (Shower Transfer)  wheelchair;shower chair;grab bars/tub rail  -DN      Recorded by [DN] Reg Mckinney OT      Row Name 04/05/19 0942             Gait/Stairs Assessment/Training    [place] Level (Gait)  minimum assist (75% patient effort);moderate assist (50% patient effort);1 person to manage equipment;verbal cues  (Pended)   -LS      Assistive Device (Gait)  jay-bars  (Pended)   -LS      Distance in Feet (Gait)  50  (Pended)   -LS      Pattern (Gait)  step-through  (Pended)   -LS      Deviations/Abnormal Patterns (Gait)  base of support, narrow;nancy decreased;gait speed decreased  (Pended)   -LS      Bilateral Gait Deviations  forward flexed posture;heel strike decreased  (Pended)   -LS      Left Sided Gait Deviations  weight shift ability decreased  (Pended)   -LS      Right Sided Gait Deviations  foot  drop/toe drag;knee hyperextension;weight shift ability decreased  (Pended)   -LS      Comment (Gait/Stairs)  Ambulated with slider on R foot. Min A to advance R LE when fatigued, verbal cues to prevent crossing over during turns/to widen VITALY, tactile cues to prevent adduction during swing phase and for hip/trunk extension  (Pended)   -LS      Recorded by [LS] Naomy Mendenhall, PT Student      Row Name 04/05/19 0942             Safety Issues, Functional Mobility    Safety Issues Affecting Function (Mobility)  sequencing abilities;insight into deficits/self awareness;problem solving  (Pended)   -LS      Impairments Affecting Function (Mobility)  cognition;balance;motor planning;strength;visual/perceptual;postural/trunk control  (Pended)   -LS      Recorded by [LS] Naomy Mendenhall, PT Student      Row Name 04/05/19 1211             Bathing Assessment/Treatment    Bathing [place] Level  bathing skills;contact guard assist;verbal cues;nonverbal cues (demo/gesture)  -DN      Assistive Device (Bathing)  tub bench;hand held shower spray hose;grab bar/tub rail  -DN      Bathing Position  supported sitting;supported standing  -DN      Bathing Setup Assistance  obtain supplies  -DN      Recorded by [DN] Reg Mckinney, OT      Row Name 04/05/19 1211             Upper Body Dressing Assessment/Treatment    Upper Body Dressing Task  upper body dressing skills;doff;don;pull over garment;supervision;set up assistance  -DN      Upper Body Dressing Position  supported sitting  -DN      Set-up Assistance (Upper Body Dressing)  obtain clothing  -DN      Recorded by [DN] Reg Mckinney OT      Row Name 04/05/19 1211             Lower Body Dressing Assessment/Treatment    Lower Body Dressing [place] Level  doff;don;shoes/slippers;socks;pants/bottoms;underwear;minimum assist (75% patient effort);nonverbal cues (demo/gesture);verbal cues;set up  -DN      Lower Body Dressing Position  supported sitting;supported standing  -DN       Lower Body Dressing Setup Assistance  obtain clothing  -DN      Recorded by [DN] Reg Mckinney, OT      Row Name 04/05/19 1211             Grooming Assessment/Treatment    Grooming [place] Level  grooming skills;deodorant application;hair care, combing/brushing;oral care regimen;wash face, hands;supervision;verbal cues;nonverbal cues (demo/gesture)  -DN      Grooming Position  sink side;supported sitting  -DN      Grooming Setup Assistance  open containers  -DN      Recorded by [DN] Reg Mckinney, OT      Row Name 04/05/19 1211             Fine Motor Testing & Training    Comment, Fine Motor Coordination  theraputty exercises and clothes pin activity completed by pt to increase strength,coordiation for increased independence with adls  -DN      Recorded by [DN] Reg Mckinney, OT      Row Name 04/05/19 0942             Vision Assessment/Intervention    Visual Field Deficit  homonymous hemianopsia, right  (Pended)   -LS      Visual Processing Deficit  jay-inattention/neglect, right;visual attention, right  (Pended)   -LS      Recorded by [LS] Naomy Mendenhall, PT Student      Row Name 04/05/19 0942             Pain Assessment    Additional Documentation  Pain Scale: Numbers Pre/Post-Treatment (Group)  (Pended)   -LS      Recorded by [LS] Naomy Mendenhall, PT Student      Row Name 04/05/19 1211 04/05/19 0942 04/05/19 0800       Pain Scale: Numbers Pre/Post-Treatment    Pain Scale: Numbers, Pretreatment  0/10 - no pain  -DN  0/10 - no pain  (Pended)   -LS  0/10 - no pain  -SA    Pain Scale: Numbers, Post-Treatment  0/10 - no pain  -DN  0/10 - no pain  (Pended)   -LS  0/10 - no pain  -SA    Recorded by [DN] Reg Mckinney, OT [LS] Naomy Mendenhall, PT Student [SA] Narcisa Sanchez MS CCC-SLP    Row Name 04/05/19 0942             Lower Extremity Seated Therapeutic Exercise    Performed, Seated Lower Extremity (Therapeutic Exercise)  hip flexion/extension;hip abduction/adduction;knee flexion/extension;LAQ (long arc quad),  knee extension  (Pended)   -LS      Device, Seated Lower Extremity (Therapeutic Exercise)  elastic bands/tubing  (Pended)  yellow theraband  -LS      Exercise Type, Seated Lower Extremity (Therapeutic Exercise)  AROM (active range of motion);resistive exercise  (Pended)   -LS      Sets/Reps Detail, Seated Lower Extremity (Therapeutic Exercise)  2 x 12  (Pended)   -LS      Comment, Seated Lower Extremity (Therapeutic Exercise)  hamstring curls and hip abduction with yellow theraband, heel slides with pillowcase under heel, yellow ball hip adduction  (Pended)   -LS      Recorded by [LS] Naomy Mendenhall, PT Student      Row Name 04/05/19 1211 04/05/19 0942          Positioning and Restraints    Pre-Treatment Position  sitting in chair/recliner  -DN  sitting in chair/recliner  (Pended)   -LS     Post Treatment Position  wheelchair  -DN  bed  (Pended)   -LS     In Bed  sitting;call light within reach;encouraged to call for assist;exit alarm on  -DN  supine;call light within reach;encouraged to call for assist;exit alarm on  (Pended)   -LS     Recorded by [DN] Reg Mckinney, OT [LS] Naomy Mendenhall, PT Student       User Key  (r) = Recorded By, (t) = Taken By, (c) = Cosigned By    Initials Name Effective Dates    Reg Bolden, OT 06/08/18 -     Narcisa Gaston, MS CCC-SLP 04/03/18 -     Naomy Armas, PT Student 02/04/19 -           Wound 03/26/19 0900 Left chest incision (Active)   Dressing Appearance open to air 4/4/2019 10:00 PM   Closure Liquid skin adhesive;Approximated 4/5/2019  8:00 AM   Base clean;dry 4/5/2019  8:00 AM   Periwound dry;intact 4/4/2019 10:00 PM   Dressing Care, Wound open to air 4/4/2019 10:00 PM         OT Recommendation and Plan    Anticipated Equipment Needs At Discharge (OT Eval): commode, 3-in-1, shower chair, tub bench  Planned Therapy Interventions (OT Eval): BADL retraining, cognitive/visual perception retraining, functional balance retraining, neuromuscular control/coordination  retraining, strengthening exercise, transfer/mobility retraining            OT IRF GOALS     Row Name 04/02/19 1500 03/25/19 1649          Bathing Goal 1 (OT-IRF)    Activity/Device (Bathing Goal 1, OT-IRF)  bathing skills, all  -DN  bathing skills, all;tub bench;hand-held shower spray hose;grab bar/tub rail  -DN     [place] Level (Bathing Goal 1, OT-IRF)  contact guard assist;verbal cues required  -DN  minimum assist (75% or more patient effort);verbal cues required;set-up required;tactile cues required  -DN     Time Frame (Bathing Goal 1, OT-IRF)  short term goal (STG)  -DN  short term goal (STG)  -DN     Progress/Outcomes (Bathing Goal 1, OT-IRF)  goal met;goal revised this date  -DN  continuing progress toward goal  -DN        Bathing Goal 2 (OT-IRF)    Activity/Device (Bathing Goal 2, OT-IRF)  bathing skills, all  -DN  bathing skills, all;tub bench;hand-held shower spray hose;grab bar/tub rail  -DN     [place] Level (Bathing Goal 2, OT-IRF)  supervision required  -DN  supervision required;verbal cues required  -DN     Time Frame (Bathing Goal 2, OT-IRF)  long term goal (LTG)  -DN  long term goal (LTG)  -DN     Progress/Outcomes (Bathing Goal 2, OT-IRF)  goal ongoing  -DN  continuing progress toward goal  -DN        UB Dressing Goal 1 (OT-IRF)    Activity/Device (UB Dressing Goal 1, OT-IRF)  upper body dressing  -DN  upper body dressing  -DN     [place] (UB Dress Goal 1, OT-IRF)  supervision required  -DN  minimum assist (75% or more patient effort);verbal cues required  -DN     Time Frame (UB Dressing Goal 1, OT-IRF)  short term goal (STG)  -DN  short term goal (STG)  -DN     Progress/Outcomes (UB Dressing Goal 1, OT-IRF)  goal met;goal revised this date  -DN  continuing progress toward goal  -DN        UB Dressing Goal 2 (OT-IRF)    Activity/Device (UB Dressing Goal 2, OT-IRF)  upper body dressing  -DN  upper body dressing  -DN     [place] (UB Dress Goal 2, OT-IRF)  supervision required   -DN  supervision required;verbal cues required  -DN     Time Frame (UB Dressing Goal 2, OT-IRF)  long term goal (LTG)  -DN  long term goal (LTG)  -DN     Progress/Outcomes (UB Dressing Goal 2, OT-IRF)  goal ongoing;goal met  -DN  continuing progress toward goal  -DN        LB Dressing Goal 1 (OT-IRF)    Activity/Device (LB Dressing Goal 1, OT-IRF)  lower body dressing  -DN  lower body dressing  -DN     [place] (LB Dressing Goal 1, OT-IRF)  minimum assist (75% or more patient effort);verbal cues required;tactile cues required  -DN  moderate assist (50-74% patient effort);verbal cues required;set-up required;tactile cues required  -DN     Time Frame (LB Dressing Goal 1, OT-IRF)  short term goal (STG)  -DN  short term goal (STG)  -DN     Progress/Outcomes (LB Dressing Goal 1, OT-IRF)  goal met;goal revised this date  -DN  continuing progress toward goal  -DN        LB Dressing Goal 2 (OT-IRF)    Activity/Device (LB Dressing Goal 2, OT-IRF)  lower body dressing  -DN  lower body dressing  -DN     [place] (LB Dressing Goal 2, OT-IRF)  verbal cues required;tactile cues required;minimum assist (75% or more patient effort)  -DN  verbal cues required;tactile cues required;minimum assist (75% or more patient effort)  -DN     Time Frame (LB Dressing Goal 2, OT-IRF)  long term goal (LTG)  -DN  long term goal (LTG)  -DN     Progress/Outcomes (LB Dressing Goal 2, OT-IRF)  goal ongoing  -DN  continuing progress toward goal  -DN        Grooming Goal 1 (OT-IRF)    Activity/Device (Grooming Goal 1, OT-IRF)  grooming skills, all  -DN  grooming skills, all  -DN     [place] (Grooming Goal 1, OT-IRF)  supervision required  -DN  supervision required;verbal cues required;minimum assist (75% or more patient effort)  -DN     Time Frame (Grooming Goal 1, OT-IRF)  short term goal (STG)  -DN  short term goal (STG)  -DN     Progress/Outcomes (Grooming Goal 1, OT-IRF)  goal partially met;goal ongoing  -DN  continuing progress toward  goal  -DN        Grooming Goal 2 (OT-IRF)    Activity/Device (Grooming Goal 2, OT-IRF)  grooming skills, all  -DN  grooming skills, all  -DN     [place] (Grooming Goal 2, OT-IRF)  supervision required  -DN  set-up required;supervision required;verbal cues required;tactile cues required  -DN     Time Frame (Grooming Goal 2, OT-IRF)  long term goal (LTG)  -DN  long term goal (LTG)  -DN     Progress/Outcomes (Grooming Goal 2, OT-IRF)  goal revised this date  -DN  continuing progress toward goal  -DN        Toileting Goal 1 (OT-IRF)    Activity/Device (Toileting Goal 1, OT-IRF)  toileting skills, all  -DN  toileting skills, all;adjust/manage clothing;perform perineal hygiene;grab bar/safety frame;raised toilet seat  -DN     [place] Level (Toileting Goal 1, OT-IRF)  minimum assist (75% or more patient effort);verbal cues required;tactile cues required  -DN  moderate assist (50-74% patient effort);verbal cues required;tactile cues required  -DN     Time Frame (Toileting Goal 1, OT-IRF)  short term goal (STG)  -DN  short term goal (STG)  -DN     Progress/Outcomes (Toileting Goal 1, OT-IRF)  goal met;goal revised this date  -DN  continuing progress toward goal  -DN        Toileting Goal 2 (OT-IRF)    Activity/Device (Toileting Goal 2, OT-IRF)  toileting skills, all  -DN  toileting skills, all;adjust/manage clothing;perform perineal hygiene  -DN     [place] Level (Toileting Goal 2, OT-IRF)  contact guard assist;verbal cues required;tactile cues required  -DN  contact guard assist;verbal cues required;tactile cues required  -DN     Time Frame (Toileting Goal 2, OT-IRF)  long term goal (LTG)  -DN  long term goal (LTG)  -DN     Progress/Outcomes (Toileting Goal 2, OT-IRF)  goal ongoing  -DN  continuing progress toward goal  -DN        Self-Feeding Goal 1 (OT-IRF)    Activity/Device (Self-Feeding Goal 1, OT-IRF)  self-feeding skills, all  -DN  self-feeding skills, all  -DN     [place] (Self-Feeding Goal 1,  OT-IRF)  supervision required  -DN  supervision required;tactile cues required  -DN     Time Frame (Self-Feeding Goal 1, OT-IRF)  short term goal (STG)  -DN  short term goal (STG)  -DN     Progress/Outcomes 1 (Self-Feeding Goal, OT-IRF)  goal met;goal ongoing  -DN  continuing progress toward goal  -DN        Self-Feeding Goal 2 (OT-IRF)    Activity/Device (Self-Feeding Goal 2, OT-IRF)  self-feeding skills, all  -DN  self-feeding skills, all  -DN     [place] (Self-Feeding Goal 2, OT-IRF)  supervision required  -DN  supervision required;set-up required  -DN     Time Frame (Self-Feeding Goal 2, OT-IRF)  long term goal (LTG)  -DN  long term goal (LTG)  -DN     Progress/Outcomes (Self-Feeding Goal 2, OT-IRF)  goal met;goal ongoing  -DN  continuing progress toward goal  -DN        Caregiver Training Goal 2 (OT-IRF)    Caregiver Training Goal 2 (OT-IRF)  pt caregiver to be independent with any assist pt needs with adls, transfers and HEP for d/c home  -DN  pt caregiver to be independent with any assist pt needs with adls, transfers and HEP for d/c home  -DN     Time Frame (Caregiver Training Goal 2, OT-IRF)  long term goal (LTG)  -DN  long term goal (LTG)  -DN     Progress/Outcomes (Caregiver Training Goal 2, OT-IRF)  goal ongoing  -DN  continuing progress toward goal  -DN       User Key  (r) = Recorded By, (t) = Taken By, (c) = Cosigned By    Initials Name Provider Type    Reg Bolden OT Occupational Therapist          Occupational Therapy Education     Title: PT OT SLP Therapies (In Progress)     Topic: Occupational Therapy (Done)     Point: ADL training (Done)     Description: Instruct learner(s) on proper safety adaptation and remediation techniques during self care or transfers.   Instruct in proper use of assistive devices.    Learning Progress Summary           Patient Acceptance, E,TB,D, VU,NR by DN at 4/4/2019  3:19 PM    Comment:  theraputty exercises, jay adls and transfer trainning    Acceptance,  E,TB,D, NR by DN at 3/26/2019 12:06 PM    Comment:  jay adls and commode/shower transfers, still max vc for all due to cognition and visual impairments    Acceptance, E,TB,D, VU,NR by DN at 3/25/2019  5:23 PM    Comment:  OT role in rehab, transfer traning, jay adls                   Point: Home exercise program (Done)     Description: Instruct learner(s) on appropriate technique for monitoring, assisting and/or progressing therapeutic exercises/activities.    Learning Progress Summary           Patient Acceptance, E,TB,D, VU,NR by DN at 4/4/2019  3:19 PM    Comment:  theraputty exercises, jay adls and transfer trainning    Acceptance, E,TB,D, NR by DN at 3/26/2019 12:06 PM    Comment:  jay adls and commode/shower transfers, still max vc for all due to cognition and visual impairments    Acceptance, E,TB,D, VU,NR by DN at 3/25/2019  5:23 PM    Comment:  OT role in rehab, transfer traning, jay adls                   Point: Precautions (Done)     Description: Instruct learner(s) on prescribed precautions during self-care and functional transfers.    Learning Progress Summary           Patient Acceptance, E,TB,D, VU,NR by DN at 4/4/2019  3:19 PM    Comment:  theraputty exercises, jay adls and transfer trainning    Acceptance, E,TB,D, NR by DN at 3/26/2019 12:06 PM    Comment:  jay adls and commode/shower transfers, still max vc for all due to cognition and visual impairments    Acceptance, E,TB,D, VU,NR by DN at 3/25/2019  5:23 PM    Comment:  OT role in rehab, transfer traning, jay adls                   Point: Body mechanics (Done)     Description: Instruct learner(s) on proper positioning and spine alignment during self-care, functional mobility activities and/or exercises.    Learning Progress Summary           Patient Acceptance, E,TB,D, VU,NR by DN at 4/4/2019  3:19 PM    Comment:  theraputty exercises, jay adls and transfer trainning    Acceptance, E,TB,D, NR by DN at 3/26/2019 12:06 PM    Comment:   jay adls and commode/shower transfers, still max vc for all due to cognition and visual impairments    Acceptance, E,TB,D, VU,NR by DN at 3/25/2019  5:23 PM    Comment:  OT role in rehab, transfer traning, jay adls                               User Key     Initials Effective Dates Name Provider Type Discipline    BRENDAN 06/08/18 -  Reg Mckinney OT Occupational Therapist OT                       Time Calculation:     Time Calculation- OT     Row Name 04/05/19 1221             Time Calculation- OT    OT Start Time  0830  -DN      OT Stop Time  0930  -DN      OT Time Calculation (min)  60 min  -DN      OT Received On  04/05/19  -DN        User Key  (r) = Recorded By, (t) = Taken By, (c) = Cosigned By    Initials Name Provider Type    Reg Bolden OT Occupational Therapist          Therapy Charges for Today     Code Description Service Date Service Provider Modifiers Qty    94332917396 HC OT NEUROMUSC RE EDUCATION EA 15 MIN 4/4/2019 Reg Mckinney OT GO 1    26462935162 HC OT SELF CARE/MGMT/TRAIN EA 15 MIN 4/4/2019 Reg Mckinney OT GO 4    24986045014 HC OT THER PROC EA 15 MIN 4/4/2019 Reg Mckinney OT GO 2    68986048562 HC OT SELF CARE/MGMT/TRAIN EA 15 MIN 4/5/2019 Reg Mckinney OT GO 3    94741983758 HC OT THER PROC EA 15 MIN 4/5/2019 Reg Mckinney OT GO 1                   Reg Mckinney OT  4/5/2019

## 2019-04-05 NOTE — PLAN OF CARE
Problem: Skin Injury Risk (Adult)  Goal: Skin Health and Integrity   04/04/19 3293   Skin Injury Risk (Adult)   Skin Health and Integrity making progress toward outcome

## 2019-04-05 NOTE — CONSULTS
"Diabetes Education  Assessment/Teaching    Patient Name:  Nayan Ryan  YOB: 1965  MRN: [id number]  Admit Date:  3/24/2019      Assessment Date:  4/5/2019    Most Recent Value   General Information    Referral From:  Database   Height  182.9 cm (72\")   Height Method  Stated   Weight  140 kg (308 lb 6.4 oz)  (Abnormal)    Weight Method  Bed scale   Pregnancy Assessment   Diabetes History   What type of diabetes do you have?  Type 2   Length of Diabetes Diagnosis  6 - 10 years   Current DM knowledge  poor   Have you had diabetes education/teaching in the past?  no   Do you test your blood sugar at home?  yes   Have you had low blood sugar? (<70mg/dl)  no   Have you had high blood sugar? (>140mg/dl)  yes   How often do you have high blood sugar?  occasionally   How would you rate your diabetes control?  good   What makes it difficult for you to take care of your diabetes or yourself?  -- [pt has had recent stroke]   Education Preferences   What areas of diabetes would you like to learn about?  avoiding high blood sugar, testing my blood sugar at home, medications for diabetes   Nutrition Information   Assessment Topics   Healthy Eating - Assessment  Needs education   Being Active - Assessment  Needs education   Taking Medication - Assessment  Needs education   Problem Solving - Assessment  Needs education   Reducing Risk - Assessment  Needs education   Healthy Coping - Assessment  Needs education   Monitoring - Assessment  Needs education   DM Goals            Most Recent Value   DM Education Needs   Meter  Has own   Meter Type  Other (comment)   Frequency of Testing  Daily   Medication  Oral, Insulin, Other injectables [tresiba 36u daily,farxiga,glucophage,trulicity]   Healthy Coping  Appropriate   Discharge Plan  Home   Motivation  Moderate   Teaching Method  Discussion, Explanation   Patient Response  Verbalized understanding            Other Comments:  Pt very tired form day of therapy. We discussed " his diabetes management at home,he tests BG but not that often. He is fairly consistent with taking his meds but admit he misses some doses. He has been on insuiln in past so will not need to be taught  how to do that.  We will return closer to discharge to review if pt will have any other needs prior to discharge.        Electronically signed by:  Rose Corrigan RN  04/05/19 2:32 PM

## 2019-04-05 NOTE — PLAN OF CARE
Problem: Stroke (IRF) (Adult)  Goal: Promote Optimal Functional [place]   04/04/19 7922   Stroke (IRF) (Adult)   Promote Optimal Functional [place] demonstrating adequate progress

## 2019-04-05 NOTE — THERAPY TREATMENT NOTE
Inpatient Rehabilitation - Physical Therapy Treatment Note  Psychiatric     Patient Name: Nayan Ryan  : 1965  MRN: [id number]    Today's Date: 2019                 Admit Date: 3/24/2019      Visit Dx:    No diagnosis found.    Patient Active Problem List   Diagnosis   • Acute ischemic left PCA stroke (CMS/HCC)   • Status post placement of implantable loop recorder   • HTN (hypertension)   • Diabetes mellitus (CMS/HCC)   • Hyperlipidemia   • Morbid obesity (CMS/HCC)   • Anxiety disorder   • Migraine   • H/O hernia repair   • Abdominal wall abscess   • Back pain   • Stroke (cerebrum) (CMS/HCC)   • Vitamin D deficiency   • History of fatty infiltration of liver       Therapy Treatment    IRF Treatment Summary     Row Name 19 1211 19 1100 19 0942       Evaluation/Treatment Time and Intent    Subjective Information  no complaints  -DN  no complaints  -SA  no complaints  -LB,LS,LB2    Existing Precautions/Restrictions  fall  -DN  fall  -SA  fall  -LB,LS,LB2    Document Type  therapy note (daily note)  -DN  therapy note (daily note)  -SA  therapy note (daily note)  -LB,LS,LB2    Mode of Treatment  occupational therapy  -DN  speech-language pathology  -SA  physical therapy  -LB,LS,LB2    Patient/Family Observations  sitting in w/c  -DN  in bed initially; able to assist pt in getting to w/c without difficulty; to ST room  -SA  pt sitting in w/c arriving to gym from OT  -LB,LS,LB2    Start Time (Evaluation/Treatment)  --  1100  -SA  --    Stop Time (Evaluation/Treatment)  --  1130  -SA  --    Recorded by [DN] Reg Mckinney, OT [SA] Narcisa Sanchez MS CCC-SLP [LB,LS,LB2] Eliane Blackburn, PT (r) Naomy Mendenhall, PT Student (t) Eliane Blackburn, PT (c)    Row Name 19 0800             Evaluation/Treatment Time and Intent    Subjective Information  no complaints  -SA      Existing Precautions/Restrictions  fall swallow  -SA      Document Type  therapy note (daily note)  -SA      Mode of Treatment   speech-language pathology  -      Patient/Family Observations  from  to ST; good mood and effort  -      Start Time (Evaluation/Treatment)  0800  -SA      Stop Time (Evaluation/Treatment)  0830  -SA      Recorded by [SA] Narcisa Sanchez MS CCC-SLP      Row Name 04/05/19 1211 04/05/19 0942          Cognition/Psychosocial- PT/OT    Affect/Mental Status (Cognitive)  --  flat/blunted affect  -LB,LS,LB2     Behavioral Issues (Cognitive)  --  withdrawn  -LB,LS,LB2     Orientation Status (Cognition)  --  oriented x 3  -LB,LS,LB2     Follows Commands (Cognition)  75-90% accuracy;verbal cues/prompting required;repetition of directions required;initiation impaired  -DN  follows one step commands;75-90% accuracy;repetition of directions required;verbal cues/prompting required  -LB,LS,LB2     Personal Safety Interventions  fall prevention program maintained;gait belt  -DN  fall prevention program maintained;gait belt;muscle strengthening facilitated;nonskid shoes/slippers when out of bed  -LB,LS,LB2     Safety Deficit (Cognitive)  --  ability to follow commands;impulsivity;problem solving  -LB,LS,LB2     Recorded by [DN] Reg Mckinney, OT [LB,LS,LB2] Eliane Blackburn, PT (r) Naomy Mendenhall, PT Student (t) Eliane Blackburn, PT (c)     Row Name 04/05/19 0942             Bed Mobility Assessment/Treatment    Sit-Supine [place] (Bed Mobility)  minimum assist (75% patient effort);moderate assist (50% patient effort) to lift R LE into bed  -LB,LS,LB2      Bed Mobility, Safety Issues  cognitive deficits limit understanding;decreased use of arms for pushing/pulling;decreased use of legs for bridging/pushing  -LB,LS,LB2      Assistive Device (Bed Mobility)  bed rails  -LB,LS,LB2      Recorded by [LB,LS,LB2] Eliane Blackburn, PT (r) Naomy Mendenhall, PT Student (t) Eliane Blackburn, PT (c)      Row Name 04/05/19 0942             Transfer Assessment/Treatment    Comment (Transfers)  Pt needed assist with set up for STS and foot positioning   -LB,LS,LB2      Recorded by [LB,LS,LB2] Eliane Blackburn, PT (r) Naomy Mendenhall, PT Student (t) Eliane Blackburn, PT (c)      Row Name 04/05/19 0942             Chair-Bed Transfer    Chair-Bed [place] (Transfers)  moderate assist (50% patient effort)  -LB,LS,LB2      Assistive Device (Chair-Bed Transfers)  wheelchair  -LB,LS,LB2      Recorded by [LB,LS,LB2] Eliane Blackburn, PT (r) Naomy Mendenhall, PT Student (t) Eliane Blackburn, PT (c)      Row Name 04/05/19 0942             Sit-Stand Transfer    Sit-Stand [place] (Transfers)  minimum assist (75% patient effort);1 person to manage equipment  -LB,LS,LB2      Assistive Device (Sit-Stand Transfers)  wheelchair at jay bars  -LB,LS,LB2      Recorded by [LB,LS,LB2] Eliane Blackburn, PT (r) Naomy Mendenhall, PT Student (t) Eliane Blackburn, PT (c)      Row Name 04/05/19 0942             Stand-Sit Transfer    Stand-Sit [place] (Transfers)  minimum assist (75% patient effort);1 person to manage equipment  -LB,LS,LB2      Assistive Device (Stand-Sit Transfers)  wheelchair at jay bars  -LB,LS,LB2      Recorded by [LB,LS,LB2] Eliane Blackburn, PT (r) Naomy Mendenhall, PT Student (t) Eliane Blackburn, PT (c)      Row Name 04/05/19 1211             Shower Transfer    Type (Shower Transfer)  stand pivot/stand step  -DN      [place] Level (Shower Transfer)  minimum assist (75% patient effort);nonverbal cues (demo/gesture);verbal cues  -DN      Assistive Device (Shower Transfer)  wheelchair;shower chair;grab bars/tub rail  -DN      Recorded by [DN] Reg Mckinney OT      Row Name 04/05/19 0942             Gait/Stairs Assessment/Training    [place] Level (Gait)  minimum assist (75% patient effort);moderate assist (50% patient effort);1 person to manage equipment;verbal cues  -LB,LS,LB2      Assistive Device (Gait)  jay-bars  -LB,LS,LB2      Distance in Feet (Gait)  50 x 3  -LB,LS,LB2      Pattern (Gait)  step-through  -LB,LS,LB2      Deviations/Abnormal Patterns (Gait)  base of  support, narrow;nancy decreased;gait speed decreased  -LB,LS,LB2      Bilateral Gait Deviations  forward flexed posture;heel strike decreased  -LB,LS,LB2      Left Sided Gait Deviations  weight shift ability decreased  -LB,LS,LB2      Right Sided Gait Deviations  foot drop/toe drag;knee hyperextension;weight shift ability decreased  -LB,LS,LB2      Comment (Gait/Stairs)  Ambulated with slider on R foot. Min A to advance R LE when fatigued, verbal cues to prevent crossing over during turns/to widen VITALY, tactile cues to prevent adduction during swing phase and for hip/trunk extension. More fatigued noted with decreased knee control in afternoon session  -LB,LS,LB2      Recorded by [LB,LS,LB2] Eliane Blackburn, PT (r) Naomy Mendenhall, PT Student (t) Eliane Blackburn, PT (c)      Row Name 04/05/19 0942             Wheelchair Mobility/Management    Method of Wheelchair Locomotion (Mobility)  jay-bipedal propulsion (left sided)  -LB,LS,LB2      Mobility Activities (Wheelchair)  forward propulsion;steering;turning;doorway navigation  -LB,LS,LB2      Forward Propulsion [place] (Wheelchair)  supervision  -LB,LS,LB2      Steering [place] (Wheelchair)  stand by assist  -LB,LS,LB2      Turning [place] (Wheelchair)  stand by assist  -LB,LS,LB2      Doorway Navigation [place] (Wheelchair)  stand by assist  -LB,LS,LB2      Distance Propelled in Feet (Wheelchair)  100  -LB,LS,LB2      Comment, Mobility Activities (Wheelchair)  Propelled through doorway/in [place] with VCs to attend to obstacles on R side and turn head to scan to the R. Performed weaving in between cones--only 1 occasion of hitting cone on R  -LB,LS,LB2      Recorded by [LB,LS,LB2] Eliane Blackburn, PT (r) Naomy Mendenhall, PT Student (t) Eliane Blackburn, PT (c)      Row Name 04/05/19 0909             Safety Issues, Functional Mobility    Safety Issues Affecting Function (Mobility)  sequencing abilities;insight into deficits/self awareness;problem solving   -LB,LS,LB2      Impairments Affecting Function (Mobility)  cognition;balance;motor planning;strength;visual/perceptual;postural/trunk control  -LB,LS,LB2      Recorded by [LB,LS,LB2] Eliane Blackburn, PT (r) Naomy Mendenhall PT Student (t) Eliane Blackburn, PT (c)      Row Name 04/05/19 [facility]             Bathing Assessment/Treatment    Bathing [place] Level  bathing skills;contact guard assist;verbal cues;nonverbal cues (demo/gesture)  -DN      Assistive Device (Bathing)  tub bench;hand held shower spray hose;grab bar/tub rail  -DN      Bathing Position  supported sitting;supported standing  -DN      Bathing Setup Assistance  obtain supplies  -DN      Recorded by [BRENDAN] Reg Mckinney OT      Row Name 04/05/19 [facility]             Upper Body Dressing Assessment/Treatment    Upper Body Dressing Task  upper body dressing skills;doff;don;pull over garment;supervision;set up assistance  -DN      Upper Body Dressing Position  supported sitting  -DN      Set-up Assistance (Upper Body Dressing)  obtain clothing  -DN      Recorded by [BRENDAN] Reg Mckinney OT      Row Name 04/05/19 1211             Lower Body Dressing Assessment/Treatment    Lower Body Dressing [place] Level  doff;don;shoes/slippers;socks;pants/bottoms;underwear;minimum assist (75% patient effort);nonverbal cues (demo/gesture);verbal cues;set up  -DN      Lower Body Dressing Position  supported sitting;supported standing  -DN      Lower Body Dressing Setup Assistance  obtain clothing  -DN      Recorded by [BRENDAN] Reg Mckinney OT      Row Name 04/05/19 1211             Grooming Assessment/Treatment    Grooming [place] Level  grooming skills;deodorant application;hair care, combing/brushing;oral care regimen;wash face, hands;supervision;verbal cues;nonverbal cues (demo/gesture)  -DN      Grooming Position  sink side;supported sitting  -DN      Grooming Setup Assistance  open containers  -DN      Recorded by [BRENDAN] Reg Mckinney OT      Row Name 04/05/19 1211              Fine Motor Testing & Training    Comment, Fine Motor Coordination  theraputty exercises and clothes pin activity completed by pt to increase strength,coordiation for increased independence with adls  -DN      Recorded by [DN] Reg Mckinney, OT      Row Name 04/05/19 0942             Vision Assessment/Intervention    Visual Field Deficit  homonymous hemianopsia, right  -LB,LS,LB2      Visual Processing Deficit  jay-inattention/neglect, right;visual attention, right  -LB,LS,LB2      Recorded by [LB,LS,LB2] Eliane Blackburn, PT (r) Naomy Mendenhall, PT Student (t) Eliane Blackburn, PT (c)      Row Name 04/05/19 0942             Pain Assessment    Additional Documentation  Pain Scale: Numbers Pre/Post-Treatment (Group)  -LB,LS,LB2      Recorded by [LB,LS,LB2] Eliane Blackburn, PT (r) Naomy Mendenhall, PT Student (t) Eliane Blackburn, PT (c)      Row Name 04/05/19 1211 04/05/19 0942 04/05/19 0800       Pain Scale: Numbers Pre/Post-Treatment    Pain Scale: Numbers, Pretreatment  0/10 - no pain  -DN  0/10 - no pain  -LB,LS,LB2  0/10 - no pain  -SA    Pain Scale: Numbers, Post-Treatment  0/10 - no pain  -DN  0/10 - no pain  -LB,LS,LB2  0/10 - no pain  -SA    Recorded by [DN] Reg Mckinney, OT [LB,LS,LB2] Eliane Blackburn, PT (r) Naomy Mendenhall, PT Student (t) Eliane Blackburn, PT (c) [SA] Narcisa Sanchez, MS CCC-SLP    Row Name 04/05/19 0942             Balance    Balance  sitting balance activity  -LB,LS,LB2      Recorded by [LB,LS,LB2] Eliane Blackburn, PT (r) Naomy Mendenhall, PT Student (t) Eliane Blackburn, PT (c)      Row Name 04/05/19 0942             Sitting Balance Activity    Activities Performed (Sitting, Balance Training)  sit to stand activity with arm support  -LB,LS,LB2      Support Needed (Sitting, Balance Training)  minimal external support for balance, 75% patient effort;uses at least one upper extremity for support  -LB,LS,LB2      Comment (Sitting, Balance Training)  Performed 8 reps STS with verbal cues for anterior  weight shift and to prevent leaning to L. Assist at pelvis to weight shift to R, gaurding L knee throughout. Pt pushing up with arms on w/c armrests  -LB,LS,LB2      Recorded by [LB,LS,LB2] Eliane Blackburn, PT (r) Naomy Mendenhall, PT Student (t) Eliane Blackburn, PT (c)      Row Name 04/05/19 0942             Standing Balance Activity    Activities Performed (Standing, Balance Training)  standing reaching outside base of support  -LB,LS,LB2      Support Needed for Balance (Standing, Balance Training)  uses at least one upper extremity for support;minimal external support for balance, 75% patient effort;moderate external support for balance, 50-74% patient effort;1 person assist  -LB,LS,LB2      Upper Extremity Activity with Device (Standing, Balance Training)  reaching cones  -LB,LS,LB2      Comment (Standing, Balance Training)  standing with L UE support on jay bars reaching for cones with emphasis of weight shifting onto R LE and reaching with RUE. Min A for R knee control and weight shift  -LB,LS,LB2      Recorded by [LB,LS,LB2] Eliane Blackburn, PT (r) Naomy Mendenhall, PT Student (t) Eliane Blackburn, PT (c)      Row Name 04/05/19 0942             Lower Extremity Seated Therapeutic Exercise    Performed, Seated Lower Extremity (Therapeutic Exercise)  hip flexion/extension;hip abduction/adduction;knee flexion/extension;LAQ (long arc quad), knee extension  -LB,LS,LB2      Device, Seated Lower Extremity (Therapeutic Exercise)  elastic bands/tubing yellow theraband  -LB,LS,LB2      Exercise Type, Seated Lower Extremity (Therapeutic Exercise)  AROM (active range of motion);resistive exercise  -LB,LS,LB2      Sets/Reps Detail, Seated Lower Extremity (Therapeutic Exercise)  2 x 12  -LB,LS,LB2      Comment, Seated Lower Extremity (Therapeutic Exercise)  hamstring curls and hip abduction with yellow theraband, heel slides with pillowcase under heel, yellow ball hip adduction  -LB,LS,LB2      Recorded by [LB,LS,LB2] Eliane Blackburn, PT  (r) Naomy Mendenhall, PT Student (t) Eliane Blackburn, PT (c)      Row Name 04/05/19 1211 04/05/19 0942          Positioning and Restraints    Pre-Treatment Position  sitting in chair/recliner  -DN  sitting in chair/recliner  -LB,LS,LB2     Post Treatment Position  wheelchair  -DN  bed  -LB,LS,LB2     In Bed  sitting;call light within reach;encouraged to call for assist;exit alarm on  -DN  supine;call light within reach;encouraged to call for assist;exit alarm on;with family/caregiver  -LB,LS,LB2     Recorded by [DN] Reg Mckinney, OT [LB,LS,LB2] Eliane Blackburn, PT (r) Naomy Mendenhall, PT Student (t) Eliane Blackburn, PT (c)       User Key  (r) = Recorded By, (t) = Taken By, (c) = Cosigned By    Initials Name Effective Dates    Reg Bolden, OT 06/08/18 -     Eliane Pereira PT 04/03/18 -     Narcisa Gaston MS CCC-SLP 04/03/18 -     Naomy Armas, PT Student 02/04/19 -         Wound 03/26/19 0900 Left chest incision (Active)   Dressing Appearance open to air 4/4/2019 10:00 PM   Closure Liquid skin adhesive;Approximated 4/5/2019  8:00 AM   Base clean;dry 4/5/2019  8:00 AM   Periwound dry;intact 4/4/2019 10:00 PM   Dressing Care, Wound open to air 4/4/2019 10:00 PM     Physical Therapy Education     Title: PT OT SLP Therapies (In Progress)     Topic: Physical Therapy (In Progress)     Point: Mobility training (Done)     Learning Progress Summary           Patient Acceptance, E,TB, VU,NR by  at 4/5/2019 11:30 AM    Comment:  Discussed purpose of strengthening exercises. Educated on repeated practice of walking and other exercises to gradually build strength and endurance.    Acceptance, E, NR by  at 4/4/2019  9:52 AM    Acceptance, E, NR by  at 4/3/2019 10:16 AM    Acceptance, E, NR by  at 4/2/2019  3:18 PM    Acceptance, E, NR by  at 4/1/2019 10:32 AM    Acceptance, E,TB,D, NR by ELISSA at 3/30/2019 11:44 AM    Acceptance, E,TB,D, NR by EE at 3/29/2019  2:58 PM    Acceptance, E,TB,D, NR by EE at 3/28/2019  11:38 AM    Acceptance, E,TB,D, NR by EE at 3/27/2019 10:05 AM    Acceptance, E,TB,D, NR by EE at 3/26/2019  9:48 AM    Acceptance, E,TB,D, NR by EE at 3/25/2019  3:09 PM                   Point: Home exercise program (In Progress)     Learning Progress Summary           Patient Acceptance, E, NR by  at 4/4/2019  9:52 AM    Acceptance, E, NR by  at 4/3/2019 10:16 AM    Acceptance, E, NR by  at 4/2/2019  3:18 PM    Acceptance, E, NR by LH at 4/1/2019 10:32 AM    Acceptance, E,TB,D, NR by EE at 3/29/2019  2:58 PM    Acceptance, E,TB,D, NR by EE at 3/28/2019 11:38 AM    Acceptance, E,TB,D, NR by EE at 3/27/2019 10:05 AM    Acceptance, E,TB,D, NR by EE at 3/26/2019  9:48 AM    Acceptance, E,TB,D, NR by EE at 3/25/2019  3:09 PM                   Point: Body mechanics (In Progress)     Learning Progress Summary           Patient Acceptance, E, NR by  at 4/4/2019  9:52 AM    Acceptance, E, NR by  at 4/3/2019 10:16 AM    Acceptance, E, NR by  at 4/2/2019  3:18 PM    Acceptance, E, NR by  at 4/1/2019 10:32 AM    Acceptance, E,TB,D, NR by EE at 3/29/2019  2:58 PM    Acceptance, E,TB,D, NR by EE at 3/28/2019 11:38 AM    Acceptance, E,TB,D, NR by EE at 3/27/2019 10:05 AM    Acceptance, E,TB,D, NR by EE at 3/26/2019  9:48 AM    Acceptance, E,TB,D, NR by EE at 3/25/2019  3:09 PM                   Point: Precautions (Done)     Learning Progress Summary           Patient Acceptance, E,TB, VU,NR by  at 4/5/2019 11:30 AM    Comment:  Discussed purpose of strengthening exercises. Educated on repeated practice of walking and other exercises to gradually build strength and endurance.    Acceptance, E, NR by  at 4/4/2019  9:52 AM    Acceptance, E, NR by  at 4/3/2019 10:16 AM    Acceptance, E, NR by  at 4/2/2019  3:18 PM    Acceptance, E, NR by  at 4/1/2019 10:32 AM    Acceptance, E,TB,D, NR by EE at 3/29/2019  2:58 PM    Acceptance, E,TB,D, NR by EE at 3/28/2019 11:38 AM    Acceptance, E,TB,D, NR by  at  3/27/2019 10:05 AM    Acceptance, E,TB,D, NR by EE at 3/26/2019  9:48 AM    Acceptance, E,TB,D, NR by EE at 3/25/2019  3:09 PM                               User Key     Initials Effective Dates Name Provider Type Discipline    ELISSA 06/08/18 -  Donna Inman, PT Physical Therapist PT     04/03/18 -  Clau Ayon, PT Physical Therapist PT    EE 04/03/18 -  Ariadne Garcia, PT Physical Therapist PT     02/04/19 -  Naomy Mendenhall, PT Student PT Student                   PT Recommendation and Plan                        Time Calculation:     PT Charges     Row Name 04/05/19 1307 04/05/19 0942          Time Calculation    Start Time  1300  -LB (r) LS (t) LB (c)  0930  -LB (r) LS (t) LB (c)     Stop Time  1330  -LB (r) LS (t) LB (c)  1000  -LB (r) LS (t) LB (c)     Time Calculation (min)  30 min  -LB (r) LS (t)  30 min  -LB (r) LS (t)     PT Received On  04/05/19  -LB (r) LS (t) LB (c)  04/05/19  -LB (r) LS (t) LB (c)     PT - Next Appointment  04/06/19  -LB (r) LS (t) LB (c)  --     PT Goal Re-Cert Due Date  04/06/19  -LB (r) LS (t) LB (c)  --       User Key  (r) = Recorded By, (t) = Taken By, (c) = Cosigned By    Initials Name Provider Type    LB Eliane Blackburn, PT Physical Therapist    LS Naomy Mendenhall, PT Student PT Student          Therapy Charges for Today     Code Description Service Date Service Provider Modifiers Qty    24170988254 HC PT NEUROMUSC RE EDUCATION EA 15 MIN 4/5/2019 Naomy Mendenhall, PT Student GP 4    27002790973 HC PT THER SUPP EA 15 MIN 4/5/2019 Naomy Mendenhall, PT Student GP 3                   Naomy Mendenhall, PT Student  4/5/2019

## 2019-04-05 NOTE — PROGRESS NOTES
Occupational Therapy: Individual: 90 minutes.    Physical Therapy: Branch    Speech Language Pathology:  Branch    Signed by: RAMO Cox/SOFIA

## 2019-04-05 NOTE — PLAN OF CARE
Problem: Patient Care Overview  Goal: Plan of Care Review   04/04/19 2316   OTHER   Outcome Summary inc bm today and a/ox4, 1-2 assist transfers, skin intact, scd

## 2019-04-05 NOTE — THERAPY TREATMENT NOTE
Inpatient Rehabilitation - Speech Language Pathology Treatment Note    [facility]       Patient Name: Nayan Ryan  : 1965  MRN: [id number]    Today's Date: 2019           Admit Date: 3/24/2019      Visit Dx:      No diagnosis found.    Patient Active Problem List   Diagnosis   • Acute ischemic left PCA stroke (CMS/HCC)   • Status post placement of implantable loop recorder   • HTN (hypertension)   • Diabetes mellitus (CMS/HCC)   • Hyperlipidemia   • Morbid obesity (CMS/HCC)   • Anxiety disorder   • Migraine   • H/O hernia repair   • Abdominal wall abscess   • Back pain   • Stroke (cerebrum) (CMS/HCC)   • Vitamin D deficiency   • History of fatty infiltration of liver          Therapy Treatment    Evaluation/Coping    Evaluation/Treatment Time and Intent  Subjective Information: no complaints (19 1100 : Narcisa Sanchez MS CCC-SLP)  Existing Precautions/Restrictions: fall (19 1100 : Narcisa Sanchez MS CCC-SLP)  Document Type: therapy note (daily note) (19 1100 : Narcisa Sanchez MS CCC-SLP)  Mode of Treatment: speech-language pathology (19 1100 : Narcisa Sanchez MS CCC-SLP)  Patient/Family Observations: in bed initially; able to assist pt in getting to w/c without difficulty; to ST room (19 1100 : Narcisa Sanchez MS CCC-SLP)  Start Time (Evaluation/Treatment): 1100 (19 1100 : Narcisa Sanchez MS CCC-SLP)  Stop Time (Evaluation/Treatment): 1130 (19 1100 : Narcisa Sanchez MS CCC-SLP)    Vitals/Pain/Safety    Pain Scale: Numbers Pre/Post-Treatment  Pain Scale: Numbers, Pretreatment: 0/10 - no pain (19 0800 : Narcisa Sanchez MS CCC-SLP)  Pain Scale: Numbers, Post-Treatment: 0/10 - no pain (19 0800 : Narcisa Sanchez MS CCC-SLP)    Cognition/Communication         Oral Motor/Eating         Mobility/Basic Activities/Instrumental Activities/Motor/Modality                   ROM/MMT                   Sensory/Myotome/Dermatome/Edema               Posture/Balance/Special  Tests/Exercise/Transportation/Sexual Function                   Orthotics/Residual Limb/Prosthetic Management              Outcome Summary         EDUCATION    The patient has been educated in the following areas:     Cognitive Impairment.    SLP Recommendation and Plan    SLP Diagnosis: Moderate Anomic Aphasia    SLP Diagnosis: Moderate Anomic Aphasia    Rehab Potential/Prognosis: good    Swallow Criteria for Skilled Therapeutic Interventions Met: no problems identified which require skilled intervention, other (see comments)(however, will monitor closely for s/s asp/dysphagia)    Anticipated Dischage Disposition: home with OP services, anticipate therapy at next level of care         Therapy Frequency (Swallow): 5 days per week    Predicted Duration Therapy Intervention (Days): until discharge           Plan of Care Reviewed With: patient      SLP GOALS     Row Name 04/05/19 1100 04/05/19 0800 04/04/19 1100       Comprehend Questions Goal 1 (SLP)    Progress (Ability to Comprehend Questions Goal 1, SLP)  --  --  100%;independently (over 90% accuracy)  -SA    Progress/Outcomes (Comprehend Questions Goal 1, SLP)  --  --  goal met  -SA    Comment (Comprehend Questions Goal 1, SLP)  --  --  answer wh questions general information  -SA       Memory Skills Goal 1 (SLP)    Progress (Memory Skills Goal 1, SLP)  80%;independently (over 90% accuracy);100%;with minimal cues (75-90%)  -SA  --  --    Progress/Outcomes (Memory Skills Goal 1, SLP)  good progress toward goal  -SA  --  --    Comment (Memory Skills Goal 1, SLP)  visual memory of 5 first names only (name to pic assoc)  -SA  --  --       Organizational Skills Goal 1 (SLP)    Improve Thought Organization Through Goal 1 (SLP)  --  completing a convergent naming task  -SA  completing a divergent naming task  -SA    Progress (Thought Organization Skills Goal 1, SLP)  90%;independently (over 90% accuracy)  -SA  --  70%;independently (over 90% accuracy)  -SA     Progress/Outcomes (Thought Organization Skills Goal 1, SLP)  continuing progress toward goal  -SA  continuing progress toward goal  -SA  continuing progress toward goal  -SA    Comment (Thought Organization Skills Goal 1, SLP)  95% recording information via organization of person's name and grade book information extended time to complete  -SA  82%; stated the category given 3 items: abstract  -SA  75% Id'ly naming 8/12 items in a category within 1 minute  -SA       Functional Problem Solving Skills Goal 1 (SLP)    Progress (Problem Solving Goal 1, SLP)  --  80%  -SA  --    Progress/Outcomes (Problem Solving Goal 1, SLP)  --  continuing progress toward goal;goal ongoing  -SA  --    Comment (Problem Solving Goal 1, SLP)  --  5 step sequencing of ADL's  -SA  --       Functional Math Skills Goal 1 (SLP)    Progress (Functional Math Skills Goal 1, SLP)  --  --  40%;independently (over 90% accuracy)  -SA    Progress/Outcomes (Functional Math Skills Goal 1, SLP)  --  --  goal ongoing  -SA    Comment (Functional Math Skills Goal 1, SLP)  --  --  Continued and Completed money management set 1 figuring change using a calculator; much more difficulty this pm session.   -SA       Right Hemisphere Function Goal 1 (SLP)    Progress (Right Hemisphere Function Goal 1, SLP)  --  80%;independently (over 90% accuracy)  -SA  --    Progress/Outcomes (Right Hemisphere Function Goal 1, SLP)  --  goal ongoing  -SA  --    Comment (Right Hemisphere Function Goal 1, SLP)  --  83% Id'ly; visuospatial activity-figure ground; find the number of targets in the boxed area.; pt is remembering to look to right of page with NO cues  -SA  --    Row Name 04/04/19 0800 04/03/19 1500 04/03/19 0800       Oral Nutrition/Hydration Goal 1 (SLP)    Oral Nutrition/Hydration Goal 1, SLP  --  --  Tolerate diet w/o clinical s/s asp/dysphagia  -SA    Time Frame (Oral Nutrition/Hydration Goal 1, SLP)  --  --  by discharge  -SA    Barriers (Oral Nutrition/Hydration  "Goal 1, SLP)  --  --  On regular/ thin diet. Pt observed to be using consecutive drinking and throat clear x 3; SLP encouraged pt to take small sips; pt verbalized understanding. Afebrile, breath sounds are clear per RN note; and po intake good % each meal. Pt stated no difficulty with coughing or choking with meals. Will follow for diet dilma while in rehab.  -SA    Progress/Outcomes (Oral Nutrition/Hydration Goal 1, SLP)  --  --  good progress toward goal  -SA       Word Retrieval Skills Goal 1 (SLP)    Progress (Word Retrieval Skills Goal 1, SLP)  --  90%;independently (over 90% accuracy)  -SA  --    Progress/Outcomes (Word Retrieval Goal 1, SLP)  --  good progress toward goal  -SA  --    Comment (Word Retrieval Goal 1, SLP)  --  96% naming antonyms: no cues  -SA  --       Awareness of Basic Personal Information Goal 1 (SLP)    Progress (Awareness of Basic Personal Information Goal 1, SLP)  --  --  independently (over 90% accuracy)  -SA    Progress/Outcomes (Awareness of Basic Personal Information Goal 1, SLP)  --  --  good progress toward goal  -SA    Comment (Awareness of Basic Personal Information Goal 1, SLP)  --  --  93%; answering questions related to personal info: name, address, city, state, wifes name, childrens names, dogs names, , situation; place; phone number home and cell and wife's phone number.   -SA       Attention Goal 1 (SLP)    Progress (Attention Goal 1, SLP)  --  --  90%;independently (over 90% accuracy)  -SA    Progress/Outcomes (Attention Goal 1, SLP)  --  --  goal ongoing  -SA    Comment (Attention Goal 1, SLP)  --  --  92%; find the coin; sustained and divided attention to task; initially pt neglected right half of page; but then pt independently returned to right half of the page and completed without any cues; good attention to task; pt verbalized \"i messed up\"   -SA       Memory Skills Goal 1 (SLP)    Progress (Memory Skills Goal 1, SLP)  --  40%;independently (over 90% " accuracy)  -SA  --    Progress/Outcomes (Memory Skills Goal 1, SLP)  --  goal ongoing  -SA  --    Comment (Memory Skills Goal 1, SLP)  --  visual memory of 5 first names only (name to pic assoc)  -SA  --       Organizational Skills Goal 1 (SLP)    Improve Thought Organization Through Goal 1 (SLP)  completing a divergent naming task  -SA  --  --    Progress/Outcomes (Thought Organization Skills Goal 1, SLP)  continuing progress toward goal  -SA  --  --    Comment (Thought Organization Skills Goal 1, SLP)  56% Id'ly and 100% with semantic cues; label category items beginning with a specific letter  -SA  --  --       Functional Problem Solving Skills Goal 1 (SLP)    Improve Problem Solving Through Goal 1 (SLP)  --  sequence steps in a task  -SA  --    Progress (Problem Solving Goal 1, SLP)  --  with minimal cues (75-90%)  -SA  --    Progress/Outcomes (Problem Solving Goal 1, SLP)  --  continuing progress toward goal  -SA  --    Comment (Problem Solving Goal 1, SLP)  --  88%; 4 step sequencing of ADL's  -SA  --       Functional Math Skills Goal 1 (SLP)    Progress (Functional Math Skills Goal 1, SLP)  --  100%;independently (over 90% accuracy)  -SA  --    Progress/Outcomes (Functional Math Skills Goal 1, SLP)  goal ongoing  -SA  good progress toward goal  -SA  --    Comment (Functional Math Skills Goal 1, SLP)  initiated money management set 1 figuring change using a calculator; unable to complete d/t time constraints; thus far achieved 100%  -  math language: telling time and answering questions re: using a clock   -  --      User Key  (r) = Recorded By, (t) = Taken By, (c) = Cosigned By    Initials Name Provider Type    Narcisa Gaston MS CCC-SLP Speech and Language Pathologist                  Time Calculation:       Time Calculation- SLP     Row Name 04/05/19 1201 04/05/19 0813          Time Calculation- [facility]    SLP Start Time  1100  -  0800  -[facility] Stop Time  1130  -SA  0830  -[facility] Time Calculation  (min)  30 min  -SA  30 min  -SA     SLP Received On  04/05/19  -SA  04/05/19  -SA       User Key  (r) = Recorded By, (t) = Taken By, (c) = Cosigned By    Initials Name Provider Type    Narcisa Gaston MS CCC-SLP Speech and Language Pathologist            Therapy Charges for Today     Code Description Service Date Service Provider Modifiers Qty    [id number] [facility] TREATMENT SPEECH 4 4/4/2019 Narcisa Sanchez MS CCC-SLP GN 1    [id number] [facility] DEV OF COGN SKILLS EACH 15 MIN 4/5/2019 Narcisa Sanchez MS CCC-SLP  4                           Narcisa Sanchez MS CCC-JOSIAS  4/5/2019

## 2019-04-05 NOTE — PROGRESS NOTES
Inpatient Rehabilitation Functional Measures Assessment    Functional Measures  VANITA Eating:  [facility] Grooming: [facility] Bathing:  [facility] Upper Body Dressing:  [facility] Lower Body Dressing:  [facility] Toileting:  [facility] Bladder Management  Level of Assistance:  [place]  Frequency/Number of Accidents this Shift:  [facility] Bowel Management  Level of Assistance: [place]  Frequency/Number of Accidents this Shift: [facility] Bed/Chair/Wheelchair Transfer:  [facility] Toilet Transfer:  [facility] Tub/Shower Transfer:  [place]    Previously Documented Mode of Locomotion at Discharge: Field  VANITA Expected Mode of Locomotion at Discharge: [facility] Walk/Wheelchair:  [facility] Stairs:  [facility] Comprehension:  Auditory comprehension is the usual mode. Patient does not  comprehend complex/abstract information in their primary language without  assistance from a helper. Comprehension Score = 5, Supervision. Patient  comprehends basic daily needs or ideas greater than 90% of the time. Patient  requires stand by/rare prompting. Patient requires the following assistive  device(s): Glasses.  VANITA Expression:  Vocal expression is the usual mode. Patient does not express  complex/abstract information in their primary language without a helper.  Expression Score = 4, Minimal Prompting. Patient expresses basic daily needs or  ideas 75-90% of the time.  Patient requires minimal/occasional prompting. No  assistive devices were required.  VANITA Social Interaction:  Social Interaction Score = 6, Modified Independent.  Patient is modified independent for social interaction, requiring: Medications.    VANITA Problem Solving:  Activity was not observed.  VANITA Memory:  Memory Score = 4, Minimal Prompting. Patient recognizes and  remembers 75-90% of the time. Patient requires minimal/occasional prompting for  memory for the following:    Therapy Mode Minutes  Occupational Therapy: [place]  Physical Therapy:  [place]  Speech Language Pathology:  [place]    Signed by: Fina Torres RN

## 2019-04-05 NOTE — PROGRESS NOTES
Inpatient Rehabilitation Functional Measures Assessment    Functional Measures  VANITA Eating:  [facility] Grooming: [facility] Bathing:  [facility] Upper Body Dressing:  [facility] Lower Body Dressing:  [facility] Toileting:  [facility] Bladder Management  Level of Assistance:  [place]  Frequency/Number of Accidents this Shift:  [facility] Bowel Management  Level of Assistance: [place]  Frequency/Number of Accidents this Shift: [facility] Bed/Chair/Wheelchair Transfer:  [facility] Toilet Transfer:  [facility] Tub/Shower Transfer:  [place]    Previously Documented Mode of Locomotion at Discharge: Field  VANITA Expected Mode of Locomotion at Discharge: [facility] Walk/Wheelchair:  [facility] Stairs:  [facility] Comprehension:  Auditory comprehension is the usual mode. Comprehension  Score = 6, Modified [place].  Patient comprehends complex/abstract  information in their primary language with only mild difficulty.  VANITA Expression:  Vocal expression is the usual mode. Expression Score = 6,  Modified Independent.  Patient expresses complex/abstract information in their  primary language, requiring:  [facility] Social Interaction:  Activity was not observed.  [facility]ANITA Problem Solving:  Patient does not make appropriate decisions in order to  solve complex problems without assistance from a helper. Problem Solving Score =  5, Supervision.  Patient makes appropriate decisions in order to solve routine  problems with directing only under stressful or unfamiliar conditions, but no  more than 10% of the time, for the following behavior(s):  VANITA Memory:  Activity was not observed.    Therapy Mode Minutes  Occupational Therapy: [place]  Physical Therapy: [place]  Speech Language Pathology:  [place]    Signed by: Kathy Carrera RN

## 2019-04-06 LAB
GLUCOSE BLDC GLUCOMTR-MCNC: 105 MG/DL (ref 70–130)
GLUCOSE BLDC GLUCOMTR-MCNC: 107 MG/DL (ref 70–130)
GLUCOSE BLDC GLUCOMTR-MCNC: 110 MG/DL (ref 70–130)
GLUCOSE BLDC GLUCOMTR-MCNC: 112 MG/DL (ref 70–130)

## 2019-04-06 PROCEDURE — 97110 THERAPEUTIC EXERCISES: CPT | Performed by: PHYSICAL THERAPIST

## 2019-04-06 PROCEDURE — 63710000001 INSULIN GLARGINE PER 5 UNITS: Performed by: INTERNAL MEDICINE

## 2019-04-06 PROCEDURE — 82962 GLUCOSE BLOOD TEST: CPT

## 2019-04-06 RX ADMIN — METFORMIN HYDROCHLORIDE 500 MG: 500 TABLET, EXTENDED RELEASE ORAL at 16:54

## 2019-04-06 RX ADMIN — ACETAMINOPHEN 650 MG: 325 TABLET, FILM COATED ORAL at 18:06

## 2019-04-06 RX ADMIN — CLOPIDOGREL 75 MG: 75 TABLET, FILM COATED ORAL at 08:36

## 2019-04-06 RX ADMIN — LISINOPRIL 20 MG: 20 TABLET ORAL at 20:59

## 2019-04-06 RX ADMIN — ALPRAZOLAM 1 MG: 0.5 TABLET ORAL at 21:53

## 2019-04-06 RX ADMIN — ATENOLOL 25 MG: 25 TABLET ORAL at 20:59

## 2019-04-06 RX ADMIN — LISINOPRIL 20 MG: 20 TABLET ORAL at 08:37

## 2019-04-06 RX ADMIN — ASPIRIN 325 MG: 325 TABLET, COATED ORAL at 08:36

## 2019-04-06 RX ADMIN — METFORMIN HYDROCHLORIDE 500 MG: 500 TABLET, EXTENDED RELEASE ORAL at 08:37

## 2019-04-06 RX ADMIN — PAROXETINE HYDROCHLORIDE 10 MG: 10 TABLET, FILM COATED ORAL at 08:37

## 2019-04-06 RX ADMIN — ATORVASTATIN CALCIUM 80 MG: 80 TABLET, FILM COATED ORAL at 20:59

## 2019-04-06 RX ADMIN — Medication 1 TABLET: at 08:37

## 2019-04-06 RX ADMIN — AMLODIPINE BESYLATE 5 MG: 5 TABLET ORAL at 08:36

## 2019-04-06 RX ADMIN — ATENOLOL 25 MG: 25 TABLET ORAL at 08:37

## 2019-04-06 RX ADMIN — INSULIN GLARGINE 32 UNITS: 100 INJECTION, SOLUTION SUBCUTANEOUS at 21:41

## 2019-04-06 NOTE — PROGRESS NOTES
Inpatient Rehabilitation Plan of Care Note    Plan of Care  Care Plan Reviewed - No updates at this time.    Safety    Performed Intervention(s)  Hourly rounding , items within reach  Assistance with out of bed activities  Falls protocol  bed/chair alarm      Psychosocial    Performed Intervention(s)   PRN  Patient to verbalize feelings and cocnerns  Psychologist PRN      Body Systems    Performed Intervention(s)  Accuchecks ACHS  Medication as ordered  consistent carb diet      Sphincter Control    Performed Intervention(s)  Assistance with urinal  Assistance to bathroom  Incontinence care PRN    Signed by: Rosa Isela Fleming RN

## 2019-04-06 NOTE — PROGRESS NOTES
Inpatient Rehabilitation Functional Measures Assessment    Functional Measures  VANITA Eating:  [facility] Grooming: [facility] Bathing:  [facility] Upper Body Dressing:  [facility] Lower Body Dressing:  [facility] Toileting:  [facility] Bladder Management  Level of Assistance:  [place]  Frequency/Number of Accidents this Shift:  [facility] Bowel Management  Level of Assistance: [place]  Frequency/Number of Accidents this Shift: [facility] Bed/Chair/Wheelchair Transfer:  [facility] Toilet Transfer:  [facility] Tub/Shower Transfer:  [place]    Previously Documented Mode of Locomotion at Discharge: Field  VANITA Expected Mode of Locomotion at Discharge: [facility] Walk/Wheelchair:  [facility] Stairs:  [facility] Comprehension:  Auditory comprehension is the usual mode. Patient does not  comprehend complex/abstract information in their primary language without  assistance from a helper. Comprehension Score = 5, Supervision. Patient  comprehends basic daily needs or ideas greater than 90% of the time. Patient  requires stand by/rare prompting. No assistive devices were required.  VANITA Expression:  Vocal expression is the usual mode. Patient does not express  complex/abstract information in their primary language without a helper.  Expression Score = 5, Stand By Prompting. Patient expresses basic daily needs or  ideas without prompting. No assistive devices were required.  VANITA Social Interaction:  Social Interaction Score = 6, Modified Independent.  Patient is modified independent for social interaction, requiring:  [facility] Problem Solving:  Activity was not observed.  VANITA Memory:  Memory Score = 5, Supervision.  Patient recognizes and remembers  with prompting only under stressful or unfamiliar conditions, but no more than  10% of the time, for the following behavior(s):    Therapy Mode Minutes  Occupational Therapy: [place]  Physical Therapy: [place]  Speech Language Pathology:  [place]    Signed by: Rosa Isela Fleming  RN

## 2019-04-06 NOTE — PROGRESS NOTES
Inpatient Rehabilitation Plan of Care Note    Plan of Care  Care Plan Reviewed - Updates as Follows    Body Systems    [RN] Endocrine(Active)  Current Status(04/06/2019): Patient at risk for altered blood sugars related to  recent health change.  Weekly Goal(04/13/2019): Patient will be compliant with accuchecks, diet, and  insulin coverage.  Discharge Goal: Patient will be independent with blood sugar management and be  compliant with treatment.        Psychosocial    [RN] Coping/Adjustment(Active)  Current Status(04/06/2019): Patient at risk for ineffective coping related to  recent change in health status and hospitalization.  Weekly Goal(04/13/2019): Patient will verbalize feelings and ask questions if he  has them.  Discharge Goal: Patient will develop coping skills to accomodate his health  changes.    Performed Intervention(s)   PRN  Patient to verbalize feelings and cocnerns  Psychologist PRN      Safety    [RN] Potential for Injury(Active)  Current Status(04/06/2019): Patient at risk for falls related to impaired vision  and impaired mobility.  Weekly Goal(04/13/2019): Patient will be free of falls on rehab and will be  compliant with call light use.  Discharge Goal: No falls while here on rehab. Pt family aware of fall/safety in  the home setting.    Performed Intervention(s)  Hourly rounding , items within reach  Assistance with out of bed activities  Falls protocol  bed/chair alarm      Sphincter Control    [RN] Bladder Management(Active)  Current Status(04/06/2019): Patient is continent of bladder.  Weekly Goal(04/13/2019): Patient will remain continent.  Discharge Goal: Patient will remain continent.    [RN] Bowel Management(Active)  Current Status(04/06/2019): Patient is continent of bowels.  Weekly Goal(04/13/2019): Patient will remain continent.  Discharge Goal: Patient will remain continent.    Performed Intervention(s)  Assistance with urinal  Assistance to bathroom  Incontinence care  PRN    Signed by: Subhash Duran RN

## 2019-04-06 NOTE — PROGRESS NOTES
Inpatient Rehabilitation Functional Measures Assessment    Functional Measures  VANITA Eating:  [place]  [facility] Grooming: [place]  [facility] Bathing:  [place]  [facility] Upper Body Dressing:  [place]  [facility] Lower Body Dressing:  [facility] Toileting:  [facility] Bladder Management  Level of Assistance:  [place]  Frequency/Number of Accidents this Shift:  [facility] Bowel Management  Level of Assistance: [place]  Frequency/Number of Accidents this Shift: [facility] Bed/Chair/Wheelchair Transfer:  Activity was not observed.  VANITA Toilet Transfer:  [facility] Tub/Shower Transfer:  [place]    Previously Documented Mode of Locomotion at Discharge: Field  VANITA Expected Mode of Locomotion at Discharge: [facility] Walk/Wheelchair:  WHEELCHAIR OBSERVATION   Wheelchair locomotion was observed using a manual wheelchair. Wheelchair  Distance Scale = 2.  Distance traveled in wheelchair is 50 -149 feet. Wheelchair  Score = 2.  Patient is able to go at least 50 feet (household distance) in  wheelchair but requires assistance. Patient was able to propel a distance of  50  feet in a wheelchair.  No other assistive devices were required.    WALK OBSERVATION   Activity was not observed.  VANITA Stairs:  Activity was not observed.    VANITA Comprehension:  [facility] Expression:  [facility] Social Interaction:  [facility] Problem Solving:  [facility] Memory:  [place]    Therapy Mode Minutes  Occupational Therapy: [place]  Physical Therapy: Individual: 30 minutes.  Speech Language Pathology:  [place]    Signed by: Jena Noel PT

## 2019-04-06 NOTE — PROGRESS NOTES
Inpatient Rehabilitation Functional Measures Assessment and Plan of Care    Plan of Care  Updated Problems/Interventions  Field    Functional Measures  VANITA Eating:  [facility] Grooming: [facility] Bathing:  [facility] Upper Body Dressing:  [facility] Lower Body Dressing:  [facility] Toileting:  [facility] Bladder Management  Level of Assistance:  [place]  Frequency/Number of Accidents this Shift:  [place]    VANITA Bowel Management  Level of Assistance: [place]  Frequency/Number of Accidents this Shift: [place]    [facility] Bed/Chair/Wheelchair Transfer:  [place]  VANITA Toilet Transfer:  [place]  VANITA Tub/Shower Transfer:  [place]    Previously Documented Mode of Locomotion at Discharge: [place]  VANITA Expected Mode of Locomotion at Discharge: [facility] Walk/Wheelchair:  [facility] Stairs:  [facility] Comprehension:  Auditory comprehension is the usual mode. Patient does not  comprehend complex/abstract information in their primary language without  assistance from a helper. Comprehension Score = 4, Minimal Prompting. Patient  comprehends basic daily needs or ideas 75-90% of the time. Patient requires  minimal/occasional prompting. Patient requires the following assistive  device(s): Glasses.  VANITA Expression:  Vocal expression is the usual mode. Patient does not express  complex/abstract information in their primary language without a helper.  Expression Score = 4, Minimal Prompting. Patient expresses basic daily needs or  ideas 75-90% of the time.  Patient requires minimal/occasional prompting. No  assistive devices were required.  VANITA Social Interaction:  Social Interaction Score = 6, Modified Independent.  Patient is modified independent for social interaction, requiring: Requires  additional time.  VANITA Problem Solving:  Activity was not observed.  VANITA Memory:  Memory Score = 4, Minimal Prompting. Patient recognizes and  remembers 75-90% of the time. Patient requires minimal/occasional prompting for  memory for the  following:    Therapy Mode Minutes  Occupational Therapy: [place]  Physical Therapy: [place]  Speech Language Pathology:  [place]    Signed by: Subhash Duran RN

## 2019-04-06 NOTE — PROGRESS NOTES
LOS: 13 days   Patient Care Team:  Qasim Asif MD as PCP - General  Qasim Asif MD as PCP - Family Medicine    Chief Complaint: same    Subjective     History of Present Illness    Subjective Pt is awake and alert. Pt was seen in dining room. Pt is generally pleased with his progress.    History taken from: patient    Objective     Vital Signs  Temp:  [97 °F (36.1 °C)-99 °F (37.2 °C)] 97 °F (36.1 °C)  Heart Rate:  [80-84] 84  Resp:  [18] 18  BP: (152-178)/(80-89) 166/89    Objective exam unchanged calves soft and NT resp unlabored and regular    Results Review:     I reviewed the patient's new clinical results.    Medication Review:     Assessment/Plan       Acute ischemic left PCA stroke (CMS/HCC)    Status post placement of implantable loop recorder    HTN (hypertension)    Diabetes mellitus (CMS/HCC)    Hyperlipidemia    Morbid obesity (CMS/HCC)    Anxiety disorder    Abdominal wall abscess    Stroke (cerebrum) (CMS/HCC)    Vitamin D deficiency    History of fatty infiltration of liver      Assessment & Plan Continue to prepare for dc    Santo Noonan MD  04/06/19  8:18 AM    Time:

## 2019-04-06 NOTE — PLAN OF CARE
Problem: Stroke (IRF) (Adult)  Goal: Promote Optimal Functional [place]  Outcome: Ongoing (interventions implemented as appropriate)      Problem: Fall Risk (Adult)  Goal: Absence of Fall  Outcome: Ongoing (interventions implemented as appropriate)      Problem: Diabetes, Type 2 (Adult)  Goal: Signs and Symptoms of Listed Potential Problems Will be Absent, Minimized or Managed (Diabetes, Type 2)  Outcome: Ongoing (interventions implemented as appropriate)      Problem: Skin Injury Risk (Adult)  Goal: Skin Health and Integrity  Outcome: Ongoing (interventions implemented as appropriate)      Problem: Patient Care Overview  Goal: Plan of Care Review  Outcome: Ongoing (interventions implemented as appropriate)   04/06/19 1252   Patient Care Overview   IRF Plan of Care Review progress ongoing, continue   Progress, Functional Goals demonstrating adequate progress   Coping/Psychosocial   Plan of Care Reviewed With patient   OTHER   Outcome Summary Pt. participated in therapies. May need to be reminded about safe behaviors. Continent to B&B. BS maintained. Loop Recorder site clean, dry and intact.

## 2019-04-06 NOTE — THERAPY TREATMENT NOTE
Inpatient Rehabilitation - Physical Therapy Treatment Note  [facility]     Patient Name: Nayan Ryan  : 1965  MRN: [id number]    Today's Date: 2019                 Admit Date: 3/24/2019      Visit Dx:    No diagnosis found.    Patient Active Problem List   Diagnosis   • Acute ischemic left PCA stroke (CMS/HCC)   • Status post placement of implantable loop recorder   • HTN (hypertension)   • Diabetes mellitus (CMS/HCC)   • Hyperlipidemia   • Morbid obesity (CMS/HCC)   • Anxiety disorder   • Migraine   • H/O hernia repair   • Abdominal wall abscess   • Back pain   • Stroke (cerebrum) (CMS/HCC)   • Vitamin D deficiency   • History of fatty infiltration of liver       Therapy Treatment    IRF Treatment Summary     Row Name 1915             Evaluation/Treatment Time and Intent    Subjective Information  no complaints  -JS      Existing Precautions/Restrictions  fall  -JS      Document Type  therapy note (daily note)  -JS      Mode of Treatment  physical therapy  -JS      Patient/Family Observations  Seated in w/c at [facility] station upon arrival  -JS      Recorded by [JS] Jena Noel PT      Row Name 1915             Cognition/Psychosocial- PT/OT    Affect/Mental Status (Cognitive)  flat/blunted affect  -JS      Orientation Status (Cognition)  oriented x 3  -JS      Follows Commands (Cognition)  75-90% accuracy;verbal cues/prompting required;repetition of directions required;initiation impaired  -JS      Personal Safety Interventions  fall prevention program maintained;gait belt;muscle strengthening facilitated;nonskid shoes/slippers when out of bed  -JS      Recorded by [JS] Jena Noel PT      Row Name 1915             Bed Mobility Assessment/Treatment    Comment (Bed Mobility)  Up in w/c  -JS      Recorded by [JS] Jena Noel PT      Row Name 1915             Sit-Stand Transfer    Sit-Stand [place] (Transfers)  contact guard;minimum assist (75% patient effort)  -JS       Assistive Device (Sit-Stand Transfers)  wheelchair at hemibar.   -JS      Recorded by [JS] Jena Noel, PT      Row Name 04/06/19 0915             Stand-Sit Transfer    Stand-Sit [place] (Transfers)  contact guard;minimum assist (75% patient effort)  -JS      Assistive Device (Stand-Sit Transfers)  wheelchair at hemibar  -JS      Recorded by [JS] Jena Noel, PT      Row Name 04/06/19 0915             Gait/Stairs Assessment/Training    [place] Level (Gait)  minimum assist (75% patient effort);2 person assist slider R foot (no AFO used today). 2nd person CGA  -JS      Assistive Device (Gait)  jay-bars  -JS      Distance in Feet (Gait)  35'x1, 40'x2  -JS      Pattern (Gait)  step-through  -JS      Deviations/Abnormal Patterns (Gait)  base of support, narrow;nancy decreased;gait speed decreased  -JS      Bilateral Gait Deviations  forward flexed posture;heel strike decreased  -JS      Left Sided Gait Deviations  weight shift ability decreased  -JS      Right Sided Gait Deviations  foot drop/toe drag;knee buckling, right side;knee hyperextension;weight shift ability decreased  -JS      Comment (Gait/Stairs)  Pt with intermittent R knee buckling/hyperextension especially when fatigued & during turns at bar.  PT provided assistance to block knee & tactile cues to avoid hyperextension as needed.  On last attempt walking, no buckling/hyperextension noted with ambulation.  -JS      Recorded by [JS] Jena Noel, PT      Row Name 04/06/19 0915             Wheelchair Mobility/Management    Method of Wheelchair Locomotion (Mobility)  jay-bipedal propulsion (left sided)  -JS      Mobility Activities (Wheelchair)  forward propulsion;turning  -JS      Forward Propulsion [place] (Wheelchair)  supervision  -JS      Steering [place] (Wheelchair)  stand by assist  -JS      Turning [place] (Wheelchair)  stand by assist  -JS      Distance Propelled in Feet (Wheelchair)  50  -JS      Comment, Mobility  Activities (Wheelchair)  Propelled in gym.  Requires instruction for direction.  -JS      Recorded by [JS] Jena Noel PT      Row Name 04/06/19 0915             Pain Scale: Numbers Pre/Post-Treatment    Pain Scale: Numbers, Pretreatment  0/10 - no pain  -JS      Pain Scale: Numbers, Post-Treatment  0/10 - no pain  -JS      Recorded by [JS] Jena Noel PT      Row Name 04/06/19 0915             Sitting Balance Activity    Activities Performed (Sitting, Balance Training)  sit to stand activity with arm support  -JS      Support Needed (Sitting, Balance Training)  minimal external support for balance, 75% patient effort performed at hemibar  -JS      Comment (Sitting, Balance Training)  5x2 reps. Pt able to push from w/c & reach to w/c upon return to sitting, using hemibar only intermittently for balance while standing.  PT in front to block knee as needed, though no buckling during exercise.  -JS      Recorded by [JS] Jena Noel PT      Row Name 04/06/19 0915             Lower Extremity Seated Therapeutic Exercise    Performed, Seated Lower Extremity (Therapeutic Exercise)  hip flexion/extension;hip abduction/adduction;knee flexion/extension;LAQ (long arc quad), knee extension;ankle dorsiflexion/plantarflexion  -JS      Device, Seated Lower Extremity (Therapeutic Exercise)  elastic bands/tubing  -JS      Exercise Type, Seated Lower Extremity (Therapeutic Exercise)  AROM (active range of motion);resistive exercise  -JS      Sets/Reps Detail, Seated Lower Extremity (Therapeutic Exercise)  2x15  -JS      Comment, Seated Lower Extremity (Therapeutic Exercise)  hip abd, hamstring curls with YTB.  Yellow ball for hip add  -JS      Recorded by [JS] Jena Noel PT      Row Name 04/06/19 0915             Positioning and Restraints    Pre-Treatment Position  sitting in chair/recliner  -JS      Post Treatment Position  wheelchair  -JS      In Wheelchair  sitting;exit alarm on;patient within staff view  -JS      Recorded by  [ANAHI] Jena Noel, PT        User Key  (r) = Recorded By, (t) = Taken By, (c) = Cosigned By    Initials Name Effective Dates    Jena Max, PT 04/06/17 -         Wound 03/26/19 0900 Left chest incision (Active)   Dressing Appearance open to air 4/6/2019  7:08 AM   Closure Liquid skin adhesive;Approximated 4/6/2019  7:08 AM   Base clean;dry 4/6/2019  7:08 AM   Periwound dry;intact 4/6/2019  7:08 AM   Drainage Amount none 4/6/2019  7:08 AM   Dressing Care, Wound open to air 4/6/2019  7:08 AM     Physical Therapy Education     Title: PT OT SLP Therapies (In Progress)     Topic: Physical Therapy (In Progress)     Point: Mobility training (Done)     Learning Progress Summary           Patient Acceptance, E,TB, VU,NR by  at 4/5/2019 11:30 AM    Comment:  Discussed purpose of strengthening exercises. Educated on repeated practice of walking and other exercises to gradually build strength and endurance.    Acceptance, E, NR by  at 4/4/2019  9:52 AM    Acceptance, E, NR by  at 4/3/2019 10:16 AM    Acceptance, E, NR by  at 4/2/2019  3:18 PM    Acceptance, E, NR by  at 4/1/2019 10:32 AM    Acceptance, E,TB,D, NR by  at 3/30/2019 11:44 AM    Acceptance, E,TB,D, NR by  at 3/29/2019  2:58 PM    Acceptance, E,TB,D, NR by  at 3/28/2019 11:38 AM    Acceptance, E,TB,D, NR by  at 3/27/2019 10:05 AM    Acceptance, E,TB,D, NR by  at 3/26/2019  9:48 AM    Acceptance, E,TB,D, NR by  at 3/25/2019  3:09 PM                   Point: Home exercise program (In Progress)     Learning Progress Summary           Patient Acceptance, E, NR by  at 4/4/2019  9:52 AM    Acceptance, E, NR by  at 4/3/2019 10:16 AM    Acceptance, E, NR by  at 4/2/2019  3:18 PM    Acceptance, E, NR by  at 4/1/2019 10:32 AM    Acceptance, E,TB,D, NR by EE at 3/29/2019  2:58 PM    Acceptance, E,TB,D, NR by EE at 3/28/2019 11:38 AM    Acceptance, E,TB,D, NR by EE at 3/27/2019 10:05 AM    Acceptance, E,TB,D, NR by EE at 3/26/2019  9:48 AM     Acceptance, E,TB,D, NR by EE at 3/25/2019  3:09 PM                   Point: Body mechanics (In Progress)     Learning Progress Summary           Patient Acceptance, E, NR by  at 4/4/2019  9:52 AM    Acceptance, E, NR by  at 4/3/2019 10:16 AM    Acceptance, E, NR by  at 4/2/2019  3:18 PM    Acceptance, E, NR by  at 4/1/2019 10:32 AM    Acceptance, E,TB,D, NR by  at 3/29/2019  2:58 PM    Acceptance, E,TB,D, NR by EE at 3/28/2019 11:38 AM    Acceptance, E,TB,D, NR by EE at 3/27/2019 10:05 AM    Acceptance, E,TB,D, NR by EE at 3/26/2019  9:48 AM    Acceptance, E,TB,D, NR by EE at 3/25/2019  3:09 PM                   Point: Precautions (Done)     Learning Progress Summary           Patient Acceptance, E,TB, VU,NR by  at 4/5/2019 11:30 AM    Comment:  Discussed purpose of strengthening exercises. Educated on repeated practice of walking and other exercises to gradually build strength and endurance.    Acceptance, E, NR by  at 4/4/2019  9:52 AM    Acceptance, E, NR by  at 4/3/2019 10:16 AM    Acceptance, E, NR by  at 4/2/2019  3:18 PM    Acceptance, E, NR by  at 4/1/2019 10:32 AM    Acceptance, E,TB,D, NR by  at 3/29/2019  2:58 PM    Acceptance, E,TB,D, NR by  at 3/28/2019 11:38 AM    Acceptance, E,TB,D, NR by  at 3/27/2019 10:05 AM    Acceptance, E,TB,D, NR by  at 3/26/2019  9:48 AM    Acceptance, E,TB,D, NR by  at 3/25/2019  3:09 PM                               User Key     Initials Effective Dates Name Provider Type Discipline     06/08/18 -  Donna Inman, PT Physical Therapist PT     04/03/18 -  Clau Ayon, PT Physical Therapist PT    EE 04/03/18 -  Ariadne Garcia, PT Physical Therapist PT     02/04/19 -  Naomy Mendenhall, PT Student PT Student                   PT Recommendation and Plan                        Time Calculation:     PT Charges     Row Name 04/06/19 0915             Time Calculation    Start Time  0915  -JS      Stop Time  0945  -JS      Time Calculation (min)   30 min  -ANAHI      PT Received On  04/06/19  -ANAHI      PT - Next Appointment  04/08/19  -ANAHI        User Key  (r) = Recorded By, (t) = Taken By, (c) = Cosigned By    Initials Name Provider Type    Jena Max PT Physical Therapist          Therapy Charges for Today     Code Description Service Date Service Provider Modifiers Qty    51802299651  PT THER PROC EA 15 MIN 4/6/2019 Jena Noel, PT GP 2    [id number]  PT THER SUPP EA 15 MIN 4/6/2019 Jena Noel, PT GP 1                   Jena Noel PT  4/6/2019

## 2019-04-06 NOTE — PLAN OF CARE
Problem: Stroke (IRF) (Adult)  Goal: Promote Optimal Functional [place]  Outcome: Ongoing (interventions implemented as appropriate)   04/06/19 0355   Stroke (IRF) (Adult)   Promote Optimal Functional [place] demonstrating adequate progress       Problem: Fall Risk (Adult)  Goal: Absence of Fall  Outcome: Ongoing (interventions implemented as appropriate)   04/06/19 0355   Fall Risk (Adult)   Absence of Fall making progress toward outcome       Problem: Diabetes, Type 2 (Adult)  Goal: Signs and Symptoms of Listed Potential Problems Will be Absent, Minimized or Managed (Diabetes, Type 2)  Outcome: Ongoing (interventions implemented as appropriate)   04/06/19 0355   Goal/Outcome Evaluation   Problems Assessed (Type 2 Diabetes) all   Problems Present (Type 2 Diabetes) none       Problem: Skin Injury Risk (Adult)  Goal: Skin Health and Integrity  Outcome: Ongoing (interventions implemented as appropriate)   04/06/19 0355   Skin Injury Risk (Adult)   Skin Health and Integrity making progress toward outcome

## 2019-04-07 LAB
GLUCOSE BLDC GLUCOMTR-MCNC: 101 MG/DL (ref 70–130)
GLUCOSE BLDC GLUCOMTR-MCNC: 112 MG/DL (ref 70–130)
GLUCOSE BLDC GLUCOMTR-MCNC: 85 MG/DL (ref 70–130)
GLUCOSE BLDC GLUCOMTR-MCNC: 88 MG/DL (ref 70–130)

## 2019-04-07 PROCEDURE — 82962 GLUCOSE BLOOD TEST: CPT

## 2019-04-07 PROCEDURE — 63710000001 INSULIN GLARGINE PER 5 UNITS: Performed by: INTERNAL MEDICINE

## 2019-04-07 RX ADMIN — ATENOLOL 25 MG: 25 TABLET ORAL at 07:42

## 2019-04-07 RX ADMIN — ASPIRIN 325 MG: 325 TABLET, COATED ORAL at 07:42

## 2019-04-07 RX ADMIN — ACETAMINOPHEN 650 MG: 325 TABLET, FILM COATED ORAL at 15:32

## 2019-04-07 RX ADMIN — CLOPIDOGREL 75 MG: 75 TABLET, FILM COATED ORAL at 07:42

## 2019-04-07 RX ADMIN — ACETAMINOPHEN 650 MG: 325 TABLET, FILM COATED ORAL at 07:43

## 2019-04-07 RX ADMIN — LISINOPRIL 20 MG: 20 TABLET ORAL at 07:42

## 2019-04-07 RX ADMIN — METFORMIN HYDROCHLORIDE 500 MG: 500 TABLET, EXTENDED RELEASE ORAL at 07:42

## 2019-04-07 RX ADMIN — ACETAMINOPHEN 650 MG: 325 TABLET, FILM COATED ORAL at 21:11

## 2019-04-07 RX ADMIN — LISINOPRIL 20 MG: 20 TABLET ORAL at 21:03

## 2019-04-07 RX ADMIN — ATENOLOL 25 MG: 25 TABLET ORAL at 21:03

## 2019-04-07 RX ADMIN — AMLODIPINE BESYLATE 5 MG: 5 TABLET ORAL at 07:42

## 2019-04-07 RX ADMIN — PAROXETINE HYDROCHLORIDE 10 MG: 10 TABLET, FILM COATED ORAL at 07:43

## 2019-04-07 RX ADMIN — ALPRAZOLAM 1 MG: 0.5 TABLET ORAL at 22:31

## 2019-04-07 RX ADMIN — METFORMIN HYDROCHLORIDE 500 MG: 500 TABLET, EXTENDED RELEASE ORAL at 16:42

## 2019-04-07 RX ADMIN — INSULIN GLARGINE 32 UNITS: 100 INJECTION, SOLUTION SUBCUTANEOUS at 21:03

## 2019-04-07 RX ADMIN — Medication 1 TABLET: at 07:42

## 2019-04-07 RX ADMIN — ATORVASTATIN CALCIUM 80 MG: 80 TABLET, FILM COATED ORAL at 21:03

## 2019-04-07 NOTE — PLAN OF CARE
Problem: Stroke (IRF) (Adult)  Goal: Promote Optimal Functional [place]  Outcome: Ongoing (interventions implemented as appropriate)   04/07/19 0444   Stroke (IRF) (Adult)   Promote Optimal Functional [place] demonstrating adequate progress       Problem: Fall Risk (Adult)  Goal: Absence of Fall  Outcome: Ongoing (interventions implemented as appropriate)   04/07/19 0444   Fall Risk (Adult)   Absence of Fall making progress toward outcome       Problem: Diabetes, Type 2 (Adult)  Goal: Signs and Symptoms of Listed Potential Problems Will be Absent, Minimized or Managed (Diabetes, Type 2)  Outcome: Ongoing (interventions implemented as appropriate)   04/07/19 0444   Goal/Outcome Evaluation   Problems Assessed (Type 2 Diabetes) all   Problems Present (Type 2 Diabetes) none       Problem: Skin Injury Risk (Adult)  Goal: Skin Health and Integrity  Outcome: Ongoing (interventions implemented as appropriate)   04/07/19 0444   Skin Injury Risk (Adult)   Skin Health and Integrity making progress toward outcome

## 2019-04-07 NOTE — PROGRESS NOTES
Inpatient Rehabilitation Functional Measures Assessment    Functional Measures  VANITA Eating:  [facility] Grooming: [facility] Bathing:  [facility] Upper Body Dressing:  [facility] Lower Body Dressing:  [facility] Toileting:  [facility] Bladder Management  Level of Assistance:  [place]  Frequency/Number of Accidents this Shift:  [facility] Bowel Management  Level of Assistance: [place]  Frequency/Number of Accidents this Shift: [facility] Bed/Chair/Wheelchair Transfer:  [facility] Toilet Transfer:  [facility] Tub/Shower Transfer:  [place]    Previously Documented Mode of Locomotion at Discharge: Field  VANITA Expected Mode of Locomotion at Discharge: [facility] Walk/Wheelchair:  [facility] Stairs:  [facility] Comprehension:  Auditory comprehension is the usual mode. Patient does not  comprehend complex/abstract information in their primary language without  assistance from a helper. Comprehension Score = 5, Supervision. Patient  comprehends basic daily needs or ideas greater than 90% of the time. Patient  requires stand by/rare prompting. No assistive devices were required.  VANITA Expression:  Vocal expression is the usual mode. Patient does not express  complex/abstract information in their primary language without a helper.  Expression Score = 5, Stand By Prompting. Patient expresses basic daily needs or  ideas without prompting. No assistive devices were required.  VANITA Social Interaction:  Social Interaction Score = 6, Modified Independent.  Patient is modified independent for social interaction, requiring:  [facility] Problem Solving:  Activity was not observed.  VANITA Memory:  Memory Score = 5, Supervision.  Patient recognizes and remembers  with prompting only under stressful or unfamiliar conditions, but no more than  10% of the time, for the following behavior(s):    Therapy Mode Minutes  Occupational Therapy: Branch  Physical Therapy: Branch  Speech Language Pathology:  Branch    Signed by: Rosa Isela Fleming  RN

## 2019-04-07 NOTE — PROGRESS NOTES
Inpatient Rehabilitation Plan of Care Note    Plan of Care  Care Plan Reviewed - No updates at this time.    Safety    Performed Intervention(s)  Hourly rounding , items within reach  bed/chair alarm      Psychosocial    Performed Intervention(s)  Patient to verbalize feelings and cocnerns      Body Systems    Performed Intervention(s)  Accuchecks ACHS  consistent carb diet    Signed by: Brayan Haji RN

## 2019-04-07 NOTE — PROGRESS NOTES
Inpatient Rehabilitation Functional Measures Assessment    Functional Measures  VANITA Eating:  [facility] Grooming: [facility] Bathing:  [facility] Upper Body Dressing:  [facility] Lower Body Dressing:  [facility] Toileting:  [facility] Bladder Management  Level of Assistance:  [place]  Frequency/Number of Accidents this Shift:  [facility] Bowel Management  Level of Assistance: [place]  Frequency/Number of Accidents this Shift: [facility] Bed/Chair/Wheelchair Transfer:  [facility] Toilet Transfer:  [facility] Tub/Shower Transfer:  [place]    Previously Documented Mode of Locomotion at Discharge: Field  VANITA Expected Mode of Locomotion at Discharge: [facility] Walk/Wheelchair:  [facility] Stairs:  [facility] Comprehension:  Auditory comprehension is the usual mode. Comprehension  Score = 6, Modified [place].  Patient comprehends complex/abstract  information in their primary language with only mild difficulty.  VANITA Expression:  Vocal expression is the usual mode. Expression Score = 6,  Modified Independent.  Patient expresses complex/abstract information in their  primary language with only mild difficulty with tasks.  VANITA Social Interaction:  Social Interaction Score = 6, Modified Independent.  Patient is modified independent for social interaction, requiring: Requires  additional time.  VANITA Problem Solving:  Problem Solving Score = 6, Modified [place].  Patient  makes appropriate decisions in order to solve complex problems with mild  difficulty but self-corrects.  VANITA Memory:  Memory Score = 6, Modified [place].  Patient is modified  independent for memory, having only mild difficulty and using self-initiated or  environmental cues to remember.    Therapy Mode Minutes  Occupational Therapy: Branch  Physical Therapy: Branch  Speech Language Pathology:  Branch    Signed by: Brayan Haji RN

## 2019-04-07 NOTE — PROGRESS NOTES
LOS: 14 days   Patient Care Team:  Qasim Asif MD as PCP - General  Qasim Asif MD as PCP - Family Medicine    Chief Complaint: same    Subjective     History of Present Illness    Subjective Pt is awake and alert. Pt is pleased with his progress but is anxious to be dcd.     History taken from: patient    Objective     Vital Signs  Temp:  [98.2 °F (36.8 °C)-98.6 °F (37 °C)] 98.2 °F (36.8 °C)  Heart Rate:  [81-88] 81  Resp:  [18] 18  BP: (113-171)/(75-96) 113/96    Objectiveexam unchanged    Results Review:     I reviewed the patient's new clinical results.    Medication Review:     Assessment/Plan       Acute ischemic left PCA stroke (CMS/HCC)    Status post placement of implantable loop recorder    HTN (hypertension)    Diabetes mellitus (CMS/HCC)    Hyperlipidemia    Morbid obesity (CMS/HCC)    Anxiety disorder    Abdominal wall abscess    Stroke (cerebrum) (CMS/HCC)    Vitamin D deficiency    History of fatty infiltration of liver      Assessment & Plan Continue to prepare for dc.     Santo Noonan MD  04/07/19  9:49 AM    Time:

## 2019-04-08 LAB
ALBUMIN SERPL-MCNC: 3.7 G/DL (ref 3.5–5.2)
ALBUMIN/GLOB SERPL: 1.2 G/DL
ALP SERPL-CCNC: 101 U/L (ref 39–117)
ALT SERPL W P-5'-P-CCNC: 31 U/L (ref 1–41)
ANION GAP SERPL CALCULATED.3IONS-SCNC: 13.2 MMOL/L
AST SERPL-CCNC: 20 U/L (ref 1–40)
BASOPHILS # BLD AUTO: 0.04 10*3/MM3 (ref 0–0.2)
BASOPHILS NFR BLD AUTO: 0.5 % (ref 0–1.5)
BILIRUB SERPL-MCNC: 0.3 MG/DL (ref 0.2–1.2)
BUN BLD-MCNC: 11 MG/DL (ref 6–20)
BUN/CREAT SERPL: 19.3 (ref 7–25)
CALCIUM SPEC-SCNC: 9 MG/DL (ref 8.6–10.5)
CHLORIDE SERPL-SCNC: 104 MMOL/L (ref 98–107)
CO2 SERPL-SCNC: 25.8 MMOL/L (ref 22–29)
CREAT BLD-MCNC: 0.57 MG/DL (ref 0.76–1.27)
DEPRECATED RDW RBC AUTO: 40.1 FL (ref 37–54)
EOSINOPHIL # BLD AUTO: 0.32 10*3/MM3 (ref 0–0.4)
EOSINOPHIL NFR BLD AUTO: 4.4 % (ref 0.3–6.2)
ERYTHROCYTE [DISTWIDTH] IN BLOOD BY AUTOMATED COUNT: 13 % (ref 12.3–15.4)
GFR SERPL CREATININE-BSD FRML MDRD: 149 ML/MIN/1.73
GLOBULIN UR ELPH-MCNC: 3.2 GM/DL
GLUCOSE BLD-MCNC: 96 MG/DL (ref 65–99)
GLUCOSE BLDC GLUCOMTR-MCNC: 100 MG/DL (ref 70–130)
GLUCOSE BLDC GLUCOMTR-MCNC: 103 MG/DL (ref 70–130)
GLUCOSE BLDC GLUCOMTR-MCNC: 108 MG/DL (ref 70–130)
GLUCOSE BLDC GLUCOMTR-MCNC: 110 MG/DL (ref 70–130)
HCT VFR BLD AUTO: 41.4 % (ref 37.5–51)
HGB BLD-MCNC: 13.5 G/DL (ref 13–17.7)
IMM GRANULOCYTES # BLD AUTO: 0.03 10*3/MM3 (ref 0–0.05)
IMM GRANULOCYTES NFR BLD AUTO: 0.4 % (ref 0–0.5)
LYMPHOCYTES # BLD AUTO: 2.79 10*3/MM3 (ref 0.7–3.1)
LYMPHOCYTES NFR BLD AUTO: 38.2 % (ref 19.6–45.3)
MCH RBC QN AUTO: 27.7 PG (ref 26.6–33)
MCHC RBC AUTO-ENTMCNC: 32.6 G/DL (ref 31.5–35.7)
MCV RBC AUTO: 84.8 FL (ref 79–97)
MONOCYTES # BLD AUTO: 0.66 10*3/MM3 (ref 0.1–0.9)
MONOCYTES NFR BLD AUTO: 9 % (ref 5–12)
NEUTROPHILS # BLD AUTO: 3.47 10*3/MM3 (ref 1.4–7)
NEUTROPHILS NFR BLD AUTO: 47.5 % (ref 42.7–76)
NRBC BLD AUTO-RTO: 0 /100 WBC (ref 0–0)
PLATELET # BLD AUTO: 402 10*3/MM3 (ref 140–450)
PMV BLD AUTO: 9.4 FL (ref 6–12)
POTASSIUM BLD-SCNC: 3.5 MMOL/L (ref 3.5–5.2)
PROT SERPL-MCNC: 6.9 G/DL (ref 6–8.5)
RBC # BLD AUTO: 4.88 10*6/MM3 (ref 4.14–5.8)
SODIUM BLD-SCNC: 143 MMOL/L (ref 136–145)
WBC NRBC COR # BLD: 7.31 10*3/MM3 (ref 3.4–10.8)

## 2019-04-08 PROCEDURE — 97110 THERAPEUTIC EXERCISES: CPT

## 2019-04-08 PROCEDURE — 97112 NEUROMUSCULAR REEDUCATION: CPT

## 2019-04-08 PROCEDURE — 80053 COMPREHEN METABOLIC PANEL: CPT | Performed by: PHYSICAL MEDICINE & REHABILITATION

## 2019-04-08 PROCEDURE — 82962 GLUCOSE BLOOD TEST: CPT

## 2019-04-08 PROCEDURE — 85025 COMPLETE CBC W/AUTO DIFF WBC: CPT | Performed by: PHYSICAL MEDICINE & REHABILITATION

## 2019-04-08 PROCEDURE — 63710000001 INSULIN GLARGINE PER 5 UNITS: Performed by: INTERNAL MEDICINE

## 2019-04-08 PROCEDURE — 97535 SELF CARE MNGMENT TRAINING: CPT

## 2019-04-08 PROCEDURE — G0515 COGNITIVE SKILLS DEVELOPMENT: HCPCS

## 2019-04-08 RX ORDER — OXYCODONE HYDROCHLORIDE AND ACETAMINOPHEN 5; 325 MG/1; MG/1
1 TABLET ORAL EVERY 12 HOURS PRN
Status: DISCONTINUED | OUTPATIENT
Start: 2019-04-08 | End: 2019-04-23 | Stop reason: HOSPADM

## 2019-04-08 RX ADMIN — ATENOLOL 25 MG: 25 TABLET ORAL at 09:37

## 2019-04-08 RX ADMIN — ERGOCALCIFEROL 50000 UNITS: 1.25 CAPSULE ORAL at 09:32

## 2019-04-08 RX ADMIN — ASPIRIN 325 MG: 325 TABLET, COATED ORAL at 09:32

## 2019-04-08 RX ADMIN — INSULIN GLARGINE 32 UNITS: 100 INJECTION, SOLUTION SUBCUTANEOUS at 21:31

## 2019-04-08 RX ADMIN — PAROXETINE HYDROCHLORIDE 10 MG: 10 TABLET, FILM COATED ORAL at 09:45

## 2019-04-08 RX ADMIN — AMLODIPINE BESYLATE 5 MG: 5 TABLET ORAL at 09:37

## 2019-04-08 RX ADMIN — LISINOPRIL 20 MG: 20 TABLET ORAL at 09:34

## 2019-04-08 RX ADMIN — ATENOLOL 25 MG: 25 TABLET ORAL at 20:28

## 2019-04-08 RX ADMIN — Medication 1 TABLET: at 09:32

## 2019-04-08 RX ADMIN — LISINOPRIL 20 MG: 20 TABLET ORAL at 20:28

## 2019-04-08 RX ADMIN — CLOPIDOGREL 75 MG: 75 TABLET, FILM COATED ORAL at 09:32

## 2019-04-08 RX ADMIN — ALPRAZOLAM 1 MG: 0.5 TABLET ORAL at 21:11

## 2019-04-08 RX ADMIN — METFORMIN HYDROCHLORIDE 500 MG: 500 TABLET, EXTENDED RELEASE ORAL at 17:07

## 2019-04-08 RX ADMIN — OXYCODONE AND ACETAMINOPHEN 1 TABLET: 5; 325 TABLET ORAL at 21:11

## 2019-04-08 RX ADMIN — METFORMIN HYDROCHLORIDE 500 MG: 500 TABLET, EXTENDED RELEASE ORAL at 09:33

## 2019-04-08 RX ADMIN — ATORVASTATIN CALCIUM 80 MG: 80 TABLET, FILM COATED ORAL at 20:28

## 2019-04-08 NOTE — PLAN OF CARE
Problem: Stroke (IRF) (Adult)  Goal: Promote Optimal Functional [place]  Outcome: Ongoing (interventions implemented as appropriate)   04/08/19 1716   Stroke (IRF) (Adult)   Promote Optimal Functional [place] demonstrating adequate progress       Problem: Fall Risk (Adult)  Goal: Absence of Fall  Outcome: Ongoing (interventions implemented as appropriate)   04/08/19 1716   Fall Risk (Adult)   Absence of Fall making progress toward outcome       Problem: Diabetes, Type 2 (Adult)  Goal: Signs and Symptoms of Listed Potential Problems Will be Absent, Minimized or Managed (Diabetes, Type 2)  Outcome: Ongoing (interventions implemented as appropriate)   04/08/19 1716   Goal/Outcome Evaluation   Problems Assessed (Type 2 Diabetes) all   Problems Present (Type 2 Diabetes) situational response       Problem: Skin Injury Risk (Adult)  Goal: Skin Health and Integrity  Outcome: Ongoing (interventions implemented as appropriate)   04/08/19 1716   Skin Injury Risk (Adult)   Skin Health and Integrity making progress toward outcome       Problem: Patient Care Overview  Goal: Plan of Care Review   04/08/19 1716   Patient Care Overview   IRF Plan of Care Review progress ongoing, continue   Progress, Functional Goals demonstrating adequate progress   Coping/Psychosocial   Plan of Care Reviewed With patient   OTHER   Outcome Summary Patient pleasant and cooperative, alert and oriented with some aphasia at times. He is continent of b/b, assist x1, and takes medication with water. Right sided weakness, takes Xanax and a pain pill at night and family at bedside on and off throughout the day.

## 2019-04-08 NOTE — PROGRESS NOTES
Inpatient Rehabilitation Plan of Care Note    Plan of Care  Care Plan Reviewed - No updates at this time.    Safety    Performed Intervention(s)  Hourly rounding , items within reach  Assistance with out of bed activities  Falls protocol  bed/chair alarm      Psychosocial    Performed Intervention(s)   PRN  Patient to verbalize feelings and cocnerns  Psychologist PRN      Body Systems    Performed Intervention(s)  Accuchecks ACHS  Medication as ordered  consistent carb diet      Sphincter Control    Performed Intervention(s)  Assistance with urinal  Assistance to bathroom  Incontinence care PRN    Signed by: Elas Zhou RN

## 2019-04-08 NOTE — PROGRESS NOTES
Inpatient Rehabilitation Functional Measures Assessment    Functional Measures  VANITA Eating:  [facility] Grooming: [facility] Bathing:  [facility] Upper Body Dressing:  [facility] Lower Body Dressing:  [facility] Toileting:  [facility] Bladder Management  Level of Assistance:  [place]  Frequency/Number of Accidents this Shift:  [facility] Bowel Management  Level of Assistance: [place]  Frequency/Number of Accidents this Shift: [facility] Bed/Chair/Wheelchair Transfer:  [facility] Toilet Transfer:  [facility] Tub/Shower Transfer:  [place]    Previously Documented Mode of Locomotion at Discharge: Field  VANITA Expected Mode of Locomotion at Discharge: [facility] Walk/Wheelchair:  [facility] Stairs:  [facility] Comprehension:  Auditory comprehension is the usual mode. Comprehension  Score = 6, Modified [place].  Patient comprehends complex/abstract  information in their primary language, requiring: Additional time.  VANITA Expression:  Vocal expression is the usual mode. Expression Score = 6,  Modified Independent.  Patient expresses complex/abstract information in their  primary language, requiring: Additional time.  VANITA Social Interaction:  Social Interaction Score = 6, Modified Independent.  Patient is modified independent for social interaction, requiring: Requires  additional time.  VANITA Problem Solving:  Problem Solving Score = 6, Modified [place].  Patient  makes appropriate decisions in order to solve complex problems, but requires  extra time.  VANITA Memory:  Memory Score = 6, Modified [place].  Patient is modified  independent for memory, requiring:    Therapy Mode Minutes  Occupational Therapy: [place]  Physical Therapy: [place]  Speech Language Pathology:  [place]    Signed by: Apoorva Love RN

## 2019-04-08 NOTE — PROGRESS NOTES
Inpatient Rehabilitation Functional Measures Assessment    Functional Measures  VANITA Eating:  [facility] Grooming: [facility] Bathing:  [facility] Upper Body Dressing:  [facility] Lower Body Dressing:  [facility] Toileting:  [facility] Bladder Management  Level of Assistance:  [place]  Frequency/Number of Accidents this Shift:  [facility] Bowel Management  Level of Assistance: [place]  Frequency/Number of Accidents this Shift: [facility] Bed/Chair/Wheelchair Transfer:  Bed/chair/wheelchair Transfer Score = 4.  Patient performs 75% or more of effort and minimal assistance (little/incidental  help/lifting of one limb/steadying) for transferring to and from the  bed/chair/wheelchair, requiring: Steadying. Patient requires the following  assistive device(s): Arm rest. Bed rails.  [facility] Toilet Transfer:  [facility] Tub/Shower Transfer:  [place]    Previously Documented Mode of Locomotion at Discharge: [place]  VANITA Expected Mode of Locomotion at Discharge: [facility] Walk/Wheelchair:  WHEELCHAIR OBSERVATION   Wheelchair locomotion was observed using a manual wheelchair. Wheelchair  Distance Scale = 2.  Distance traveled in wheelchair is 50 -149 feet. Wheelchair  Score = 2.  Patient is able to go at least 50 feet (household distance) in  wheelchair but requires assistance. Patient was able to propel a distance of  100 feet in a wheelchair.  No other assistive devices were required.    WALK OBSERVATION   Walk Distance Scale = 2.  Distance walked is 50 -149 feet. Walk Score = 1.  Patient performs 75% or more of effort and requires minimal assistance of two or  more people. 2 people used for safety . Patient walked a distance of 60 feet.  Patient requires the following assistive device(s): jay bar .  VANITA Stairs:  Stairs did not occur because activity was unsafe for patient.    VANITA Comprehension:  [facility] Expression:  [facility] Social Interaction:  [facility] Problem Solving:  [facility] Memory:   [place]    Therapy Mode Minutes  Occupational Therapy: [place]  Physical Therapy: Individual: 60 minutes.  Speech Language Pathology:  [place]    Signed by: Naomy Mendenhall PT Student     - CoSigned By: Eliane Blackburn PT 4/8/2019 3:38:40 PM

## 2019-04-08 NOTE — PLAN OF CARE
Problem: Stroke (IRF) (Adult)  Goal: Promote Optimal Functional [place]  Outcome: Ongoing (interventions implemented as appropriate)   04/08/19 0107   Stroke (IRF) (Adult)   Promote Optimal Functional [place] demonstrating adequate progress       Problem: Fall Risk (Adult)  Goal: Absence of Fall  Outcome: Ongoing (interventions implemented as appropriate)   04/08/19 0107   Fall Risk (Adult)   Absence of Fall making progress toward outcome       Problem: Diabetes, Type 2 (Adult)  Goal: Signs and Symptoms of Listed Potential Problems Will be Absent, Minimized or Managed (Diabetes, Type 2)  Outcome: Ongoing (interventions implemented as appropriate)   04/08/19 0107   Goal/Outcome Evaluation   Problems Assessed (Type 2 Diabetes) all   Problems Present (Type 2 Diabetes) none       Problem: Skin Injury Risk (Adult)  Goal: Skin Health and Integrity  Outcome: Ongoing (interventions implemented as appropriate)   04/08/19 0107   Skin Injury Risk (Adult)   Skin Health and Integrity making progress toward outcome       Problem: Patient Care Overview  Goal: Plan of Care Review   04/08/19 0107   Patient Care Overview   IRF Plan of Care Review progress ongoing, continue   Progress, Functional Goals demonstrating adequate progress   Coping/Psychosocial   Plan of Care Reviewed With patient   OTHER   Outcome Summary Patient calm and cooperative. Tylenol given with nighttime medications. Xanax given at HS to help promote sleep. No unsafe behaviors. [facility] still presents with aphasia.

## 2019-04-08 NOTE — THERAPY TREATMENT NOTE
Inpatient Rehabilitation - Occupational Therapy Treatment Note    [facility]     Patient Name: Nayan Ryan  : 1965  MRN: [id number]    Today's Date: 2019                 Admit Date: 3/24/2019      Visit Dx:  No diagnosis found.    Patient Active Problem List   Diagnosis   • Acute ischemic left PCA stroke (CMS/HCC)   • Status post placement of implantable loop recorder   • HTN (hypertension)   • Diabetes mellitus (CMS/HCC)   • Hyperlipidemia   • Morbid obesity (CMS/HCC)   • Anxiety disorder   • Migraine   • H/O hernia repair   • Abdominal wall abscess   • Back pain   • Stroke (cerebrum) (CMS/HCC)   • Vitamin D deficiency   • History of fatty infiltration of liver         Therapy Treatment    IRF Treatment Summary     Row Name 19 1207 19 1100 19 0955       Evaluation/Treatment Time and Intent    Subjective Information  no complaints  -DN  no complaints  -SA  no complaints  (Pended)   -LS    Existing Precautions/Restrictions  fall  -DN  fall  -SA  fall  (Pended)   -LS    Document Type  therapy note (daily note)  -DN  therapy note (daily note)  -SA  therapy note (daily note)  (Pended)   -    Mode of Treatment  occupational therapy  -DN  speech-language pathology  -SA  physical therapy  (Pended)   -LS    Patient/Family Observations  sitting in w/c  -DN  up in w/c;   -SA  Pt sitting in w/c arriving from . Ready to participate in therapy   (Pended)   -LS    Start Time (Evaluation/Treatment)  --  1100  -SA  --    Stop Time (Evaluation/Treatment)  --  1130  -SA  --    Recorded by [DN] Reg Mckinney, OT [SA] Narcisa Sanchez MS CCC-SLP [LS] Naomy Mendenhall, PT Student    Row Name 19 0830             Evaluation/Treatment Time and Intent    Subjective Information  no complaints  -SA      Existing Precautions/Restrictions  fall  -SA      Document Type  therapy note (daily note)  -SA      Mode of Treatment  speech-language pathology  -SA      Patient/Family Observations  up in w/c; ready for  ST  -SA      Start Time (Evaluation/Treatment)  0830  -SA      Stop Time (Evaluation/Treatment)  0900  -SA      Recorded by [SA] Narcisa Sanchez MS CCC-SLP      Row Name 04/08/19 1207 04/08/19 0955          Cognition/Psychosocial- PT/OT    Affect/Mental Status (Cognitive)  --  --  (Pended)  pt more engaging and initiates conversation this date  -LS     Orientation Status (Cognition)  oriented x 3  -DN  oriented x 3  (Pended)   -LS     Follows Commands (Cognition)  follows one step commands;75-90% accuracy  -DN  75-90% accuracy;verbal cues/prompting required;physical/tactile prompts required  (Pended)   -LS     Personal Safety Interventions  fall prevention program maintained;gait belt  -DN  fall prevention program maintained;gait belt;muscle strengthening facilitated;nonskid shoes/slippers when out of bed  (Pended)   -LS     Cognitive Function (Cognitive)  attention deficit;safety deficit  -DN  --     Attention Deficit (Cognitive)  moderate deficit  -DN  --     Memory Deficit (Cognitive)  --  moderate deficit  (Pended)   -LS     Safety Deficit (Cognitive)  --  ability to follow commands;judgment;problem solving;insight into deficits/self awareness  (Pended)   -LS     Recorded by [DN] eRg Mckinney, OT [LS] Naomy Mendenhall, PT Student     Row Name 04/08/19 0955             Transfer Assessment/Treatment    Comment (Transfers)  assist with set up and foot placement for STS  (Pended)   -LS      Recorded by [LS] Naomy Mendenhall, PT Student      Row Name 04/08/19 0955             Sit-Stand Transfer    Sit-Stand [place] (Transfers)  contact guard  (Pended)   -      Assistive Device (Sit-Stand Transfers)  wheelchair  (Pended)   -LS      Recorded by [LS] Naomy Mendenhall, PT Student      Row Name 04/08/19 0955             Stand-Sit Transfer    Stand-Sit [place] (Transfers)  contact guard;minimum assist (75% patient effort)  (Pended)   -      Assistive Device (Stand-Sit Transfers)  wheelchair  (Pended)   -       Recorded by [LS] Naomy Mendenhall, PT Student      Row Name 04/08/19 1207             Shower Transfer    Type (Shower Transfer)  stand pivot/stand step  -DN      [place] Level (Shower Transfer)  minimum assist (75% patient effort);nonverbal cues (demo/gesture);verbal cues  -DN      Assistive Device (Shower Transfer)  wheelchair;tub bench;grab bars/tub rail  -DN      Recorded by [DN] Reg Mckinney, OT      Row Name 04/08/19 0955             Gait/Stairs Assessment/Training    [place] Level (Gait)  contact guard;minimum assist (75% patient effort);2 person assist  (Pended)  posterior leaf AFO, 2nd person CGA   -LS      Assistive Device (Gait)  jay-bars  (Pended)   -LS      Distance in Feet (Gait)  40 x 2  (Pended)   -LS      Pattern (Gait)  step-through  (Pended)   -LS      Deviations/Abnormal Patterns (Gait)  base of support, narrow;nancy decreased;gait speed decreased  (Pended)   -LS      Bilateral Gait Deviations  forward flexed posture;heel strike decreased  (Pended)   -LS      Left Sided Gait Deviations  weight shift ability decreased  (Pended)   -LS      Right Sided Gait Deviations  foot drop/toe drag;knee buckling, right side;knee hyperextension;weight shift ability decreased  (Pended)   -LS      Comment (Gait/Stairs)  Pt with intermittent R knee buckling/hyperextension, able to improve knee control with verbal and tactile cues. Improved width of VITALY and decreased crossing over during turns this date. Pt reports some R knee pain after ambulation. CGA-min A at R LE for knee control  (Pended)   -LS      Recorded by [LS] Naomy Mendenhall, PT Student      Row Name 04/08/19 0943             Wheelchair Mobility/Management    Method of Wheelchair Locomotion (Mobility)  jay-bipedal propulsion (left sided)  (Pended)   -LS      Mobility Activities (Wheelchair)  forward propulsion;turning  (Pended)   -LS      Forward Propulsion [place] (Wheelchair)  supervision  (Pended)   -LS      Steering [place]  (Wheelchair)  supervision  (Pended)   -LS      Turning [place] (Wheelchair)  supervision  (Pended)   -LS      Doorway Navigation [place] (Wheelchair)  supervision  (Pended)   -LS      Distance Propelled in Feet (Wheelchair)  100  (Pended)   -LS      Recorded by [LS] Naomy Mendenhall, PT Student      Row Name 04/08/19 0955             Safety Issues, Functional Mobility    Safety Issues Affecting Function (Mobility)  sequencing abilities;insight into deficits/self awareness;problem solving  (Pended)   -LS      Impairments Affecting Function (Mobility)  cognition;balance;motor planning;strength;visual/perceptual;postural/trunk control  (Pended)   -LS      Recorded by [LS] Naomy Mendenhall, PT Student      Row Name 04/08/19 1207             Bathing Assessment/Treatment    Bathing [place] Level  bathing skills;verbal cues;nonverbal cues (demo/gesture);set up;contact guard assist  -DN      Assistive Device (Bathing)  tub bench;hand held shower spray hose;grab bar/tub rail  -DN      Bathing Position  supported sitting;supported standing  -DN      Bathing Setup Assistance  adjust water temperature  -DN      Comment (Bathing)  vc for safety with standing and movements  -DN      Recorded by [DN] Reg Mckinney, OT      Row Name 04/08/19 1207             Upper Body Dressing Assessment/Treatment    Upper Body Dressing Task  upper body dressing skills;doff;don;pull over garment;nonverbal cues (demo/gesture);verbal cues  -DN      Upper Body Dressing Position  supported sitting;supported standing  -DN      Set-up Assistance (Upper Body Dressing)  obtain clothing  -DN      Recorded by [DN] Reg Mckinney, OT      Row Name 04/08/19 1207             Lower Body Dressing Assessment/Treatment    Lower Body Dressing [place] Level  doff;don;pants/bottoms;shoes/slippers;socks;underwear;minimum assist (75% patient effort);verbal cues;nonverbal cues (demo/gesture);set up  -DN      Lower Body Dressing Position  supported  standing;supported sitting  -DN      Lower Body Dressing Setup Assistance  obtain clothing  -DN      Comment (Lower Body Dressing)  vc for sequence and safe techniques  -DN      Recorded by [DN] Reg Mckinney, OT      Row Name 04/08/19 0779             Grooming Assessment/Treatment    Grooming [place] Level  grooming skills;deodorant application;shave face;supervision;verbal cues;nonverbal cues (demo/gesture)  -DN      Grooming Position  sink side;supported sitting  -DN      Grooming Setup Assistance  obtain supplies  -DN      Comment (Grooming)  min vc for seqiencing, R neglect is better comp for  -DN      Recorded by [DN] Reg Mckinney, OT      Row Name 04/08/19 9451             Pain Assessment    Additional Documentation  Pain Scale: FACES Pre/Post-Treatment (Group)  (Pended)   -LS      Recorded by [LS] Naomy Mendenhall, PT Student      Row Name 04/08/19 2720             Pain Scale: Numbers Pre/Post-Treatment    Pain Scale: Numbers, Pretreatment  0/10 - no pain  -DN      Pain Scale: Numbers, Post-Treatment  0/10 - no pain  -DN      Recorded by [DN] Reg Mckinney, OT      Row Name 04/08/19 9861             Pain Scale: FACES Pre/Post-Treatment    Pain: FACES Scale, Pretreatment  2-->hurts little bit  (Pended)   -LS      Pain: FACES Scale, Post-Treatment  2-->hurts little bit  (Pended)   -LS      Pre/Post Treatment Pain Comment  R knee/ankle  (Pended)   -LS      Recorded by [LS] Naomy Mendenhall, PT Student      Row Name 04/08/19 2131             Lower Extremity Seated Therapeutic Exercise    Performed, Seated Lower Extremity (Therapeutic Exercise)  hip flexion/extension;knee flexion/extension;ankle dorsiflexion/plantarflexion  (Pended)   -LS      Exercise Type, Seated Lower Extremity (Therapeutic Exercise)  AROM (active range of motion)  (Pended)   -LS      Sets/Reps Detail, Seated Lower Extremity (Therapeutic Exercise)  15  (Pended)   -LS      Comment, Seated Lower Extremity (Therapeutic Exercise)  ankle  eversion and DF performed with manual resistance from PT  (Pended)   -LS      Recorded by [LS] Naomy Mendenhall, PT Student      Row Name 04/08/19 1207 04/08/19 0955          Positioning and Restraints    Pre-Treatment Position  sitting in chair/recliner  -DN  sitting in chair/recliner  (Pended)   -LS     Post Treatment Position  wheelchair  -DN  wheelchair  (Pended)   -LS     In Wheelchair  --  sitting;with OT  (Pended)   -LS     Recorded by [DN] Reg Mckinney, OT [LS] Naomy Mendenhall, PT Student       User Key  (r) = Recorded By, (t) = Taken By, (c) = Cosigned By    Initials Name Effective Dates    Reg Bolden, OT 06/08/18 -     Narcisa Gaston, MS CCC-SLP 04/03/18 -     Naomy Armas, PT Student 02/04/19 -           Wound 03/26/19 0900 Left chest incision (Active)   Dressing Appearance open to air 4/7/2019  9:11 PM   Closure Liquid skin adhesive;Approximated 4/7/2019  9:11 PM   Base clean;dry 4/7/2019  9:11 PM   Drainage Amount none 4/7/2019  9:11 PM   Dressing Care, Wound open to air 4/8/2019  9:32 AM         OT Recommendation and Plan    Anticipated Equipment Needs At Discharge (OT Eval): commode, 3-in-1, shower chair, tub bench  Planned Therapy Interventions (OT Eval): BADL retraining, cognitive/visual perception retraining, functional balance retraining, neuromuscular control/coordination retraining, strengthening exercise, transfer/mobility retraining            OT IRF GOALS     Row Name 04/02/19 1500             Bathing Goal 1 (OT-IRF)    Activity/Device (Bathing Goal 1, OT-IRF)  bathing skills, all  -DN      [place] Level (Bathing Goal 1, OT-IRF)  contact guard assist;verbal cues required  -DN      Time Frame (Bathing Goal 1, OT-IRF)  short term goal (STG)  -DN      Progress/Outcomes (Bathing Goal 1, OT-IRF)  goal met;goal revised this date  -DN         Bathing Goal 2 (OT-IRF)    Activity/Device (Bathing Goal 2, OT-IRF)  bathing skills, all  -DN      [place] Level (Bathing Goal 2, OT-IRF)   supervision required  -DN      Time Frame (Bathing Goal 2, OT-IRF)  long term goal (LTG)  -DN      Progress/Outcomes (Bathing Goal 2, OT-IRF)  goal ongoing  -DN         UB Dressing Goal 1 (OT-IRF)    Activity/Device (UB Dressing Goal 1, OT-IRF)  upper body dressing  -DN      [place] (UB Dress Goal 1, OT-IRF)  supervision required  -DN      Time Frame (UB Dressing Goal 1, OT-IRF)  short term goal (STG)  -DN      Progress/Outcomes (UB Dressing Goal 1, OT-IRF)  goal met;goal revised this date  -DN         UB Dressing Goal 2 (OT-IRF)    Activity/Device (UB Dressing Goal 2, OT-IRF)  upper body dressing  -DN      [place] (UB Dress Goal 2, OT-IRF)  supervision required  -DN      Time Frame (UB Dressing Goal 2, OT-IRF)  long term goal (LTG)  -DN      Progress/Outcomes (UB Dressing Goal 2, OT-IRF)  goal ongoing;goal met  -DN         LB Dressing Goal 1 (OT-IRF)    Activity/Device (LB Dressing Goal 1, OT-IRF)  lower body dressing  -DN      [place] (LB Dressing Goal 1, OT-IRF)  minimum assist (75% or more patient effort);verbal cues required;tactile cues required  -DN      Time Frame (LB Dressing Goal 1, OT-IRF)  short term goal (STG)  -DN      Progress/Outcomes (LB Dressing Goal 1, OT-IRF)  goal met;goal revised this date  -DN         LB Dressing Goal 2 (OT-IRF)    Activity/Device (LB Dressing Goal 2, OT-IRF)  lower body dressing  -DN      [place] (LB Dressing Goal 2, OT-IRF)  verbal cues required;tactile cues required;minimum assist (75% or more patient effort)  -DN      Time Frame (LB Dressing Goal 2, OT-IRF)  long term goal (LTG)  -DN      Progress/Outcomes (LB Dressing Goal 2, OT-IRF)  goal ongoing  -DN         Grooming Goal 1 (OT-IRF)    Activity/Device (Grooming Goal 1, OT-IRF)  grooming skills, all  -DN      [place] (Grooming Goal 1, OT-IRF)  supervision required  -DN      Time Frame (Grooming Goal 1, OT-IRF)  short term goal (STG)  -DN      Progress/Outcomes (Grooming Goal 1, OT-IRF)  goal  partially met;goal ongoing  -DN         Grooming Goal 2 (OT-IRF)    Activity/Device (Grooming Goal 2, OT-IRF)  grooming skills, all  -DN      [place] (Grooming Goal 2, OT-IRF)  supervision required  -DN      Time Frame (Grooming Goal 2, OT-IRF)  long term goal (LTG)  -DN      Progress/Outcomes (Grooming Goal 2, OT-IRF)  goal revised this date  -DN         Toileting Goal 1 (OT-IRF)    Activity/Device (Toileting Goal 1, OT-IRF)  toileting skills, all  -DN      [place] Level (Toileting Goal 1, OT-IRF)  minimum assist (75% or more patient effort);verbal cues required;tactile cues required  -DN      Time Frame (Toileting Goal 1, OT-IRF)  short term goal (STG)  -DN      Progress/Outcomes (Toileting Goal 1, OT-IRF)  goal met;goal revised this date  -DN         Toileting Goal 2 (OT-IRF)    Activity/Device (Toileting Goal 2, OT-IRF)  toileting skills, all  -DN      [place] Level (Toileting Goal 2, OT-IRF)  contact guard assist;verbal cues required;tactile cues required  -DN      Time Frame (Toileting Goal 2, OT-IRF)  long term goal (LTG)  -DN      Progress/Outcomes (Toileting Goal 2, OT-IRF)  goal ongoing  -DN         Self-Feeding Goal 1 (OT-IRF)    Activity/Device (Self-Feeding Goal 1, OT-IRF)  self-feeding skills, all  -DN      [place] (Self-Feeding Goal 1, OT-IRF)  supervision required  -DN      Time Frame (Self-Feeding Goal 1, OT-IRF)  short term goal (STG)  -DN      Progress/Outcomes 1 (Self-Feeding Goal, OT-IRF)  goal met;goal ongoing  -DN         Self-Feeding Goal 2 (OT-IRF)    Activity/Device (Self-Feeding Goal 2, OT-IRF)  self-feeding skills, all  -DN      [place] (Self-Feeding Goal 2, OT-IRF)  supervision required  -DN      Time Frame (Self-Feeding Goal 2, OT-IRF)  long term goal (LTG)  -DN      Progress/Outcomes (Self-Feeding Goal 2, OT-IRF)  goal met;goal ongoing  -DN         Caregiver Training Goal 2 (OT-IRF)    Caregiver Training Goal 2 (OT-IRF)  pt caregiver to be independent with any  assist pt needs with adls, transfers and HEP for d/c home  -DN      Time Frame (Caregiver Training Goal 2, OT-IRF)  long term goal (LTG)  -DN      Progress/Outcomes (Caregiver Training Goal 2, OT-IRF)  goal ongoing  -DN        User Key  (r) = Recorded By, (t) = Taken By, (c) = Cosigned By    Initials Name Provider Type    Reg Bolden OT Occupational Therapist          Occupational Therapy Education     Title: PT OT SLP Therapies (In Progress)     Topic: Occupational Therapy (Done)     Point: ADL training (Done)     Description: Instruct learner(s) on proper safety adaptation and remediation techniques during self care or transfers.   Instruct in proper use of assistive devices.    Learning Progress Summary           Patient Acceptance, E,TB,D, VU,NR by DN at 4/8/2019 12:16 PM    Comment:  transfer training, jay skills with adls, safety,etc    Acceptance, E,TB,D, VU,NR by DN at 4/4/2019  3:19 PM    Comment:  theraputty exercises, jay adls and transfer trainning    Acceptance, E,TB,D, NR by DN at 3/26/2019 12:06 PM    Comment:  jay adls and commode/shower transfers, still max vc for all due to cognition and visual impairments    Acceptance, E,TB,D, VU,NR by DN at 3/25/2019  5:23 PM    Comment:  OT role in rehab, transfer traning, jay adls                   Point: Home exercise program (Done)     Description: Instruct learner(s) on appropriate technique for monitoring, assisting and/or progressing therapeutic exercises/activities.    Learning Progress Summary           Patient Acceptance, E,TB,D, VU,NR by DN at 4/8/2019 12:16 PM    Comment:  transfer training, jay skills with adls, safety,etc    Acceptance, E,TB,D, VU,NR by DN at 4/4/2019  3:19 PM    Comment:  theraputty exercises, ajy adls and transfer trainning    Acceptance, E,TB,D, NR by DN at 3/26/2019 12:06 PM    Comment:  jay adls and commode/shower transfers, still max vc for all due to cognition and visual impairments    Acceptance, E,TB,D, VU,NR  by DN at 3/25/2019  5:23 PM    Comment:  OT role in rehab, transfer traning, jay adls                   Point: Precautions (Done)     Description: Instruct learner(s) on prescribed precautions during self-care and functional transfers.    Learning Progress Summary           Patient Acceptance, E,TB,D, VU,NR by DN at 4/8/2019 12:16 PM    Comment:  transfer training, jay skills with adls, safety,etc    Acceptance, E,TB,D, VU,NR by DN at 4/4/2019  3:19 PM    Comment:  theraputty exercises, jay adls and transfer trainning    Acceptance, E,TB,D, NR by DN at 3/26/2019 12:06 PM    Comment:  jay adls and commode/shower transfers, still max vc for all due to cognition and visual impairments    Acceptance, E,TB,D, VU,NR by DN at 3/25/2019  5:23 PM    Comment:  OT role in rehab, transfer traning, jay adls                   Point: Body mechanics (Done)     Description: Instruct learner(s) on proper positioning and spine alignment during self-care, functional mobility activities and/or exercises.    Learning Progress Summary           Patient Acceptance, E,TB,D, VU,NR by DN at 4/8/2019 12:16 PM    Comment:  transfer training, jay skills with adls, safety,etc    Acceptance, E,TB,D, VU,NR by DN at 4/4/2019  3:19 PM    Comment:  theraputty exercises, jay adls and transfer trainning    Acceptance, E,TB,D, NR by DN at 3/26/2019 12:06 PM    Comment:  jay adls and commode/shower transfers, still max vc for all due to cognition and visual impairments    Acceptance, E,TB,D, VU,NR by DN at 3/25/2019  5:23 PM    Comment:  OT role in rehab, transfer traning, jay adls                               User Key     Initials Effective Dates Name Provider Type Discipline    DN 06/08/18 -  Reg Mckinney OT Occupational Therapist OT                       Time Calculation:     Time Calculation- OT     Row Name 04/08/19 1217             Time Calculation- OT    OT Start Time  0900  -DN      OT Stop Time  0930  -DN      OT Time Calculation  (min)  30 min  -DN      OT Received On  04/08/19  -DN        User Key  (r) = Recorded By, (t) = Taken By, (c) = Cosigned By    Initials Name Provider Type    Reg Bolden OT Occupational Therapist          Therapy Charges for Today     Code Description Service Date Service Provider Modifiers Qty    99332734297 HC OT SELF CARE/MGMT/TRAIN EA 15 MIN 4/8/2019 Reg Mckinney OT GO 2                   Reg Mckinney OT  4/8/2019

## 2019-04-08 NOTE — PROGRESS NOTES
Adult Nutrition  Assessment/PES    Patient Name:  Nayan Ryan  YOB: 1965  MRN: [id number]  Admit Date:  3/24/2019    Assessment Date:  4/8/2019    Reason for Assessment     Row Name 04/08/19 1135          Reason for Assessment    Reason For Assessment  follow-up protocol         Nutrition/Diet History     Row Name 04/08/19 1135          Nutrition/Diet History    Typical Food/Fluid Intake  intake/appetite good           Labs/Tests/Procedures/Meds     Row Name 04/08/19 1135          Labs/Procedures/Meds    Lab Results Reviewed  reviewed     Lab Results Comments  labs wnl         Diagnostic Tests/Procedures    Diagnostic Test/Procedure Reviewed  reviewed        Medications    Pertinent Medications Reviewed  reviewed             Nutrition Prescription Ordered     Row Name 04/08/19 1137 04/08/19 1136       Nutrition Prescription PO    Current PO Diet  --  Regular    Common Modifiers  Consistent Carbohydrate;Cardiac  Consistent Carbohydrate;Cardiac        Evaluation of Received Nutrient/Fluid Intake     Row Name 04/08/19 1137          PO Evaluation    % PO Intake  100%           Problem/Interventions:      Intervention Goal     Row Name 04/08/19 1137          Intervention Goal    General  Maintain nutrition     PO  Maintain intake         Nutrition Intervention     Row Name 04/08/19 1137          Nutrition Intervention    RD/Tech Action  Follow Tx progress;Care plan reviewd           Education/Evaluation     Row Name 04/08/19 1137          Monitor/Evaluation    Monitor  Per protocol           Electronically signed by:  Keerthi Putnam RD  04/08/19 11:38 AM

## 2019-04-08 NOTE — THERAPY TREATMENT NOTE
Inpatient Rehabilitation - Physical Therapy Progress Note  [facility]     Patient Name: Nayan Ryan  : 1965  MRN: [id number]    Today's Date: 2019                 Admit Date: 3/24/2019      Visit Dx:    No diagnosis found.    Patient Active Problem List   Diagnosis   • Acute ischemic left PCA stroke (CMS/HCC)   • Status post placement of implantable loop recorder   • HTN (hypertension)   • Diabetes mellitus (CMS/HCC)   • Hyperlipidemia   • Morbid obesity (CMS/HCC)   • Anxiety disorder   • Migraine   • H/O hernia repair   • Abdominal wall abscess   • Back pain   • Stroke (cerebrum) (CMS/HCC)   • Vitamin D deficiency   • History of fatty infiltration of liver       Therapy Treatment    IRF Treatment Summary     Row Name 19 1507 19 1207 19 1100       Evaluation/Treatment Time and Intent    Subjective Information  no complaints  -DN  no complaints  -DN  no complaints  -SA    Existing Precautions/Restrictions  fall  -DN  fall  -DN  fall  -SA    Document Type  therapy note (daily note)  -DN  therapy note (daily note)  -DN  therapy note (daily note)  -SA    Mode of Treatment  occupational therapy  -DN  occupational therapy  -DN  speech-language pathology  -SA    Patient/Family Observations  --  sitting in w/c  -DN  up in w/c;   -SA    Start Time (Evaluation/Treatment)  --  --  1100  -SA    Stop Time (Evaluation/Treatment)  --  --  1130  -SA    Recorded by [DN] Reg Mckinney, OT [DN] Reg Mckinney OT [SA] Narcisa Sanchez MS CCC-SLP    Row Name 19 0955 19 0830          Evaluation/Treatment Time and Intent    Subjective Information  no complaints  -LB,LS,LB2  no complaints  -SA     Existing Precautions/Restrictions  fall  -LB,LS,LB2  fall  -SA     Document Type  therapy note (daily note)  -LB,LS,LB2  therapy note (daily note)  -SA     Mode of Treatment  physical therapy  -LB,LS,LB2  speech-language pathology  -SA     Patient/Family Observations  Pt sitting in w/c arriving from [facility]  Ready to participate in therapy   -LB,LS,LB2  up in w/c; ready for ST  -SA     Start Time (Evaluation/Treatment)  --  0830  -SA     Stop Time (Evaluation/Treatment)  --  0900  -SA     Recorded by [LB,LS,LB2] Eliane Blackburn, PT (r) Naomy Mendenhall, PT Student (t) Eliane Blackburn, PT (c) [SA] Narcisa Sanchez MS CCC-SLP     Row Name 04/08/19 1207 04/08/19 0955          Cognition/Psychosocial- PT/OT    Affect/Mental Status (Cognitive)  --  -- pt more engaging and initiates conversation this date  -LB,LS,LB2     Orientation Status (Cognition)  oriented x 3  -DN  oriented x 3  -LB,LS,LB2     Follows Commands (Cognition)  follows one step commands;75-90% accuracy  -DN  75-90% accuracy;verbal cues/prompting required;physical/tactile prompts required  -LB,LS,LB2     Personal Safety Interventions  fall prevention program maintained;gait belt  -DN  fall prevention program maintained;gait belt;muscle strengthening facilitated;nonskid shoes/slippers when out of bed  -LB,LS,LB2     Cognitive Function (Cognitive)  attention deficit;safety deficit  -DN  --     Attention Deficit (Cognitive)  moderate deficit  -DN  --     Memory Deficit (Cognitive)  --  moderate deficit  -LB,LS,LB2     Safety Deficit (Cognitive)  --  ability to follow commands;judgment;problem solving;insight into deficits/self awareness  -LB,LS,LB2     Recorded by [DN] Reg Mckinney, OT [LB,LS,LB2] Eliane Blackburn PT (r) Naomy Mendenhall, PT Student (t) Eliane Blackburn, PT (c)     Row Name 04/08/19 0955             Bed Mobility Assessment/Treatment    Rolling Left [place] (Bed Mobility)  verbal cues;minimum assist (75% patient effort) Min A to hold R LE in place  -LB,LS,LB2      Rolling Right [place] (Bed Mobility)  verbal cues;supervision  -LB,LS,LB2      Supine-Sit [place] (Bed Mobility)  verbal cues;contact guard;minimum assist (75% patient effort)  -LB,LS,LB2      Sit-Supine [place] (Bed Mobility)  verbal cues;contact guard  -LB,LS,LB2      Bed  Mobility, Safety Issues  decreased use of arms for pushing/pulling;decreased use of legs for bridging/pushing  -LB,LS,LB2      Recorded by [LB,LS,LB2] Eliane Blackburn, PT (r) Naomy Mendenhall PT Student (t) Eliane Blackbrun, PT (c)      Row Name 04/08/19 1507 04/08/19 0955          Transfer Assessment/Treatment    Comment (Transfers)  min w/c to mat with SPS, sit to stand still unsafe vc for proper foot placement and safe technique, pt still presents with poor insight into deficits  -DN  assist with set up and foot placement for STS  -LB,LS,LB2     Recorded by [DN] Reg Mckinney, OT [LB,LS,LB2] Eliane Blackburn, PT (r) Naomy Mendenhall, PT Student (t) Eliane Blackburn, PT (c)     Row Name 04/08/19 0955             Bed-Chair Transfer    Bed-Chair [place] (Transfers)  verbal cues;minimum assist (75% patient effort);set up  -LB,LS,LB2      Assistive Device (Bed-Chair Transfers)  wheelchair  -LB,LS,LB2      Recorded by [LB,LS,LB2] Eliane Blackburn, PT (r) Naomy Mendenhall, PT Student (t) Eliane Blackburn, PT (c)      Row Name 04/08/19 0955             Chair-Bed Transfer    Chair-Bed [place] (Transfers)  verbal cues;minimum assist (75% patient effort);set up  -LB,LS,LB2      Assistive Device (Chair-Bed Transfers)  wheelchair  -LB,LS,LB2      Recorded by [LB,LS,LB2] Eliane Blackburn B, PT (r) Naomy Mendenhall, PT Student (t) Eliane Blackburn, PT (c)      Row Name 04/08/19 0955             Sit-Stand Transfer    Sit-Stand [place] (Transfers)  contact guard  -LB,LS,LB2      Assistive Device (Sit-Stand Transfers)  wheelchair hemibars  -LB3      Recorded by [LB,LS,LB2] Sonia Blackburni B, PT (r) Naomy Mendenhall, PT Student (t) Eliane Blackburn, PT (c)  [LB3] Eliane Blackburn, PT      Row Name 04/08/19 0955             Stand-Sit Transfer    Stand-Sit [place] (Transfers)  contact guard;minimum assist (75% patient effort)  -LB,LS,LB2      Assistive Device (Stand-Sit Transfers)  wheelchair  -LB,LS,LB2      Recorded by [LB,LS,LB2] Eliane Blackburn PT (r)  Naomy Mendenhall, PT Student (t) Eliane Blackburn, PT (c)      Row Name 04/08/19 1207             Shower Transfer    Type (Shower Transfer)  stand pivot/stand step  -DN      [place] Level (Shower Transfer)  minimum assist (75% patient effort);nonverbal cues (demo/gesture);verbal cues  -DN      Assistive Device (Shower Transfer)  wheelchair;tub bench;grab bars/tub rail  -DN      Recorded by [DN] Reg Mckinney OT      Row Name 04/08/19 0922             Gait/Stairs Assessment/Training    [place] Level (Gait)  minimum assist (75% patient effort);2 person assist posterior leaf AFO, 2nd person CGA   -LB      Assistive Device (Gait)  jay-bars  -LB2,LS,LB3      Distance in Feet (Gait)  40 x 2, 60 x 2  -LB2,LS,LB3      Pattern (Gait)  step-through  -LB2,LS,LB3      Deviations/Abnormal Patterns (Gait)  base of support, narrow;nancy decreased;gait speed decreased  -LB2,LS,LB3      Bilateral Gait Deviations  forward flexed posture;heel strike decreased  -LB2,LS,LB3      Left Sided Gait Deviations  weight shift ability decreased  -LB2,LS,LB3      Right Sided Gait Deviations  foot drop/toe drag;knee buckling, right side;knee hyperextension;weight shift ability decreased  -LB2,LS,LB3      Comment (Gait/Stairs)  Pt with intermittent R knee buckling/hyperextension, able to improve knee control with verbal and tactile cues. Improved width of [place] and decreased crossing over during turns this date. Pt reports some R knee pain after ambulation. CGA-min A at R LE for knee control  -LB2,LS,LB3      Recorded by [LB] Eliane Blackburn, PT  [LB2,LS,LB3] Eliane Blackburn, PT (r) Naomy Mendenhall, PT Student (t) Eliane Blackburn, PT (c)      Row Name 04/08/19 0995             Wheelchair Mobility/Management    Method of Wheelchair Locomotion (Mobility)  jay-bipedal propulsion (left sided)  -LB,LS,LB2      Mobility Activities (Wheelchair)  forward propulsion;turning  -LB,LS,LB2      Forward Propulsion [place] (Wheelchair)  supervision   -LB,LS,LB2      Steering [place] (Wheelchair)  supervision  -LB,LS,LB2      Turning [place] (Wheelchair)  supervision  -LB,LS,LB2      Doorway Navigation [place] (Wheelchair)  supervision  -LB,LS,LB2      Distance Propelled in Feet (Wheelchair)  100  -LB,LS,LB2      Recorded by [LB,LS,LB2] Eliane Blackburn, PT (r) Naomy Mendenhall, PT Student (t) Eliane Blackburn, PT (c)      Row Name 04/08/19 0955             Safety Issues, Functional Mobility    Safety Issues Affecting Function (Mobility)  sequencing abilities;insight into deficits/self awareness;problem solving  -LB,LS,LB2      Impairments Affecting Function (Mobility)  cognition;balance;motor planning;strength;visual/perceptual;postural/trunk control  -LB,LS,LB2      Recorded by [LB,LS,LB2] Eliane Blackburn, PT (r) Naomy Mendenhall, PT Student (t) Eliane Blackburn, PT (c)      Row Name 04/08/19 1207             Bathing Assessment/Treatment    Bathing [place] Level  bathing skills;verbal cues;nonverbal cues (demo/gesture);set up;contact guard assist  -DN      Assistive Device (Bathing)  tub bench;hand held shower spray hose;grab bar/tub rail  -DN      Bathing Position  supported sitting;supported standing  -DN      Bathing Setup Assistance  adjust water temperature  -DN      Comment (Bathing)  vc for safety with standing and movements  -DN      Recorded by [DN] Reg Mckinney OT      Row Name 04/08/19 1207             Upper Body Dressing Assessment/Treatment    Upper Body Dressing Task  upper body dressing skills;doff;don;pull over garment;nonverbal cues (demo/gesture);verbal cues  -DN      Upper Body Dressing Position  supported sitting;supported standing  -DN      Set-up Assistance (Upper Body Dressing)  obtain clothing  -DN      Recorded by [DN] Reg Mckinney OT      Row Name 04/08/19 1207             Lower Body Dressing Assessment/Treatment    Lower Body Dressing [place] Level  doff;don;pants/bottoms;shoes/slippers;socks;underwear;minimum assist  (75% patient effort);verbal cues;nonverbal cues (demo/gesture);set up  -DN      Lower Body Dressing Position  supported standing;supported sitting  -DN      Lower Body Dressing Setup Assistance  obtain clothing  -DN      Comment (Lower Body Dressing)  vc for sequence and safe techniques  -DN      Recorded by [DN] Reg Mckinney, OT      Row Name 04/08/19 1207             Grooming Assessment/Treatment    Grooming [place] Level  grooming skills;deodorant application;shave face;supervision;verbal cues;nonverbal cues (demo/gesture)  -DN      Grooming Position  sink side;supported sitting  -DN      Grooming Setup Assistance  obtain supplies  -DN      Comment (Grooming)  min vc for seqiencing, R neglect is better comp for  -DN      Recorded by [DN] Reg Mckinney, OT      Row Name 04/08/19 1507             Fine Motor Testing & Training    Comment, Fine Motor Coordination  pt sitting on mat worked on lateral/3point pinch with RUE with dowel and rings of various sizes, and pipe tree fabrications for increased FMC/GMC visual compensations to R etc  -DN      Recorded by [DN] Reg Mckinney, OT      Row Name 04/08/19 1507             Vision Assessment/Intervention    Visual Impairment/Limitations  peripheral vision impaired right  -DN      Visual Field Deficit  homonymous hemianopsia, right  -DN      Visual Motor Impairment  strabismus, right  -DN      Visual Processing Deficit  visual attention, right;jay-inattention/neglect, right  -DN      Recorded by [DN] Reg Mckinney, OT      Row Name 04/08/19 0947             Pain Assessment    Additional Documentation  Pain Scale: FACES Pre/Post-Treatment (Group)  -LB,LS,LB2      Recorded by [LB,LS,LB2] Eliane Blackburn, PT (r) Naomy Mendenhall PT Student (t) Eliane Blackburn, PT (c)      Row Name 04/08/19 1507 04/08/19 1207          Pain Scale: Numbers Pre/Post-Treatment    Pain Scale: Numbers, Pretreatment  0/10 - no pain  -DN  0/10 - no pain  -DN     Pain Scale: Numbers,  Post-Treatment  0/10 - no pain  -DN  0/10 - no pain  -DN     Recorded by [DN] Reg Mckinney, OT [DN] Reg Mckinney, OT     Row Name 04/08/19 0955             Pain Scale: FACES Pre/Post-Treatment    Pain: FACES Scale, Pretreatment  2-->hurts little bit  -LB,LS,LB2      Pain: FACES Scale, Post-Treatment  2-->hurts little bit  -LB,LS,LB2      Pre/Post Treatment Pain Comment  R knee/ankle  -LB,LS,LB2      Recorded by [LB,LS,LB2] Eliane Blackburn, PT (r) Naomy Mendenhall, PT Student (t) Eliane Blackburn, PT (c)      Row Name 04/08/19 0955             Lower Extremity Orthosis Management    Type (Lower Extremity Orthosis)  posterior strut carbon fiber AFO  -LB      Therapeutic Indications (Lower Extremity Orthosis)  compensation for lost function  -LB      Orthosis Training (Lower Extremity Orthosis)  patient;purpose/goals of orthosis  -LB      Skin Assessment (Lower Extremity Orthosis)  skin intact  -LB      Recorded by [LB] Eliane Blackburn, PT      Row Name 04/08/19 0955             Lower Extremity Seated Therapeutic Exercise    Performed, Seated Lower Extremity (Therapeutic Exercise)  hip flexion/extension;knee flexion/extension;ankle dorsiflexion/plantarflexion  -LB,LS,LB2      Exercise Type, Seated Lower Extremity (Therapeutic Exercise)  AROM (active range of motion)  -LB,LS,LB2      Sets/Reps Detail, Seated Lower Extremity (Therapeutic Exercise)  15  -LB,LS,LB2      Comment, Seated Lower Extremity (Therapeutic Exercise)  ankle eversion and DF performed with manual resistance from PT  -LB,LS,LB2      Recorded by [LB,LS,LB2] Eliane Blackburn, PT (r) Naomy Mendenhall, PT Student (t) Eliane Blackburn, PT (c)      Row Name 04/08/19 0955             Lower Extremity Supine Therapeutic Exercise    Performed, Supine Lower Extremity (Therapeutic Exercise)  heel slides;ankle pumps;SLR (straight leg raise);hip abduction/adduction  -LB,LS,LB2      Exercise Type, Supine Lower Extremity (Therapeutic Exercise)  AROM (active range of motion);AAROM  (active assistive range of motion)  -LB,LS,LB2      Sets/Reps Detail, Supine Lower Extremity (Therapeutic Exercise)  2 x 10  -LB,LS,LB2      Comment, Supine Lower Extremity (Therapeutic Exercise)  Performed in bed--discussed pt performing these in the evening  -LB,LS,LB2      Recorded by [LB,LS,LB2] Eliane Blackburn, PT (r) Naomy Mendenhall, PT Student (t) Eliane Blackburn, PT (c)      Row Name 04/08/19 1507 04/08/19 1207 04/08/19 0955       Positioning and Restraints    Pre-Treatment Position  sitting in chair/recliner  -DN  sitting in chair/recliner  -DN  sitting in chair/recliner  -LB,LS,LB2    Post Treatment Position  wheelchair  -DN  wheelchair  -DN  bed  -LB,LS,LB2    In Bed  sitting;with PT  -DN  --  supine;call light within reach;encouraged to call for assist;exit alarm on;notified nsg  -LB,LS,LB2    In Wheelchair  --  --  --  -LB,LS,LB2    Recorded by [BRENDAN] Reg Mckinney, OT [DN] Reg Mckinney, OT [LB,LS,LB2] Eliane Blackburn, PT (r) Naomy Mendenhall, PT Student (t) Eliane Blackburn, PT (c)    Row Name 04/08/19 0955             Weekly Summary of Progress (PT)    Weekly Outcome Summary: Physical Therapy  Pt demonstrates improvements with ambulation endurance with improved clearance of R LE and decreased assistance needed to advance R LE. Pt with gradual improvements in R knee control, but continues to exhibit weakness of R LE compared to L. Pt with decreased level of assistance needed for bed mobility and transfers. Pt more engaged in recent therapy sessions. Pt will continue to benefit from skilled PT to increase safety and independence with functional mobility and improve R sided strength and coordination.  -LB,LS,LB2      Recorded by [LB,LS,LB2] Eliane Blackburn, PT (r) Naomy Mendenhall, PT Student (t) Eliane Blackburn, PT (c)        User Key  (r) = Recorded By, (t) = Taken By, (c) = Cosigned By    Initials Name Effective Dates    Reg Bolden, OT 06/08/18 -     LB Eliane Blackburn, PT 04/03/18 -     SA Narcisa Sanchez, MS  CCC-SLP 04/03/18 -     Naomy Armas, PT Student 02/04/19 -         Wound 03/26/19 0900 Left chest incision (Active)   Dressing Appearance open to air 4/7/2019  9:11 PM   Closure Liquid skin adhesive;Approximated 4/7/2019  9:11 PM   Base clean;dry 4/7/2019  9:11 PM   Drainage Amount none 4/7/2019  9:11 PM   Dressing Care, Wound open to air 4/8/2019  9:32 AM     Physical Therapy Education     Title: PT OT SLP Therapies (In Progress)     Topic: Physical Therapy (In Progress)     Point: Mobility training (Done)     Learning Progress Summary           Patient Acceptance, E,TB, VU,NR by  at 4/8/2019  8:54 AM    Comment:  Educated on why R leg is weak and how the stroke is affecting his knee control    Acceptance, E,TB, VU,NR by  at 4/5/2019 11:30 AM    Comment:  Discussed purpose of strengthening exercises. Educated on repeated practice of walking and other exercises to gradually build strength and endurance.    Acceptance, E, NR by  at 4/4/2019  9:52 AM    Acceptance, E, NR by  at 4/3/2019 10:16 AM    Acceptance, E, NR by  at 4/2/2019  3:18 PM    Acceptance, E, NR by  at 4/1/2019 10:32 AM    Acceptance, E,TB,D, NR by  at 3/30/2019 11:44 AM    Acceptance, E,TB,D, NR by EE at 3/29/2019  2:58 PM    Acceptance, E,TB,D, NR by EE at 3/28/2019 11:38 AM    Acceptance, E,TB,D, NR by EE at 3/27/2019 10:05 AM    Acceptance, E,TB,D, NR by EE at 3/26/2019  9:48 AM    Acceptance, E,TB,D, NR by EE at 3/25/2019  3:09 PM                   Point: Home exercise program (In Progress)     Learning Progress Summary           Patient Acceptance, E, NR by  at 4/4/2019  9:52 AM    Acceptance, E, NR by  at 4/3/2019 10:16 AM    Acceptance, E, NR by  at 4/2/2019  3:18 PM    Acceptance, E, NR by  at 4/1/2019 10:32 AM    Acceptance, E,TB,D, NR by EE at 3/29/2019  2:58 PM    Acceptance, E,TB,D, NR by EE at 3/28/2019 11:38 AM    Acceptance, E,TB,D, NR by EE at 3/27/2019 10:05 AM    Acceptance, E,TB,D, NR by EE at 3/26/2019   9:48 AM    Acceptance, E,TB,D, NR by EE at 3/25/2019  3:09 PM                   Point: Body mechanics (In Progress)     Learning Progress Summary           Patient Acceptance, E, NR by  at 4/4/2019  9:52 AM    Acceptance, E, NR by  at 4/3/2019 10:16 AM    Acceptance, E, NR by  at 4/2/2019  3:18 PM    Acceptance, E, NR by  at 4/1/2019 10:32 AM    Acceptance, E,TB,D, NR by  at 3/29/2019  2:58 PM    Acceptance, E,TB,D, NR by  at 3/28/2019 11:38 AM    Acceptance, E,TB,D, NR by EE at 3/27/2019 10:05 AM    Acceptance, E,TB,D, NR by  at 3/26/2019  9:48 AM    Acceptance, E,TB,D, NR by  at 3/25/2019  3:09 PM                   Point: Precautions (Done)     Learning Progress Summary           Patient Acceptance, E,TB, VU,NR by  at 4/8/2019  8:54 AM    Comment:  Educated on why R leg is weak and how the stroke is affecting his knee control    Acceptance, E,TB, VU,NR by  at 4/5/2019 11:30 AM    Comment:  Discussed purpose of strengthening exercises. Educated on repeated practice of walking and other exercises to gradually build strength and endurance.    Acceptance, E, NR by  at 4/4/2019  9:52 AM    Acceptance, E, NR by  at 4/3/2019 10:16 AM    Acceptance, E, NR by  at 4/2/2019  3:18 PM    Acceptance, E, NR by  at 4/1/2019 10:32 AM    Acceptance, E,TB,D, NR by  at 3/29/2019  2:58 PM    Acceptance, E,TB,D, NR by  at 3/28/2019 11:38 AM    Acceptance, E,TB,D, NR by  at 3/27/2019 10:05 AM    Acceptance, E,TB,D, NR by  at 3/26/2019  9:48 AM    Acceptance, E,TB,D, NR by  at 3/25/2019  3:09 PM                               User Key     Initials Effective Dates Name Provider Type Discipline     06/08/18 -  Passanisi, Donna C, PT Physical Therapist PT    LH 04/03/18 -  Clau Ayon, PT Physical Therapist PT    EE 04/03/18 -  Ariadne Garcia, PT Physical Therapist PT    LS 02/04/19 -  Naomy Mendenhall, PT Student PT Student                   PT Recommendation and Plan               PT IRF GOALS     Row  Name 04/08/19 1502             Bed Mobility Goal 1 (PT-IRF)    Activity/Assistive Device (Bed Mobility Goal 1, PT-IRF)  sit to supine/supine to sit  -LB (r) LS (t) LB (c)      [place] Level (Bed Mobility Goal 1, PT-IRF)  minimum assist (75% or more patient effort)  -LB (r) LS (t) LB (c)      Time Frame (Bed Mobility Goal 1, PT-IRF)  1 week;short term goal (STG)  -LB (r) LS (t) LB (c)      Progress/Outcomes (Bed Mobility Goal 1, PT-IRF)  goal met  -LB (r) LS (t) LB (c)         Bed Mobility Goal 2 (PT-IRF)    Activity/Assistive Device (Bed Mobility Goal 2, PT-IRF)  sit to supine/supine to sit;rolling to left;rolling to right  -LB (r) LS (t) LB (c)      [place] Level (Bed Mobility Goal 2, PT-IRF)  supervision required  -LB (r) LS (t) LB (c)      Time Frame (Bed Mobility Goal 2, PT-IRF)  4 weeks;long term goal (LTG)  -LB (r) LS (t) LB (c)      Progress/Outcomes (Bed Mobility Goal 2, PT-IRF)  continuing progress toward goal  -LB (r) LS (t) LB (c)         Transfer Goal 1 (PT-IRF)    Activity/Assistive Device (Transfer Goal 1, PT-IRF)  bed-to-chair/chair-to-bed  -LB (r) LS (t) LB (c)      [place] Level (Transfer Goal 1, PT-IRF)  moderate assist (50-74% patient effort)  -LB (r) LS (t) LB (c)      Time Frame (Transfer Goal 1, PT-IRF)  1 week;short term goal (STG)  -LB (r) LS (t) LB (c)      Progress/Outcomes (Transfer Goal 1, PT-IRF)  goal met  -LB (r) LS (t) LB (c)         Transfer Goal 2 (PT-IRF)    Activity/Assistive Device (Transfer Goal 2, PT-IRF)  sit-to-stand/stand-to-sit;bed-to-chair/chair-to-bed  -LB (r) LS (t) LB (c)      [place] Level (Transfer Goal 2, PT-IRF)  contact guard assist  -LB (r) LS (t) LB (c)      Time Frame (Transfer Goal 2, PT-IRF)  4 weeks;long term goal (LTG)  -LB (r) LS (t) LB (c)      Progress/Outcomes (Transfer Goal 2, PT-IRF)  goal partially met;good progress toward goal  -LB (r) LS (t) LB (c)         Transfer Goal 3 (PT-IRF)    Activity/Assistive Device (Transfer Goal 3,  PT-IRF)  car transfer  -LB (r) LS (t) LB (c)      [place] Level (Transfer Goal 3, PT-IRF)  contact guard assist  -LB (r) LS (t) LB (c)      Time Frame (Transfer Goal 3, PT-IRF)  4 weeks;long term goal (LTG)  -LB (r) LS (t) LB (c)      Progress/Outcomes (Transfer Goal 3, PT-IRF)  goal ongoing  -LB (r) LS (t) LB (c)         Gait/Walking Locomotion Goal 1 (PT-IRF)    Activity/Assistive Device (Gait/Walking Locomotion Goal 1, PT-IRF)  gait (walking locomotion)  -LB (r) LS (t) LB (c)      Gait/Walking Locomotion Distance Goal 1 (PT-IRF)  50  -LB (r) LS (t) LB (c)      [place] Level (Gait/Walking Locomotion Goal 1, PT-IRF)  minimum assist (75% or more patient effort)  -LB (r) LS (t) LB (c)      Time Frame (Gait/Walking Locomotion Goal 1, PT-IRF)  1 week;short term goal (STG)  -LB (r) LS (t) LB (c)      Progress/Outcomes (Gait/Walking Locomotion Goal 1, PT-IRF)  goal met  -LB (r) LS (t) LB (c)         Gait/Walking Locomotion Goal 2 (PT-IRF)    Activity/Assistive Device (Gait/Walking Locomotion Goal 2, PT-IRF)  gait (walking locomotion)  -LB (r) LS (t) LB (c)      Gait/Walking Locomotion Distance Goal 2 (PT-IRF)  80  -LB (r) LS (t) LB (c)      [place] Level (Gait/Walking Locomotion Goal 2, PT-IRF)  contact guard assist  -LB (r) LS (t) LB (c)      Time Frame (Gait/Walking Locomotion Goal 2, PT-IRF)  4 weeks;long term goal (LTG)  -LB (r) LS (t) LB (c)      Progress/Outcomes (Gait/Walking Locomotion Goal 2, PT-IRF)  continuing progress toward goal  -LB (r) LS (t) LB (c)         Wheelchair Locomotion Goal 1 (PT-IRF)    Activity (Wheelchair Locomotion Goal 1, PT-IRF)  forward propulsion;steering;turning  -LB (r) LS (t) LB (c)      [place] Level (Wheelchair Locomotion Goal 1, PT-IRF)  supervision required  -LB (r) LS (t) LB (c)      Distance Goal 1 (Wheelchair Locomotion, PT-IRF)  160  -LB (r) LS (t) LB (c)      Time Frame (Wheelchair Locomotion Goal 1, PT-IRF)  1 week;short term goal (STG)  -LB (r) LS (t) LB  (c)      Progress/Outcomes (Wheelchair Locomotion Goal 1, PT-IRF)  goal met  -LB (r) LS (t) LB (c)         Stairs Goal 1 (PT-IRF)    Activity/Assistive Device (Stairs Goal 1, PT-IRF)  ascending stairs;descending stairs  -LB (r) LS (t) LB (c)      Number of Stairs (Stairs Goal 1, PT-IRF)  2  -LB (r) LS (t) LB (c)      [place] Level (Stairs Goal 1, PT-IRF)  minimum assist (75% or more patient effort)  -LB (r) LS (t) LB (c)      Time Frame (Stairs Goal 1, PT-IRF)  4 weeks;long term goal (LTG)  -LB (r) LS (t) LB (c)      Progress/Outcomes (Stairs Goal 1, PT-IRF)  goal ongoing  -LB (r) LS (t) LB (c)        User Key  (r) = Recorded By, (t) = Taken By, (c) = Cosigned By    Initials Name Provider Type    Eliane Pereira, PT Physical Therapist    Naomy Armas, PT Student PT Student               Time Calculation:     PT Charges     Row Name 04/08/19 1309 04/08/19 0853          Time Calculation    Start Time  1300  -LB (r) LS (t) LB (c)  0830  -LB (r) LS (t) LB (c)     Stop Time  1330  -LB (r) LS (t) LB (c)  0900  -LB (r) LS (t) LB (c)     Time Calculation (min)  30 min  -LB (r) LS (t)  30 min  -LB (r) LS (t)     PT Received On  04/08/19  -LB (r) LS (t) LB (c)  04/08/19  -LB (r) LS (t) LB (c)     PT - Next Appointment  04/09/19  -LB (r) LS (t) LB (c)  --     PT Goal Re-Cert Due Date  04/15/19  -LB (r) LS (t) LB (c)  --       User Key  (r) = Recorded By, (t) = Taken By, (c) = Cosigned By    Initials Name Provider Type    Eliane Pereira, PT Physical Therapist    Naomy Armas, PT Student PT Student          Therapy Charges for Today     Code Description Service Date Service Provider Modifiers Qty    35592283527 HC PT NEUROMUSC RE EDUCATION EA 15 MIN 4/8/2019 Naomy Mendenhall, PT Student GP 4    46321200315 HC PT THER SUPP EA 15 MIN 4/8/2019 Naomy Mendenhall, PT Student GP 2                   Naomy Mendenhall, PT Student  4/8/2019

## 2019-04-08 NOTE — PROGRESS NOTES
Inpatient Rehabilitation Functional Measures Assessment    Functional Measures  [facility] Eating:  [facility] Grooming: [facility] Bathing:  [facility] Upper Body Dressing:  [facility] Lower Body Dressing:  [facility] Toileting:  [facility] Bladder Management  Level of Assistance:  [place]  Frequency/Number of Accidents this Shift:  [facility] Bowel Management  Level of Assistance: [place]  Frequency/Number of Accidents this Shift: [facility] Bed/Chair/Wheelchair Transfer:  [facility] Toilet Transfer:  [facility] Tub/Shower Transfer:  [place]    Previously Documented Mode of Locomotion at Discharge: Field  VANITA Expected Mode of Locomotion at Discharge: [facility] Walk/Wheelchair:  [facility] Stairs:  [facility] Comprehension:  Auditory comprehension is the usual mode. Comprehension  Score = 6, Modified [place].  Patient comprehends complex/abstract  information in their primary language, requiring:  [facility] Expression:  Vocal expression is the usual mode. Expression Score = 6,  Modified Independent.  Patient expresses complex/abstract information in their  primary language, requiring:  [facility] Social Interaction:  Social Interaction Score = 6, Modified Independent.  Patient is modified independent for social interaction, requiring:  [facility] Problem Solving:  Patient does not make appropriate decisions in order to  solve complex problems without assistance from a helper. Problem Solving Score =  5, Supervision.  Patient makes appropriate decisions in order to solve routine  problems with directing only under stressful or unfamiliar conditions, but no  more than 10% of the time, for the following behavior(s):  VANITA Memory:  Memory Score = 5, Supervision.  Patient recognizes and remembers  with prompting only under stressful or unfamiliar conditions, but no more than  10% of the time, for the following behavior(s):    Therapy Mode Minutes  Occupational Therapy: [place]  Physical Therapy: [place]  Speech Language  Pathology:  [place]    Signed by: Elsa Zhou RN

## 2019-04-08 NOTE — THERAPY TREATMENT NOTE
Inpatient Rehabilitation - Speech Language Pathology Treatment Note    [facility]       Patient Name: Nayan Ryan  : 1965  MRN: [id number]    Today's Date: 2019           Admit Date: 3/24/2019      Visit Dx:      No diagnosis found.    Patient Active Problem List   Diagnosis   • Acute ischemic left PCA stroke (CMS/HCC)   • Status post placement of implantable loop recorder   • HTN (hypertension)   • Diabetes mellitus (CMS/HCC)   • Hyperlipidemia   • Morbid obesity (CMS/HCC)   • Anxiety disorder   • Migraine   • H/O hernia repair   • Abdominal wall abscess   • Back pain   • Stroke (cerebrum) (CMS/HCC)   • Vitamin D deficiency   • History of fatty infiltration of liver          Therapy Treatment    Evaluation/Coping    Evaluation/Treatment Time and Intent  Subjective Information: no complaints (19 1100 : Narcisa Sanchez MS CCC-SLP)  Existing Precautions/Restrictions: fall (19 1100 : Narcisa Sanchez MS CCC-SLP)  Document Type: therapy note (daily note) (19 1100 : Narcisa Sanchez MS CCC-SLP)  Mode of Treatment: speech-language pathology (19 1100 : Narcisa Sanchez MS CCC-SLP)  Patient/Family Observations: up in w/c;  (19 1100 : Narcisa Sanchez MS CCC-SLP)  Start Time (Evaluation/Treatment): 1100 (19 1100 : Narcisa Sanchez MS CCC-SLP)  Stop Time (Evaluation/Treatment): 1130 (19 1100 : Narcisa Sanchez MS CCC-SLP)    Vitals/Pain/Safety         Cognition/Communication         Oral Motor/Eating         Mobility/Basic Activities/Instrumental Activities/Motor/Modality                   ROM/MMT                   Sensory/Myotome/Dermatome/Edema               Posture/Balance/Special Tests/Exercise/Transportation/Sexual Function                   Orthotics/Residual Limb/Prosthetic Management              Outcome Summary         EDUCATION    The patient has been educated in the following areas:     Cognitive Impairment.    SLP Recommendation and Plan    SLP Diagnosis: Moderate Anomic  Aphasia    SLP Diagnosis: Moderate Anomic Aphasia    Rehab Potential/Prognosis: good    Swallow Criteria for Skilled Therapeutic Interventions Met: no problems identified which require skilled intervention, other (see comments)(however, will monitor closely for s/s asp/dysphagia)    Anticipated Dischage Disposition: home with OP services, anticipate therapy at next level of care         Therapy Frequency (Swallow): 5 days per week    Predicted Duration Therapy Intervention (Days): until discharge           Plan of Care Reviewed With: patient      SLP GOALS     Row Name 04/08/19 1100             Attention Goal 1 (SLP)    Progress (Attention Goal 1, SLP)  independently (over 90% accuracy)  -SA      Progress/Outcomes (Attention Goal 1, SLP)  goal ongoing  -SA      Comment (Attention Goal 1, SLP)  blink game; matching colors timed: 2:20  -SA         Organizational Skills Goal 1 (SLP)    Progress/Outcomes (Thought Organization Skills Goal 1, SLP)  goal ongoing  -SA      Comment (Thought Organization Skills Goal 1, SLP)  69%: organizing and recording information (person's name and grades) ; items needed for a task: 88% independently and 94% with min assist  -SA         Reasoning Goal 1 (SLP)    Progress (Reasoning Goal 1, SLP)  with minimal cues (75-90%);70%  -SA      Progress/Outcomes (Reasoning Goal 1, SLP)  goal ongoing;continuing progress toward goal  -SA      Comment (Reasoning Goal 1, SLP)  75% abstract naming of category given 3 words; 75%: naming 8/12 itmes in a specific category (dogs, presidents, and occupations  -SA         Right Hemisphere Function Goal 1 (SLP)    Progress (Right Hemisphere Function Goal 1, SLP)  90%;independently (over 90% accuracy)  -SA      Progress/Outcomes (Right Hemisphere Function Goal 1, SLP)  continuing progress toward goal  -SA      Comment (Right Hemisphere Function Goal 1, SLP)  97%; visual scanning for target letter   -SA        User Key  (r) = Recorded By, (t) = Taken By, (c) =  Cosigned By    Initials Name Provider Type    Narcisa Gaston MS CCC-SLP Speech and Language Pathologist                  Time Calculation:       Time Calculation- SLP     Row Name 04/08/19 1152 04/08/19 0831          Time Calculation- SLP    SLP Start Time  1100  -SA  0830  -     SLP Stop Time  1130  -SA  0900  -     SLP Time Calculation (min)  30 min  -  30 min  -     SLP Received On  --  04/08/19  -SA       User Key  (r) = Recorded By, (t) = Taken By, (c) = Cosigned By    Initials Name Provider Type    Narcisa Gaston MS CCC-SLP Speech and Language Pathologist            Therapy Charges for Today     Code Description Service Date Service Provider Modifiers Qty    [id number]  ST DEV OF COGN SKILLS EACH 15 MIN 4/8/2019 Narcisa Sanchez MS CCC-SLP  4                           Narcisa Sanchez MS CCC-JOSIAS  4/8/2019
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
## 2019-04-08 NOTE — PROGRESS NOTES
Inpatient Rehabilitation Functional Measures Assessment and Plan of Care    Plan of Care  Updated Problems/Interventions  Mobility    [OT] Toilet Transfers(Active)  Current Status(04/08/2019): Min SPS mod vc for safety  Weekly Goal(04/16/2019): CGA Min vc for safety  Discharge Goal: SBA min vc    [OT] Tub/Shower Transfers(Active)  Current Status(04/08/2019): Min SPS mod vc for safety  Weekly Goal(04/16/2019): CGA  Discharge Goal: SBA        Self Care    [OT] Bathing(Active)  Current Status(04/08/2019): Min vc for safety,sequencing  Weekly Goal(04/16/2019): CGA vc for safety  Discharge Goal: SBA    [OT] Dressing (Lower)(Active)  Current Status(04/08/2019): Mod/Min vc for jay tech,foot placement  Weekly Goal(04/16/2019): Min  Discharge Goal: CGA    [OT] Dressing (Upper)(Active)  Current Status(04/08/2019): SBA with min cues  Weekly Goal(04/16/2019): SBA  Discharge Goal: SBA    [OT] Eating(Active)  Current Status(04/08/2019): SBA Min vc  Weekly Goal(04/16/2019): SBA  Discharge Goal: SBA    [OT] Grooming(Active)  Current Status(04/01/2019): SBA vc seated  Weekly Goal(04/16/2019): SBA  Discharge Goal: SBA    [OT] Toileting(Active)  Current Status(04/08/2019): Min VC for safe technique  Weekly Goal(04/16/2019): CGA vc  Discharge Goal: SBA    Functional Measures  VANITA Eating:  [place]  VANITA Grooming: Branch  VANITA Bathing:  Branch  VANITA Upper Body Dressing:  [place]anch  VANITA Lower Body Dressing:  [place]anch  VANITA Toileting:  [place]anch    VANITA Bladder Management  Level of Assistance:  [place]  Frequency/Number of Accidents this Shift:  [place]anch    VANITA Bowel Management  Level of Assistance: [place]  Frequency/Number of Accidents this Shift: Branch    VANITA Bed/Chair/Wheelchair Transfer:  [place]  VANITA Toilet Transfer:  [place]  VANITA Tub/Shower Transfer:  [place]anch    Previously Documented Mode of Locomotion at Discharge: Field  [facility] Expected Mode of Locomotion at Discharge: Branch  VANITA Walk/Wheelchair:  Branch  [facility] Stairs:  Branch    VANITA Comprehension:   [place]anch  VANITA Expression:  [place]anch  VANITA Social Interaction:  [place]  VANITA Problem Solving:  [place]anch  VANITA Memory:  [place]anch    Therapy Mode Minutes  Occupational Therapy: Individual: 60 minutes.  Physical Therapy: [place]  Speech Language Pathology:  [place]    Signed by: RAMO Cox/SOFIA

## 2019-04-08 NOTE — NURSING NOTE
"Diabetes Education  Assessment/Teaching    Patient Name:  Nayan Ryan  YOB: 1965  MRN: [id number]  Admit Date:  3/24/2019      Assessment Date:  4/8/2019    Most Recent Value   General Information    Referral From:  Database. F/u with 52 y/o at bedside to assess needs for DM supplies.    Height  182.9 cm (72\")   Height Method  Stated   Weight  140 kg (308 lb 6.4 oz)  (Abnormal)    Weight Method  Bed scale   Diabetes History   What type of diabetes do you have?  Type 2   Do you test your blood sugar at home?  yes   Do you have any diabetes complications?  -- recent cva   Barriers to Learning  -- pt presents with cognitive deficit.    Nutrition Information   Assessment Topics   Taking Medication - Assessment  Needs education   Healthy Coping - Assessment  Competent [supportive sister at bedside. ]   DM Goals            Most Recent Value   DM Education Needs   Meter  Has own   Medication  Insulin -explain to pt, sister that Tresiba can be re-started at dc (1:1 of Lantus dose. )   Healthy Coping  Appropriate   Discharge Plan  Home   Motivation  Moderate   Teaching Method  Explanation, Discussion   Patient Response  Needs reinforcement          Electronically signed by:  Apple Singh, RN, BSN, CDE   04/08/19 11:22 AM  "

## 2019-04-08 NOTE — THERAPY TREATMENT NOTE
Inpatient Rehabilitation - Occupational Therapy Treatment Note    [facility]     Patient Name: Nayan Ryan  : 1965  MRN: [id number]    Today's Date: 2019                 Admit Date: 3/24/2019      Visit Dx:  No diagnosis found.    Patient Active Problem List   Diagnosis   • Acute ischemic left PCA stroke (CMS/HCC)   • Status post placement of implantable loop recorder   • HTN (hypertension)   • Diabetes mellitus (CMS/HCC)   • Hyperlipidemia   • Morbid obesity (CMS/HCC)   • Anxiety disorder   • Migraine   • H/O hernia repair   • Abdominal wall abscess   • Back pain   • Stroke (cerebrum) (CMS/HCC)   • Vitamin D deficiency   • History of fatty infiltration of liver         Therapy Treatment    IRF Treatment Summary     Row Name 19 1507 19 1207 19 1100       Evaluation/Treatment Time and Intent    Subjective Information  no complaints  -DN  no complaints  -DN  no complaints  -SA    Existing Precautions/Restrictions  fall  -DN  fall  -DN  fall  -SA    Document Type  therapy note (daily note)  -DN  therapy note (daily note)  -DN  therapy note (daily note)  -SA    Mode of Treatment  occupational therapy  -DN  occupational therapy  -DN  speech-language pathology  -SA    Patient/Family Observations  --  sitting in w/c  -DN  up in w/c;   -SA    Start Time (Evaluation/Treatment)  --  --  1100  -SA    Stop Time (Evaluation/Treatment)  --  --  1130  -SA    Recorded by [DN] Reg Mckinney, OT [DN] Reg Mckinney OT [SA] Narcisa Sanchez MS CCC-SLP    Row Name 19 0955 19 0830          Evaluation/Treatment Time and Intent    Subjective Information  no complaints  (Pended)   -LS  no complaints  -SA     Existing Precautions/Restrictions  fall  (Pended)   -LS  fall  -SA     Document Type  therapy note (daily note)  (Pended)   -LS  therapy note (daily note)  -SA     Mode of Treatment  physical therapy  (Pended)   -LS  speech-language pathology  -SA     Patient/Family Observations  Pt sitting in  w/c arriving from ST. Ready to participate in therapy   (Pended)   -LS  up in w/c; ready for ST  -SA     Start Time (Evaluation/Treatment)  --  0830  -SA     Stop Time (Evaluation/Treatment)  --  0900  -SA     Recorded by [LS] Naomy Mendenhall, PT Student [SA] Narcisa Sanchez MS CCC-SLP     Row Name 04/08/19 1207 04/08/19 0955          Cognition/Psychosocial- PT/OT    Affect/Mental Status (Cognitive)  --  --  (Pended)  pt more engaging and initiates conversation this date  -LS     Orientation Status (Cognition)  oriented x 3  -DN  oriented x 3  (Pended)   -LS     Follows Commands (Cognition)  follows one step commands;75-90% accuracy  -DN  75-90% accuracy;verbal cues/prompting required;physical/tactile prompts required  (Pended)   -LS     Personal Safety Interventions  fall prevention program maintained;gait belt  -DN  fall prevention program maintained;gait belt;muscle strengthening facilitated;nonskid shoes/slippers when out of bed  (Pended)   -LS     Cognitive Function (Cognitive)  attention deficit;safety deficit  -DN  --     Attention Deficit (Cognitive)  moderate deficit  -DN  --     Memory Deficit (Cognitive)  --  moderate deficit  (Pended)   -LS     Safety Deficit (Cognitive)  --  ability to follow commands;judgment;problem solving;insight into deficits/self awareness  (Pended)   -LS     Recorded by [DN] Reg Mckinney OT [LS] Naomy Mendenhall, PT Student     Row Name 04/08/19 0955             Bed Mobility Assessment/Treatment    Rolling Left [place] (Bed Mobility)  verbal cues;minimum assist (75% patient effort)  (Pended)  Min A to hold R LE in place  -LS      Rolling Right [place] (Bed Mobility)  verbal cues;supervision  (Pended)   -LS      Supine-Sit [place] (Bed Mobility)  verbal cues;contact guard;minimum assist (75% patient effort)  (Pended)   -LS      Sit-Supine [place] (Bed Mobility)  verbal cues;contact guard  (Pended)   -LS      Bed Mobility, Safety Issues  decreased use of arms for  pushing/pulling;decreased use of legs for bridging/pushing  (Pended)   -LS      Recorded by [LS] Naomy Mendenhall, PT Student      Row Name 04/08/19 1507 04/08/19 0955          Transfer Assessment/Treatment    Comment (Transfers)  min w/c to mat with SPS, sit to stand still unsafe vc for proper foot placement and safe technique, pt still presents with poor insight into deficits  -DN  assist with set up and foot placement for STS  (Pended)   -LS     Recorded by [DN] Reg Mckinney OT [LS] Naomy Mendenhall, PT Student     Row Name 04/08/19 0955             Bed-Chair Transfer    Bed-Chair [place] (Transfers)  verbal cues;minimum assist (75% patient effort);set up  (Pended)   -LS      Assistive Device (Bed-Chair Transfers)  wheelchair  (Pended)   -LS      Recorded by [LS] Naomy Mendenhall, PT Student      Row Name 04/08/19 0955             Chair-Bed Transfer    Chair-Bed [place] (Transfers)  verbal cues;minimum assist (75% patient effort);set up  (Pended)   -LS      Assistive Device (Chair-Bed Transfers)  wheelchair  (Pended)   -LS      Recorded by [LS] Naomy Mendenhall, PT Student      Row Name 04/08/19 0955             Sit-Stand Transfer    Sit-Stand [place] (Transfers)  contact guard  (Pended)   -LS      Assistive Device (Sit-Stand Transfers)  wheelchair  (Pended)   -LS      Recorded by [LS] Naomy Mendenhall, PT Student      Row Name 04/08/19 0955             Stand-Sit Transfer    Stand-Sit [place] (Transfers)  contact guard;minimum assist (75% patient effort)  (Pended)   -LS      Assistive Device (Stand-Sit Transfers)  wheelchair  (Pended)   -LS      Recorded by [LS] Naomy Mendenhall, PT Student      Row Name 04/08/19 1207             Shower Transfer    Type (Shower Transfer)  stand pivot/stand step  -DN      [place] Level (Shower Transfer)  minimum assist (75% patient effort);nonverbal cues (demo/gesture);verbal cues  -DN      Assistive Device (Shower Transfer)  wheelchair;tub bench;grab bars/tub rail   -DN      Recorded by [DN] Reg Mckinney, OT      Row Name 04/08/19 0955             Gait/Stairs Assessment/Training    [place] Level (Gait)  contact guard;minimum assist (75% patient effort);2 person assist  (Pended)  posterior leaf AFO, 2nd person CGA   -LS      Assistive Device (Gait)  jay-bars  (Pended)   -LS      Distance in Feet (Gait)  40 x 2, 60 x 2  (Pended)   -LS      Pattern (Gait)  step-through  (Pended)   -LS      Deviations/Abnormal Patterns (Gait)  base of support, narrow;nancy decreased;gait speed decreased  (Pended)   -LS      Bilateral Gait Deviations  forward flexed posture;heel strike decreased  (Pended)   -LS      Left Sided Gait Deviations  weight shift ability decreased  (Pended)   -LS      Right Sided Gait Deviations  foot drop/toe drag;knee buckling, right side;knee hyperextension;weight shift ability decreased  (Pended)   -LS      Comment (Gait/Stairs)  Pt with intermittent R knee buckling/hyperextension, able to improve knee control with verbal and tactile cues. Improved width of VITALY and decreased crossing over during turns this date. Pt reports some R knee pain after ambulation. CGA-min A at R LE for knee control  (Pended)   -LS      Recorded by [LS] Naomy Mendenhall, PT Student      Row Name 04/08/19 9967             Wheelchair Mobility/Management    Method of Wheelchair Locomotion (Mobility)  jay-bipedal propulsion (left sided)  (Pended)   -LS      Mobility Activities (Wheelchair)  forward propulsion;turning  (Pended)   -LS      Forward Propulsion [place] (Wheelchair)  supervision  (Pended)   -LS      Steering [place] (Wheelchair)  supervision  (Pended)   -LS      Turning [place] (Wheelchair)  supervision  (Pended)   -LS      Doorway Navigation [place] (Wheelchair)  supervision  (Pended)   -LS      Distance Propelled in Feet (Wheelchair)  100  (Pended)   -LS      Recorded by [LS] Naomy Mendenhall, PT Student      Row Name 04/08/19 5501             Safety  Issues, Functional Mobility    Safety Issues Affecting Function (Mobility)  sequencing abilities;insight into deficits/self awareness;problem solving  (Pended)   -LS      Impairments Affecting Function (Mobility)  cognition;balance;motor planning;strength;visual/perceptual;postural/trunk control  (Pended)   -LS      Recorded by [LS] Naomy Mendenhall, PT Student      Row Name 04/08/19 1207             Bathing Assessment/Treatment    Bathing [place] Level  bathing skills;verbal cues;nonverbal cues (demo/gesture);set up;contact guard assist  -DN      Assistive Device (Bathing)  tub bench;hand held shower spray hose;grab bar/tub rail  -DN      Bathing Position  supported sitting;supported standing  -DN      Bathing Setup Assistance  adjust water temperature  -DN      Comment (Bathing)  vc for safety with standing and movements  -DN      Recorded by [DN] Reg Mckinney, OT      Row Name 04/08/19 1207             Upper Body Dressing Assessment/Treatment    Upper Body Dressing Task  upper body dressing skills;doff;don;pull over garment;nonverbal cues (demo/gesture);verbal cues  -DN      Upper Body Dressing Position  supported sitting;supported standing  -DN      Set-up Assistance (Upper Body Dressing)  obtain clothing  -DN      Recorded by [DN] Reg Mckinney, OT      Row Name 04/08/19 1207             Lower Body Dressing Assessment/Treatment    Lower Body Dressing [place] Level  doff;don;pants/bottoms;shoes/slippers;socks;underwear;minimum assist (75% patient effort);verbal cues;nonverbal cues (demo/gesture);set up  -DN      Lower Body Dressing Position  supported standing;supported sitting  -DN      Lower Body Dressing Setup Assistance  obtain clothing  -DN      Comment (Lower Body Dressing)  vc for sequence and safe techniques  -DN      Recorded by [DN] Reg Mckinney, OT      Row Name 04/08/19 1207             Grooming Assessment/Treatment    Grooming [place] Level  grooming skills;deodorant  application;shave face;supervision;verbal cues;nonverbal cues (demo/gesture)  -DN      Grooming Position  sink side;supported sitting  -DN      Grooming Setup Assistance  obtain supplies  -DN      Comment (Grooming)  min vc for seqiencing, R neglect is better comp for  -DN      Recorded by [DN] Reg Mckinney, OT      Row Name 04/08/19 1500             Fine Motor Testing & Training    Comment, Fine Motor Coordination  pt sitting on mat worked on lateral/3point pinch with RUE with dowel and rings of various sizes, and pipe tree fabrications for increased FMC/GMC visual compensations to R etc  -DN      Recorded by [DN] Reg Mckinney, OT      Row Name 04/08/19 2259             Vision Assessment/Intervention    Visual Impairment/Limitations  peripheral vision impaired right  -DN      Visual Field Deficit  homonymous hemianopsia, right  -DN      Visual Motor Impairment  strabismus, right  -DN      Visual Processing Deficit  visual attention, right;jay-inattention/neglect, right  -DN      Recorded by [DN] Reg Mckinney, OT      Row Name 04/08/19 7896             Pain Assessment    Additional Documentation  Pain Scale: FACES Pre/Post-Treatment (Group)  (Pended)   -LS      Recorded by [LS] Naomy Mendenhall, PT Student      Row Name 04/08/19 1509 04/08/19 1207          Pain Scale: Numbers Pre/Post-Treatment    Pain Scale: Numbers, Pretreatment  0/10 - no pain  -DN  0/10 - no pain  -DN     Pain Scale: Numbers, Post-Treatment  0/10 - no pain  -DN  0/10 - no pain  -DN     Recorded by [DN] Reg Mckinney, OT [DN] Reg Mckinney, OT     Row Name 04/08/19 2266             Pain Scale: FACES Pre/Post-Treatment    Pain: FACES Scale, Pretreatment  2-->hurts little bit  (Pended)   -LS      Pain: FACES Scale, Post-Treatment  2-->hurts little bit  (Pended)   -LS      Pre/Post Treatment Pain Comment  R knee/ankle  (Pended)   -LS      Recorded by [LS] Naomy Mendenhall, PT Student      Row Name 04/08/19 5217             Lower Extremity  Seated Therapeutic Exercise    Performed, Seated Lower Extremity (Therapeutic Exercise)  hip flexion/extension;knee flexion/extension;ankle dorsiflexion/plantarflexion  (Pended)   -LS      Exercise Type, Seated Lower Extremity (Therapeutic Exercise)  AROM (active range of motion)  (Pended)   -LS      Sets/Reps Detail, Seated Lower Extremity (Therapeutic Exercise)  15  (Pended)   -LS      Comment, Seated Lower Extremity (Therapeutic Exercise)  ankle eversion and DF performed with manual resistance from PT  (Pended)   -LS      Recorded by [LS] Naomy Mendenhall, PT Student      Row Name 04/08/19 0955             Lower Extremity Supine Therapeutic Exercise    Performed, Supine Lower Extremity (Therapeutic Exercise)  heel slides;ankle pumps;SLR (straight leg raise);hip abduction/adduction  (Pended)   -LS      Exercise Type, Supine Lower Extremity (Therapeutic Exercise)  AROM (active range of motion);AAROM (active assistive range of motion)  (Pended)   -LS      Sets/Reps Detail, Supine Lower Extremity (Therapeutic Exercise)  2 x 10  (Pended)   -LS      Comment, Supine Lower Extremity (Therapeutic Exercise)  Performed in bed--discussed pt performing these in the evening  (Pended)   -LS      Recorded by [LS] Naomy Mendenhall, PT Student      Row Name 04/08/19 1507 04/08/19 1207 04/08/19 0955       Positioning and Restraints    Pre-Treatment Position  sitting in chair/recliner  -DN  sitting in chair/recliner  -DN  sitting in chair/recliner  (Pended)   -LS    Post Treatment Position  wheelchair  -DN  wheelchair  -DN  bed  (Pended)   -LS    In Bed  sitting;with PT  -DN  --  supine;call light within reach;encouraged to call for assist;exit alarm on;notified nsg  (Pended)   -LS    In Wheelchair  --  --  --  (Pended)   -LS    Recorded by [DN] Reg Mckinney OT [DN] Reg Mckinney, OT [LS] Naomy Mendenhall, PT Student      User Key  (r) = Recorded By, (t) = Taken By, (c) = Cosigned By    Initials Name Effective Dates    BRENDAN Mckinney  Reg, OT 06/08/18 -     Narcisa Gaston, MS CCC-SLP 04/03/18 -     LS Naomy Mendenhall, PT Student 02/04/19 -           Wound 03/26/19 0900 Left chest incision (Active)   Dressing Appearance open to air 4/7/2019  9:11 PM   Closure Liquid skin adhesive;Approximated 4/7/2019  9:11 PM   Base clean;dry 4/7/2019  9:11 PM   Drainage Amount none 4/7/2019  9:11 PM   Dressing Care, Wound open to air 4/8/2019  9:32 AM         OT Recommendation and Plan    Anticipated Equipment Needs At Discharge (OT Eval): commode, 3-in-1, shower chair, tub bench  Planned Therapy Interventions (OT Eval): BADL retraining, cognitive/visual perception retraining, functional balance retraining, neuromuscular control/coordination retraining, strengthening exercise, transfer/mobility retraining            OT IRF GOALS     Row Name 04/02/19 1500             Bathing Goal 1 (OT-IRF)    Activity/Device (Bathing Goal 1, OT-IRF)  bathing skills, all  -DN      [place] Level (Bathing Goal 1, OT-IRF)  contact guard assist;verbal cues required  -DN      Time Frame (Bathing Goal 1, OT-IRF)  short term goal (STG)  -DN      Progress/Outcomes (Bathing Goal 1, OT-IRF)  goal met;goal revised this date  -DN         Bathing Goal 2 (OT-IRF)    Activity/Device (Bathing Goal 2, OT-IRF)  bathing skills, all  -DN      [place] Level (Bathing Goal 2, OT-IRF)  supervision required  -DN      Time Frame (Bathing Goal 2, OT-IRF)  long term goal (LTG)  -DN      Progress/Outcomes (Bathing Goal 2, OT-IRF)  goal ongoing  -DN         UB Dressing Goal 1 (OT-IRF)    Activity/Device (UB Dressing Goal 1, OT-IRF)  upper body dressing  -DN      [place] (UB Dress Goal 1, OT-IRF)  supervision required  -DN      Time Frame (UB Dressing Goal 1, OT-IRF)  short term goal (STG)  -DN      Progress/Outcomes (UB Dressing Goal 1, OT-IRF)  goal met;goal revised this date  -DN         UB Dressing Goal 2 (OT-IRF)    Activity/Device (UB Dressing Goal 2, OT-IRF)  upper body dressing  -DN       [place] (UB Dress Goal 2, OT-IRF)  supervision required  -DN      Time Frame (UB Dressing Goal 2, OT-IRF)  long term goal (LTG)  -DN      Progress/Outcomes (UB Dressing Goal 2, OT-IRF)  goal ongoing;goal met  -DN         LB Dressing Goal 1 (OT-IRF)    Activity/Device (LB Dressing Goal 1, OT-IRF)  lower body dressing  -DN      [place] (LB Dressing Goal 1, OT-IRF)  minimum assist (75% or more patient effort);verbal cues required;tactile cues required  -DN      Time Frame (LB Dressing Goal 1, OT-IRF)  short term goal (STG)  -DN      Progress/Outcomes (LB Dressing Goal 1, OT-IRF)  goal met;goal revised this date  -DN         LB Dressing Goal 2 (OT-IRF)    Activity/Device (LB Dressing Goal 2, OT-IRF)  lower body dressing  -DN      [place] (LB Dressing Goal 2, OT-IRF)  verbal cues required;tactile cues required;minimum assist (75% or more patient effort)  -DN      Time Frame (LB Dressing Goal 2, OT-IRF)  long term goal (LTG)  -DN      Progress/Outcomes (LB Dressing Goal 2, OT-IRF)  goal ongoing  -DN         Grooming Goal 1 (OT-IRF)    Activity/Device (Grooming Goal 1, OT-IRF)  grooming skills, all  -DN      [place] (Grooming Goal 1, OT-IRF)  supervision required  -DN      Time Frame (Grooming Goal 1, OT-IRF)  short term goal (STG)  -DN      Progress/Outcomes (Grooming Goal 1, OT-IRF)  goal partially met;goal ongoing  -DN         Grooming Goal 2 (OT-IRF)    Activity/Device (Grooming Goal 2, OT-IRF)  grooming skills, all  -DN      [place] (Grooming Goal 2, OT-IRF)  supervision required  -DN      Time Frame (Grooming Goal 2, OT-IRF)  long term goal (LTG)  -DN      Progress/Outcomes (Grooming Goal 2, OT-IRF)  goal revised this date  -DN         Toileting Goal 1 (OT-IRF)    Activity/Device (Toileting Goal 1, OT-IRF)  toileting skills, all  -DN      [place] Level (Toileting Goal 1, OT-IRF)  minimum assist (75% or more patient effort);verbal cues required;tactile cues required  -DN      Time  Frame (Toileting Goal 1, OT-IRF)  short term goal (STG)  -DN      Progress/Outcomes (Toileting Goal 1, OT-IRF)  goal met;goal revised this date  -DN         Toileting Goal 2 (OT-IRF)    Activity/Device (Toileting Goal 2, OT-IRF)  toileting skills, all  -DN      [place] Level (Toileting Goal 2, OT-IRF)  contact guard assist;verbal cues required;tactile cues required  -DN      Time Frame (Toileting Goal 2, OT-IRF)  long term goal (LTG)  -DN      Progress/Outcomes (Toileting Goal 2, OT-IRF)  goal ongoing  -DN         Self-Feeding Goal 1 (OT-IRF)    Activity/Device (Self-Feeding Goal 1, OT-IRF)  self-feeding skills, all  -DN      [place] (Self-Feeding Goal 1, OT-IRF)  supervision required  -DN      Time Frame (Self-Feeding Goal 1, OT-IRF)  short term goal (STG)  -DN      Progress/Outcomes 1 (Self-Feeding Goal, OT-IRF)  goal met;goal ongoing  -DN         Self-Feeding Goal 2 (OT-IRF)    Activity/Device (Self-Feeding Goal 2, OT-IRF)  self-feeding skills, all  -DN      [place] (Self-Feeding Goal 2, OT-IRF)  supervision required  -DN      Time Frame (Self-Feeding Goal 2, OT-IRF)  long term goal (LTG)  -DN      Progress/Outcomes (Self-Feeding Goal 2, OT-IRF)  goal met;goal ongoing  -DN         Caregiver Training Goal 2 (OT-IRF)    Caregiver Training Goal 2 (OT-IRF)  pt caregiver to be independent with any assist pt needs with adls, transfers and HEP for d/c home  -DN      Time Frame (Caregiver Training Goal 2, OT-IRF)  long term goal (LTG)  -DN      Progress/Outcomes (Caregiver Training Goal 2, OT-IRF)  goal ongoing  -DN        User Key  (r) = Recorded By, (t) = Taken By, (c) = Cosigned By    Initials Name Provider Type    Reg Bolden OT Occupational Therapist          Occupational Therapy Education     Title: PT OT SLP Therapies (In Progress)     Topic: Occupational Therapy (Done)     Point: ADL training (Done)     Description: Instruct learner(s) on proper safety adaptation and remediation techniques  during self care or transfers.   Instruct in proper use of assistive devices.    Learning Progress Summary           Patient Acceptance, E,TB,D, VU,NR by DN at 4/8/2019 12:16 PM    Comment:  transfer training, jay skills with adls, safety,etc    Acceptance, E,TB,D, VU,NR by DN at 4/4/2019  3:19 PM    Comment:  theraputty exercises, jay adls and transfer trainning    Acceptance, E,TB,D, NR by DN at 3/26/2019 12:06 PM    Comment:  jay adls and commode/shower transfers, still max vc for all due to cognition and visual impairments    Acceptance, E,TB,D, VU,NR by DN at 3/25/2019  5:23 PM    Comment:  OT role in rehab, transfer traning, jay adls                   Point: Home exercise program (Done)     Description: Instruct learner(s) on appropriate technique for monitoring, assisting and/or progressing therapeutic exercises/activities.    Learning Progress Summary           Patient Acceptance, E,TB,D, VU,NR by DN at 4/8/2019 12:16 PM    Comment:  transfer training, jay skills with adls, safety,etc    Acceptance, E,TB,D, VU,NR by DN at 4/4/2019  3:19 PM    Comment:  theraputty exercises, jay adls and transfer trainning    Acceptance, E,TB,D, NR by DN at 3/26/2019 12:06 PM    Comment:  jay adls and commode/shower transfers, still max vc for all due to cognition and visual impairments    Acceptance, E,TB,D, VU,NR by DN at 3/25/2019  5:23 PM    Comment:  OT role in rehab, transfer traning, jay adls                   Point: Precautions (Done)     Description: Instruct learner(s) on prescribed precautions during self-care and functional transfers.    Learning Progress Summary           Patient Acceptance, E,TB,D, VU,NR by DN at 4/8/2019 12:16 PM    Comment:  transfer training, jay skills with adls, safety,etc    Acceptance, E,TB,D, VU,NR by DN at 4/4/2019  3:19 PM    Comment:  theraputty exercises, jay adls and transfer trainning    Acceptance, E,TB,D, NR by DN at 3/26/2019 12:06 PM    Comment:  jay adls and  commode/shower transfers, still max vc for all due to cognition and visual impairments    Acceptance, E,TB,D, VU,NR by DN at 3/25/2019  5:23 PM    Comment:  OT role in rehab, transfer traning, jay adls                   Point: Body mechanics (Done)     Description: Instruct learner(s) on proper positioning and spine alignment during self-care, functional mobility activities and/or exercises.    Learning Progress Summary           Patient Acceptance, E,TB,D, VU,NR by DN at 4/8/2019 12:16 PM    Comment:  transfer training, jay skills with adls, safety,etc    Acceptance, E,TB,D, VU,NR by DN at 4/4/2019  3:19 PM    Comment:  theraputty exercises, jay adls and transfer trainning    Acceptance, E,TB,D, NR by DN at 3/26/2019 12:06 PM    Comment:  jay adls and commode/shower transfers, still max vc for all due to cognition and visual impairments    Acceptance, E,TB,D, VU,NR by DN at 3/25/2019  5:23 PM    Comment:  OT role in rehab, transfer traning, jay adls                               User Key     Initials Effective Dates Name Provider Type Discipline    DN 06/08/18 -  Reg Mckinney OT Occupational Therapist OT                       Time Calculation:     Time Calculation- OT     Row Name 04/08/19 1511 04/08/19 1217          Time Calculation- OT    OT Start Time  1230  -DN  0900  -DN     OT Stop Time  1300  -DN  0930  -DN     OT Time Calculation (min)  30 min  -DN  30 min  -DN     OT Received On  04/08/19  -DN  04/08/19  -DN       User Key  (r) = Recorded By, (t) = Taken By, (c) = Cosigned By    Initials Name Provider Type    DN Reg Mckinney OT Occupational Therapist          Therapy Charges for Today     Code Description Service Date Service Provider Modifiers Qty    10557984630 HC OT SELF CARE/MGMT/TRAIN EA 15 MIN 4/8/2019 Reg Mckinney OT GO 2    52054297886 HC OT NEUROMUSC RE EDUCATION EA 15 MIN 4/8/2019 Reg Mckinney OT GO 1    27256940986 HC OT THER PROC EA 15 MIN 4/8/2019 Reg Mckinney OT GO 1                    Reg Mckinney, OT  4/8/2019

## 2019-04-09 LAB
GLUCOSE BLDC GLUCOMTR-MCNC: 115 MG/DL (ref 70–130)
GLUCOSE BLDC GLUCOMTR-MCNC: 117 MG/DL (ref 70–130)
GLUCOSE BLDC GLUCOMTR-MCNC: 85 MG/DL (ref 70–130)
GLUCOSE BLDC GLUCOMTR-MCNC: 92 MG/DL (ref 70–130)

## 2019-04-09 PROCEDURE — 82962 GLUCOSE BLOOD TEST: CPT

## 2019-04-09 PROCEDURE — 97535 SELF CARE MNGMENT TRAINING: CPT

## 2019-04-09 PROCEDURE — G0515 COGNITIVE SKILLS DEVELOPMENT: HCPCS

## 2019-04-09 PROCEDURE — 63710000001 INSULIN GLARGINE PER 5 UNITS: Performed by: INTERNAL MEDICINE

## 2019-04-09 PROCEDURE — 97112 NEUROMUSCULAR REEDUCATION: CPT

## 2019-04-09 RX ADMIN — AMLODIPINE BESYLATE 5 MG: 5 TABLET ORAL at 07:22

## 2019-04-09 RX ADMIN — CLOPIDOGREL 75 MG: 75 TABLET, FILM COATED ORAL at 07:22

## 2019-04-09 RX ADMIN — PAROXETINE HYDROCHLORIDE 10 MG: 10 TABLET, FILM COATED ORAL at 07:22

## 2019-04-09 RX ADMIN — LISINOPRIL 20 MG: 20 TABLET ORAL at 07:21

## 2019-04-09 RX ADMIN — ATORVASTATIN CALCIUM 80 MG: 80 TABLET, FILM COATED ORAL at 20:29

## 2019-04-09 RX ADMIN — METFORMIN HYDROCHLORIDE 500 MG: 500 TABLET, EXTENDED RELEASE ORAL at 07:21

## 2019-04-09 RX ADMIN — INSULIN GLARGINE 32 UNITS: 100 INJECTION, SOLUTION SUBCUTANEOUS at 20:30

## 2019-04-09 RX ADMIN — Medication 1 TABLET: at 07:23

## 2019-04-09 RX ADMIN — LISINOPRIL 20 MG: 20 TABLET ORAL at 20:30

## 2019-04-09 RX ADMIN — ALPRAZOLAM 1 MG: 0.5 TABLET ORAL at 22:50

## 2019-04-09 RX ADMIN — ASPIRIN 325 MG: 325 TABLET, COATED ORAL at 07:22

## 2019-04-09 RX ADMIN — ATENOLOL 25 MG: 25 TABLET ORAL at 07:22

## 2019-04-09 RX ADMIN — OXYCODONE AND ACETAMINOPHEN 1 TABLET: 5; 325 TABLET ORAL at 20:30

## 2019-04-09 RX ADMIN — METFORMIN HYDROCHLORIDE 500 MG: 500 TABLET, EXTENDED RELEASE ORAL at 17:01

## 2019-04-09 RX ADMIN — ATENOLOL 25 MG: 25 TABLET ORAL at 20:30

## 2019-04-09 NOTE — THERAPY TREATMENT NOTE
"Inpatient Rehabilitation - Occupational Therapy Treatment Note    [facility]     Patient Name: Nayan Ryan  : 1965  MRN: [id number]    Today's Date: 2019                 Admit Date: 3/24/2019      Visit Dx:  No diagnosis found.    Patient Active Problem List   Diagnosis   • Acute ischemic left PCA stroke (CMS/HCC)   • Status post placement of implantable loop recorder   • HTN (hypertension)   • Diabetes mellitus (CMS/HCC)   • Hyperlipidemia   • Morbid obesity (CMS/HCC)   • Anxiety disorder   • Migraine   • H/O hernia repair   • Abdominal wall abscess   • Back pain   • Stroke (cerebrum) (CMS/HCC)   • Vitamin D deficiency   • History of fatty infiltration of liver         Therapy Treatment    IRF Treatment Summary     Row Name 19 1400 19 1030 19 0900       Evaluation/Treatment Time and Intent    Subjective Information  no complaints  -DN  no complaints  -SA  no complaints  -SA    Existing Precautions/Restrictions  fall swallow, R visual neglect, aphasic  -DN  fall swallow  -SA  fall  -SA    Document Type  therapy note (daily note)  -DN  therapy note (daily note)  -SA  therapy note (daily note)  -SA    Mode of Treatment  occupational therapy  -DN  speech-language pathology  -SA  speech-language pathology  -SA    Patient/Family Observations  sitting in w/c  -DN  --  up in w/c at RN station; states he feels \"good, because i just took a shower\"  -SA    Start Time (Evaluation/Treatment)  --  1030  -SA  0930  -SA    Stop Time (Evaluation/Treatment)  --  1100  -SA  1000  -SA    Recorded by [DN] Reg Mckinney, OT [SA] Narcisa Sanchez MS CCC-SLP [SA] Narcisa Sanchez MS CCC-SLP    Row Name 19 0844             Evaluation/Treatment Time and Intent    Subjective Information  no complaints  (Pended)   -LS      Existing Precautions/Restrictions  fall  (Pended)   -LS      Document Type  therapy note (daily note)  (Pended)   -LS      Mode of Treatment  physical therapy  (Pended)   -LS      " Patient/Family Observations  Pt sitting in w/c brushing his teeth in BR  (Pended)   -LS      Recorded by [LS] Naomy Mendenhall, PT Student      Row Name 04/09/19 1030             Safety Awareness/Health Promotion    Additional Documentation  Fall Prevention (Row)  -DN      Recorded by [DN] Reg Mckinney, OT      Row Name 04/09/19 1030 04/09/19 0844          Cognition/Psychosocial- PT/OT    Affect/Mental Status (Cognitive)  --  --  (Pended)  pt continues to be more engaged at times--varies   -LS     Behavioral Issues (Cognitive)  withdrawn  -DN  withdrawn  (Pended)   -LS     Orientation Status (Cognition)  oriented x 3  -DN  oriented x 3  (Pended)   -LS     Follows Commands (Cognition)  follows one step commands;over 90% accuracy  -DN  follows one step commands;75-90% accuracy  (Pended)   -LS     Personal Safety Interventions  gait belt;fall prevention program maintained  -DN  fall prevention program maintained;gait belt;muscle strengthening facilitated;nonskid shoes/slippers when out of bed  (Pended)   -LS     Memory Deficit (Cognitive)  --  moderate deficit  (Pended)   -LS     Safety Deficit (Cognitive)  --  impulsivity;insight into deficits/self awareness;problem solving;ability to follow commands  (Pended)   -LS     Recorded by [DN] Reg Mckinney, OT [LS] Naomy Mendenhall, PT Student     Row Name 04/09/19 0844             Sit-Stand Transfer    Sit-Stand [place] (Transfers)  contact guard  (Pended)   -LS      Assistive Device (Sit-Stand Transfers)  wheelchair  (Pended)   -LS      Recorded by [LS] Naomy Mendenhall, PT Student      Row Name 04/09/19 0844             Stand-Sit Transfer    Stand-Sit [place] (Transfers)  contact guard;minimum assist (75% patient effort)  (Pended)   -LS      Assistive Device (Stand-Sit Transfers)  wheelchair  (Pended)   -LS      Recorded by [LS] Naomy Mendenhall, PT Student      Row Name 04/09/19 1030             Toilet Transfer    Type (Toilet Transfer)  stand pivot/stand step  -DN       [place] Level (Toilet Transfer)  minimum assist (75% patient effort);verbal cues;nonverbal cues (demo/gesture)  -DN      Assistive Device (Toilet Transfer)  grab bars/safety frame;wheelchair;raised toilet seat  -DN      Recorded by [DN] Reg Mckinney OT      Row Name 04/09/19 1030             Shower Transfer    Type (Shower Transfer)  stand pivot/stand step  -DN      [place] Level (Shower Transfer)  minimum assist (75% patient effort);nonverbal cues (demo/gesture);verbal cues  -DN      Assistive Device (Shower Transfer)  tub bench;grab bars/tub rail;wheelchair  -DN      Recorded by [DN] Reg Mckinney, OT      Row Name 04/09/19 0844             Gait/Stairs Assessment/Training    [place] Level (Gait)  minimum assist (75% patient effort);2 person assist  (Pended)  min of 1 + CGA of 1  -LS      Assistive Device (Gait)  cane, quad  (Pended)  LBQC  -LS      Distance in Feet (Gait)  30, 40 x 2, 50  (Pended)   -LS      Pattern (Gait)  step-through  (Pended)   -LS      Deviations/Abnormal Patterns (Gait)  base of support, narrow;nancy decreased;gait speed decreased  (Pended)   -LS      Bilateral Gait Deviations  forward flexed posture;heel strike decreased  (Pended)   -LS      Left Sided Gait Deviations  weight shift ability decreased  (Pended)   -LS      Right Sided Gait Deviations  foot drop/toe drag;knee buckling, right side;knee hyperextension;weight shift ability decreased  (Pended)   -LS      Comment (Gait/Stairs)  Began ambulation with LBQC this date. Pt needed continuous step by step cues for proper sequencing with cane. CGA-Min A at R knee with intermittent buckling noted. Pt advancing limb without assist and posterior AFO improves toe clearance    (Pended)   -LS      Recorded by [LS] Naomy Mendenhall, PT Student      Row Name 04/09/19 0899             Wheelchair Mobility/Management    Method of Wheelchair Locomotion (Mobility)  jay-bipedal propulsion (left sided)  (Pended)   -LS       Mobility Activities (Wheelchair)  forward propulsion;turning  (Pended)   -LS      Forward Propulsion [place] (Wheelchair)  independent  (Pended)   -LS      Steering [place] (Wheelchair)  independent  (Pended)   -LS      Turning [place] (Wheelchair)  independent  (Pended)   -LS      Doorway Navigation [place] (Wheelchair)  supervision  (Pended)   -LS      Distance Propelled in Feet (Wheelchair)  150  (Pended)   -LS      Comment, Mobility Activities (Wheelchair)  Propelled from room most of way into therapy gym  (Pended)   -LS      Recorded by [LS] Naomy Mendenhall, PT Student      Row Name 04/09/19 1030 04/09/19 0844          Safety Issues, Functional Mobility    Safety Issues Affecting Function (Mobility)  ability to follow commands;at risk behavior observed;awareness of need for assistance;insight into deficits/self awareness;impulsivity;judgment;problem solving;safety precaution awareness;sequencing abilities  -DN  sequencing abilities;positioning of assistive device;problem solving;insight into deficits/self awareness;impulsivity;ability to follow commands  (Pended)   -LS     Impairments Affecting Function (Mobility)  balance;cognition;coordination;strength;visual/perceptual  -DN  cognition;balance;motor planning;strength;visual/perceptual;postural/trunk control  (Pended)   -LS     Comment, Safety Issues/Impairments (Mobility)  R neglect  -DN  Pt tends to be slightly impulsive/impatient with starting mobility at times. Cues for slowing down and for saftey  (Pended)   -LS     Recorded by [DN] Reg Mckinney OT [LS] Naomy Mendenhall, PT Student     Row Name 04/09/19 1030             Bathing Assessment/Treatment    Bathing [place] Level  bathing skills;verbal cues;nonverbal cues (demo/gesture);contact guard assist  -DN      Assistive Device (Bathing)  tub bench;grab bar/tub rail  -DN      Bathing Position  supported sitting;supported standing  -DN      Bathing Setup Assistance  adjust water  temperature  -DN      Comment (Bathing)  Min vc for sequencing  -DN      Recorded by [BRENDAN] Reg Mckinney, OT      Row Name 04/09/19 1030             Upper Body Dressing Assessment/Treatment    Upper Body Dressing Task  upper body dressing skills;pull over garment;set up assistance;supervision;verbal cues;nonverbal cues (demo/gesture)  -DN      Upper Body Dressing Position  supported sitting  -DN      Set-up Assistance (Upper Body Dressing)  obtain clothing  -DN      Recorded by [BRENDAN] Reg Mckinney, OT      Row Name 04/09/19 1030             Lower Body Dressing Assessment/Treatment    Lower Body Dressing [place] Level  doff;don;pants/bottoms;shoes/slippers;socks;underwear;minimum assist (75% patient effort);nonverbal cues (demo/gesture);verbal cues;set up  -DN      Lower Body Dressing Position  supported sitting;supported standing  -DN      Lower Body Dressing Setup Assistance  obtain clothing  -DN      Comment (Lower Body Dressing)  Min vc for sequencing  -DN      Recorded by [BRENDAN] Reg Mckinney, OT      Row Name 04/09/19 1030             Grooming Assessment/Treatment    Grooming [place] Level  grooming skills;deodorant application;hair care, combing/brushing;oral care regimen;supervision;set up  -DN      Grooming Position  sink side;supported sitting  -DN      Grooming Setup Assistance  obtain supplies;open containers  -DN      Recorded by [BRENDAN] Reg Mckinney, OT      Row Name 04/09/19 1030             Toileting Assessment/Treatment    Toileting [place] Level  toileting skills;adjust/manage clothing;contact guard assist;nonverbal cues (demo/gesture);verbal cues;set up assistance  -DN      Assistive Device Use (Toileting)  grab bar/safety frame;raised toilet seat  -DN      Toileting Position  supported sitting;supported standing  -DN      Recorded by [BRENDAN] Reg Mckinney, OT      Row Name 04/09/19 1030             Fine Motor Testing & Training    Comment, Fine Motor Coordination  stacking dominoes with  Rhand and lateral pinch, min a due to coordination and visual impairments  -DN      Recorded by [DN] Reg Mckinney, OT      Row Name 04/09/19 1030             Vision Assessment/Intervention    Visual Impairment/Limitations  visual/perceptual impairments present;peripheral vision impaired right  -DN      Visual Field Deficit  homonymous hemianopsia, right  -DN      Visual Processing Deficit  visual attention, right;jay-inattention/neglect, right  -DN      Recorded by [DN] Reg Mckinney, OT      Row Name 04/09/19 0844             Pain Assessment    Additional Documentation  Pain Scale: Numbers Pre/Post-Treatment (Group)  (Pended)   -LS      Recorded by [LS] Naomy Mendenhall, PT Student      Row Name 04/09/19 1030 04/09/19 0844          Pain Scale: Numbers Pre/Post-Treatment    Pain Scale: Numbers, Pretreatment  0/10 - no pain  -DN  0/10 - no pain  (Pended)   -LS     Pain Scale: Numbers, Post-Treatment  0/10 - no pain  -DN  0/10 - no pain  (Pended)   -LS     Recorded by [DN] Reg Mckinney, OT [LS] Naomy Mendenhall, PT Student     Row Name 04/09/19 0844             Balance    Balance  dynamic balance activity  (Pended)   -LS      Recorded by [LS] Naomy Mendenhall, PT Student      Row Name 04/09/19 1030             Static Standing Balance    Comment (Unsupported Standing, Static Balance)  standing at counter for RUE activity with visual elememt pt needed min vc for looking Right, not to use his LUE, and to try to keep RLE from hyper extending or buckleing  -DN      Recorded by [DN] Reg Mckinney, OT      Row Name 04/09/19 0861             Dynamic Balance Activity    Therapeutic Training Performed (Dynamic Balance)  --  (Pended)  step taps on 4 inch green step  -LS      Support Needed for Balance (Dynamic Balance Training)  uses at least one upper extremity for support;minimal external support for balance, 75% patient effort  (Pended)   -LS      Upper Extremity Activity with Device (Dynamic Balance Training)  --  (Pended)   jay bar  -LS      Comment (Dynamic Balance Training)  Performed x 8 reps of step taps with R LE, gaurding R knee. Min A to clear R foot and place on step. Repeated verbal cues to lift R leg and tactile cues improve form. Pt with significant fatigue after.   (Pended)   -LS      Recorded by [LS] Naomy Mendenhall, PT Student      Row Name 04/09/19 0844             Positioning and Restraints    Pre-Treatment Position  sitting in chair/recliner  (Pended)   -LS      Post Treatment Position  wheelchair  (Pended)   -LS      In Wheelchair  patient within staff view;exit alarm on;sitting  (Pended)   -LS      Recorded by [LS] Naomy Mendenhall, PT Student        User Key  (r) = Recorded By, (t) = Taken By, (c) = Cosigned By    Initials Name Effective Dates    Reg Bolden, OT 06/08/18 -     Narcisa Gaston, MS CCC-SLP 04/03/18 -     LS Naomy Mendenhall, PT Student 02/04/19 -           Wound 03/26/19 0900 Left chest incision (Active)   Dressing Appearance open to air 4/8/2019  8:15 PM   Closure Liquid skin adhesive;Approximated 4/8/2019  8:15 PM   Base clean;dry 4/8/2019  8:15 PM   Periwound dry;intact 4/8/2019  8:15 PM   Drainage Amount none 4/9/2019  7:22 AM   Dressing Care, Wound open to air 4/9/2019  7:22 AM         OT Recommendation and Plan    Anticipated Equipment Needs At Discharge (OT Eval): commode, 3-in-1, shower chair, tub bench  Planned Therapy Interventions (OT Eval): BADL retraining, cognitive/visual perception retraining, functional balance retraining, neuromuscular control/coordination retraining, strengthening exercise, transfer/mobility retraining            OT IRF GOALS     Row Name 04/09/19 1400 04/02/19 1500          Bathing Goal 1 (OT-IRF)    Activity/Device (Bathing Goal 1, OT-IRF)  bathing skills, all  -DN  bathing skills, all  -DN     [place] Level (Bathing Goal 1, OT-IRF)  supervision required;verbal cues required  -DN  contact guard assist;verbal cues required  -DN     Time Frame (Bathing Goal 1,  OT-IRF)  short term goal (STG)  -DN  short term goal (STG)  -DN     Progress/Outcomes (Bathing Goal 1, OT-IRF)  goal met;goal revised this date  -DN  goal met;goal revised this date  -DN        Bathing Goal 2 (OT-IRF)    Activity/Device (Bathing Goal 2, OT-IRF)  bathing skills, all  -DN  bathing skills, all  -DN     [place] Level (Bathing Goal 2, OT-IRF)  supervision required  -DN  supervision required  -DN     Time Frame (Bathing Goal 2, OT-IRF)  long term goal (LTG)  -DN  long term goal (LTG)  -DN     Progress/Outcomes (Bathing Goal 2, OT-IRF)  goal ongoing  -DN  goal ongoing  -DN        UB Dressing Goal 1 (OT-IRF)    Activity/Device (UB Dressing Goal 1, OT-IRF)  upper body dressing  -DN  upper body dressing  -DN     [place] (UB Dress Goal 1, OT-IRF)  supervision required  -DN  supervision required  -DN     Time Frame (UB Dressing Goal 1, OT-IRF)  short term goal (STG)  -DN  short term goal (STG)  -DN     Progress/Outcomes (UB Dressing Goal 1, OT-IRF)  goal met;goal ongoing  -DN  goal met;goal revised this date  -DN        UB Dressing Goal 2 (OT-IRF)    Activity/Device (UB Dressing Goal 2, OT-IRF)  upper body dressing  -DN  upper body dressing  -DN     [place] (UB Dress Goal 2, OT-IRF)  supervision required  -DN  supervision required  -DN     Time Frame (UB Dressing Goal 2, OT-IRF)  long term goal (LTG)  -DN  long term goal (LTG)  -DN     Progress/Outcomes (UB Dressing Goal 2, OT-IRF)  goal ongoing;goal met  -DN  goal ongoing;goal met  -DN        LB Dressing Goal 1 (OT-IRF)    Activity/Device (LB Dressing Goal 1, OT-IRF)  lower body dressing  -DN  lower body dressing  -DN     [place] (LB Dressing Goal 1, OT-IRF)  minimum assist (75% or more patient effort);verbal cues required;tactile cues required  -DN  minimum assist (75% or more patient effort);verbal cues required;tactile cues required  -DN     Time Frame (LB Dressing Goal 1, OT-IRF)  short term goal (STG)  -DN  short term goal (STG)  -DN      Progress/Outcomes (LB Dressing Goal 1, OT-IRF)  goal met;goal ongoing  -DN  goal met;goal revised this date  -DN        LB Dressing Goal 2 (OT-IRF)    Activity/Device (LB Dressing Goal 2, OT-IRF)  lower body dressing  -DN  lower body dressing  -DN     [place] (LB Dressing Goal 2, OT-IRF)  verbal cues required;tactile cues required;contact guard assist  -DN  verbal cues required;tactile cues required;minimum assist (75% or more patient effort)  -DN     Time Frame (LB Dressing Goal 2, OT-IRF)  long term goal (LTG)  -DN  long term goal (LTG)  -DN     Progress/Outcomes (LB Dressing Goal 2, OT-IRF)  goal revised this date  -DN  goal ongoing  -DN        Grooming Goal 1 (OT-IRF)    Activity/Device (Grooming Goal 1, OT-IRF)  grooming skills, all  -DN  grooming skills, all  -DN     [place] (Grooming Goal 1, OT-IRF)  supervision required  -DN  supervision required  -DN     Time Frame (Grooming Goal 1, OT-IRF)  short term goal (STG)  -DN  short term goal (STG)  -DN     Progress/Outcomes (Grooming Goal 1, OT-IRF)  goal ongoing  -DN  goal partially met;goal ongoing  -DN        Grooming Goal 2 (OT-IRF)    Activity/Device (Grooming Goal 2, OT-IRF)  grooming skills, all  -DN  grooming skills, all  -DN     [place] (Grooming Goal 2, OT-IRF)  supervision required  -DN  supervision required  -DN     Time Frame (Grooming Goal 2, OT-IRF)  long term goal (LTG)  -DN  long term goal (LTG)  -DN     Progress/Outcomes (Grooming Goal 2, OT-IRF)  goal revised this date  -DN  goal revised this date  -DN        Toileting Goal 1 (OT-IRF)    Activity/Device (Toileting Goal 1, OT-IRF)  toileting skills, all  -DN  toileting skills, all  -DN     [place] Level (Toileting Goal 1, OT-IRF)  minimum assist (75% or more patient effort);verbal cues required;tactile cues required  -DN  minimum assist (75% or more patient effort);verbal cues required;tactile cues required  -DN     Time Frame (Toileting Goal 1, OT-IRF)  short term goal  (STG)  -DN  short term goal (STG)  -DN     Progress/Outcomes (Toileting Goal 1, OT-IRF)  goal met;goal revised this date  -DN  goal met;goal revised this date  -DN        Toileting Goal 2 (OT-IRF)    Activity/Device (Toileting Goal 2, OT-IRF)  toileting skills, all  -DN  toileting skills, all  -DN     [place] Level (Toileting Goal 2, OT-IRF)  contact guard assist;verbal cues required;tactile cues required  -DN  contact guard assist;verbal cues required;tactile cues required  -DN     Time Frame (Toileting Goal 2, OT-IRF)  long term goal (LTG)  -DN  long term goal (LTG)  -DN     Progress/Outcomes (Toileting Goal 2, OT-IRF)  goal ongoing  -DN  goal ongoing  -DN        Self-Feeding Goal 1 (OT-IRF)    Activity/Device (Self-Feeding Goal 1, OT-IRF)  self-feeding skills, all  -DN  self-feeding skills, all  -DN     [place] (Self-Feeding Goal 1, OT-IRF)  supervision required  -DN  supervision required  -DN     Time Frame (Self-Feeding Goal 1, OT-IRF)  short term goal (STG)  -DN  short term goal (STG)  -DN     Progress/Outcomes 1 (Self-Feeding Goal, OT-IRF)  goal met;goal ongoing  -DN  goal met;goal ongoing  -DN        Self-Feeding Goal 2 (OT-IRF)    Activity/Device (Self-Feeding Goal 2, OT-IRF)  self-feeding skills, all  -DN  self-feeding skills, all  -DN     [place] (Self-Feeding Goal 2, OT-IRF)  supervision required  -DN  supervision required  -DN     Time Frame (Self-Feeding Goal 2, OT-IRF)  long term goal (LTG)  -DN  long term goal (LTG)  -DN     Progress/Outcomes (Self-Feeding Goal 2, OT-IRF)  goal met;goal ongoing  -DN  goal met;goal ongoing  -DN        Caregiver Training Goal 2 (OT-IRF)    Caregiver Training Goal 2 (OT-IRF)  pt caregiver to be independent with any assist pt needs with adls, transfers and HEP for d/c home  -DN  pt caregiver to be independent with any assist pt needs with adls, transfers and HEP for d/c home  -DN     Time Frame (Caregiver Training Goal 2, OT-IRF)  long term goal (LTG)  -DN   long term goal (LTG)  -DN     Progress/Outcomes (Caregiver Training Goal 2, OT-IRF)  goal ongoing  -DN  goal ongoing  -DN       User Key  (r) = Recorded By, (t) = Taken By, (c) = Cosigned By    Initials Name Provider Type    Reg Bolden OT Occupational Therapist          Occupational Therapy Education     Title: PT OT SLP Therapies (In Progress)     Topic: Occupational Therapy (Done)     Point: ADL training (Done)     Description: Instruct learner(s) on proper safety adaptation and remediation techniques during self care or transfers.   Instruct in proper use of assistive devices.    Learning Progress Summary           Patient Acceptance, E,TB,D, VU,NR by DN at 4/9/2019  2:25 PM    Comment:  jay adls, adaptive visual scanning, transfer training    Acceptance, E,TB,D, VU,NR by DN at 4/8/2019 12:16 PM    Comment:  transfer training, jay skills with adls, safety,etc    Acceptance, E,TB,D, VU,NR by DN at 4/4/2019  3:19 PM    Comment:  theraputty exercises, jay adls and transfer trainning    Acceptance, E,TB,D, NR by DN at 3/26/2019 12:06 PM    Comment:  jay adls and commode/shower transfers, still max vc for all due to cognition and visual impairments    Acceptance, E,TB,D, VU,NR by DN at 3/25/2019  5:23 PM    Comment:  OT role in rehab, transfer traning, jay adls                   Point: Home exercise program (Done)     Description: Instruct learner(s) on appropriate technique for monitoring, assisting and/or progressing therapeutic exercises/activities.    Learning Progress Summary           Patient Acceptance, E,TB,D, VU,NR by DN at 4/9/2019  2:25 PM    Comment:  jay adls, adaptive visual scanning, transfer training    Acceptance, E,TB,D, VU,NR by DN at 4/8/2019 12:16 PM    Comment:  transfer training, jay skills with adls, safety,etc    Acceptance, E,TB,D, VU,NR by DN at 4/4/2019  3:19 PM    Comment:  theraputty exercises, jay adls and transfer trainning    Acceptance, E,TB,D, NR by DN at 3/26/2019  12:06 PM    Comment:  jay adls and commode/shower transfers, still max vc for all due to cognition and visual impairments    Acceptance, E,TB,D, VU,NR by DN at 3/25/2019  5:23 PM    Comment:  OT role in rehab, transfer traning, jay adls                   Point: Precautions (Done)     Description: Instruct learner(s) on prescribed precautions during self-care and functional transfers.    Learning Progress Summary           Patient Acceptance, E,TB,D, VU,NR by DN at 4/9/2019  2:25 PM    Comment:  jay adls, adaptive visual scanning, transfer training    Acceptance, E,TB,D, VU,NR by DN at 4/8/2019 12:16 PM    Comment:  transfer training, jay skills with adls, safety,etc    Acceptance, E,TB,D, VU,NR by DN at 4/4/2019  3:19 PM    Comment:  theraputty exercises, jay adls and transfer trainning    Acceptance, E,TB,D, NR by DN at 3/26/2019 12:06 PM    Comment:  jay adls and commode/shower transfers, still max vc for all due to cognition and visual impairments    Acceptance, E,TB,D, VU,NR by DN at 3/25/2019  5:23 PM    Comment:  OT role in rehab, transfer traning, jay adls                   Point: Body mechanics (Done)     Description: Instruct learner(s) on proper positioning and spine alignment during self-care, functional mobility activities and/or exercises.    Learning Progress Summary           Patient Acceptance, E,TB,D, VU,NR by DN at 4/9/2019  2:25 PM    Comment:  jay adls, adaptive visual scanning, transfer training    Acceptance, E,TB,D, VU,NR by DN at 4/8/2019 12:16 PM    Comment:  transfer training, jay skills with adls, safety,etc    Acceptance, E,TB,D, VU,NR by DN at 4/4/2019  3:19 PM    Comment:  theraputty exercises, jay adls and transfer trainning    Acceptance, E,TB,D, NR by DN at 3/26/2019 12:06 PM    Comment:  jay adls and commode/shower transfers, still max vc for all due to cognition and visual impairments    Acceptance, E,TB,D, VU,NR by DN at 3/25/2019  5:23 PM    Comment:  OT role in rehab,  transfer traning, jay adls                               User Key     Initials Effective Dates Name Provider Type Discipline    DN 06/08/18 -  Reg Mckinney OT Occupational Therapist OT                       Time Calculation:     Time Calculation- OT     Row Name 04/09/19 1429 04/09/19 0900          Time Calculation- OT    OT Start Time  1230  -DN  0900  -DN     OT Stop Time  1300  -DN  0930  -DN     OT Time Calculation (min)  30 min  -DN  30 min  -DN     OT Non-Billable Time (min)  --  15 min team rounds  -DN     OT Received On  04/09/19  -DN  04/09/19  -DN       User Key  (r) = Recorded By, (t) = Taken By, (c) = Cosigned By    Initials Name Provider Type    DN Reg Mckinney OT Occupational Therapist          Therapy Charges for Today     Code Description Service Date Service Provider Modifiers Qty    47753042290 HC OT SELF CARE/MGMT/TRAIN EA 15 MIN 4/8/2019 Reg Mckinney, OT GO 2    65964169500 HC OT NEUROMUSC RE EDUCATION EA 15 MIN 4/8/2019 Reg Mckinney OT GO 1    [id number] HC OT THER PROC EA 15 MIN 4/8/2019 Reg Mckinney OT GO 1    [id number] HC OT CARE PLAN EA 15 MIN 4/9/2019 Reg Mckinney OT GO 1    [id number] HC OT SELF CARE/MGMT/TRAIN EA 15 MIN 4/9/2019 Reg Mckinney OT GO 2    [id number] HC OT NEUROMUSC RE EDUCATION EA 15 MIN 4/9/2019 Reg Mckinney OT GO 2                   Reg Mckinney OT  4/9/2019
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
## 2019-04-09 NOTE — THERAPY TREATMENT NOTE
"Inpatient Rehabilitation - Physical Therapy Treatment Note  [facility]     Patient Name: Nayan Ryan  : 1965  MRN: [id number]    Today's Date: 2019                 Admit Date: 3/24/2019      Visit Dx:    No diagnosis found.    Patient Active Problem List   Diagnosis   • Acute ischemic left PCA stroke (CMS/HCC)   • Status post placement of implantable loop recorder   • HTN (hypertension)   • Diabetes mellitus (CMS/HCC)   • Hyperlipidemia   • Morbid obesity (CMS/HCC)   • Anxiety disorder   • Migraine   • H/O hernia repair   • Abdominal wall abscess   • Back pain   • Stroke (cerebrum) (CMS/HCC)   • Vitamin D deficiency   • History of fatty infiltration of liver       Therapy Treatment    IRF Treatment Summary     Row Name 19 1400 19 1030 19 0900       Evaluation/Treatment Time and Intent    Subjective Information  no complaints  -DN  no complaints  -SA  no complaints  -SA    Existing Precautions/Restrictions  fall swallow, R visual neglect, aphasic  -DN  fall swallow  -SA  fall  -SA    Document Type  therapy note (daily note)  -DN  therapy note (daily note)  -SA  therapy note (daily note)  -SA    Mode of Treatment  occupational therapy  -DN  speech-language pathology  -SA  speech-language pathology  -SA    Patient/Family Observations  sitting in w/c  -DN  --  up in w/c at RN station; states he feels \"good, because i just took a shower\"  -SA    Start Time (Evaluation/Treatment)  --  1030  -SA  0930  -SA    Stop Time (Evaluation/Treatment)  --  1100  -SA  1000  -SA    Recorded by [DN] Reg Mckinney, OT [SA] Narcisa Sanchez MS CCC-SLP [SA] Narcisa Sanchez MS CCC-SLP    Row Name 19 0844             Evaluation/Treatment Time and Intent    Subjective Information  no complaints  -LB,LS,LB2      Existing Precautions/Restrictions  fall  -LB,LS,LB2      Document Type  therapy note (daily note)  -LB,LS,LB2      Mode of Treatment  physical therapy  -LB,LS,LB2      Patient/Family Observations  Pt " sitting in w/c brushing his teeth in BR  -LB,LS,LB2      Recorded by [LB,LS,LB2] Eliane Blackburn, PT (r) Naomy Mendenhall, PT Student (t) Eliane Blackburn, PT (c)      Row Name 04/09/19 1030             Safety Awareness/Health Promotion    Additional Documentation  Fall Prevention (Row)  -DN      Recorded by [DN] Reg Mckinney, OT      Row Name 04/09/19 1030 04/09/19 0844          Cognition/Psychosocial- PT/OT    Affect/Mental Status (Cognitive)  --  -- pt continues to be more engaged at times--varies   -LB,LS,LB2     Behavioral Issues (Cognitive)  withdrawn  -DN  withdrawn  -LB,LS,LB2     Orientation Status (Cognition)  oriented x 3  -DN  oriented x 3  -LB,LS,LB2     Follows Commands (Cognition)  follows one step commands;over 90% accuracy  -DN  follows one step commands;75-90% accuracy  -LB,LS,LB2     Personal Safety Interventions  gait belt;fall prevention program maintained  -DN  fall prevention program maintained;gait belt;muscle strengthening facilitated;nonskid shoes/slippers when out of bed  -LB,LS,LB2     Memory Deficit (Cognitive)  --  moderate deficit  -LB,LS,LB2     Safety Deficit (Cognitive)  --  impulsivity;insight into deficits/self awareness;problem solving;ability to follow commands  -LB,LS,LB2     Recorded by [DN] Reg Mckinney, OT [LB,LS,LB2] Eliane Blackburn, PT (r) Naomy Mendenhall, PT Student (t) Eliane Blackburn, PT (c)     Row Name 04/09/19 0844             Bed Mobility Assessment/Treatment    Rolling Left [place] (Bed Mobility)  verbal cues;minimum assist (75% patient effort)  -LB,LS,LB2      Supine-Sit [place] (Bed Mobility)  verbal cues;contact guard;minimum assist (75% patient effort)  -LB,LS,LB2      Recorded by [LB,LS,LB2] Eliane Blackburn, PT (r) Naomy Mendenhall, PT Student (t) Eliane Blackburn, PT (c)      Row Name 04/09/19 0844             Chair-Bed Transfer    Chair-Bed [place] (Transfers)  verbal cues;contact guard;minimum assist (75% patient effort)  -LB,LS,LB2      Assistive Device  (Chair-Bed Transfers)  wheelchair  -LB,LS,LB2      Recorded by [LB,LS,LB2] Eliane Blackburn, PT (r) Naomy Mendenhall, PT Student (t) Eliane Blackburn, PT (c)      Row Name 04/09/19 0844             Sit-Stand Transfer    Sit-Stand [place] (Transfers)  contact guard  -LB,LS,LB2      Assistive Device (Sit-Stand Transfers)  wheelchair  -LB,LS,LB2      Recorded by [LB,LS,LB2] Eliane Blackburn, PT (r) Naomy Mendenhall, PT Student (t) Eliane Blackburn, PT (c)      Row Name 04/09/19 0844             Stand-Sit Transfer    Stand-Sit [place] (Transfers)  contact guard;minimum assist (75% patient effort)  -LB,LS,LB2      Assistive Device (Stand-Sit Transfers)  wheelchair  -LB,LS,LB2      Recorded by [LB,LS,LB2] Eliane Blackburn, PT (r) Naomy Mendenhall, PT Student (t) Eliane Blackburn, PT (c)      Row Name 04/09/19 1030             Toilet Transfer    Type (Toilet Transfer)  stand pivot/stand step  -DN      [place] Level (Toilet Transfer)  minimum assist (75% patient effort);verbal cues;nonverbal cues (demo/gesture)  -DN      Assistive Device (Toilet Transfer)  grab bars/safety frame;wheelchair;raised toilet seat  -DN      Recorded by [DN] Reg Mckinney OT      Row Name 04/09/19 1030             Shower Transfer    Type (Shower Transfer)  stand pivot/stand step  -DN      [place] Level (Shower Transfer)  minimum assist (75% patient effort);nonverbal cues (demo/gesture);verbal cues  -DN      Assistive Device (Shower Transfer)  tub bench;grab bars/tub rail;wheelchair  -DN      Recorded by [BRENDAN] Reg Mckinney OT      Row Name 04/09/19 0844             Gait/Stairs Assessment/Training    [place] Level (Gait)  minimum assist (75% patient effort);2 person assist min of 1 + CGA of 1  -LB,LS,LB2      Assistive Device (Gait)  cane, quad LBQC  -LB,LS,LB2      Distance in Feet (Gait)  30, 40 x 2, 50  -LB,LS,LB2      Pattern (Gait)  step-through  -LB,LS,LB2      Deviations/Abnormal Patterns (Gait)  base of support, narrow;nancy  decreased;gait speed decreased  -LB,LS,LB2      Bilateral Gait Deviations  forward flexed posture;heel strike decreased  -LB,LS,LB2      Left Sided Gait Deviations  weight shift ability decreased  -LB,LS,LB2      Right Sided Gait Deviations  foot drop/toe drag;knee buckling, right side;knee hyperextension;weight shift ability decreased  -LB,LS,LB2      Comment (Gait/Stairs)  Began ambulation with LBQC this date. Pt needed continuous step by step cues for proper sequencing with cane. CGA-Min A at R knee with intermittent buckling noted. Pt advancing limb without assist and posterior AFO improves toe clearance    -LB,LS,LB2      Recorded by [LB,LS,LB2] Eliane Blackburn, PT (r) Naomy Mendenhall, PT Student (t) Eliane Blackburn, PT (c)      Row Name 04/09/19 0810             Wheelchair Mobility/Management    Method of Wheelchair Locomotion (Mobility)  jay-bipedal propulsion (left sided)  -LB,LS,LB2      Mobility Activities (Wheelchair)  forward propulsion;turning  -LB,LS,LB2      Forward Propulsion [place] (Wheelchair)  independent  -LB,LS,LB2      Steering [place] (Wheelchair)  independent  -LB,LS,LB2      Turning [place] (Wheelchair)  independent  -LB,LS,LB2      Doorway Navigation [place] (Wheelchair)  supervision  -LB,LS,LB2      Distance Propelled in Feet (Wheelchair)  150  -LB,LS,LB2      Comment, Mobility Activities (Wheelchair)  Propelled from room most of way into therapy gym  -LB,LS,LB2      Recorded by [LB,LS,LB2] Eliane Blackburn PT (r) Naomy Mendenhall, PT Student (t) Eliane Blackburn, PT (c)      Row Name 04/09/19 1030 04/09/19 0844          Safety Issues, Functional Mobility    Safety Issues Affecting Function (Mobility)  ability to follow commands;at risk behavior observed;awareness of need for assistance;insight into deficits/self awareness;impulsivity;judgment;problem solving;safety precaution awareness;sequencing abilities  -DN  sequencing abilities;positioning of assistive device;problem  solving;insight into deficits/self awareness;impulsivity;ability to follow commands  -LB,LS,LB2     Impairments Affecting Function (Mobility)  balance;cognition;coordination;strength;visual/perceptual  -DN  cognition;balance;motor planning;strength;visual/perceptual;postural/trunk control  -LB,LS,LB2     Comment, Safety Issues/Impairments (Mobility)  R neglect  -DN  Pt tends to be slightly impulsive/impatient with starting mobility at times. Cues for slowing down and for saftey  -LB,LS,LB2     Recorded by [DN] Reg Mckinney OT [LB,LS,LB2] Eliane Blackburn, PT (r) Naomy Mendenhall, PT Student (t) Eliane Blackburn PT (c)     Row Name 04/09/19 1030             Bathing Assessment/Treatment    Bathing [place] Level  bathing skills;verbal cues;nonverbal cues (demo/gesture);contact guard assist  -DN      Assistive Device (Bathing)  tub bench;grab bar/tub rail  -DN      Bathing Position  supported sitting;supported standing  -DN      Bathing Setup Assistance  adjust water temperature  -DN      Comment (Bathing)  Min vc for sequencing  -DN      Recorded by [DN] Reg Mckinney, OT      Row Name 04/09/19 1030             Upper Body Dressing Assessment/Treatment    Upper Body Dressing Task  upper body dressing skills;pull over garment;set up assistance;supervision;verbal cues;nonverbal cues (demo/gesture)  -DN      Upper Body Dressing Position  supported sitting  -DN      Set-up Assistance (Upper Body Dressing)  obtain clothing  -DN      Recorded by [DN] Reg Mckinney OT      Row Name 04/09/19 1030             Lower Body Dressing Assessment/Treatment    Lower Body Dressing [place] Level  doff;don;pants/bottoms;shoes/slippers;socks;underwear;minimum assist (75% patient effort);nonverbal cues (demo/gesture);verbal cues;set up  -DN      Lower Body Dressing Position  supported sitting;supported standing  -DN      Lower Body Dressing Setup Assistance  obtain clothing  -DN      Comment (Lower Body Dressing)  Min vc for  sequencing  -DN      Recorded by [DN] Reg Mckinney, OT      Row Name 04/09/19 1030             Grooming Assessment/Treatment    Grooming [place] Level  grooming skills;deodorant application;hair care, combing/brushing;oral care regimen;supervision;set up  -DN      Grooming Position  sink side;supported sitting  -DN      Grooming Setup Assistance  obtain supplies;open containers  -DN      Recorded by [DN] Reg Mckinney, OT      Row Name 04/09/19 1030             Toileting Assessment/Treatment    Toileting [place] Level  toileting skills;adjust/manage clothing;contact guard assist;nonverbal cues (demo/gesture);verbal cues;set up assistance  -DN      Assistive Device Use (Toileting)  grab bar/safety frame;raised toilet seat  -DN      Toileting Position  supported sitting;supported standing  -DN      Recorded by [DN] Reg Mckinney, OT      Row Name 04/09/19 1030             Fine Motor Testing & Training    Comment, Fine Motor Coordination  stacking dominoes with Rhand and lateral pinch, min a due to coordination and visual impairments  -DN      Recorded by [BRENDAN] Reg Mckinney, OT      Row Name 04/09/19 1030             Vision Assessment/Intervention    Visual Impairment/Limitations  visual/perceptual impairments present;peripheral vision impaired right  -DN      Visual Field Deficit  homonymous hemianopsia, right  -DN      Visual Processing Deficit  visual attention, right;jay-inattention/neglect, right  -DN      Recorded by [BRENDAN] Reg Mckinney, OT      Row Name 04/09/19 0844             Pain Assessment    Additional Documentation  Pain Scale: Numbers Pre/Post-Treatment (Group)  -LB,LS,LB2      Recorded by [LB,LS,LB2] Eliane Blackburn PT (r) Naomy Mendenhall PT Student (t) Eliane Blackburn PT (c)      Row Name 04/09/19 1030 04/09/19 0844          Pain Scale: Numbers Pre/Post-Treatment    Pain Scale: Numbers, Pretreatment  0/10 - no pain  -DN  0/10 - no pain  -LB,LS,LB2     Pain Scale: Numbers, Post-Treatment   0/10 - no pain  -DN  0/10 - no pain  -LB,LS,LB2     Recorded by [DN] Reg Mckinney, OT [LB,LS,LB2] Eliane Blackburn, PT (r) Naomy Mendenhall, PT Student (t) Eliane Blackburn, PT (c)     Row Name 04/09/19 0816             Balance    Balance  dynamic balance activity  -LB,LS,LB2      Recorded by [LB,LS,LB2] Eliane Blackburn, PT (r) Naomy Mendenhall, PT Student (t) Eliane Blackburn, PT (c)      Row Name 04/09/19 1030             Static Standing Balance    Comment (Unsupported Standing, Static Balance)  standing at counter for RUE activity with visual elememt pt needed min vc for looking Right, not to use his LUE, and to try to keep RLE from hyper extending or buckleing  -DN      Recorded by [DN] Reg Mckinney, OT      Row Name 04/09/19 0806             Dynamic Balance Activity    Therapeutic Training Performed (Dynamic Balance)  -- step taps on 4 inch green step  -LB,LS,LB2      Support Needed for Balance (Dynamic Balance Training)  uses at least one upper extremity for support;minimal external support for balance, 75% patient effort  -LB,LS,LB2      Upper Extremity Activity with Device (Dynamic Balance Training)  -- jay bar  -LB,LS,LB2      Comment (Dynamic Balance Training)  Performed 2 x 8 reps of step taps with R LE/ 1 x 10 reps on L LE, gaurding R knee. Min A to clear R foot and place on step. Repeated verbal cues to lift R leg and tactile cues improve form. Pt with significant fatigue after.   -LB,LS,LB2      Recorded by [LB,LS,LB2] Eliane Blackburn, PT (r) Naomy Mendenhall, PT Student (t) Eliane Blackburn, PT (c)      Row Name 04/09/19 0822             Lower Extremity Orthosis Management    Type (Lower Extremity Orthosis)  posterior strut carbon fiber AFO  -LB,LS,LB2      Therapeutic Indications (Lower Extremity Orthosis)  compensation for lost function  -LB,LS,LB2      Orthosis Training (Lower Extremity Orthosis)  patient;purpose/goals of orthosis  -LB,LS,LB2      Skin Assessment (Lower Extremity Orthosis)  not skin issues noted  this date  -LB,LS,LB2      Recorded by [LB,LS,LB2] Eliane Blackburn, PT (r) Naomy Mendenhall, PT Student (t) Eliane Blackburn, PT (c)      Row Name 04/09/19 0844             Lower Extremity Supine Therapeutic Exercise    Comment, Supine Lower Extremity (Therapeutic Exercise)  Quickly ran through 1-2 reps of SLR, heel slides, hip abduction, and ankle pumps to remind pt of exercises he can do for R leg while laying in bed in the evenings  -LB,LS,LB2      Recorded by [LB,LS,LB2] Eliane Blackburn, PT (r) Naomy Mendenhall, PT Student (t) Eliane Blackburn, PT (c)      Row Name 04/09/19 0844             Positioning and Restraints    Pre-Treatment Position  sitting in chair/recliner  -LB,LS,LB2      Post Treatment Position  bed  -LB,LS,LB2      In Bed  supine;call light within reach;encouraged to call for assist;exit alarm on  -LB,LS,LB2      In Wheelchair  patient within staff view  -LB,LS,LB2      Recorded by [LB,LS,LB2] Eliane Blackburn, PT (r) Naomy Mendenhall, PT Student (t) Eliane Blackburn PT (c)        User Key  (r) = Recorded By, (t) = Taken By, (c) = Cosigned By    Initials Name Effective Dates    Reg Bolden, OT 06/08/18 -     Eliane Pereira PT 04/03/18 -     Narcisa Gaston, MS CCC-SLP 04/03/18 -     Naomy Armas, PT Student 02/04/19 -         Wound 03/26/19 0900 Left chest incision (Active)   Dressing Appearance open to air 4/8/2019  8:15 PM   Closure Liquid skin adhesive;Approximated 4/8/2019  8:15 PM   Base clean;dry 4/8/2019  8:15 PM   Periwound dry;intact 4/8/2019  8:15 PM   Drainage Amount none 4/9/2019  7:22 AM   Dressing Care, Wound open to air 4/9/2019  7:22 AM     Physical Therapy Education     Title: PT OT SLP Therapies (In Progress)     Topic: Physical Therapy (In Progress)     Point: Mobility training (Done)     Learning Progress Summary           Patient Acceptance, E,TB, VU,NR by KENZIE at 4/9/2019  8:33 AM    Comment:  Continued discussion regarding how stroke has affected his R side. Educated on use of cane     Acceptance, E,TB, VU,NR by  at 4/8/2019  8:54 AM    Comment:  Educated on why R leg is weak and how the stroke is affecting his knee control    Acceptance, E,TB, VU,NR by  at 4/5/2019 11:30 AM    Comment:  Discussed purpose of strengthening exercises. Educated on repeated practice of walking and other exercises to gradually build strength and endurance.    Acceptance, E, NR by  at 4/4/2019  9:52 AM    Acceptance, E, NR by  at 4/3/2019 10:16 AM    Acceptance, E, NR by  at 4/2/2019  3:18 PM    Acceptance, E, NR by  at 4/1/2019 10:32 AM    Acceptance, E,TB,D, NR by  at 3/30/2019 11:44 AM    Acceptance, E,TB,D, NR by  at 3/29/2019  2:58 PM    Acceptance, E,TB,D, NR by EE at 3/28/2019 11:38 AM    Acceptance, E,TB,D, NR by  at 3/27/2019 10:05 AM    Acceptance, E,TB,D, NR by EE at 3/26/2019  9:48 AM    Acceptance, E,TB,D, NR by EE at 3/25/2019  3:09 PM                   Point: Home exercise program (In Progress)     Learning Progress Summary           Patient Acceptance, E, NR by  at 4/4/2019  9:52 AM    Acceptance, E, NR by  at 4/3/2019 10:16 AM    Acceptance, E, NR by  at 4/2/2019  3:18 PM    Acceptance, E, NR by  at 4/1/2019 10:32 AM    Acceptance, E,TB,D, NR by  at 3/29/2019  2:58 PM    Acceptance, E,TB,D, NR by EE at 3/28/2019 11:38 AM    Acceptance, E,TB,D, NR by EE at 3/27/2019 10:05 AM    Acceptance, E,TB,D, NR by EE at 3/26/2019  9:48 AM    Acceptance, E,TB,D, NR by EE at 3/25/2019  3:09 PM                   Point: Body mechanics (In Progress)     Learning Progress Summary           Patient Acceptance, E, NR by  at 4/4/2019  9:52 AM    Acceptance, E, NR by  at 4/3/2019 10:16 AM    Acceptance, E, NR by  at 4/2/2019  3:18 PM    Acceptance, E, NR by  at 4/1/2019 10:32 AM    Acceptance, E,TB,D, NR by EE at 3/29/2019  2:58 PM    Acceptance, E,TB,D, NR by EE at 3/28/2019 11:38 AM    Acceptance, E,TB,D, NR by EE at 3/27/2019 10:05 AM    Acceptance, E,TB,D, NR by EE at 3/26/2019   9:48 AM    Acceptance, E,TB,D, NR by  at 3/25/2019  3:09 PM                   Point: Precautions (Done)     Learning Progress Summary           Patient Acceptance, E,TB, VU,NR by  at 4/9/2019  8:33 AM    Comment:  Continued discussion regarding how stroke has affected his R side. Educated on use of cane    Acceptance, E,TB, VU,NR by  at 4/8/2019  8:54 AM    Comment:  Educated on why R leg is weak and how the stroke is affecting his knee control    Acceptance, E,TB, VU,NR by  at 4/5/2019 11:30 AM    Comment:  Discussed purpose of strengthening exercises. Educated on repeated practice of walking and other exercises to gradually build strength and endurance.    Acceptance, E, NR by  at 4/4/2019  9:52 AM    Acceptance, E, NR by  at 4/3/2019 10:16 AM    Acceptance, E, NR by  at 4/2/2019  3:18 PM    Acceptance, E, NR by  at 4/1/2019 10:32 AM    Acceptance, E,TB,D, NR by  at 3/29/2019  2:58 PM    Acceptance, E,TB,D, NR by  at 3/28/2019 11:38 AM    Acceptance, E,TB,D, NR by  at 3/27/2019 10:05 AM    Acceptance, E,TB,D, NR by  at 3/26/2019  9:48 AM    Acceptance, E,TB,D, NR by  at 3/25/2019  3:09 PM                               User Key     Initials Effective Dates Name Provider Type Discipline     06/08/18 -  Donna Inman, PT Physical Therapist PT     04/03/18 -  Clau Ayon, PT Physical Therapist PT     04/03/18 -  Ariadne Garcia, PT Physical Therapist PT     02/04/19 -  Naomy Mendenhall, PT Student PT Student                   PT Recommendation and Plan                        Time Calculation:     PT Charges     Row Name 04/09/19 1312 04/09/19 0832          Time Calculation    Start Time  1300  -LB (r) LS (t) LB (c)  0830  -LB (r) LS (t) LB (c)     Stop Time  1330  -LB (r) LS (t) LB (c)  0900  -LB (r) LS (t) LB (c)     Time Calculation (min)  30 min  -LB (r) LS (t)  30 min  -LB (r) LS (t)     PT Received On  04/09/19  -LB (r) LS (t) LB (c)  04/09/19  -LB (r) LS (t) LB (c)     PT -  Next Appointment  04/10/19  -RENNY (r) LS (t) RENNY (c)  --       User Key  (r) = Recorded By, (t) = Taken By, (c) = Cosigned By    Initials Name Provider Type    Eliane Pereira, PT Physical Therapist    Naomy Armas, PT Student PT Student          Therapy Charges for Today     Code Description Service Date Service Provider Modifiers Qty    35996290343 HC PT NEUROMUSC RE EDUCATION EA 15 MIN 4/8/2019 Naomy Mendenhall, PT Student GP 4    77503590240 HC PT THER SUPP EA 15 MIN 4/8/2019 Naomy Mendenhall, PT Student GP 2    57742029447 HC PT NEUROMUSC RE EDUCATION EA 15 MIN 4/9/2019 Naomy Mendenhall, PT Student GP 4    87593254069 HC PT THER SUPP EA 15 MIN 4/9/2019 Naomy Mendenhall, PT Student GP 3                   Naomy Mendenhall, PT Student  4/9/2019

## 2019-04-09 NOTE — PROGRESS NOTES
"Weekly progress report and length of stay discussed with pt and by phone with pt's sister.  Pt expressed disappointment about his length of stay, stating that he \"had to get back to work\" and that he was worried about finances.  Reminded pt that his wife had confirmed that pt has quite a lot of paid leave time available to him. Asked pt if he thought he was physically able to do his job and he replied \"no\".   Will continue to provide support and assist with plans.  "

## 2019-04-09 NOTE — PROGRESS NOTES
Case Management  Inpatient Rehabilitation Team Conference    Conference Date/Time: 4/9/2019 8:50:24 AM    Team Conference Attendees:  Dr. Familia Madrid, Pharmacist  Germania Sampson, SARKISW  Eliane Blackburn, PT  Reg Mckinney, OT  Narcisa Sanchez, SLP  Narcisa Case, CTRS  Keerthi Putnam RD, LD  Camilo Albright, ASHLEE Zhou, RN  Chaplain Sylvia    Demographics            Age: 53Y            Gender: Male    Admission Date: 3/24/2019 3:37:00 PM  Rehabilitation Diagnosis:  CVA  Past Medical History: HTN, HLD; hernia repair; migraines; IDDM; anxiety back  pain      Plan of Care  Anticipated Discharge Date/Estimated Length of Stay: ELOS: 3 weeks  Anticipated Discharge Destination: Community discharge with assistance  Discharge Plan : Pt lives with his wife and 2 adult children in a 2 story house  withsteps to enter. Pt will have 24 hour assistance at discharge.  Medical Necessity Expected Level Rationale: MIN  Intensity and Duration: an average of 3 hours/5 days per week  Medical Supervision and 24 Hour Rehab Nursing: x  Physical Therapy: x  PT Intensity/Duration: 60 minutes/day, 5 days/week  Occupational Therapy: x  OT Intensity/Duration: 60 minutes/day, 5 days/week  Speech and Language Therapy: x  SLP Intensity/Duration: 60 minutes/day, 5 days/week  Social Work: x  Therapeutic Recreation: x  Psychology: x  Updated (if changes indicated)    Anticipated Discharge Date/Estimated Length of Stay:   ELOS: 2 weeks    Based on the patient's medical and functional status, their prognosis and  expected level of functional improvement is: CGA      Interdisciplinary Problem/Goals/Status    All Rehab Problems:  Body Systems    [RN] Endocrine(Active)  Current Status(04/06/2019): Patient at risk for altered blood sugars related to  recent health change.  Weekly Goal(04/13/2019): Patient will be compliant with accuchecks, diet, and  insulin coverage.  Discharge Goal: Patient will be independent with blood sugar management and  be  compliant with treatment.        Cognition    [ST] Executive Functions(Active)  Current Status(04/08/2019): Moderate cognitive deficits: mod attention, exec  function, and visuospatial skills (right neglect):improving. Moderate memory  deficits; impulsivity noted. Improving with 4-5 step verbal sequencing of ADL's  and visual memory.  Weekly Goal(04/15/2019): Attend to tasks and recall details of daily activities  Discharge Goal: Improved cognitive skills        Communication    [ST] Expression(Active)  Current Status(04/08/2019): Mild anomic aphasia; word finding and comprehension  deficits. Improving in spontaneous and confrontational expression and  comprehension  Weekly Goal(04/15/2019): improve word retrieval with min phonemic cues and  follow commands with min assist  Discharge Goal: Improve receptive and expressive language skills        Mobility    [OT] Toilet Transfers(Active)  Current Status(04/08/2019): Min SPS mod vc for safety  Weekly Goal(04/16/2019): CGA Min vc for safety  Discharge Goal: SBA min vc    [OT] Tub/Shower Transfers(Active)  Current Status(04/08/2019): Min SPS mod vc for safety  Weekly Goal(04/16/2019): CGA  Discharge Goal: SBA    [PT] Walk(Active)  Current Status(04/08/2019): 60 ft Min A of 1+ CGA of 1 at hemibars  Weekly Goal(04/15/2019): PT only  Discharge Goal: 80' CGA with appropriate AD    [PT] Bed/Chair/Wheelchair(Active)  Current Status(04/08/2019): Min-Mod A  Weekly Goal(04/15/2019): CGA-Min A  Discharge Goal: CGA    [PT] Bed Mobility(Active)  Current Status(04/08/2019): CGA- Min  Weekly Goal(04/15/2019): CGA  Discharge Goal: SBA    [PT] Wheelchair(Active)  Current Status(04/08/2019): 100ft supervision  Weekly Goal(04/15/2019): 150ft Mod I  Discharge Goal: 150ft Mod I        Psychosocial    [RN] Coping/Adjustment(Active)  Current Status(04/06/2019): Patient at risk for ineffective coping related to  recent change in health status and hospitalization.  Weekly Goal(04/13/2019):  Patient will verbalize feelings and ask questions if he  has them.  Discharge Goal: Patient will develop coping skills to accomodate his health  changes.        Safety    [RN] Potential for Injury(Active)  Current Status(04/06/2019): Patient at risk for falls related to impaired vision  and impaired mobility.  Weekly Goal(04/13/2019): Patient will be free of falls on rehab and will be  compliant with call light use.  Discharge Goal: No falls while here on rehab. Pt family aware of fall/safety in  the home setting.        Self Care    [OT] Bathing(Active)  Current Status(04/08/2019): Min vc for safety,sequencing  Weekly Goal(04/16/2019): CGA vc for safety  Discharge Goal: SBA    [OT] Dressing (Lower)(Active)  Current Status(04/08/2019): Mod/Min vc for jay tech,foot placement  Weekly Goal(04/16/2019): Min  Discharge Goal: CGA    [OT] Dressing (Upper)(Active)  Current Status(04/08/2019): SBA with min cues  Weekly Goal(04/16/2019): SBA  Discharge Goal: SBA    [OT] Eating(Active)  Current Status(04/08/2019): SBA Min vc  Weekly Goal(04/16/2019): SBA  Discharge Goal: SBA    [OT] Grooming(Active)  Current Status(04/01/2019): SBA vc seated  Weekly Goal(04/16/2019): SBA  Discharge Goal: SBA    [OT] Toileting(Active)  Current Status(04/08/2019): Min VC for safe technique  Weekly Goal(04/16/2019): CGA vc  Discharge Goal: SBA        Sphincter Control    [RN] Bladder Management(Active)  Current Status(04/06/2019): Patient is continent of bladder.  Weekly Goal(04/13/2019): Patient will remain continent.  Discharge Goal: Patient will remain continent.    [RN] Bowel Management(Active)  Current Status(04/06/2019): Patient is continent of bowels.  Weekly Goal(04/13/2019): Patient will remain continent.  Discharge Goal: Patient will remain continent.        Swallow Function    [ST] Swallowing(Active)  Current Status(04/08/2019): On reg/thin; Mild dysphagia. Suspect mistiming of  swallow and question poss asp/pen. Pt needs verbal cues  for small sips/bites and  NO talking with foodl/liquid in mouth. Monitoring for diet dilma and possible need  for VFSS if clinically warranted.  Weekly Goal(04/15/2019): Tolerate diet; Recall swallow strategies (no talking;  small sip/bite)  Discharge Goal: Tolerate least restrictive diet        Comments: 3/26: RLE strength poor; inconsistent when answering questions re:  number of steps in home, ages of children, etc.; impulsive, very poor safety  awareness, NSG not noting any unsafe behavior at night; right visual  deficit/neglect; needing step by step cues; BNE ordered;    4/2: needs constant cues for sequencing;    4/9: to trial quad cane this AM; impulsive; some cognitive improvement noted;    Signed by: Camilo Albright RN    Physician CoSigned By: Familia Jaems 04/09/2019 09:08:50

## 2019-04-09 NOTE — PLAN OF CARE
Problem: Stroke (IRF) (Adult)  Goal: Promote Optimal Functional [place]  Outcome: Ongoing (interventions implemented as appropriate)   04/09/19 1840   Stroke (IRF) (Adult)   Promote Optimal Functional [place] demonstrating adequate progress       Problem: Fall Risk (Adult)  Goal: Absence of Fall  Outcome: Ongoing (interventions implemented as appropriate)   04/09/19 1840   Fall Risk (Adult)   Absence of Fall making progress toward outcome       Problem: Diabetes, Type 2 (Adult)  Goal: Signs and Symptoms of Listed Potential Problems Will be Absent, Minimized or Managed (Diabetes, Type 2)  Outcome: Ongoing (interventions implemented as appropriate)   04/09/19 1840   Goal/Outcome Evaluation   Problems Assessed (Type 2 Diabetes) all   Problems Present (Type 2 Diabetes) situational response       Problem: Skin Injury Risk (Adult)  Goal: Skin Health and Integrity  Outcome: Ongoing (interventions implemented as appropriate)   04/09/19 1840   Skin Injury Risk (Adult)   Skin Health and Integrity making progress toward outcome       Problem: Patient Care Overview  Goal: Plan of Care Review  Outcome: Ongoing (interventions implemented as appropriate)   04/09/19 1840   Patient Care Overview   IRF Plan of Care Review progress ongoing, continue   Progress, Functional Goals demonstrating adequate progress   Coping/Psychosocial   Plan of Care Reviewed With patient   OTHER   Outcome Summary Patient pleasant and cooperative, continent of b/b and takes medication with water. He has had no pain and no unsafe behavior today. Right sided weakness, and takes pain medication and Xanax at bedtime.

## 2019-04-09 NOTE — PROGRESS NOTES
Inpatient Rehabilitation Functional Measures Assessment    Functional Measures  VANITA Eating:  [facility] Grooming: [facility] Bathing:  [facility] Upper Body Dressing:  [facility] Lower Body Dressing:  [facility] Toileting:  [facility] Bladder Management  Level of Assistance:  [place]  Frequency/Number of Accidents this Shift:  [facility] Bowel Management  Level of Assistance: [place]  Frequency/Number of Accidents this Shift: [facility] Bed/Chair/Wheelchair Transfer:  [facility] Toilet Transfer:  [facility] Tub/Shower Transfer:  [place]    Previously Documented Mode of Locomotion at Discharge: Field  VANITA Expected Mode of Locomotion at Discharge: [facility] Walk/Wheelchair:  [facility] Stairs:  [facility] Comprehension:  Auditory comprehension is the usual mode. Comprehension  Score = 6, Modified [place].  Patient comprehends complex/abstract  information in their primary language, requiring:  VANITA Expression:  Vocal expression is the usual mode. Expression Score = 6,  Modified Independent.  Patient expresses complex/abstract information in their  primary language, requiring:  VANITA Social Interaction:  Social Interaction Score = 6, Modified Independent.  Patient is modified independent for social interaction, requiring:  VANITA Problem Solving:  Problem Solving Score = 6, Modified [place].  Patient  makes appropriate decisions in order to solve complex problems, but requires  extra time.  VANITA Memory:  Memory Score = 6, Modified [place].  Patient is modified  independent for memory, requiring:    Therapy Mode Minutes  Occupational Therapy: [place]  Physical Therapy: [place]  Speech Language Pathology:  [place]    Signed by: Kathy Carrera RN

## 2019-04-09 NOTE — PROGRESS NOTES
Occupational Therapy: Individual: 60 minutes.    Physical Therapy: [place]    Speech Language Pathology:  [place]    Signed by: RAMO Cox/SOFIA

## 2019-04-09 NOTE — THERAPY TREATMENT NOTE
"Inpatient Rehabilitation - Speech Language Pathology Treatment Note    [facility]       Patient Name: Nayan Ryan  : 1965  MRN: [id number]    Today's Date: 2019           Admit Date: 3/24/2019      Visit Dx:      No diagnosis found.    Patient Active Problem List   Diagnosis   • Acute ischemic left PCA stroke (CMS/HCC)   • Status post placement of implantable loop recorder   • HTN (hypertension)   • Diabetes mellitus (CMS/HCC)   • Hyperlipidemia   • Morbid obesity (CMS/HCC)   • Anxiety disorder   • Migraine   • H/O hernia repair   • Abdominal wall abscess   • Back pain   • Stroke (cerebrum) (CMS/HCC)   • Vitamin D deficiency   • History of fatty infiltration of liver          Therapy Treatment    Evaluation/Coping    Evaluation/Treatment Time and Intent  Subjective Information: no complaints (19 1030 : Narcisa Sanchez MS CCC-SLP)  Existing Precautions/Restrictions: fall(swallow) (19 1030 : Narcisa Sanchez MS CCC-SLP)  Document Type: therapy note (daily note) (19 1030 : Narcisa Sanchez MS CCC-SLP)  Mode of Treatment: speech-language pathology (19 1030 : Narcisa Sanchez, MS CCC-SLP)  Patient/Family Observations: up in w/c at RN station; states he feels \"good, because i just took a shower\" (19 0900 : Narcisa Sanchez, MS CCC-SLP)  Start Time (Evaluation/Treatment): 1030 (19 1030 : Narcisa Sanchez, MS CCC-SLP)  Stop Time (Evaluation/Treatment): 1100 (19 1030 : Narcisa Sanchez MS CCC-SLP)    Vitals/Pain/Safety         Cognition/Communication         Oral Motor/Eating         Mobility/Basic Activities/Instrumental Activities/Motor/Modality                   ROM/MMT                   Sensory/Myotome/Dermatome/Edema               Posture/Balance/Special Tests/Exercise/Transportation/Sexual Function                   Orthotics/Residual Limb/Prosthetic Management              Outcome Summary         EDUCATION    The patient has been educated in the following areas:     Cognitive " Impairment.    SLP Recommendation and Plan    SLP Diagnosis: Moderate Cognitive impairment; Mild Anomic Aphasia    SLP Diagnosis: Moderate Cognitive impairment; Mild Anomic Aphasia    Rehab Potential/Prognosis: good    Swallow Criteria for Skilled Therapeutic Interventions Met: no problems identified which require skilled intervention, other (see comments)(however, will monitor closely for s/s asp/dysphagia)    Anticipated Dischage Disposition: home with OP services, anticipate therapy at next level of care         Therapy Frequency (Swallow): 5 days per week    Predicted Duration Therapy Intervention (Days): until discharge           Plan of Care Reviewed With: patient      SLP GOALS     Row Name 04/09/19 1030 04/09/19 0900 04/08/19 1100       Oral Nutrition/Hydration Goal 1 (SLP)    Time Frame (Oral Nutrition/Hydration Goal 1, SLP)  by discharge  -SA  --  --    Barriers (Oral Nutrition/Hydration Goal 1, SLP)  pt with throat clear x 2 during therapy with sips of thin; suspect d/t reduced oral contorl and mistiming as pt attmepting to talk once took sip. Reviewed with pt need to take small sips/viewed diagram of swallow function. Pt stated he did not know about this despite SLP reviewing with pt once he first came to rehab. Provided handout so pt could review with family. Wrote swallow precautions on handout; small sips/bite; no talking with food/liuqid in mouth. Appears dilma diet; afebrile, BS CTA per MD note and po intake good. Pt verbalized understanding.   -SA  --  --       Articulation Goal 1 (SLP)    Progress/Outcomes (Articulation Goal 1, SLP)  --  goal met;goal no longer appropriate  -SA  --    Comment (Articulation Goal 1, SLP)  --  No articulation difficulties; intelligibility is good with familiar and unfamiliar communication partners  -SA  --       Awareness of Basic Personal Information Goal 1 (SLP)    Improve Awareness of Basic Personal Information Goal 1 (SLP)  --  answering open-ended questions  regarding personal/biographical information;90%;independently (over 90% accuracy)  -SA  --    Progress (Awareness of Basic Personal Information Goal 1, SLP)  --  80%;with minimal cues (75-90%)  -SA  --    Progress/Outcomes (Awareness of Basic Personal Information Goal 1, SLP)  --  goal ongoing  -SA  --    Comment (Awareness of Basic Personal Information Goal 1, SLP)  --  80%; answering questions related to personal info: name, address, city, state, wifes name, childrens names, dogs names, , situation; place; phone number home and cell and wife's phone number.   -SA  --       Attention Goal 1 (SLP)    Progress (Attention Goal 1, SLP)  independently (over 90% accuracy)  -SA  --  independently (over 90% accuracy)  -SA    Progress/Outcomes (Attention Goal 1, SLP)  continuing progress toward goal  -SA  --  goal ongoing  -SA    Comment (Attention Goal 1, SLP)  blink game; matching colors timed: 2:18; increased by 2 seconds this session  -SA  --  blink game; matching colors timed: 2:20  -SA       Organizational Skills Goal 1 (SLP)    Progress (Thought Organization Skills Goal 1, SLP)  with minimal cues (75-90%);80%  -SA  --  --    Progress/Outcomes (Thought Organization Skills Goal 1, SLP)  continuing progress toward goal  -SA  goal ongoing  -SA  goal ongoing  -SA    Comment (Thought Organization Skills Goal 1, SLP)  78%; organizing functional information with specifics: right/left columns; which item does not belong in category given chioce of 5 words: 80%  -SA  organizing functional information with specifics: right/left columns; did not finish 2/2 time constraints; will finish next session.  -SA  69%: organizing and recording information (person's name and grades) ; items needed for a task: 88% independently and 94% with min assist  -SA       Reasoning Goal 1 (SLP)    Progress (Reasoning Goal 1, SLP)  --  --  with minimal cues (75-90%);70%  -SA    Progress/Outcomes (Reasoning Goal 1, SLP)  --  --  goal  ongoing;continuing progress toward goal  -SA    Comment (Reasoning Goal 1, SLP)  --  --  75% abstract naming of category given 3 words; 75%: naming 8/12 itmes in a specific category (dogs, presidents, and occupations  -       Right Hemisphere Function Goal 1 (SLP)    Progress (Right Hemisphere Function Goal 1, SLP)  --  independently (over 90% accuracy)  -SA  90%;independently (over 90% accuracy)  -SA    Progress/Outcomes (Right Hemisphere Function Goal 1, SLP)  --  continuing progress toward goal  -SA  continuing progress toward goal  -SA    Comment (Right Hemisphere Function Goal 1, SLP)  --  96%; visual scanning for target letters (2 target letters for each section)  -  97%; visual scanning for target letter   -      User Key  (r) = Recorded By, (t) = Taken By, (c) = Cosigned By    Initials Name Provider Type    Narcisa Gaston MS CCC-SLP Speech and Language Pathologist                  Time Calculation:       Time Calculation- SLP     Row Name 04/09/19 1104 04/09/19 0942          Time Calculation- [facility]    SLP Start Time  1030  -  0930  -     SLP Stop Time  1100  -SA  1000  -     SLP Time Calculation (min)  30 min  -  30 min  -     SLP Non-Billable Time (min)  --  15 min rounds  -     SLP Received On  04/09/19  -SA  04/09/19  -       User Key  (r) = Recorded By, (t) = Taken By, (c) = Cosigned By    Initials Name Provider Type    Narcisa Gaston MS CCC-SLP Speech and Language Pathologist            Therapy Charges for Today     Code Description Service Date Service Provider Modifiers Qty    71984217710 HC ST DEV OF COGN SKILLS EACH 15 MIN 4/8/2019 Narcisa Sanchez MS CCC-SLP  4    41036100842 HC ST DEV OF COGN SKILLS EACH 15 MIN 4/9/2019 Narcisa Sanchez MS CCC-JOSIAS  4                           MS CHARLES Meléndez  4/9/2019

## 2019-04-10 LAB
GLUCOSE BLDC GLUCOMTR-MCNC: 106 MG/DL (ref 70–130)
GLUCOSE BLDC GLUCOMTR-MCNC: 115 MG/DL (ref 70–130)
GLUCOSE BLDC GLUCOMTR-MCNC: 123 MG/DL (ref 70–130)
GLUCOSE BLDC GLUCOMTR-MCNC: 136 MG/DL (ref 70–130)

## 2019-04-10 PROCEDURE — 97112 NEUROMUSCULAR REEDUCATION: CPT

## 2019-04-10 PROCEDURE — 63710000001 INSULIN GLARGINE PER 5 UNITS: Performed by: INTERNAL MEDICINE

## 2019-04-10 PROCEDURE — 82962 GLUCOSE BLOOD TEST: CPT

## 2019-04-10 PROCEDURE — G0515 COGNITIVE SKILLS DEVELOPMENT: HCPCS

## 2019-04-10 PROCEDURE — 97535 SELF CARE MNGMENT TRAINING: CPT

## 2019-04-10 RX ADMIN — LISINOPRIL 20 MG: 20 TABLET ORAL at 07:36

## 2019-04-10 RX ADMIN — ALPRAZOLAM 1 MG: 0.5 TABLET ORAL at 21:43

## 2019-04-10 RX ADMIN — PAROXETINE HYDROCHLORIDE 10 MG: 10 TABLET, FILM COATED ORAL at 07:37

## 2019-04-10 RX ADMIN — ATENOLOL 25 MG: 25 TABLET ORAL at 07:37

## 2019-04-10 RX ADMIN — AMLODIPINE BESYLATE 5 MG: 5 TABLET ORAL at 07:37

## 2019-04-10 RX ADMIN — LISINOPRIL 20 MG: 20 TABLET ORAL at 19:58

## 2019-04-10 RX ADMIN — ATENOLOL 25 MG: 25 TABLET ORAL at 19:58

## 2019-04-10 RX ADMIN — CLOPIDOGREL 75 MG: 75 TABLET, FILM COATED ORAL at 07:37

## 2019-04-10 RX ADMIN — METFORMIN HYDROCHLORIDE 500 MG: 500 TABLET, EXTENDED RELEASE ORAL at 07:36

## 2019-04-10 RX ADMIN — ATORVASTATIN CALCIUM 80 MG: 80 TABLET, FILM COATED ORAL at 19:58

## 2019-04-10 RX ADMIN — Medication 1 TABLET: at 07:38

## 2019-04-10 RX ADMIN — ASPIRIN 325 MG: 325 TABLET, COATED ORAL at 07:37

## 2019-04-10 RX ADMIN — INSULIN GLARGINE 32 UNITS: 100 INJECTION, SOLUTION SUBCUTANEOUS at 21:35

## 2019-04-10 RX ADMIN — METFORMIN HYDROCHLORIDE 500 MG: 500 TABLET, EXTENDED RELEASE ORAL at 17:09

## 2019-04-10 NOTE — PLAN OF CARE
Problem: Skin Injury Risk (Adult)  Goal: Skin Health and Integrity   04/10/19 0246   Skin Injury Risk (Adult)   Skin Health and Integrity making progress toward outcome

## 2019-04-10 NOTE — PROGRESS NOTES
Inpatient Rehabilitation Functional Measures Assessment    Functional Measures  VANITA Eating:  [facility] Grooming: [facility] Bathing:  [place]  [facility] Upper Body Dressing:  [facility] Lower Body Dressing:  [facility] Toileting:  [facility] Bladder Management  Level of Assistance:  [place]  Frequency/Number of Accidents this Shift:  [facility] Bowel Management  Level of Assistance: [place]  Frequency/Number of Accidents this Shift: [place]    [facility] Bed/Chair/Wheelchair Transfer:  Bed/chair/wheelchair Transfer Score = 4.  Patient performs 75% or more of effort and minimal assistance (little/incidental  help/lifting of one limb/steadying) for transferring to and from the  bed/chair/wheelchair, requiring: Steadying. Patient requires the following  assistive device(s): Arm rest.  VANITA Toilet Transfer:  [facility] Tub/Shower Transfer:  [place]    Previously Documented Mode of Locomotion at Discharge: [place]  VANITA Expected Mode of Locomotion at Discharge: [facility] Walk/Wheelchair:  WHEELCHAIR OBSERVATION   Activity was not observed.    WALK OBSERVATION   Walk Distance Scale = 2.  Distance walked is 50 -149 feet. Walk Score = 1.  Patient performs 75% or more of effort and requires minimal assistance of two or  more people. 2 people used for safety . Patient walked a distance of 60 feet.  Patient requires the following assistive device(s): Small based quad cane.  VANITA Stairs:  Activity was not observed.    VANITA Comprehension:  [facility] Expression:  [facility] Social Interaction:  [facility] Problem Solving:  [facility] Memory:  [place]    Therapy Mode Minutes  Occupational Therapy: [place]  Physical Therapy: Individual: 60 minutes.  Speech Language Pathology:  [place]    Signed by: Naomy Mendenhall PT Student     - CoSigned By: Marisela Lema PT 4/10/2019 3:18:00 PM

## 2019-04-10 NOTE — PLAN OF CARE
Problem: Diabetes, Type 2 (Adult)  Goal: Signs and Symptoms of Listed Potential Problems Will be Absent, Minimized or Managed (Diabetes, Type 2)   04/10/19 0246   Goal/Outcome Evaluation   Problems Assessed (Type 2 Diabetes) all   Problems Present (Type 2 Diabetes) none

## 2019-04-10 NOTE — PLAN OF CARE
Problem: Stroke (IRF) (Adult)  Goal: Promote Optimal Functional [place]   04/10/19 0247   Stroke (IRF) (Adult)   Promote Optimal Functional [place] demonstrating adequate progress

## 2019-04-10 NOTE — PLAN OF CARE
Problem: Fall Risk (Adult)  Goal: Absence of Fall   04/10/19 0247   Fall Risk (Adult)   Absence of Fall making progress toward outcome

## 2019-04-10 NOTE — PROGRESS NOTES
Inpatient Rehabilitation Functional Measures Assessment    Functional Measures  [facility] Eating:  [facility] Grooming: [facility] Bathing:  [facility] Upper Body Dressing:  [facility] Lower Body Dressing:  [facility] Toileting:  [facility] Bladder Management  Level of Assistance:  [place]  Frequency/Number of Accidents this Shift:  [facility] Bowel Management  Level of Assistance: [place]  Frequency/Number of Accidents this Shift: [facility] Bed/Chair/Wheelchair Transfer:  [facility] Toilet Transfer:  [facility] Tub/Shower Transfer:  [place]    Previously Documented Mode of Locomotion at Discharge: [place]  VANITA Expected Mode of Locomotion at Discharge: [facility] Walk/Wheelchair:  [facility] Stairs:  [facility] Comprehension:  Auditory comprehension is the usual mode. Comprehension  Score = 6, Modified [place].  Patient comprehends complex/abstract  information in their primary language, requiring:  [facility] Expression:  Vocal expression is the usual mode. Expression Score = 6,  Modified Independent.  Patient expresses complex/abstract information in their  primary language, requiring:  [facility] Social Interaction:  Social Interaction Score = 6, Modified [place].  Patient is modified independent for social interaction, requiring:  [facility] Problem Solving:  Patient does not make appropriate decisions in order to  solve complex problems without assistance from a helper. Problem Solving Score =  5, Supervision.  Patient makes appropriate decisions in order to solve routine  problems with directing only under stressful or unfamiliar conditions, but no  more than 10% of the time, for the following behavior(s):  VANITA Memory:  Memory Score = 5, Supervision.  Patient recognizes and remembers  with prompting only under stressful or unfamiliar conditions, but no more than  10% of the time, for the following behavior(s):    Therapy Mode Minutes  Occupational Therapy: [place]  Physical Therapy: [place]  Speech Language  Pathology:  [place]    Signed by: Elsa Zhou RN

## 2019-04-10 NOTE — PROGRESS NOTES
Inpatient Rehabilitation Functional Measures Assessment    Functional Measures  VANITA Eating:  [facility] Grooming: [facility] Bathing:  [facility] Upper Body Dressing:  [facility] Lower Body Dressing:  [facility] Toileting:  [facility] Bladder Management  Level of Assistance:  [place]  Frequency/Number of Accidents this Shift:  [facility] Bowel Management  Level of Assistance: [place]  Frequency/Number of Accidents this Shift: [facility] Bed/Chair/Wheelchair Transfer:  [facility] Toilet Transfer:  [facility] Tub/Shower Transfer:  [facility]    Previously Documented Mode of Locomotion at Discharge: Field  VANITA Expected Mode of Locomotion at Discharge: [facility] Walk/Wheelchair:  [facility] Stairs:  [facility] Comprehension:  Auditory comprehension is the usual mode. Patient does not  comprehend complex/abstract information in their primary language without  assistance from a helper. Comprehension Score = 5, Supervision. Patient  comprehends basic daily needs or ideas greater than 90% of the time. Patient  requires stand by/rare prompting. No assistive devices were required.  VANITA Expression:  Vocal expression is the usual mode. Expression Score = 6,  Modified Independent.  Patient expresses complex/abstract information in their  primary language, requiring:  VANITA Social Interaction:  Social Interaction Score = 6, Modified Independent.  Patient is modified independent for social interaction, requiring:  VANITA Problem Solving:  Problem Solving Score = 6, Modified [place].  Patient  makes appropriate decisions in order to solve complex problems, but requires  extra time.  VANITA Memory:  Memory Score = 4, Minimal Prompting. Patient recognizes and  remembers 75-90% of the time. Patient requires minimal/occasional prompting for  memory for the following:    Therapy Mode Minutes  Occupational Therapy: [facility]  Physical Therapy: [facility]  Speech Language Pathology:  [facility]    Signed by: Kathy Carrera RN

## 2019-04-10 NOTE — PROGRESS NOTES
Occupational Therapy: Individual: 60 minutes.    Physical Therapy: [facility]    Speech Language Pathology:  [facility]    Signed by: Elliott Ken OT Student     - CoSigned By: Rhoda Adan OT 4/10/2019 3:46:41 PM

## 2019-04-10 NOTE — PROGRESS NOTES
Inpatient Rehabilitation Functional Measures Assessment and Plan of Care    Plan of Care  Updated Problems/Interventions  Field    Functional Measures  VANITA Eating:  [facility] Grooming: [facility] Bathing:  [facility] Upper Body Dressing:  [facility] Lower Body Dressing:  [facility] Toileting:  [facility] Bladder Management  Level of Assistance:  [place]  Frequency/Number of Accidents this Shift:  [facility] Bowel Management  Level of Assistance: [place]  Frequency/Number of Accidents this Shift: [facility] Bed/Chair/Wheelchair Transfer:  [facility] Toilet Transfer:  [facility] Tub/Shower Transfer:  [place]    Previously Documented Mode of Locomotion at Discharge: Field  VANITA Expected Mode of Locomotion at Discharge: [facility] Walk/Wheelchair:  [facility] Stairs:  [facility] Comprehension:  Auditory comprehension is the usual mode. Patient does not  comprehend complex/abstract information in their primary language without  assistance from a helper. Comprehension Score = 4, Minimal Prompting. Patient  comprehends basic daily needs or ideas 75-90% of the time. Patient requires  minimal/occasional prompting. No assistive devices were required.  VANITA Expression:  Vocal expression is the usual mode. Expression Score = 6,  Modified Independent.  Patient expresses complex/abstract information in their  primary language, requiring: Additional time.  VANITA Social Interaction:  Social Interaction Score = 6, Modified Independent.  Patient is modified independent for social interaction, requiring: Medications.    VANITA Problem Solving:  Activity was not observed.  VANITA Memory:  Memory Score = 6, Modified [place].  Patient is modified  independent for memory, requiring: Requires additional time. Requires additional  time.    Therapy Mode Minutes  Occupational Therapy: [facility]  Physical Therapy: [facility]  Speech Language Pathology:  [facility]    Signed by: Subhash Duran RN
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
## 2019-04-10 NOTE — THERAPY TREATMENT NOTE
"Inpatient Rehabilitation - Occupational Therapy Treatment Note    [facility]     Patient Name: Nayan Ryan  : 1965  MRN: [id number]    Today's Date: 4/10/2019                 Admit Date: 3/24/2019      Visit Dx:  No diagnosis found.    Patient Active Problem List   Diagnosis   • Acute ischemic left PCA stroke (CMS/HCC)   • Status post placement of implantable loop recorder   • HTN (hypertension)   • Diabetes mellitus (CMS/HCC)   • Hyperlipidemia   • Morbid obesity (CMS/HCC)   • Anxiety disorder   • Migraine   • H/O hernia repair   • Abdominal wall abscess   • Back pain   • Stroke (cerebrum) (CMS/HCC)   • Vitamin D deficiency   • History of fatty infiltration of liver         Therapy Treatment    IRF Treatment Summary     Row Name 04/10/19 1444 04/10/19 1100 04/10/19 0943       Evaluation/Treatment Time and Intent    Subjective Information  no complaints  (Pended)   -  no complaints  -  complains of;fatigue  (Pended)   -    Existing Precautions/Restrictions  fall  (Pended)   -  fall  -  fall  (Pended)   -    Document Type  therapy note (daily note)  (Pended)   -  therapy note (daily note)  -  therapy note (daily note)  (Pended)   -    Mode of Treatment  occupational therapy  (Pended)   -  speech-language pathology  -  physical therapy  (Pended)   -    Patient/Family Observations  pt up in w/c in am, pt supine in bed no acute distress in pm  (Pended)   -  up in w/c; pt stated he was tired d/t \"being up all night with diarrhea, multiple times, maybe because I ate the meatloaf my sister in law brought . It was greasy\"   -  Pt sitting in w/c in room. States he is tired today and did not sleep well at all last night. Continued reports of significant fatigue throughout the day  (Pended)   -    Start Time (Evaluation/Treatment)  --  1030  -SA  --    Stop Time (Evaluation/Treatment)  --  1130  -SA  --    Recorded by [] Elliott Ken, OT Student [] Narcisa Sanchez MS CCC-SLP [] " Naomy Mendenhall, PT Student    Row Name 04/10/19 1444 04/10/19 0943          Cognition/Psychosocial- PT/OT    Affect/Mental Status (Cognitive)  --  flat/blunted affect  (Pended)   -     Behavioral Issues (Cognitive)  withdrawn  (Pended)   -  withdrawn  (Pended)   -     Orientation Status (Cognition)  oriented x 3  (Pended)   -  oriented x 3  (Pended)   -     Follows Commands (Cognition)  follows one step commands  (Pended)   -  follows one step commands;over 90% accuracy  (Pended)   -     Personal Safety Interventions  fall prevention program maintained;gait belt;muscle strengthening facilitated;nonskid shoes/slippers when out of bed;supervised activity  (Pended)   -  fall prevention program maintained;gait belt;muscle strengthening facilitated;nonskid shoes/slippers when out of bed  (Pended)   -     Cognitive Function (Cognitive)  attention deficit;safety deficit  (Pended)   -  --     Attention Deficit (Cognitive)  moderate deficit  (Pended)   -  mild deficit  (Pended)   -     Executive Function Deficit (Cognition)  severe deficit  (Pended)   -  --     Memory Deficit (Cognitive)  moderate deficit  (Pended)   -  moderate deficit  (Pended)   -     Safety Deficit (Cognitive)  impulsivity;awareness of need for assistance;insight into deficits/self awareness;problem solving;ability to follow commands;at risk behavior observed  (Pended)   -  impulsivity;insight into deficits/self awareness;problem solving;ability to follow commands  (Pended)   -LS     Recorded by [] Elliott Ken OT Student [] Naomy Mendenhall, PT Student     Row Name 04/10/19 1444 04/10/19 0943          Bed Mobility Assessment/Treatment    Bed Mobility Assessment/Treatment  rolling right;supine-sit  (Pended)   -  --     Rolling Right [place] (Bed Mobility)  supervision;verbal cues  (Pended)   -  --     Supine-Sit [place] (Bed Mobility)  minimum assist (75% patient effort);contact guard;verbal cues   (Pended)   -  --     Sit-Supine [place] (Bed Mobility)  --  contact guard;supervision  (Pended)   -     Bed Mobility, Safety Issues  --  decreased use of arms for pushing/pulling;decreased use of legs for bridging/pushing  (Pended)   -     Recorded by [] Elliott Ken, OT Student [LS] Naomy Mendenhall, PT Student     Row Name 04/10/19 0943             Transfer Assessment/Treatment    Comment (Transfers)  Pt with decreased eccentric control for stand to sit  (Pended)   -      Recorded by [LS] Naomy Mendenhall, PT Student      Row Name 04/10/19 1444             Bed-Chair Transfer    Bed-Chair [place] (Transfers)  minimum assist (75% patient effort);verbal cues  (Pended)   -      Assistive Device (Bed-Chair Transfers)  wheelchair  (Pended)   -      Recorded by [] Elliott Ken, OT Student      Row Name 04/10/19 0943             Chair-Bed Transfer    Chair-Bed [place] (Transfers)  verbal cues;contact guard;minimum assist (75% patient effort)  (Pended)   -      Assistive Device (Chair-Bed Transfers)  wheelchair  (Pended)   -      Recorded by [LS] Naomy Mendenhall, PT Student      Row Name 04/10/19 0943             Sit-Stand Transfer    Sit-Stand [place] (Transfers)  contact guard  (Pended)   -      Assistive Device (Sit-Stand Transfers)  wheelchair  (Pended)   -      Recorded by [] Naomy Mendenhall, PT Student      Row Name 04/10/19 0943             Stand-Sit Transfer    Stand-Sit [place] (Transfers)  contact guard;minimum assist (75% patient effort)  (Pended)   -      Assistive Device (Stand-Sit Transfers)  wheelchair  (Pended)   -      Recorded by [] Naomy Mendenhall, PT Student      Row Name 04/10/19 1444             Shower Transfer    Type (Shower Transfer)  stand pivot/stand step  (Pended)   -      [place] Level (Shower Transfer)  minimum assist (75% patient effort);verbal cues;nonverbal cues (demo/gesture)  (Pended)   -      Assistive Device (Shower  Transfer)  tub bench;grab bars/tub rail;wheelchair  (Pended)   -DH      Recorded by [DH] Elliott Ken OT Student      Row Name 04/10/19 0973             Gait/Stairs Assessment/Training    [place] Level (Gait)  minimum assist (75% patient effort);2 person assist  (Pended)   -LS      Assistive Device (Gait)  cane, quad;walker, front-wheeled  (Pended)  SBQC  -LS      Distance in Feet (Gait)  60 x 2 with cane; 30 with Rwx  (Pended)   -LS      Pattern (Gait)  step-through  (Pended)   -LS      Deviations/Abnormal Patterns (Gait)  base of support, narrow;nancy decreased;gait speed decreased  (Pended)   -LS      Bilateral Gait Deviations  forward flexed posture;heel strike decreased  (Pended)   -LS      Left Sided Gait Deviations  weight shift ability decreased  (Pended)   -LS      Right Sided Gait Deviations  foot drop/toe drag;knee buckling, right side;knee hyperextension;weight shift ability decreased  (Pended)   -LS      Comment (Gait/Stairs)  All ambulation with posterior leaf AFO. Performed trial of ambulation with Rwx this date. Pt able to hold walker with RUE and advance walker independently, however pt demonstrates decreased step length and weight shifting with walker compared to cane. Will reassess in future as needed. With SBQC, pt still requires intermittent VCs for proper placement and seqencing. CGA-[place] needed at R knee due to intermittent buckling/hyperextension  (Pended)   -LS      Recorded by [LS] Naomy Mendenhall PT Student      Row Name 04/10/19 0974             Safety Issues, Functional Mobility    Safety Issues Affecting Function (Mobility)  ability to follow commands;insight into deficits/self awareness;impulsivity;judgment;problem solving  (Pended)   -LS      Impairments Affecting Function (Mobility)  balance;cognition;coordination;strength;visual/perceptual  (Pended)   -LS      Comment, Safety Issues/Impairments (Mobility)  Verbal cues needed for safety  (Pended)   -LS      Recorded by [LS]  Naomy Mendenhall, PT Student      Row Name 04/10/19 1444             Basic Activities of Daily Living (BADLs)    Basic Activities of Daily Living  bathing;upper body dressing;lower body dressing;grooming  (Pended)   -      Recorded by [] Elliott Ken, OT Student      Row Name 04/10/19 1444             Bathing Assessment/Treatment    Bathing [place] Level  bathing skills;lower body;upper body;contact guard assist;verbal cues  (Pended)   -      Assistive Device (Bathing)  tub bench;grab bar/tub rail;hand held shower spray hose  (Pended)   -      Bathing Position  supported sitting;supported standing  (Pended)   -      Bathing Setup Assistance  adjust water temperature;obtain supplies  (Pended)   -      Comment (Bathing)  pt requires CGA for standing balance when washing perineal area/buttocks, pt requires MAX VC/TC's for safety awareness, and MIN VC's for sequencing for R sided neglect  (Pended)   -      Recorded by [] Elliott Ken, OT Student      Row Name 04/10/19 1444             Upper Body Dressing Assessment/Treatment    Upper Body Dressing Task  upper body dressing skills;doff;don;pull over garment;set up assistance;verbal cues  (Pended)   -      Upper Body Dressing Position  supported sitting  (Pended)   -      Set-up Assistance (Upper Body Dressing)  obtain clothing  (Pended)   -      Comment (Upper Body Dressing)  VC's for sequencing  (Pended)   -      Recorded by [] Elliott Ken, OT Student      Row Name 04/10/19 1444             Lower Body Dressing Assessment/Treatment    Lower Body Dressing [place] Level  doff;don;pants/bottoms;shoes/slippers;socks;underwear;minimum assist (75% patient effort);verbal cues  (Pended)   -      Lower Body Dressing Position  supported sitting;supported standing  (Pended)   -      Lower Body Dressing Setup Assistance  obtain clothing  (Pended)   -      Comment (Lower Body Dressing)  pt required assist to tie LLE shoe, MIN VC's for  "seqeuncing, pt required 1 rest break during LBD  (Pended)   -      Recorded by [] Elliott Ken, OT Student      Row Name 04/10/19 1444             Grooming Assessment/Treatment    Grooming [place] Level  grooming skills;deodorant application;oral care regimen;shave face;set up;verbal cues  (Pended)   -      Grooming Position  sink side;supported sitting  (Pended)   -      Grooming Setup Assistance  obtain supplies  (Pended)   -      Comment (Grooming)  VC's for sequencing and to attend to R side  (Pended)   -      Recorded by [] Elliott Ken, OT Student      Row Name 04/10/19 1447             Fine Motor Testing & Training    Comment, Fine Motor Coordination  pt performed FMC activity utilizing 20 1\" pegs placing them into peg board reaching across midline with RUE and focusing on R sided neglect, Pt required 3 VC's to attend to R side of peg board  (Pended)   -      Recorded by [] Elliott Ken, OT Student      Row Name 04/10/19 144             Vision Assessment/Intervention    Visual Impairment/Limitations  visual/perceptual impairments present;peripheral vision impaired right  (Pended)   -      Visual Field Deficit  homonymous hemianopsia, right  (Pended)   -      Visual Motor Impairment  strabismus, right  (Pended)   -      Visual Processing Deficit  jay-inattention/neglect, right  (Pended)   -      Recorded by [] Elliott Ken, OT Student      Row Name 04/10/19 1444 04/10/19 0942          Pain Assessment    Additional Documentation  Pain Scale: Numbers Pre/Post-Treatment (Group)  (Pended)   -  Pain Scale: Numbers Pre/Post-Treatment (Group)  (Pended)   -     Recorded by [] Elliott Ken, OT Student [LS] Naomy Mendenhall, PT Student     Row Name 04/10/19 1444 04/10/19 0947          Pain Scale: Numbers Pre/Post-Treatment    Pain Scale: Numbers, Pretreatment  0/10 - no pain  (Pended)   -  0/10 - no pain  (Pended)   -     Pain Scale: Numbers, Post-Treatment  0/10 - no " pain  (Pended)   -  0/10 - no pain  (Pended)   -LS     Recorded by [DH] Elliott Ken, OT Student [LS] Naomy Mendenhall, PT Student     Row Name 04/10/19 144             Balance    Balance  --  (Pended)   -      Recorded by [DH] Elliott Ken, OT Student      Row Name 04/10/19 1444             Static Standing Balance    Level of [place] (Supported Standing, Static Balance)  contact guard assist  (Pended)   -      Time Able to Maintain Position (Supported Standing, Static Balance)  4 to 5 minutes  (Pended)   -      Assistive Device Utilized (Supported Standing, Static Balance)  --  (Pended)  countertop  -      Comment (Supported Standing, Static Balance)  pt able to maintain upright static standing balance/posture while performing FMC/Gross motor control activity reaching across body, pt utilized LUE hadn to assist with stabalization, pt able to tolerate 1 minute of static standing without support from BUE. VC's to maintain standing balance   (Pended)   -      Recorded by [DH] Elliott Ken, OT Student      Row Name 04/10/19 0930             Dynamic Balance Activity    Therapeutic Training Performed (Dynamic Balance)  --  (Pended)  step taps on 4 inch green step  -LS      Support Needed for Balance (Dynamic Balance Training)  uses at least one upper extremity for support;minimal external support for balance, 75% patient effort;CGA  (Pended)   -LS      Upper Extremity Activity with Device (Dynamic Balance Training)  --  (Pended)  jay bar  -LS      Comment (Dynamic Balance Training)  2 x 8 reps each leg. Guarding for intermittent buckling at R knee and Min A to clear R foot and place on step. Repeated verbal cues to lift R leg and tactile cues improve form. Pt with significant fatigue after.   (Pended)   -LS      Recorded by [LS] Naomy Mendenhall, PT Student      Row Name 04/10/19 0905             Lower Extremity Orthosis Management    Type (Lower Extremity Orthosis)  posterior strut carbon fiber AFO   (Pended)   -      Therapeutic Indications (Lower Extremity Orthosis)  compensation for lost function  (Pended)   -      Orthosis Training (Lower Extremity Orthosis)  patient;purpose/goals of orthosis;skin inspection/precautions  (Pended)   -      Skin Assessment (Lower Extremity Orthosis)  no skin issues noted this date  (Pended)   -      Recorded by [LS] Naomy Mendenhall, PT Student      Row Name 04/10/19 1444             Therapeutic Exercise    Therapeutic Exercise  seated, upper extremities  (Pended)   -      Additional Documentation  Therapeutic Exercise (Row)  (Pended)   -      Recorded by [] Elliott Ken, OT Student      Row Name 04/10/19 1444             Upper Extremity Seated Therapeutic Exercise    Performed, Seated Upper Extremity (Therapeutic Exercise)  shoulder flexion/extension;scapular protraction/retraction;elbow flexion/extension  (Pended)   -      Device, Seated Upper Extremity (Therapeutic Exercise)  --  (Pended)  2# dowel whit  -      Exercise Type, Seated Upper Extremity (Therapeutic Exercise)  AROM (active range of motion);resistive exercise  (Pended)   -      Expected Outcomes, Seated Upper Extremity (Therapeutic Exercise)  improve functional tolerance, self-care activity;improve motor control;improve neuromuscular control;improve functional stability;improve performance, BADLs;improve performance, gross motor coordination skills;improve performance, reaching above shoulder level;improve performance, reaching for objects;improve performance, transfer skills  (Pended)   -      Sets/Reps Detail, Seated Upper Extremity (Therapeutic Exercise)  2/15  (Pended)   -      Transfers Skills, Training to Functional Activity, Seated Upper Extremity (Therapeutic Exercise)  beginning to transfer skills to functional activity  (Pended)   -      Comment, Seated Upper Extremity (Therapeutic Exercise)  pt performed BUE strengthening utilizing 23 dowel whit focusing on deltoids, pectorals,  and biceps to increase muscle strength for increased independence in self care tasks  (Pended)   -      Recorded by [] Elliott Ken OT Student      Row Name 04/10/19 0943             Lower Extremity Seated Therapeutic Exercise    Performed, Seated Lower Extremity (Therapeutic Exercise)  hip flexion/extension;knee flexion/extension;hip abduction/adduction  (Pended)   -      Device, Seated Lower Extremity (Therapeutic Exercise)  elastic bands/tubing  (Pended)   -      Exercise Type, Seated Lower Extremity (Therapeutic Exercise)  AROM (active range of motion);resistive exercise  (Pended)   -      Sets/Reps Detail, Seated Lower Extremity (Therapeutic Exercise)  1 x 15   (Pended)   -      Comment, Seated Lower Extremity (Therapeutic Exercise)  red theraband for knee flexion and hip abduction  (Pended)   -      Recorded by [LS] Naomy Mendenhall, ZAIDA Student      Row Name 04/10/19 1444 04/10/19 0943          Positioning and Restraints    Pre-Treatment Position  in bed  (Pended)   -  sitting in chair/recliner  (Pended)   -     Post Treatment Position  wheelchair  (Pended)   -  bed  (Pended)   -     In Bed  --  supine;call light within reach;encouraged to call for assist;exit alarm on  (Pended)   -     In Wheelchair  sitting;call light within reach;encouraged to call for assist;exit alarm on  (Pended)   -  patient within staff view  (Pended)   -     Recorded by [] Elliott Ken OT Student [LS] Naomy Mendenhall, PT Student       User Key  (r) = Recorded By, (t) = Taken By, (c) = Cosigned By    Initials Name Effective Dates    Narcisa Gaston MS CCC-SLP 04/03/18 -     Elliott Ramirez OT Student 02/05/19 -     Naomy Armas, PT Student 02/04/19 -           Wound 03/26/19 0900 Left chest incision (Active)   Dressing Appearance open to air 4/10/2019  7:36 AM   Closure Liquid skin adhesive;Approximated 4/10/2019  7:36 AM   Base clean;dry 4/10/2019  7:36 AM   Periwound dry;intact 4/10/2019   7:36 AM   Drainage Amount none 4/10/2019  7:36 AM   Dressing Care, Wound open to air 4/10/2019  7:36 AM         OT Recommendation and Plan                 OT IRF GOALS     Row Name 04/09/19 1400 04/02/19 1500          Bathing Goal 1 (OT-IRF)    Activity/Device (Bathing Goal 1, OT-IRF)  bathing skills, all  -DN  bathing skills, all  -DN     [place] Level (Bathing Goal 1, OT-IRF)  supervision required;verbal cues required  -DN  contact guard assist;verbal cues required  -DN     Time Frame (Bathing Goal 1, OT-IRF)  short term goal (STG)  -DN  short term goal (STG)  -DN     Progress/Outcomes (Bathing Goal 1, OT-IRF)  goal met;goal revised this date  -DN  goal met;goal revised this date  -DN        Bathing Goal 2 (OT-IRF)    Activity/Device (Bathing Goal 2, OT-IRF)  bathing skills, all  -DN  bathing skills, all  -DN     [place] Level (Bathing Goal 2, OT-IRF)  supervision required  -DN  supervision required  -DN     Time Frame (Bathing Goal 2, OT-IRF)  long term goal (LTG)  -DN  long term goal (LTG)  -DN     Progress/Outcomes (Bathing Goal 2, OT-IRF)  goal ongoing  -DN  goal ongoing  -DN        UB Dressing Goal 1 (OT-IRF)    Activity/Device (UB Dressing Goal 1, OT-IRF)  upper body dressing  -DN  upper body dressing  -DN     [place] (UB Dress Goal 1, OT-IRF)  supervision required  -DN  supervision required  -DN     Time Frame (UB Dressing Goal 1, OT-IRF)  short term goal (STG)  -DN  short term goal (STG)  -DN     Progress/Outcomes (UB Dressing Goal 1, OT-IRF)  goal met;goal ongoing  -DN  goal met;goal revised this date  -DN        UB Dressing Goal 2 (OT-IRF)    Activity/Device (UB Dressing Goal 2, OT-IRF)  upper body dressing  -DN  upper body dressing  -DN     [place] (UB Dress Goal 2, OT-IRF)  supervision required  -DN  supervision required  -DN     Time Frame (UB Dressing Goal 2, OT-IRF)  long term goal (LTG)  -DN  long term goal (LTG)  -DN     Progress/Outcomes (UB Dressing Goal 2, OT-IRF)  goal  ongoing;goal met  -DN  goal ongoing;goal met  -DN        LB Dressing Goal 1 (OT-IRF)    Activity/Device (LB Dressing Goal 1, OT-IRF)  lower body dressing  -DN  lower body dressing  -DN     [place] (LB Dressing Goal 1, OT-IRF)  minimum assist (75% or more patient effort);verbal cues required;tactile cues required  -DN  minimum assist (75% or more patient effort);verbal cues required;tactile cues required  -DN     Time Frame (LB Dressing Goal 1, OT-IRF)  short term goal (STG)  -DN  short term goal (STG)  -DN     Progress/Outcomes (LB Dressing Goal 1, OT-IRF)  goal met;goal ongoing  -DN  goal met;goal revised this date  -DN        LB Dressing Goal 2 (OT-IRF)    Activity/Device (LB Dressing Goal 2, OT-IRF)  lower body dressing  -DN  lower body dressing  -DN     [place] (LB Dressing Goal 2, OT-IRF)  verbal cues required;tactile cues required;contact guard assist  -DN  verbal cues required;tactile cues required;minimum assist (75% or more patient effort)  -DN     Time Frame (LB Dressing Goal 2, OT-IRF)  long term goal (LTG)  -DN  long term goal (LTG)  -DN     Progress/Outcomes (LB Dressing Goal 2, OT-IRF)  goal revised this date  -DN  goal ongoing  -DN        Grooming Goal 1 (OT-IRF)    Activity/Device (Grooming Goal 1, OT-IRF)  grooming skills, all  -DN  grooming skills, all  -DN     [place] (Grooming Goal 1, OT-IRF)  supervision required  -DN  supervision required  -DN     Time Frame (Grooming Goal 1, OT-IRF)  short term goal (STG)  -DN  short term goal (STG)  -DN     Progress/Outcomes (Grooming Goal 1, OT-IRF)  goal ongoing  -DN  goal partially met;goal ongoing  -DN        Grooming Goal 2 (OT-IRF)    Activity/Device (Grooming Goal 2, OT-IRF)  grooming skills, all  -DN  grooming skills, all  -DN     [place] (Grooming Goal 2, OT-IRF)  supervision required  -DN  supervision required  -DN     Time Frame (Grooming Goal 2, OT-IRF)  long term goal (LTG)  -DN  long term goal (LTG)  -DN     Progress/Outcomes  (Grooming Goal 2, OT-IRF)  goal revised this date  -DN  goal revised this date  -DN        Toileting Goal 1 (OT-IRF)    Activity/Device (Toileting Goal 1, OT-IRF)  toileting skills, all  -DN  toileting skills, all  -DN     [place] Level (Toileting Goal 1, OT-IRF)  minimum assist (75% or more patient effort);verbal cues required;tactile cues required  -DN  minimum assist (75% or more patient effort);verbal cues required;tactile cues required  -DN     Time Frame (Toileting Goal 1, OT-IRF)  short term goal (STG)  -DN  short term goal (STG)  -DN     Progress/Outcomes (Toileting Goal 1, OT-IRF)  goal met;goal revised this date  -DN  goal met;goal revised this date  -DN        Toileting Goal 2 (OT-IRF)    Activity/Device (Toileting Goal 2, OT-IRF)  toileting skills, all  -DN  toileting skills, all  -DN     [place] Level (Toileting Goal 2, OT-IRF)  contact guard assist;verbal cues required;tactile cues required  -DN  contact guard assist;verbal cues required;tactile cues required  -DN     Time Frame (Toileting Goal 2, OT-IRF)  long term goal (LTG)  -DN  long term goal (LTG)  -DN     Progress/Outcomes (Toileting Goal 2, OT-IRF)  goal ongoing  -DN  goal ongoing  -DN        Self-Feeding Goal 1 (OT-IRF)    Activity/Device (Self-Feeding Goal 1, OT-IRF)  self-feeding skills, all  -DN  self-feeding skills, all  -DN     [place] (Self-Feeding Goal 1, OT-IRF)  supervision required  -DN  supervision required  -DN     Time Frame (Self-Feeding Goal 1, OT-IRF)  short term goal (STG)  -DN  short term goal (STG)  -DN     Progress/Outcomes 1 (Self-Feeding Goal, OT-IRF)  goal met;goal ongoing  -DN  goal met;goal ongoing  -DN        Self-Feeding Goal 2 (OT-IRF)    Activity/Device (Self-Feeding Goal 2, OT-IRF)  self-feeding skills, all  -DN  self-feeding skills, all  -DN     [place] (Self-Feeding Goal 2, OT-IRF)  supervision required  -DN  supervision required  -DN     Time Frame (Self-Feeding Goal 2, OT-IRF)  long term  goal (LTG)  -DN  long term goal (LTG)  -DN     Progress/Outcomes (Self-Feeding Goal 2, OT-IRF)  goal met;goal ongoing  -DN  goal met;goal ongoing  -DN        Caregiver Training Goal 2 (OT-IRF)    Caregiver Training Goal 2 (OT-IRF)  pt caregiver to be independent with any assist pt needs with adls, transfers and HEP for d/c home  -DN  pt caregiver to be independent with any assist pt needs with adls, transfers and HEP for d/c home  -DN     Time Frame (Caregiver Training Goal 2, OT-IRF)  long term goal (LTG)  -DN  long term goal (LTG)  -DN     Progress/Outcomes (Caregiver Training Goal 2, OT-IRF)  goal ongoing  -DN  goal ongoing  -DN       User Key  (r) = Recorded By, (t) = Taken By, (c) = Cosigned By    Initials Name Provider Type    Reg Bolden OT Occupational Therapist          Occupational Therapy Education     Title: PT OT SLP Therapies (In Progress)     Topic: Occupational Therapy (Done)     Point: ADL training (Done)     Description: Instruct learner(s) on proper safety adaptation and remediation techniques during self care or transfers.   Instruct in proper use of assistive devices.    Learning Progress Summary           Patient Acceptance, E,TB,D, VU,NR by DN at 4/9/2019  2:25 PM    Comment:  jay adls, adaptive visual scanning, transfer training    Acceptance, E,TB,D, VU,NR by DN at 4/8/2019 12:16 PM    Comment:  transfer training, jay skills with adls, safety,etc    Acceptance, E,TB,D, VU,NR by DN at 4/4/2019  3:19 PM    Comment:  theraputty exercises, jay adls and transfer trainning    Acceptance, E,TB,D, NR by DN at 3/26/2019 12:06 PM    Comment:  jay adls and commode/shower transfers, still max vc for all due to cognition and visual impairments    Acceptance, E,TB,D, VU,NR by DN at 3/25/2019  5:23 PM    Comment:  OT role in rehab, transfer traning, jay adls                   Point: Home exercise program (Done)     Description: Instruct learner(s) on appropriate technique for monitoring,  assisting and/or progressing therapeutic exercises/activities.    Learning Progress Summary           Patient Acceptance, E,TB,D, VU,NR by DN at 4/9/2019  2:25 PM    Comment:  jay adls, adaptive visual scanning, transfer training    Acceptance, E,TB,D, VU,NR by DN at 4/8/2019 12:16 PM    Comment:  transfer training, jay skills with adls, safety,etc    Acceptance, E,TB,D, VU,NR by DN at 4/4/2019  3:19 PM    Comment:  theraputty exercises, jay adls and transfer trainning    Acceptance, E,TB,D, NR by DN at 3/26/2019 12:06 PM    Comment:  jay adls and commode/shower transfers, still max vc for all due to cognition and visual impairments    Acceptance, E,TB,D, VU,NR by DN at 3/25/2019  5:23 PM    Comment:  OT role in rehab, transfer traning, jay adls                   Point: Precautions (Done)     Description: Instruct learner(s) on prescribed precautions during self-care and functional transfers.    Learning Progress Summary           Patient Acceptance, E,TB,D, VU,NR by DN at 4/9/2019  2:25 PM    Comment:  jay adls, adaptive visual scanning, transfer training    Acceptance, E,TB,D, VU,NR by DN at 4/8/2019 12:16 PM    Comment:  transfer training, jay skills with adls, safety,etc    Acceptance, E,TB,D, VU,NR by DN at 4/4/2019  3:19 PM    Comment:  theraputty exercises, jay adls and transfer trainning    Acceptance, E,TB,D, NR by DN at 3/26/2019 12:06 PM    Comment:  jay adls and commode/shower transfers, still max vc for all due to cognition and visual impairments    Acceptance, E,TB,D, VU,NR by DN at 3/25/2019  5:23 PM    Comment:  OT role in rehab, transfer traning, jay adls                   Point: Body mechanics (Done)     Description: Instruct learner(s) on proper positioning and spine alignment during self-care, functional mobility activities and/or exercises.    Learning Progress Summary           Patient Acceptance, E,TB,D, VU,NR by DN at 4/9/2019  2:25 PM    Comment:  jay adls, adaptive visual  scanning, transfer training    Acceptance, E,TB,D, VU,NR by DN at 4/8/2019 12:16 PM    Comment:  transfer training, jay skills with adls, safety,etc    Acceptance, E,TB,D, VU,NR by DN at 4/4/2019  3:19 PM    Comment:  theraputty exercises, jay adls and transfer trainning    Acceptance, E,TB,D, NR by DN at 3/26/2019 12:06 PM    Comment:  jay adls and commode/shower transfers, still max vc for all due to cognition and visual impairments    Acceptance, E,TB,D, VU,NR by DN at 3/25/2019  5:23 PM    Comment:  OT role in rehab, transfer traning, jay adls                               User Key     Initials Effective Dates Name Provider Type Discipline    DN 06/08/18 -  Reg Mckinney, OT Occupational Therapist OT                       Time Calculation:     Time Calculation- OT     Row Name 04/10/19 1509 04/10/19 1508          Time Calculation- OT    OT Start Time  1230  (Pended)   -  0900  (Pended)   -     OT Stop Time  1300  (Pended)   -  0930  (Pended)   -     OT Time Calculation (min)  30 min  (Pended)   -  30 min  (Pended)   -       User Key  (r) = Recorded By, (t) = Taken By, (c) = Cosigned By    Initials Name Provider Type     Elliott Ken, OT Student OT Student          Therapy Charges for Today     Code Description Service Date Service Provider Modifiers Qty    [id number] HC OT SELF CARE/MGMT/TRAIN EA 15 MIN 4/10/2019 Elliott Ken, OT Student GO 2    [id number]  OT NEUROMUSC RE EDUCATION EA 15 MIN 4/10/2019 Elliott Ken, OT Student GO 2                   Elliott Ken OT Student  4/10/2019

## 2019-04-10 NOTE — PROGRESS NOTES
Inpatient Rehabilitation Plan of Care Note    Plan of Care  Care Plan Reviewed - No updates at this time.    Safety    Performed Intervention(s)  Hourly rounding , items within reach  Assistance with out of bed activities  Falls protocol  bed/chair alarm      Psychosocial    Performed Intervention(s)   PRN  Psychologist PRN      Body Systems    Performed Intervention(s)  consistent carb diet      Sphincter Control    Performed Intervention(s)  Assistance with urinal  Assistance to bathroom  Incontinence care PRN    Signed by: Subhash Duran RN

## 2019-04-10 NOTE — THERAPY TREATMENT NOTE
"Inpatient Rehabilitation - Physical Therapy Treatment Note  [facility]     Patient Name: Nayan Ryan  : 1965  MRN: [id number]    Today's Date: 4/10/2019                 Admit Date: 3/24/2019      Visit Dx:    No diagnosis found.    Patient Active Problem List   Diagnosis   • Acute ischemic left PCA stroke (CMS/HCC)   • Status post placement of implantable loop recorder   • HTN (hypertension)   • Diabetes mellitus (CMS/HCC)   • Hyperlipidemia   • Morbid obesity (CMS/HCC)   • Anxiety disorder   • Migraine   • H/O hernia repair   • Abdominal wall abscess   • Back pain   • Stroke (cerebrum) (CMS/HCC)   • Vitamin D deficiency   • History of fatty infiltration of liver       Therapy Treatment    IRF Treatment Summary     Row Name 04/10/19 1444 04/10/19 1100 04/10/19 0943       Evaluation/Treatment Time and Intent    Subjective Information  no complaints  (Pended)   -  no complaints  -  complains of;fatigue  (Pended)   -    Existing Precautions/Restrictions  fall  (Pended)   -  fall  -  fall  (Pended)   -    Document Type  therapy note (daily note)  (Pended)   -  therapy note (daily note)  -  therapy note (daily note)  (Pended)   -    Mode of Treatment  occupational therapy  (Pended)   -  speech-language pathology  -  physical therapy  (Pended)   -    Patient/Family Observations  pt up in w/c in am, pt supine in bed no acute distress in pm  (Pended)   -  up in w/c; pt stated he was tired d/t \"being up all night with diarrhea, multiple times, maybe because I ate the meatloaf my sister in law brought . It was greasy\"   -  Pt sitting in w/c in room. States he is tired today and did not sleep well at all last night. Continued reports of significant fatigue throughout the day  (Pended)   -    Start Time (Evaluation/Treatment)  --  1030  -SA  --    Stop Time (Evaluation/Treatment)  --  1130  -SA  --    Recorded by [] Elliott Ken, OT Student [] Narcisa Sanchez, MS CCC-SLP [] Za, " Naomy PT Student    Row Name 04/10/19 1444 04/10/19 0943          Cognition/Psychosocial- PT/OT    Affect/Mental Status (Cognitive)  --  flat/blunted affect  (Pended)   -     Behavioral Issues (Cognitive)  withdrawn  (Pended)   -  withdrawn  (Pended)   -     Orientation Status (Cognition)  oriented x 3  (Pended)   -  oriented x 3  (Pended)   -     Follows Commands (Cognition)  follows one step commands  (Pended)   -  follows one step commands;over 90% accuracy  (Pended)   -     Personal Safety Interventions  fall prevention program maintained;gait belt;muscle strengthening facilitated;nonskid shoes/slippers when out of bed;supervised activity  (Pended)   -  fall prevention program maintained;gait belt;muscle strengthening facilitated;nonskid shoes/slippers when out of bed  (Pended)   -     Cognitive Function (Cognitive)  attention deficit;safety deficit  (Pended)   -  --     Attention Deficit (Cognitive)  moderate deficit  (Pended)   -  mild deficit  (Pended)   -     Executive Function Deficit (Cognition)  severe deficit  (Pended)   -  --     Memory Deficit (Cognitive)  moderate deficit  (Pended)   -  moderate deficit  (Pended)   -     Safety Deficit (Cognitive)  impulsivity;awareness of need for assistance;insight into deficits/self awareness;problem solving;ability to follow commands;at risk behavior observed  (Pended)   -  impulsivity;insight into deficits/self awareness;problem solving;ability to follow commands  (Pended)   -     Recorded by [] Elliott Ken OT Student [] Naomy Mendenhall PT Student     Row Name 04/10/19 1444 04/10/19 0943          Bed Mobility Assessment/Treatment    Bed Mobility Assessment/Treatment  rolling right;supine-sit  (Pended)   -  --     Rolling Right [place] (Bed Mobility)  supervision;verbal cues  (Pended)   -  --     Supine-Sit [place] (Bed Mobility)  minimum assist (75% patient effort);contact guard;verbal cues  (Pended)   -   --     Sit-Supine [place] (Bed Mobility)  --  contact guard;supervision  (Pended)   -     Bed Mobility, Safety Issues  --  decreased use of arms for pushing/pulling;decreased use of legs for bridging/pushing  (Pended)   -     Recorded by [] Elliott Ken, OT Student [LS] Naomy Mendenhall, PT Student     Row Name 04/10/19 0943             Transfer Assessment/Treatment    Comment (Transfers)  Pt with decreased eccentric control for stand to sit  (Pended)   -      Recorded by [LS] Naomy Mendenhall, PT Student      Row Name 04/10/19 1444             Bed-Chair Transfer    Bed-Chair [place] (Transfers)  minimum assist (75% patient effort);verbal cues  (Pended)   -      Assistive Device (Bed-Chair Transfers)  wheelchair  (Pended)   -      Recorded by [] Elliott Ken, OT Student      Row Name 04/10/19 0943             Chair-Bed Transfer    Chair-Bed [place] (Transfers)  verbal cues;contact guard;minimum assist (75% patient effort)  (Pended)   -      Assistive Device (Chair-Bed Transfers)  wheelchair  (Pended)   -      Recorded by [LS] Naomy Mendenhall, PT Student      Row Name 04/10/19 0943             Sit-Stand Transfer    Sit-Stand [place] (Transfers)  contact guard  (Pended)   -      Assistive Device (Sit-Stand Transfers)  wheelchair  (Pended)   -      Recorded by [] Naomy Mendenhall, PT Student      Row Name 04/10/19 0943             Stand-Sit Transfer    Stand-Sit [place] (Transfers)  contact guard;minimum assist (75% patient effort)  (Pended)   -      Assistive Device (Stand-Sit Transfers)  wheelchair  (Pended)   -      Recorded by [] Naomy Mendenhall, PT Student      Row Name 04/10/19 1444             Shower Transfer    Type (Shower Transfer)  stand pivot/stand step  (Pended)   -      [place] Level (Shower Transfer)  minimum assist (75% patient effort);verbal cues;nonverbal cues (demo/gesture)  (Pended)   -      Assistive Device (Shower Transfer)  tub  bench;grab bars/tub rail;wheelchair  (Pended)   -DH      Recorded by [DH] Elliott Ken, OT Student      Row Name 04/10/19 0950             Gait/Stairs Assessment/Training    [place] Level (Gait)  minimum assist (75% patient effort);2 person assist  (Pended)   -LS      Assistive Device (Gait)  cane, quad;walker, front-wheeled  (Pended)  SBQC  -LS      Distance in Feet (Gait)  60 x 2 with cane; 30 with Rwx  (Pended)   -LS      Pattern (Gait)  step-through  (Pended)   -LS      Deviations/Abnormal Patterns (Gait)  base of support, narrow;nancy decreased;gait speed decreased  (Pended)   -LS      Bilateral Gait Deviations  forward flexed posture;heel strike decreased  (Pended)   -LS      Left Sided Gait Deviations  weight shift ability decreased  (Pended)   -LS      Right Sided Gait Deviations  foot drop/toe drag;knee buckling, right side;knee hyperextension;weight shift ability decreased  (Pended)   -LS      Comment (Gait/Stairs)  All ambulation with posterior leaf AFO. Performed trial of ambulation with Rwx this date. Pt able to hold walker with RUE and advance walker independently, however pt demonstrates decreased step length and weight shifting with walker compared to cane. Will reassess in future as needed. With SBQC, pt still requires intermittent VCs for proper placement and seqencing. CGA-[place] needed at R knee due to intermittent buckling/hyperextension  (Pended)   -LS      Recorded by [LS] Naomy Mendenhall PT Student      Row Name 04/10/19 2440             Safety Issues, Functional Mobility    Safety Issues Affecting Function (Mobility)  ability to follow commands;insight into deficits/self awareness;impulsivity;judgment;problem solving  (Pended)   -LS      Impairments Affecting Function (Mobility)  balance;cognition;coordination;strength;visual/perceptual  (Pended)   -LS      Comment, Safety Issues/Impairments (Mobility)  Verbal cues needed for safety  (Pended)   -LS      Recorded by [LS] Za  Naomy, PT Student      Row Name 04/10/19 1444             Basic Activities of Daily Living (BADLs)    Basic Activities of Daily Living  bathing;upper body dressing;lower body dressing;grooming  (Pended)   -      Recorded by [] Elliott Ken, OT Student      Row Name 04/10/19 1444             Bathing Assessment/Treatment    Bathing [place] Level  bathing skills;lower body;upper body;contact guard assist;verbal cues  (Pended)   -      Assistive Device (Bathing)  tub bench;grab bar/tub rail;hand held shower spray hose  (Pended)   -      Bathing Position  supported sitting;supported standing  (Pended)   -      Bathing Setup Assistance  adjust water temperature;obtain supplies  (Pended)   -      Comment (Bathing)  pt requires CGA for standing balance when washing perineal area/buttocks, pt requires MAX VC/TC's for safety awareness, and MIN VC's for sequencing for R sided neglect  (Pended)   -      Recorded by [] Elliott Ken, OT Student      Row Name 04/10/19 1444             Upper Body Dressing Assessment/Treatment    Upper Body Dressing Task  upper body dressing skills;doff;don;pull over garment;set up assistance;verbal cues  (Pended)   -      Upper Body Dressing Position  supported sitting  (Pended)   -      Set-up Assistance (Upper Body Dressing)  obtain clothing  (Pended)   -      Comment (Upper Body Dressing)  VC's for sequencing  (Pended)   -      Recorded by [] Elliott Ken, OT Student      Row Name 04/10/19 1444             Lower Body Dressing Assessment/Treatment    Lower Body Dressing [place] Level  doff;don;pants/bottoms;shoes/slippers;socks;underwear;minimum assist (75% patient effort);verbal cues  (Pended)   -      Lower Body Dressing Position  supported sitting;supported standing  (Pended)   -      Lower Body Dressing Setup Assistance  obtain clothing  (Pended)   -      Comment (Lower Body Dressing)  pt required assist to tie LLE shoe, MIN VC's for seqeuncing,  "pt required 1 rest break during LBD  (Pended)   -      Recorded by [] Elliott Ken, OT Student      Row Name 04/10/19 1444             Grooming Assessment/Treatment    Grooming [place] Level  grooming skills;deodorant application;oral care regimen;shave face;set up;verbal cues  (Pended)   -      Grooming Position  sink side;supported sitting  (Pended)   -      Grooming Setup Assistance  obtain supplies  (Pended)   -      Comment (Grooming)  VC's for sequencing and to attend to R side  (Pended)   -      Recorded by [] Elliott Ken, OT Student      Row Name 04/10/19 1448             Fine Motor Testing & Training    Comment, Fine Motor Coordination  pt performed FMC activity utilizing 20 1\" pegs placing them into peg board reaching across midline with RUE and focusing on R sided neglect, Pt required 3 VC's to attend to R side of peg board  (Pended)   -      Recorded by [] Elliott Ken, OT Student      Row Name 04/10/19 1446             Vision Assessment/Intervention    Visual Impairment/Limitations  visual/perceptual impairments present;peripheral vision impaired right  (Pended)   -      Visual Field Deficit  homonymous hemianopsia, right  (Pended)   -      Visual Motor Impairment  strabismus, right  (Pended)   -      Visual Processing Deficit  jay-inattention/neglect, right  (Pended)   -      Recorded by [] Elliott Ken, OT Student      Row Name 04/10/19 1444 04/10/19 0933          Pain Assessment    Additional Documentation  Pain Scale: Numbers Pre/Post-Treatment (Group)  (Pended)   -  Pain Scale: Numbers Pre/Post-Treatment (Group)  (Pended)   -     Recorded by [] Elliott Ken, OT Student [LS] Naomy Mendenhall, PT Student     Row Name 04/10/19 1444 04/10/19 0956          Pain Scale: Numbers Pre/Post-Treatment    Pain Scale: Numbers, Pretreatment  0/10 - no pain  (Pended)   -  0/10 - no pain  (Pended)   -     Pain Scale: Numbers, Post-Treatment  0/10 - no pain  " (Pended)   -  0/10 - no pain  (Pended)   -LS     Recorded by [DH] Elliott Ken, OT Student [LS] Naomy Mendenhall, PT Student     Row Name 04/10/19 1442             Balance    Balance  --  (Pended)   -      Recorded by [DH] Elliott Ken, OT Student      Row Name 04/10/19 1444             Static Standing Balance    Level of [place] (Supported Standing, Static Balance)  contact guard assist  (Pended)   -      Time Able to Maintain Position (Supported Standing, Static Balance)  4 to 5 minutes  (Pended)   -      Assistive Device Utilized (Supported Standing, Static Balance)  --  (Pended)  countertop  -      Comment (Supported Standing, Static Balance)  pt able to maintain upright static standing balance/posture while performing FMC/Gross motor control activity reaching across body, pt utilized LUE hadn to assist with stabalization, pt able to tolerate 1 minute of static standing without support from BUE. VC's to maintain standing balance   (Pended)   -      Recorded by [DH] Elliott Ken, OT Student      Row Name 04/10/19 0977             Dynamic Balance Activity    Therapeutic Training Performed (Dynamic Balance)  --  (Pended)  step taps on 4 inch green step  -LS      Support Needed for Balance (Dynamic Balance Training)  uses at least one upper extremity for support;minimal external support for balance, 75% patient effort;CGA  (Pended)   -LS      Upper Extremity Activity with Device (Dynamic Balance Training)  --  (Pended)  jay bar  -LS      Comment (Dynamic Balance Training)  2 x 8 reps each leg. Guarding for intermittent buckling at R knee and Min A to clear R foot and place on step. Repeated verbal cues to lift R leg and tactile cues improve form. Pt with significant fatigue after.   (Pended)   -LS      Recorded by [LS] Naomy Mendenhall, PT Student      Row Name 04/10/19 0929             Lower Extremity Orthosis Management    Type (Lower Extremity Orthosis)  posterior strut carbon fiber AFO   (Pended)   -      Therapeutic Indications (Lower Extremity Orthosis)  compensation for lost function  (Pended)   -      Orthosis Training (Lower Extremity Orthosis)  patient;purpose/goals of orthosis;skin inspection/precautions  (Pended)   -      Skin Assessment (Lower Extremity Orthosis)  no skin issues noted this date  (Pended)   -      Recorded by [LS] Naomy Mendenhall, PT Student      Row Name 04/10/19 1444             Therapeutic Exercise    Therapeutic Exercise  seated, upper extremities  (Pended)   -      Additional Documentation  Therapeutic Exercise (Row)  (Pended)   -      Recorded by [] Elliott Ken, OT Student      Row Name 04/10/19 1444             Upper Extremity Seated Therapeutic Exercise    Performed, Seated Upper Extremity (Therapeutic Exercise)  shoulder flexion/extension;scapular protraction/retraction;elbow flexion/extension  (Pended)   -      Device, Seated Upper Extremity (Therapeutic Exercise)  --  (Pended)  2# dowel whit  -      Exercise Type, Seated Upper Extremity (Therapeutic Exercise)  AROM (active range of motion);resistive exercise  (Pended)   -      Expected Outcomes, Seated Upper Extremity (Therapeutic Exercise)  improve functional tolerance, self-care activity;improve motor control;improve neuromuscular control;improve functional stability;improve performance, BADLs;improve performance, gross motor coordination skills;improve performance, reaching above shoulder level;improve performance, reaching for objects;improve performance, transfer skills  (Pended)   -      Sets/Reps Detail, Seated Upper Extremity (Therapeutic Exercise)  2/15  (Pended)   -      Transfers Skills, Training to Functional Activity, Seated Upper Extremity (Therapeutic Exercise)  beginning to transfer skills to functional activity  (Pended)   -      Comment, Seated Upper Extremity (Therapeutic Exercise)  pt performed BUE strengthening utilizing 23 dowel whit focusing on deltoids, pectorals,  and biceps to increase muscle strength for increased independence in self care tasks  (Pended)   -      Recorded by [] Elliott Ken OT Student      Row Name 04/10/19 0943             Lower Extremity Seated Therapeutic Exercise    Performed, Seated Lower Extremity (Therapeutic Exercise)  hip flexion/extension;knee flexion/extension;hip abduction/adduction  (Pended)   -      Device, Seated Lower Extremity (Therapeutic Exercise)  elastic bands/tubing  (Pended)   -      Exercise Type, Seated Lower Extremity (Therapeutic Exercise)  AROM (active range of motion);resistive exercise  (Pended)   -      Sets/Reps Detail, Seated Lower Extremity (Therapeutic Exercise)  1 x 15   (Pended)   -      Comment, Seated Lower Extremity (Therapeutic Exercise)  red theraband for knee flexion and hip abduction  (Pended)   -      Recorded by [LS] Naomy Mendenhall, ZAIDA Student      Row Name 04/10/19 1444 04/10/19 0943          Positioning and Restraints    Pre-Treatment Position  in bed  (Pended)   -  sitting in chair/recliner  (Pended)   -     Post Treatment Position  wheelchair  (Pended)   -  bed  (Pended)   -     In Bed  --  supine;call light within reach;encouraged to call for assist;exit alarm on  (Pended)   -     In Wheelchair  sitting;call light within reach;encouraged to call for assist;exit alarm on  (Pended)   -  patient within staff view  (Pended)   -     Recorded by [] Elliott Ken OT Student [LS] Naomy Mendenhall, PT Student       User Key  (r) = Recorded By, (t) = Taken By, (c) = Cosigned By    Initials Name Effective Dates    Narcisa Gaston MS CCC-SLP 04/03/18 -     Elliott Ramirez OT Student 02/05/19 -     Naomy Armas, PT Student 02/04/19 -         Wound 03/26/19 0900 Left chest incision (Active)   Dressing Appearance open to air 4/10/2019  7:36 AM   Closure Liquid skin adhesive;Approximated 4/10/2019  7:36 AM   Base clean;dry 4/10/2019  7:36 AM   Periwound dry;intact 4/10/2019  7:36  AM   Drainage Amount none 4/10/2019  7:36 AM   Dressing Care, Wound open to air 4/10/2019  7:36 AM     Physical Therapy Education     Title: PT OT SLP Therapies (In Progress)     Topic: Physical Therapy (In Progress)     Point: Mobility training (Done)     Learning Progress Summary           Patient Acceptance, E,TB, VU,NR by  at 4/10/2019  9:43 AM    Comment:  Continued discussion regarding safety and slowing down during mobility. Educated on proper use of cane again this date    Acceptance, E,TB, VU,NR by  at 4/9/2019  8:33 AM    Comment:  Continued discussion regarding how stroke has affected his R side. Educated on use of cane    Acceptance, E,TB, VU,NR by  at 4/8/2019  8:54 AM    Comment:  Educated on why R leg is weak and how the stroke is affecting his knee control    Acceptance, E,TB, VU,NR by  at 4/5/2019 11:30 AM    Comment:  Discussed purpose of strengthening exercises. Educated on repeated practice of walking and other exercises to gradually build strength and endurance.    Acceptance, E, NR by  at 4/4/2019  9:52 AM    Acceptance, E, NR by  at 4/3/2019 10:16 AM    Acceptance, E, NR by  at 4/2/2019  3:18 PM    Acceptance, E, NR by  at 4/1/2019 10:32 AM    Acceptance, E,TB,D, NR by  at 3/30/2019 11:44 AM    Acceptance, E,TB,D, NR by  at 3/29/2019  2:58 PM    Acceptance, E,TB,D, NR by  at 3/28/2019 11:38 AM    Acceptance, E,TB,D, NR by  at 3/27/2019 10:05 AM    Acceptance, E,TB,D, NR by  at 3/26/2019  9:48 AM    Acceptance, E,TB,D, NR by  at 3/25/2019  3:09 PM                   Point: Home exercise program (In Progress)     Learning Progress Summary           Patient Acceptance, E, NR by  at 4/4/2019  9:52 AM    Acceptance, E, NR by  at 4/3/2019 10:16 AM    Acceptance, E, NR by  at 4/2/2019  3:18 PM    Acceptance, E, NR by  at 4/1/2019 10:32 AM    Acceptance, E,TB,D, NR by EE at 3/29/2019  2:58 PM    Acceptance, LEANDRO RUIZ D, NR by EE at 3/28/2019 11:38 AM    Acceptance,  E,TB,D, NR by EE at 3/27/2019 10:05 AM    Acceptance, E,TB,D, NR by EE at 3/26/2019  9:48 AM    Acceptance, E,TB,D, NR by EE at 3/25/2019  3:09 PM                   Point: Body mechanics (In Progress)     Learning Progress Summary           Patient Acceptance, E, NR by  at 4/4/2019  9:52 AM    Acceptance, E, NR by  at 4/3/2019 10:16 AM    Acceptance, E, NR by  at 4/2/2019  3:18 PM    Acceptance, E, NR by  at 4/1/2019 10:32 AM    Acceptance, E,TB,D, NR by EE at 3/29/2019  2:58 PM    Acceptance, E,TB,D, NR by EE at 3/28/2019 11:38 AM    Acceptance, E,TB,D, NR by EE at 3/27/2019 10:05 AM    Acceptance, E,TB,D, NR by EE at 3/26/2019  9:48 AM    Acceptance, E,TB,D, NR by EE at 3/25/2019  3:09 PM                   Point: Precautions (Done)     Learning Progress Summary           Patient Acceptance, E,TB, VU,NR by  at 4/10/2019  9:43 AM    Comment:  Continued discussion regarding safety and slowing down during mobility. Educated on proper use of cane again this date    Acceptance, E,TB, VU,NR by  at 4/9/2019  8:33 AM    Comment:  Continued discussion regarding how stroke has affected his R side. Educated on use of cane    Acceptance, E,TB, VU,NR by  at 4/8/2019  8:54 AM    Comment:  Educated on why R leg is weak and how the stroke is affecting his knee control    Acceptance, E,TB, VU,NR by  at 4/5/2019 11:30 AM    Comment:  Discussed purpose of strengthening exercises. Educated on repeated practice of walking and other exercises to gradually build strength and endurance.    Acceptance, E, NR by  at 4/4/2019  9:52 AM    Acceptance, E, NR by  at 4/3/2019 10:16 AM    Acceptance, E, NR by  at 4/2/2019  3:18 PM    Acceptance, E, NR by  at 4/1/2019 10:32 AM    Acceptance, E,TB,D, NR by  at 3/29/2019  2:58 PM    Acceptance, E,TB,D, NR by EE at 3/28/2019 11:38 AM    Acceptance, E,TB,D, NR by EE at 3/27/2019 10:05 AM    Acceptance, E,TB,D, NR by EE at 3/26/2019  9:48 AM    Acceptance, E,TB,D, NR by EE at  3/25/2019  3:09 PM                               User Key     Initials Effective Dates Name Provider Type Discipline    ELISSA 06/08/18 -  Donna Inman, PT Physical Therapist PT    LH 04/03/18 -  Clau Ayon, PT Physical Therapist PT    EE 04/03/18 -  Ariadne Garcia, PT Physical Therapist PT    LS 02/04/19 -  Naomy Mendenhall, PT Student PT Student                   PT Recommendation and Plan                        Time Calculation:     PT Charges     Row Name 04/10/19 1439 04/10/19 0942          Time Calculation    Start Time  1330  (Pended)   -LS  0930  (Pended)   -LS     Stop Time  1400  (Pended)   -LS  1000  (Pended)   -LS     Time Calculation (min)  30 min  (Pended)   -LS  30 min  (Pended)   -LS     PT Received On  04/10/19  (Pended)   -LS  04/10/19  (Pended)   -LS     PT - Next Appointment  04/11/19  (Pended)   -LS  --       User Key  (r) = Recorded By, (t) = Taken By, (c) = Cosigned By    Initials Name Provider Type    LS Naomy Mendenhall, PT Student PT Student          Therapy Charges for Today     Code Description Service Date Service Provider Modifiers Qty    31678267554 HC PT NEUROMUSC RE EDUCATION EA 15 MIN 4/9/2019 Naomy Mendenhall, PT Student GP 4    05875612824 HC PT THER SUPP EA 15 MIN 4/9/2019 Naomy Mendenhall, PT Student GP 3    74531684722 HC PT NEUROMUSC RE EDUCATION EA 15 MIN 4/10/2019 Naomy Mendenhall, PT Student GP 4    32486747447 HC PT THER SUPP EA 15 MIN 4/10/2019 Naomy Mendenhall, PT Student GP 2                   Naomy Mendenhall, PT Student  4/10/2019

## 2019-04-10 NOTE — PLAN OF CARE
Problem: Patient Care Overview  Goal: Plan of Care Review   04/10/19 0247   Patient Care Overview   IRF Plan of Care Review progress ongoing, continue   Progress, Functional Goals progressing to functional independence   Coping/Psychosocial   Plan of Care Reviewed With patient;spouse   OTHER   Outcome Summary a/ox4,occ forgets month, cont b/b except tonight had meat loaf and it didnt agree, 4 loose stools and 3 inc episodes, right weakness, able to move extremity, skin intact occ pain meds at night and xanax, wife and dtr visited, and told him of new arrangements in house with bedrooms, t,

## 2019-04-10 NOTE — PROGRESS NOTES
Inpatient Rehabilitation Functional Measures Assessment    Functional Measures  VANITA Eating:  Branch  [facility] Grooming: Branch  [facility] Bathing:  Branch  [facility] Upper Body Dressing:  Branch  [facility] Lower Body Dressing:  Branch  [facility] Toileting:  [facility] Bladder Management  Level of Assistance:  [place]  Frequency/Number of Accidents this Shift:  [facility] Bowel Management  Level of Assistance: [place]  Frequency/Number of Accidents this Shift: Branch    [facility] Bed/Chair/Wheelchair Transfer:  Bed/chair/wheelchair Transfer Score = 4.  Patient performs 75% or more of effort and minimal assistance (little/incidental  help/lifting of one limb/steadying) for transferring to and from the  bed/chair/wheelchair, requiring: Steadying. Patient requires the following  assistive device(s): Arm rest.  VANITA Toilet Transfer:  [facility] Tub/Shower Transfer:  [place]    Previously Documented Mode of Locomotion at Discharge: Field  VANITA Expected Mode of Locomotion at Discharge: [facility] Walk/Wheelchair:  WHEELCHAIR OBSERVATION   Wheelchair locomotion was observed using a manual wheelchair. Wheelchair  Distance Scale = 3.  Distance traveled in wheelchair is greater than 150 feet.  Wheelchair Score = 5.  Patient is supervision or set up only for propelling  wheelchair, requiring: Stand by assistance. Patient was able to propel a  distance of 150 feet in a wheelchair. No other assistive devices were required.    WALK OBSERVATION   Walk Distance Scale = 2.  Distance walked is 50 -149 feet. Walk Score = 1.  Patient performs 75% or more of effort and requires minimal assistance of two or  more people. 2 people used for safety . Patient walked a distance of 50 feet.  Patient requires the following assistive device(s): Large based quad cane.  VANITA Stairs:  Activity was not observed.    VANITA Comprehension:  Branch  [facility] Expression:  [facility] Social Interaction:  [facility] Problem Solving:  [facility] Memory:  [place]    Therapy Mode  Minutes  Occupational Therapy: Branch  Physical Therapy: Individual: 60 minutes.  Speech Language Pathology:  Branch    Signed by: Naomy Mendenhall PT Student     - CoSigned By: Marisela Lema PT 4/10/2019 3:16:07 PM

## 2019-04-10 NOTE — PROGRESS NOTES
Inpatient Rehabilitation Plan of Care Note    Plan of Care  Care Plan Reviewed - No updates at this time.    Safety    Performed Intervention(s)  Hourly rounding , items within reach  Assistance with out of bed activities  Falls protocol  bed/chair alarm      Psychosocial    Performed Intervention(s)   PRN  Patient to verbalize feelings and cocnerns  Psychologist PRN      Body Systems    Performed Intervention(s)  Accuchecks ACHS  Medication as ordered  consistent carb diet      Sphincter Control    Performed Intervention(s)  Assistance with urinal  Assistance to bathroom  Incontinence care PRN    Signed by: Elsa Zhou RN

## 2019-04-10 NOTE — THERAPY TREATMENT NOTE
"Inpatient Rehabilitation - Speech Language Pathology Treatment Note    [facility]       Patient Name: Nayan Ryan  : 1965  MRN: [id number]    Today's Date: 4/10/2019           Admit Date: 3/24/2019      Visit Dx:      No diagnosis found.    Patient Active Problem List   Diagnosis   • Acute ischemic left PCA stroke (CMS/HCC)   • Status post placement of implantable loop recorder   • HTN (hypertension)   • Diabetes mellitus (CMS/HCC)   • Hyperlipidemia   • Morbid obesity (CMS/HCC)   • Anxiety disorder   • Migraine   • H/O hernia repair   • Abdominal wall abscess   • Back pain   • Stroke (cerebrum) (CMS/HCC)   • Vitamin D deficiency   • History of fatty infiltration of liver          Therapy Treatment    Evaluation/Coping    Evaluation/Treatment Time and Intent  Subjective Information: no complaints (04/10/19 1100 : Narcisa Sanchez, MS CCC-SLP)  Existing Precautions/Restrictions: fall (04/10/19 1100 : Narcisa Sanchez MS CCC-SLP)  Document Type: therapy note (daily note) (04/10/19 1100 : Narcisa Sanchez, MS CCC-SLP)  Mode of Treatment: speech-language pathology (04/10/19 1100 : Narcisa Sanchez, MS CCC-SLP)  Patient/Family Observations: up in w/c; pt stated he was tired d/t \"being up all night with diarrhea, multiple times, maybe because I ate the meatloaf my sister in law brought . It was greasy\"  (04/10/19 1100 : Narcisa Sanchez, MS CCC-SLP)  Start Time (Evaluation/Treatment): 1030 (04/10/19 1100 : Narcisa Sanchez, MS CCC-SLP)  Stop Time (Evaluation/Treatment): 1130 (04/10/19 1100 : Narcisa Sanchez, MS CCC-SLP)    Vitals/Pain/Safety         Cognition/Communication         Oral Motor/Eating         Mobility/Basic Activities/Instrumental Activities/Motor/Modality                   ROM/MMT                   Sensory/Myotome/Dermatome/Edema               Posture/Balance/Special Tests/Exercise/Transportation/Sexual Function                   Orthotics/Residual Limb/Prosthetic Management              Outcome Summary     "     EDUCATION    The patient has been educated in the following areas:     Cognitive Impairment.    SLP Recommendation and Plan    SLP Diagnosis: Moderate Cognitive impairment; Mild Anomic Aphasia    SLP Diagnosis: Moderate Cognitive impairment; Mild Anomic Aphasia    Rehab Potential/Prognosis: good    Swallow Criteria for Skilled Therapeutic Interventions Met: no problems identified which require skilled intervention, other (see comments)(however, will monitor closely for s/s asp/dysphagia)    Anticipated Dischage Disposition: home with OP services, anticipate therapy at next level of care         Therapy Frequency (Swallow): 5 days per week    Predicted Duration Therapy Intervention (Days): until discharge           Plan of Care Reviewed With: patient      SLP GOALS     Row Name 04/10/19 1100 04/09/19 1030 04/09/19 0900       Oral Nutrition/Hydration Goal 1 (SLP)    Time Frame (Oral Nutrition/Hydration Goal 1, SLP)  --  by discharge  -SA  --    Barriers (Oral Nutrition/Hydration Goal 1, SLP)  --  pt with throat clear x 2 during therapy with sips of thin; suspect d/t reduced oral contorl and mistiming as pt attmepting to talk once took sip. Reviewed with pt need to take small sips/viewed diagram of swallow function. Pt stated he did not know about this despite SLP reviewing with pt once he first came to rehab. Provided handout so pt could review with family. Wrote swallow precautions on handout; small sips/bite; no talking with food/liuqid in mouth. Appears dilma diet; afebrile, BS CTA per MD note and po intake good. Pt verbalized understanding.   -SA  --       Articulation Goal 1 (SLP)    Progress/Outcomes (Articulation Goal 1, SLP)  --  --  goal met;goal no longer appropriate  -SA    Comment (Articulation Goal 1, SLP)  --  --  No articulation difficulties; intelligibility is good with familiar and unfamiliar communication partners  -SA       Awareness of Basic Personal Information Goal 1 (SLP)    Improve Awareness  of Basic Personal Information Goal 1 (SLP)  --  --  answering open-ended questions regarding personal/biographical information;90%;independently (over 90% accuracy)  -SA    Progress (Awareness of Basic Personal Information Goal 1, SLP)  80%;with minimal cues (75-90%)  -SA  --  80%;with minimal cues (75-90%)  -SA    Progress/Outcomes (Awareness of Basic Personal Information Goal 1, SLP)  goal ongoing  -SA  --  goal ongoing  -SA    Comment (Awareness of Basic Personal Information Goal 1, SLP)  80%; answering questions related to personal info: name, address, city, state, wifes name, childrens names, dogs names, , situation; place; phone number home and cell and wife's phone number.   -SA  --  80%; answering questions related to personal info: name, address, city, state, wifes name, childrens names, dogs names, , situation; place; phone number home and cell and wife's phone number.   -SA       Attention Goal 1 (SLP)    Progress (Attention Goal 1, SLP)  80%  -SA  independently (over 90% accuracy)  -SA  --    Progress/Outcomes (Attention Goal 1, SLP)  goal ongoing  -SA  continuing progress toward goal  -SA  --    Comment (Attention Goal 1, [facility])  82%; CT: Playing card slapPipeline Microck; level 2; 82%   -SA  blink game; matching colors timed: 2:18; increased by 2 seconds this session  -SA  --       Organizational Skills Goal 1 (SLP)    Progress (Thought Organization Skills Goal 1, SLP)  90%;independently (over 90% accuracy)  -SA  with minimal cues (75-90%);80%  -SA  --    Progress/Outcomes (Thought Organization Skills Goal 1, SLP)  good progress toward goal  -SA  continuing progress toward goal  -SA  goal ongoing  -SA    Comment (Thought Organization Skills Goal 1, SLP)  90%; organizing functional information with specifics: right/left columns  -SA  78%; organizing functional information with specifics: right/left columns; which item does not belong in category given chioce of 5 words: 80%  -SA  organizing functional  information with specifics: right/left columns; did not finish 2/2 time constraints; will finish next session.  -SA       Reasoning Goal 1 (SLP)    Progress (Reasoning Goal 1, SLP)  60%  -SA  --  --    Progress/Outcomes (Reasoning Goal 1, SLP)  goal ongoing  -SA  --  --    Comment (Reasoning Goal 1, SLP)  67% overall; divergent cat: named 11/12 drink items and 5/12 holidays for timed task of 1 minute  -SA  --  --       Executive Functional Skills Goal 1 (SLP)    Progress (Executive Function Skills Goal 1, SLP)  60%  -SA  --  --    Progress/Outcomes (Executive Function Skills Goal 1, SLP)  goal ongoing  -SA  --  --    Comment (Executive Function Skills Goal 1, SLP)  CT: clock math; level 2  -SA  --  --       Right Hemisphere Function Goal 1 (SLP)    Progress (Right Hemisphere Function Goal 1, SLP)  --  --  independently (over 90% accuracy)  -SA    Progress/Outcomes (Right Hemisphere Function Goal 1, SLP)  --  --  continuing progress toward goal  -SA    Comment (Right Hemisphere Function Goal 1, SLP)  --  --  96%; visual scanning for target letters (2 target letters for each section)  -    Row Name 04/08/19 1100             Attention Goal 1 (SLP)    Progress (Attention Goal 1, SLP)  independently (over 90% accuracy)  -SA      Progress/Outcomes (Attention Goal 1, SLP)  goal ongoing  -SA      Comment (Attention Goal 1, SLP)  blink game; matching colors timed: 2:20  -SA         Organizational Skills Goal 1 (SLP)    Progress/Outcomes (Thought Organization Skills Goal 1, SLP)  goal ongoing  -SA      Comment (Thought Organization Skills Goal 1, SLP)  69%: organizing and recording information (person's name and grades) ; items needed for a task: 88% independently and 94% with min assist  -SA         Reasoning Goal 1 (SLP)    Progress (Reasoning Goal 1, SLP)  with minimal cues (75-90%);70%  -SA      Progress/Outcomes (Reasoning Goal 1, SLP)  goal ongoing;continuing progress toward goal  -SA      Comment (Reasoning Goal 1,  SLP)  75% abstract naming of category given 3 words; 75%: naming 8/12 itmes in a specific category (dogs, presidents, and occupations  -         Right Hemisphere Function Goal 1 (SLP)    Progress (Right Hemisphere Function Goal 1, SLP)  90%;independently (over 90% accuracy)  -      Progress/Outcomes (Right Hemisphere Function Goal 1, SLP)  continuing progress toward goal  -      Comment (Right Hemisphere Function Goal 1, SLP)  97%; visual scanning for target letter   -        User Key  (r) = Recorded By, (t) = Taken By, (c) = Cosigned By    Initials Name Provider Type    Narcisa Gaston MS CCC-SLP Speech and Language Pathologist                  Time Calculation:       Time Calculation- SLP     Row Name 04/10/19 1321             Time Calculation- SLP    SLP Start Time  1030  -      SLP Stop Time  1130  -      SLP Time Calculation (min)  60 min  -      SLP Received On  04/10/19  -        User Key  (r) = Recorded By, (t) = Taken By, (c) = Cosigned By    Initials Name Provider Type    Narcisa Gaston MS CCC-SLP Speech and Language Pathologist            Therapy Charges for Today     Code Description Service Date Service Provider Modifiers Qty    08817910462 HC ST DEV OF COGN SKILLS EACH 15 MIN 4/9/2019 Narcisa Sanchez MS CCC-SLP  4    [id number] HC ST DEV OF COGN SKILLS EACH 15 MIN 4/10/2019 Narcisa Sanchez MS CCC-SLP  4                           MS CHARLES Meléndez  4/10/2019

## 2019-04-10 NOTE — PLAN OF CARE
Problem: Stroke (IRF) (Adult)  Goal: Promote Optimal Functional [place]  Outcome: Ongoing (interventions implemented as appropriate)      Problem: Fall Risk (Adult)  Goal: Absence of Fall  Outcome: Ongoing (interventions implemented as appropriate)      Problem: Diabetes, Type 2 (Adult)  Goal: Signs and Symptoms of Listed Potential Problems Will be Absent, Minimized or Managed (Diabetes, Type 2)  Outcome: Ongoing (interventions implemented as appropriate)      Problem: Skin Injury Risk (Adult)  Goal: Skin Health and Integrity  Outcome: Ongoing (interventions implemented as appropriate)      Problem: Patient Care Overview  Goal: Plan of Care Review  Outcome: Ongoing (interventions implemented as appropriate)   04/10/19 4685   Patient Care Overview   IRF Plan of Care Review progress ongoing, continue   Progress, Functional Goals demonstrating adequate progress   Coping/Psychosocial   Plan of Care Reviewed With patient   OTHER   Outcome Summary Pt participated in therapies today. Did not have incontinence during the shift. Did not complain of pain. Loop recorder site intact and has a liquid skin adhesive. No numbness and tingling noted today. BS maintained and WNL

## 2019-04-11 LAB
GLUCOSE BLDC GLUCOMTR-MCNC: 109 MG/DL (ref 70–130)
GLUCOSE BLDC GLUCOMTR-MCNC: 112 MG/DL (ref 70–130)
GLUCOSE BLDC GLUCOMTR-MCNC: 117 MG/DL (ref 70–130)
GLUCOSE BLDC GLUCOMTR-MCNC: 118 MG/DL (ref 70–130)

## 2019-04-11 PROCEDURE — 97112 NEUROMUSCULAR REEDUCATION: CPT

## 2019-04-11 PROCEDURE — 97110 THERAPEUTIC EXERCISES: CPT

## 2019-04-11 PROCEDURE — G0515 COGNITIVE SKILLS DEVELOPMENT: HCPCS

## 2019-04-11 PROCEDURE — 97535 SELF CARE MNGMENT TRAINING: CPT

## 2019-04-11 PROCEDURE — 63710000001 INSULIN GLARGINE PER 5 UNITS: Performed by: INTERNAL MEDICINE

## 2019-04-11 PROCEDURE — 82962 GLUCOSE BLOOD TEST: CPT

## 2019-04-11 RX ADMIN — LISINOPRIL 20 MG: 20 TABLET ORAL at 09:16

## 2019-04-11 RX ADMIN — PAROXETINE HYDROCHLORIDE 10 MG: 10 TABLET, FILM COATED ORAL at 09:16

## 2019-04-11 RX ADMIN — AMLODIPINE BESYLATE 5 MG: 5 TABLET ORAL at 09:17

## 2019-04-11 RX ADMIN — CLOPIDOGREL 75 MG: 75 TABLET, FILM COATED ORAL at 09:17

## 2019-04-11 RX ADMIN — ATENOLOL 25 MG: 25 TABLET ORAL at 20:31

## 2019-04-11 RX ADMIN — LISINOPRIL 20 MG: 20 TABLET ORAL at 20:31

## 2019-04-11 RX ADMIN — ALPRAZOLAM 1 MG: 0.5 TABLET ORAL at 22:44

## 2019-04-11 RX ADMIN — INSULIN GLARGINE 32 UNITS: 100 INJECTION, SOLUTION SUBCUTANEOUS at 20:34

## 2019-04-11 RX ADMIN — ASPIRIN 325 MG: 325 TABLET, COATED ORAL at 09:16

## 2019-04-11 RX ADMIN — ATENOLOL 25 MG: 25 TABLET ORAL at 09:16

## 2019-04-11 RX ADMIN — ACETAMINOPHEN 650 MG: 325 TABLET, FILM COATED ORAL at 20:31

## 2019-04-11 RX ADMIN — METFORMIN HYDROCHLORIDE 500 MG: 500 TABLET, EXTENDED RELEASE ORAL at 17:01

## 2019-04-11 RX ADMIN — ATORVASTATIN CALCIUM 80 MG: 80 TABLET, FILM COATED ORAL at 20:31

## 2019-04-11 RX ADMIN — METFORMIN HYDROCHLORIDE 500 MG: 500 TABLET, EXTENDED RELEASE ORAL at 09:16

## 2019-04-11 RX ADMIN — Medication 1 TABLET: at 09:16

## 2019-04-11 NOTE — PLAN OF CARE
Problem: Stroke (IRF) (Adult)  Goal: Promote Optimal Functional [place]  Outcome: Ongoing (interventions implemented as appropriate)      Problem: Fall Risk (Adult)  Goal: Absence of Fall  Outcome: Ongoing (interventions implemented as appropriate)      Problem: Diabetes, Type 2 (Adult)  Goal: Signs and Symptoms of Listed Potential Problems Will be Absent, Minimized or Managed (Diabetes, Type 2)  Outcome: Ongoing (interventions implemented as appropriate)      Problem: Skin Injury Risk (Adult)  Goal: Skin Health and Integrity  Outcome: Ongoing (interventions implemented as appropriate)      Problem: Patient Care Overview  Goal: Plan of Care Review  Outcome: Ongoing (interventions implemented as appropriate)   04/10/19 1515 04/11/19 1702   Patient Care Overview   IRF Plan of Care Review progress ongoing, continue --    OTHER   Outcome Summary --  Pt has been up in chair. Speech and motor function improving.     Goal: Individualization and Mutuality  Outcome: Ongoing (interventions implemented as appropriate)    Goal: Discharge Needs Assessment  Outcome: Ongoing (interventions implemented as appropriate)    Goal: Home Safety Plan  Outcome: Ongoing (interventions implemented as appropriate)    Goal: Coping Plan  Outcome: Ongoing (interventions implemented as appropriate)    Goal: Community Reintegration Plan  Outcome: Ongoing (interventions implemented as appropriate)

## 2019-04-11 NOTE — PROGRESS NOTES
Inpatient Rehabilitation Functional Measures Assessment    Functional Measures  VANITA Eating:  [facility] Grooming: [facility] Bathing:  [facility] Upper Body Dressing:  [facility] Lower Body Dressing:  [facility] Toileting:  [facility] Bladder Management  Level of Assistance:  [place]  Frequency/Number of Accidents this Shift:  [facility] Bowel Management  Level of Assistance: [place]  Frequency/Number of Accidents this Shift: [facility] Bed/Chair/Wheelchair Transfer:  [facility] Toilet Transfer:  [facility] Tub/Shower Transfer:  [place]    Previously Documented Mode of Locomotion at Discharge: Field  VANITA Expected Mode of Locomotion at Discharge: [facility] Walk/Wheelchair:  [facility] Stairs:  [facility] Comprehension:  Auditory comprehension is the usual mode. Comprehension  Score = 7, Independent.  Patient comprehends complex/abstract information in  their primary language.  Patient is completely independent for auditory  comprehension.  There are no activity limitations.  VANITA Expression:  Vocal expression is the usual mode. Expression Score = 7,  Independent.  Patient expresses complex/abstract information in their primary  language.  Patient is completely independent for vocal expression.  There are no  activity limitations.  VANITA Social Interaction:  Activity was not observed.  VANITA Problem Solving:  Activity was not observed.  VANITA Memory:  Memory Score = 6, Modified [place].  Patient is modified  independent for memory, having only mild difficulty and using self-initiated or  environmental cues to remember.    Therapy Mode Minutes  Occupational Therapy: Branch  Physical Therapy: [place]  Speech Language Pathology:  [place]    Signed by: Ana Thomas RN

## 2019-04-11 NOTE — PROGRESS NOTES
Inpatient Rehabilitation Functional Measures Assessment    Functional Measures  [facility] Eating:  [facility] Grooming: [facility] Bathing:  [facility] Upper Body Dressing:  [facility] Lower Body Dressing:  [facility] Toileting:  [facility] Bladder Management  Level of Assistance:  [place]  Frequency/Number of Accidents this Shift:  [facility] Bowel Management  Level of Assistance: [place]  Frequency/Number of Accidents this Shift: [facility] Bed/Chair/Wheelchair Transfer:  [facility] Toilet Transfer:  [facility] Tub/Shower Transfer:  [place]    Previously Documented Mode of Locomotion at Discharge: Field  VANITA Expected Mode of Locomotion at Discharge: [facility] Walk/Wheelchair:  [facility] Stairs:  [facility] Comprehension:  Auditory comprehension is the usual mode. Comprehension  Score = 6, Modified [place].  Patient comprehends complex/abstract  information in their primary language, requiring:  [facility] Expression:  Vocal expression is the usual mode. Expression Score = 6,  Modified Independent.  Patient expresses complex/abstract information in their  primary language, requiring:  [facility] Social Interaction:  Social Interaction Score = 6, Modified Independent.  Patient is modified independent for social interaction, requiring:  [facility] Problem Solving:  Activity was not observed.  [facility] Memory:  Memory Score = 5, Supervision.  Patient recognizes and remembers  with prompting only under stressful or unfamiliar conditions, but no more than  10% of the time, for the following behavior(s):    Therapy Mode Minutes  Occupational Therapy: Branch  Physical Therapy: [place]  Speech Language Pathology:  [place]    Signed by: Rosa Isela Fleming RN

## 2019-04-11 NOTE — PROGRESS NOTES
Occupational Therapy: Branch    Physical Therapy: Branch    Speech Language Pathology:  Individual: 60 minutes.    Signed by: Narcisa Bonner SLP

## 2019-04-11 NOTE — THERAPY TREATMENT NOTE
Inpatient Rehabilitation - Occupational Therapy Treatment Note    [facility]     Patient Name: Nayan Ryan  : 1965  MRN: [id number]    Today's Date: 2019                 Admit Date: 3/24/2019      Visit Dx:  No diagnosis found.    Patient Active Problem List   Diagnosis   • Acute ischemic left PCA stroke (CMS/HCC)   • Status post placement of implantable loop recorder   • HTN (hypertension)   • Diabetes mellitus (CMS/HCC)   • Hyperlipidemia   • Morbid obesity (CMS/HCC)   • Anxiety disorder   • Migraine   • H/O hernia repair   • Abdominal wall abscess   • Back pain   • Stroke (cerebrum) (CMS/HCC)   • Vitamin D deficiency   • History of fatty infiltration of liver         Therapy Treatment    IRF Treatment Summary     Row Name 19 1300 19 0953 19 0800       Evaluation/Treatment Time and Intent    Subjective Information  no complaints  -DN  no complaints  (Pended)   -LS  no complaints  -SL    Existing Precautions/Restrictions  fall  -DN  fall  (Pended)   -LS  fall  -SL    Document Type  therapy note (daily note)  -DN  therapy note (daily note)  (Pended)   -LS  therapy note (daily note)  -    Mode of Treatment  occupational therapy  -DN  physical therapy  (Pended)   -LS  individual therapy;speech-language pathology  -SL    Patient/Family Observations  --  Pt arrives in w/c with OT. States he is feeling much better today and got a good night of sleep last night  (Pended)   -  cooperative  -SL    Start Time (Evaluation/Treatment)  --  --  0800  -SL    Stop Time (Evaluation/Treatment)  --  --  0830  -SL    Recorded by [DN] Reg Mckinney OT [LS] Naomy Mendenhall, PT Student [SL] Narcisa Bonner, MS CCC-SLP    Row Name 19 1300             Safety Awareness/Health Promotion    Additional Documentation  Fall Prevention (Row)  -DN      Recorded by [DN] Reg Mckinney OT      Row Name 19 1300 19 0953          Cognition/Psychosocial- PT/OT    Affect/Mental Status (Cognitive)  --   flat/blunted affect  (Pended)  more engagiung in recent sessions  -LS     Behavioral Issues (Cognitive)  --  withdrawn  (Pended)  less frequently   -LS     Orientation Status (Cognition)  oriented x 3  -DN  --     Follows Commands (Cognition)  follows one step commands  -DN  follows one step commands;75-90% accuracy;repetition of directions required;verbal cues/prompting required  (Pended)   -LS     Personal Safety Interventions  fall prevention program maintained;gait belt  -DN  fall prevention program maintained;gait belt;muscle strengthening facilitated;nonskid shoes/slippers when out of bed  (Pended)   -LS     Cognitive Function (Cognitive)  attention deficit  -DN  --     Attention Deficit (Cognitive)  mild deficit  -DN  mild deficit  (Pended)   -LS     Executive Function Deficit (Cognition)  severe deficit  -DN  --     Memory Deficit (Cognitive)  moderate deficit  -DN  moderate deficit  (Pended)   -LS     Safety Deficit (Cognitive)  impulsivity;insight into deficits/self awareness;judgment;problem solving;safety precautions awareness;at risk behavior observed  -DN  impulsivity;ability to follow commands;insight into deficits/self awareness;judgment;problem solving  (Pended)   -LS     Recorded by [DN] Reg Mckinney, OT [LS] Naomy Mendenhall, PT Student     Row Name 04/11/19 0953             Sit-Stand Transfer    Sit-Stand [place] (Transfers)  contact guard  (Pended)   -LS      Assistive Device (Sit-Stand Transfers)  wheelchair  (Pended)   -LS      Recorded by [LS] Naomy Mendenhall, PT Student      Row Name 04/11/19 0953             Stand-Sit Transfer    Stand-Sit [place] (Transfers)  contact guard;minimum assist (75% patient effort)  (Pended)   -LS      Assistive Device (Stand-Sit Transfers)  wheelchair  (Pended)   -LS      Recorded by [LS] Naomy Mendenhall, PT Student      Row Name 04/11/19 1300             Shower Transfer    Type (Shower Transfer)  stand pivot/stand step  -DN      [place] Level  (Shower Transfer)  verbal cues;nonverbal cues (demo/gesture);1 person assist;minimum assist (75% patient effort)  -DN      Assistive Device (Shower Transfer)  wheelchair;tub bench;grab bars/tub rail  -DN      Recorded by [DN] Reg Mckinney OT      Row Name 04/11/19 0953             Gait/Stairs Assessment/Training    [place] Level (Gait)  minimum assist (75% patient effort);2 person assist  (Pended)   -LS      Assistive Device (Gait)  cane, quad  (Pended)  SBQC  -LS      Distance in Feet (Gait)  80  (Pended)   -LS      Pattern (Gait)  step-through  (Pended)   -LS      Deviations/Abnormal Patterns (Gait)  base of support, narrow;nancy decreased;gait speed decreased  (Pended)   -LS      Bilateral Gait Deviations  forward flexed posture;heel strike decreased  (Pended)   -LS      Left Sided Gait Deviations  weight shift ability decreased  (Pended)   -LS      Right Sided Gait Deviations  foot drop/toe drag;knee buckling, right side;knee hyperextension;weight shift ability decreased  (Pended)   -LS      Negotiation (Stairs)  stairs independence  (Pended)   -LS      [place] Level (Stairs)  minimum assist (75% patient effort);2 person assist  (Pended)   -LS      Handrail Location (Stairs)  both sides  (Pended)   -LS      Number of Steps (Stairs)  4  (Pended)   -LS      Ascending Technique (Stairs)  step-to-step  (Pended)   -LS      Descending Technique (Stairs)  step-to-step  (Pended)   -LS      Stairs, Safety Issues  weight-shifting ability decreased;balance decreased during turns;sequencing ability decreased  (Pended)   -LS      Stairs, Impairments  strength decreased;impaired balance;motor control impaired  (Pended)   -LS      Comment (Gait/Stairs)  Pt ambulating with posterior leaf AFO. Continues to demonstrate intermittent R knee buckling/ hyperextension, often when taking too big of step with R LE. Decreased need for cues with cane sequencing and improved cane placement this date. Pt with difficulty  turning to sit in chair requiring cues and assist to remain safe. Began stair navigation on 4 inch steps--pt requiring consistent VCs for sequencing and step-to-step pattern. Assist for R foot placement and gaurding of R knee throughout.   (Pended)   -LS      Recorded by [LS] Naomy Mendenhall, PT Student      Row Name 04/11/19 0903             Wheelchair Mobility/Management    Method of Wheelchair Locomotion (Mobility)  bipedal (lower extremity) propulsion  (Pended)   -LS      Mobility Activities (Wheelchair)  forward propulsion;turning  (Pended)   -LS      Forward Propulsion [place] (Wheelchair)  supervision;independent  (Pended)   -LS      Steering [place] (Wheelchair)  verbal cues;supervision  (Pended)   -LS      Turning [place] (Wheelchair)  verbal cues;supervision  (Pended)   -LS      Distance Propelled in Feet (Wheelchair)  150  (Pended)   -LS      Comment, Mobility Activities (Wheelchair)  Propelled with bilateral LEs for R LE strengthening/coordination exercise. Increased difficulty with turns when using R LE. Verbal cues improved technique  (Pended)   -LS      Recorded by [LS] Naomy Mendenhall, PT Student      Row Name 04/11/19 4858             Safety Issues, Functional Mobility    Safety Issues Affecting Function (Mobility)  ability to follow commands;impulsivity;insight into deficits/self awareness;judgment;positioning of assistive device;problem solving  (Pended)   -LS      Impairments Affecting Function (Mobility)  balance;cognition;coordination;strength;visual/perceptual  (Pended)   -LS      Comment, Safety Issues/Impairments (Mobility)  Verbal cues needed to slow down and wait for instructions prior to getting up, etc  (Pended)   -LS      Recorded by [LS] Naomy Mendenhall, PT Student      Row Name 04/11/19 1300             Bathing Assessment/Treatment    Bathing [place] Level  bathing skills;contact guard assist;nonverbal cues (demo/gesture);verbal cues  -DN      Assistive Device  (Bathing)  tub bench;hand held shower spray hose;grab bar/tub rail  -DN      Bathing Position  supported standing;supported sitting  -DN      Bathing Setup Assistance  adjust water temperature  -DN      Comment (Bathing)  pt requires vc 50% of time for sequencing, foot placement with standing and completeness  -DN      Recorded by [DN] Reg Mckinney, OT      Row Name 04/11/19 1300             Upper Body Dressing Assessment/Treatment    Upper Body Dressing Task  upper body dressing skills;don;doff;pull over garment;supervision;verbal cues  -DN      Upper Body Dressing Position  supported sitting  -DN      Set-up Assistance (Upper Body Dressing)  obtain clothing  -DN      Recorded by [DN] Reg Mckinney, OT      Row Name 04/11/19 1300             Lower Body Dressing Assessment/Treatment    Lower Body Dressing [place] Level  doff;don;pants/bottoms;shoes/slippers;socks;underwear;minimum assist (75% patient effort);verbal cues;nonverbal cues (demo/gesture);set up  -DN      Lower Body Dressing Position  supported sitting;supported standing  -DN      Lower Body Dressing Setup Assistance  obtain clothing  -DN      Comment (Lower Body Dressing)  pt required vc for foot placement with standing, and safety  -DN      Recorded by [BRENDAN] Reg Mckinney, OT      Row Name 04/11/19 1300             Grooming Assessment/Treatment    Grooming [place] Level  grooming skills;deodorant application;hair care, combing/brushing;shave face;supervision;verbal cues;nonverbal cues (demo/gesture);oral care regimen  -DN      Grooming Position  supported sitting;sink side  -DN      Comment (Grooming)  vcs sequencing, and to remind pt he already applied deoderant x3  -DN      Recorded by [BRENDAN] Reg Mckinney, OT      Row Name 04/11/19 1300             Toileting Assessment/Treatment    Comment (Toileting)  pt did not need to use commode during tx session  -DN      Recorded by [BRENDAN] Reg Mckinney, OT      Row Name 04/11/19 0953              Pain Assessment    Additional Documentation  Pain Scale: Numbers Pre/Post-Treatment (Group)  (Pended)   -LS      Recorded by [LS] Naomy Mendenhall, PT Student      Row Name 04/11/19 1300 04/11/19 0953          Pain Scale: Numbers Pre/Post-Treatment    Pain Scale: Numbers, Pretreatment  0/10 - no pain  -DN  0/10 - no pain  (Pended)   -LS     Pain Scale: Numbers, Post-Treatment  0/10 - no pain  -DN  0/10 - no pain  (Pended)   -LS     Recorded by [DN] Reg Mckinney OT [LS] Naomy Mendenhall, PT Student     Row Name 04/11/19 0953             Lower Extremity Orthosis Management    Type (Lower Extremity Orthosis)  posterior strut carbon fiber AFO  (Pended)   -LS      Therapeutic Indications (Lower Extremity Orthosis)  compensation for lost function  (Pended)   -LS      Orthosis Training (Lower Extremity Orthosis)  patient;purpose/goals of orthosis  (Pended)   -LS      Skin Assessment (Lower Extremity Orthosis)  no skin issues noted this date  (Pended)   -LS      Recorded by [LS] Naomy Mendenhall, PT Student      Row Name 04/11/19 1300             Upper Extremity Seated Therapeutic Exercise    Performed, Seated Upper Extremity (Therapeutic Exercise)  shoulder flexion/extension;digit flexion/extension;wrist flexion/extension;forearm supination/pronation;elbow flexion/extension  -DN      Device, Seated Upper Extremity (Therapeutic Exercise)  free weights, barbell  -DN      Exercise Type, Seated Upper Extremity (Therapeutic Exercise)  AROM (active range of motion);resistive exercise  -DN      Expected Outcomes, Seated Upper Extremity (Therapeutic Exercise)  improve functional tolerance, self-care activity;improve performance, transfer skills  -DN      Sets/Reps Detail, Seated Upper Extremity (Therapeutic Exercise)  3/15s with 2# dumbell and dowel for RUE  -DN      Transfers Skills, Training to Functional Activity, Seated Upper Extremity (Therapeutic Exercise)  unable to transfer skills at this time  -DN      Comment, Seated Upper  Extremity (Therapeutic Exercise)  [place] [facility] [place] peg [place] exercises with min vc to use affected RUE   -DN      Recorded by [DN] Reg Mckinney OT      Row Name 04/11/19 1300 04/11/19 0953          Positioning and Restraints    Pre-Treatment Position  sitting in chair/recliner  -DN  sitting in chair/recliner  (Pended)   -LS     Post Treatment Position  wheelchair  -DN  wheelchair  (Pended)   -LS     In Bed  with PT  -DN  --     In Wheelchair  --  sitting;exit alarm on;with OT  (Pended)   -LS     Recorded by [DN] Reg Mckinney, OT [LS] Naomy Mendenhall, PT Student       User Key  (r) = Recorded By, (t) = Taken By, (c) = Cosigned By    Initials Name Effective Dates    SL Narcisa Bonner, MS CCC-SLP 06/08/18 -     Reg Bolden, MADAY 06/08/18 -     Naomy Armas, PT Student 02/04/19 -           Wound 03/26/19 0900 Left chest incision (Active)   Dressing Appearance open to air 4/11/2019  8:00 AM   Closure Liquid skin adhesive;Approximated 4/10/2019  7:58 PM   Periwound dry;intact 4/11/2019  8:00 AM   Drainage Amount none 4/10/2019  7:58 PM         OT Recommendation and Plan    Anticipated Equipment Needs At Discharge (OT Eval): commode, 3-in-1, shower chair, tub bench  Planned Therapy Interventions (OT Eval): BADL retraining, cognitive/visual perception retraining, functional balance retraining, neuromuscular control/coordination retraining, strengthening exercise, transfer/mobility retraining            OT IRF GOALS     Row Name 04/09/19 1400 04/02/19 1500          Bathing Goal 1 (OT-IRF)    Activity/Device (Bathing Goal 1, OT-IRF)  bathing skills, all  -DN  bathing skills, all  -DN     [place] Level (Bathing Goal 1, OT-IRF)  supervision required;verbal cues required  -DN  contact guard assist;verbal cues required  -DN     Time Frame (Bathing Goal 1, OT-IRF)  short term goal (STG)  -DN  short term goal (STG)  -DN     Progress/Outcomes (Bathing Goal 1, OT-IRF)  goal met;goal revised this  date  -DN  goal met;goal revised this date  -DN        Bathing Goal 2 (OT-IRF)    Activity/Device (Bathing Goal 2, OT-IRF)  bathing skills, all  -DN  bathing skills, all  -DN     [place] Level (Bathing Goal 2, OT-IRF)  supervision required  -DN  supervision required  -DN     Time Frame (Bathing Goal 2, OT-IRF)  long term goal (LTG)  -DN  long term goal (LTG)  -DN     Progress/Outcomes (Bathing Goal 2, OT-IRF)  goal ongoing  -DN  goal ongoing  -DN        UB Dressing Goal 1 (OT-IRF)    Activity/Device (UB Dressing Goal 1, OT-IRF)  upper body dressing  -DN  upper body dressing  -DN     [place] (UB Dress Goal 1, OT-IRF)  supervision required  -DN  supervision required  -DN     Time Frame (UB Dressing Goal 1, OT-IRF)  short term goal (STG)  -DN  short term goal (STG)  -DN     Progress/Outcomes (UB Dressing Goal 1, OT-IRF)  goal met;goal ongoing  -DN  goal met;goal revised this date  -DN        UB Dressing Goal 2 (OT-IRF)    Activity/Device (UB Dressing Goal 2, OT-IRF)  upper body dressing  -DN  upper body dressing  -DN     [place] (UB Dress Goal 2, OT-IRF)  supervision required  -DN  supervision required  -DN     Time Frame (UB Dressing Goal 2, OT-IRF)  long term goal (LTG)  -DN  long term goal (LTG)  -DN     Progress/Outcomes (UB Dressing Goal 2, OT-IRF)  goal ongoing;goal met  -DN  goal ongoing;goal met  -DN        LB Dressing Goal 1 (OT-IRF)    Activity/Device (LB Dressing Goal 1, OT-IRF)  lower body dressing  -DN  lower body dressing  -DN     [place] (LB Dressing Goal 1, OT-IRF)  minimum assist (75% or more patient effort);verbal cues required;tactile cues required  -DN  minimum assist (75% or more patient effort);verbal cues required;tactile cues required  -DN     Time Frame (LB Dressing Goal 1, OT-IRF)  short term goal (STG)  -DN  short term goal (STG)  -DN     Progress/Outcomes (LB Dressing Goal 1, OT-IRF)  goal met;goal ongoing  -DN  goal met;goal revised this date  -DN        LB Dressing Goal  2 (OT-IRF)    Activity/Device (LB Dressing Goal 2, OT-IRF)  lower body dressing  -DN  lower body dressing  -DN     [place] (LB Dressing Goal 2, OT-IRF)  verbal cues required;tactile cues required;contact guard assist  -DN  verbal cues required;tactile cues required;minimum assist (75% or more patient effort)  -DN     Time Frame (LB Dressing Goal 2, OT-IRF)  long term goal (LTG)  -DN  long term goal (LTG)  -DN     Progress/Outcomes (LB Dressing Goal 2, OT-IRF)  goal revised this date  -DN  goal ongoing  -DN        Grooming Goal 1 (OT-IRF)    Activity/Device (Grooming Goal 1, OT-IRF)  grooming skills, all  -DN  grooming skills, all  -DN     [place] (Grooming Goal 1, OT-IRF)  supervision required  -DN  supervision required  -DN     Time Frame (Grooming Goal 1, OT-IRF)  short term goal (STG)  -DN  short term goal (STG)  -DN     Progress/Outcomes (Grooming Goal 1, OT-IRF)  goal ongoing  -DN  goal partially met;goal ongoing  -DN        Grooming Goal 2 (OT-IRF)    Activity/Device (Grooming Goal 2, OT-IRF)  grooming skills, all  -DN  grooming skills, all  -DN     [place] (Grooming Goal 2, OT-IRF)  supervision required  -DN  supervision required  -DN     Time Frame (Grooming Goal 2, OT-IRF)  long term goal (LTG)  -DN  long term goal (LTG)  -DN     Progress/Outcomes (Grooming Goal 2, OT-IRF)  goal revised this date  -DN  goal revised this date  -DN        Toileting Goal 1 (OT-IRF)    Activity/Device (Toileting Goal 1, OT-IRF)  toileting skills, all  -DN  toileting skills, all  -DN     [place] Level (Toileting Goal 1, OT-IRF)  minimum assist (75% or more patient effort);verbal cues required;tactile cues required  -DN  minimum assist (75% or more patient effort);verbal cues required;tactile cues required  -DN     Time Frame (Toileting Goal 1, OT-IRF)  short term goal (STG)  -DN  short term goal (STG)  -DN     Progress/Outcomes (Toileting Goal 1, OT-IRF)  goal met;goal revised this date  -DN  goal met;goal  revised this date  -DN        Toileting Goal 2 (OT-IRF)    Activity/Device (Toileting Goal 2, OT-IRF)  toileting skills, all  -DN  toileting skills, all  -DN     [place] Level (Toileting Goal 2, OT-IRF)  contact guard assist;verbal cues required;tactile cues required  -DN  contact guard assist;verbal cues required;tactile cues required  -DN     Time Frame (Toileting Goal 2, OT-IRF)  long term goal (LTG)  -DN  long term goal (LTG)  -DN     Progress/Outcomes (Toileting Goal 2, OT-IRF)  goal ongoing  -DN  goal ongoing  -DN        Self-Feeding Goal 1 (OT-IRF)    Activity/Device (Self-Feeding Goal 1, OT-IRF)  self-feeding skills, all  -DN  self-feeding skills, all  -DN     [place] (Self-Feeding Goal 1, OT-IRF)  supervision required  -DN  supervision required  -DN     Time Frame (Self-Feeding Goal 1, OT-IRF)  short term goal (STG)  -DN  short term goal (STG)  -DN     Progress/Outcomes 1 (Self-Feeding Goal, OT-IRF)  goal met;goal ongoing  -DN  goal met;goal ongoing  -DN        Self-Feeding Goal 2 (OT-IRF)    Activity/Device (Self-Feeding Goal 2, OT-IRF)  self-feeding skills, all  -DN  self-feeding skills, all  -DN     [place] (Self-Feeding Goal 2, OT-IRF)  supervision required  -DN  supervision required  -DN     Time Frame (Self-Feeding Goal 2, OT-IRF)  long term goal (LTG)  -DN  long term goal (LTG)  -DN     Progress/Outcomes (Self-Feeding Goal 2, OT-IRF)  goal met;goal ongoing  -DN  goal met;goal ongoing  -DN        Caregiver Training Goal 2 (OT-IRF)    Caregiver Training Goal 2 (OT-IRF)  pt caregiver to be independent with any assist pt needs with adls, transfers and HEP for d/c home  -DN  pt caregiver to be independent with any assist pt needs with adls, transfers and HEP for d/c home  -DN     Time Frame (Caregiver Training Goal 2, OT-IRF)  long term goal (LTG)  -DN  long term goal (LTG)  -DN     Progress/Outcomes (Caregiver Training Goal 2, OT-IRF)  goal ongoing  -DN  goal ongoing  -DN       User Key   (r) = Recorded By, (t) = Taken By, (c) = Cosigned By    Initials Name Provider Type    Reg Bolden OT Occupational Therapist          Occupational Therapy Education     Title: PT OT SLP Therapies (In Progress)     Topic: Occupational Therapy (Done)     Point: ADL training (Done)     Description: Instruct learner(s) on proper safety adaptation and remediation techniques during self care or transfers.   Instruct in proper use of assistive devices.    Learning Progress Summary           Patient Acceptance, E,TB,D, VU,NR by DN at 4/9/2019  2:25 PM    Comment:  jay adls, adaptive visual scanning, transfer training    Acceptance, E,TB,D, VU,NR by DN at 4/8/2019 12:16 PM    Comment:  transfer training, jay skills with adls, safety,etc    Acceptance, E,TB,D, VU,NR by DN at 4/4/2019  3:19 PM    Comment:  theraputty exercises, jay adls and transfer trainning    Acceptance, E,TB,D, NR by DN at 3/26/2019 12:06 PM    Comment:  jay adls and commode/shower transfers, still max vc for all due to cognition and visual impairments    Acceptance, E,TB,D, VU,NR by DN at 3/25/2019  5:23 PM    Comment:  OT role in rehab, transfer traning, jay adls                   Point: Home exercise program (Done)     Description: Instruct learner(s) on appropriate technique for monitoring, assisting and/or progressing therapeutic exercises/activities.    Learning Progress Summary           Patient Acceptance, E,TB,D, VU,NR by DN at 4/9/2019  2:25 PM    Comment:  jay adls, adaptive visual scanning, transfer training    Acceptance, E,TB,D, VU,NR by DN at 4/8/2019 12:16 PM    Comment:  transfer training, jay skills with adls, safety,etc    Acceptance, E,TB,D, VU,NR by DN at 4/4/2019  3:19 PM    Comment:  theraputty exercises, jay adls and transfer trainning    Acceptance, E,TB,D, NR by DN at 3/26/2019 12:06 PM    Comment:  jay adls and commode/shower transfers, still max vc for all due to cognition and visual impairments    Acceptance,  E,TB,D, VU,NR by DN at 3/25/2019  5:23 PM    Comment:  OT role in rehab, transfer traning, jay adls                   Point: Precautions (Done)     Description: Instruct learner(s) on prescribed precautions during self-care and functional transfers.    Learning Progress Summary           Patient Acceptance, E,TB,D, VU,NR by DN at 4/9/2019  2:25 PM    Comment:  jay adls, adaptive visual scanning, transfer training    Acceptance, E,TB,D, VU,NR by DN at 4/8/2019 12:16 PM    Comment:  transfer training, jay skills with adls, safety,etc    Acceptance, E,TB,D, VU,NR by DN at 4/4/2019  3:19 PM    Comment:  theraputty exercises, jay adls and transfer trainning    Acceptance, E,TB,D, NR by DN at 3/26/2019 12:06 PM    Comment:  jay adls and commode/shower transfers, still max vc for all due to cognition and visual impairments    Acceptance, E,TB,D, VU,NR by DN at 3/25/2019  5:23 PM    Comment:  OT role in rehab, transfer traning, jay adls                   Point: Body mechanics (Done)     Description: Instruct learner(s) on proper positioning and spine alignment during self-care, functional mobility activities and/or exercises.    Learning Progress Summary           Patient Acceptance, E,TB,D, VU,NR by DN at 4/9/2019  2:25 PM    Comment:  jay adls, adaptive visual scanning, transfer training    Acceptance, E,TB,D, VU,NR by DN at 4/8/2019 12:16 PM    Comment:  transfer training, jay skills with adls, safety,etc    Acceptance, E,TB,D, VU,NR by DN at 4/4/2019  3:19 PM    Comment:  theraputty exercises, jay adls and transfer trainning    Acceptance, E,TB,D, NR by DN at 3/26/2019 12:06 PM    Comment:  jay adls and commode/shower transfers, still max vc for all due to cognition and visual impairments    Acceptance, E,TB,D, VU,NR by DN at 3/25/2019  5:23 PM    Comment:  OT role in rehab, transfer traning, jay adls                               User Key     Initials Effective Dates Name Provider Type Discipline    DN  06/08/18 -  Reg Mckinney OT Occupational Therapist OT                       Time Calculation:     Time Calculation- OT     Row Name 04/11/19 1328 04/11/19 0830          Time Calculation- OT    OT Start Time  1230  -DN  0830  -DN     OT Stop Time  1300  -DN  0930  -DN     OT Time Calculation (min)  30 min  -DN  60 min  -DN     OT Received On  04/11/19  -DN  04/11/19  -DN       User Key  (r) = Recorded By, (t) = Taken By, (c) = Cosigned By    Initials Name Provider Type    Reg Bolden OT Occupational Therapist          Therapy Charges for Today     Code Description Service Date Service Provider Modifiers Qty    [id number] HC OT NEUROMUSC RE EDUCATION EA 15 MIN 4/11/2019 Reg Mckinney OT GO 1    [id number] HC OT THER PROC EA 15 MIN 4/11/2019 Reg Mckinney OT GO 2    56691502054 HC OT SELF CARE/MGMT/TRAIN EA 15 MIN 4/11/2019 Reg Mckinney OT GO 3                   Reg Mckinney OT  4/11/2019

## 2019-04-11 NOTE — THERAPY TREATMENT NOTE
Inpatient Rehabilitation - Speech Language Pathology Treatment Note    [facility]       Patient Name: Nayan Ryan  : 1965  MRN: [id number]    Today's Date: 2019           Admit Date: 3/24/2019      Visit Dx:      No diagnosis found.    Patient Active Problem List   Diagnosis   • Acute ischemic left PCA stroke (CMS/HCC)   • Status post placement of implantable loop recorder   • HTN (hypertension)   • Diabetes mellitus (CMS/HCC)   • Hyperlipidemia   • Morbid obesity (CMS/HCC)   • Anxiety disorder   • Migraine   • H/O hernia repair   • Abdominal wall abscess   • Back pain   • Stroke (cerebrum) (CMS/HCC)   • Vitamin D deficiency   • History of fatty infiltration of liver          Therapy Treatment    Evaluation/Coping    Evaluation/Treatment Time and Intent  Subjective Information: no complaints (19 1330 : Narcisa Bonner MS CCC-SLP)  Existing Precautions/Restrictions: fall (19 1330 : Narcisa Bonner MS CCC-SLP)  Document Type: therapy note (daily note) (19 1330 : Narcisa Bonner MS CCC-SLP)  Mode of Treatment: individual therapy, speech-language pathology (19 1330 : Narcisa Bonner MS CCC-SLP)  Patient/Family Observations: alert, cooperative (19 1330 : Narcisa Bonner MS CCC-SLP)  Start Time (Evaluation/Treatment): 1300 (19 1330 : Narcisa Bonner, MS CCC-SLP)  Stop Time (Evaluation/Treatment): 1330 (19 1330 : Narcisa Bonner MS CCC-SLP)    Vitals/Pain/Safety         Cognition/Communication         Oral Motor/Eating         Mobility/Basic Activities/Instrumental Activities/Motor/Modality                   ROM/MMT                   Sensory/Myotome/Dermatome/Edema               Posture/Balance/Special Tests/Exercise/Transportation/Sexual Function                   Orthotics/Residual Limb/Prosthetic Management              Outcome Summary         EDUCATION    The patient has been educated in the following areas:     Cognitive Impairment.    SLP Recommendation and Plan                                                           SLP GOALS     Row Name 19 1400 19 0800 04/10/19 1100       Word Retrieval Skills Goal 1 (SLP)    Progress (Word Retrieval Skills Goal 1, SLP)  --  80%;independently (over 90% accuracy);with minimal cues (75-90%) synonyms  -SL  --    Progress/Outcomes (Word Retrieval Goal 1, SLP)  --  goal ongoing  -SL  --       Awareness of Basic Personal Information Goal 1 (SLP)    Progress (Awareness of Basic Personal Information Goal 1, SLP)  --  --  80%;with minimal cues (75-90%)  -SA    Progress/Outcomes (Awareness of Basic Personal Information Goal 1, SLP)  --  --  goal ongoing  -SA    Comment (Awareness of Basic Personal Information Goal 1, SLP)  --  --  80%; answering questions related to personal info: name, address, city, state, wifes name, childrens names, dogs names, , situation; place; phone number home and cell and wife's phone number.   -SA       Attention Goal 1 (SLP)    Progress (Attention Goal 1, SLP)  --  --  80%  -SA    Progress/Outcomes (Attention Goal 1, SLP)  --  --  goal ongoing  -SA    Comment (Attention Goal 1, SLP)  --  --  82%; CT: Playing card slapjack; level 2; 82%   -SA       Memory Skills Goal 1 (SLP)    Progress (Memory Skills Goal 1, SLP)  --  60%;with minimal cues (75-90%) mental manip task- 3 items - alphabetical order  -SL  --    Progress/Outcomes (Memory Skills Goal 1, SLP)  --  goal ongoing  -SL  --       Organizational Skills Goal 1 (SLP)    Progress (Thought Organization Skills Goal 1, SLP)  --  --  90%;independently (over 90% accuracy)  -SA    Progress/Outcomes (Thought Organization Skills Goal 1, SLP)  --  --  good progress toward goal  -SA    Comment (Thought Organization Skills Goal 1, SLP)  --  --  90%; organizing functional information with specifics: right/left columns  -SA       Reasoning Goal 1 (SLP)    Progress (Reasoning Goal 1, SLP)  70%;80%;with minimal cues (75-90%) simple closet organization task  -SL  --  60%  -SA    Progress/Outcomes  (Reasoning Goal 1, SLP)  goal ongoing  -SL  --  goal ongoing  -SA    Comment (Reasoning Goal 1, SLP)  needed min cues for assist w/analysis of some sentence directives for chart organization task  -SL  --  67% overall; divergent cat: named 11/12 drink items and 5/12 holidays for timed task of 1 minute  -SA       Functional Problem Solving Skills Goal 1 (SLP)    Progress (Problem Solving Goal 1, SLP)  80%;independently (over 90% accuracy);with minimal cues (75-90%) multistep directives- hospital map use  -SL  70%;with minimal cues (75-90%) following 2 step, 4 component written directives  -SL  --    Progress/Outcomes (Problem Solving Goal 1, SLP)  goal ongoing  -  goal ongoing  -  --    Comment (Problem Solving Goal 1, SLP)  --  6 step sequencing of adl tasks- 66%  -SL  --       Functional Math Skills Goal 1 (SLP)    Progress (Functional Math Skills Goal 1, SLP)  60%;with minimal cues (75-90%) money management- adding denominations  -SL  --  --    Progress/Outcomes (Functional Math Skills Goal 1, SLP)  goal ongoing  -SL  --  --    Comment (Functional Math Skills Goal 1, SLP)  needed cues for attn; written amounts elicited more accurate responses than pt trying to compute fiogures mentally without any visual cues  -SL  --  --       Executive Functional Skills Goal 1 (SLP)    Progress (Executive Function Skills Goal 1, SLP)  --  --  60%  -SA    Progress/Outcomes (Executive Function Skills Goal 1, SLP)  --  --  goal ongoing  -SA    Comment (Executive Function Skills Goal 1, SLP)  --  --  CT: clock math; level 2  -    Row Name 04/09/19 1030 04/09/19 0900          Oral Nutrition/Hydration Goal 1 (SLP)    Time Frame (Oral Nutrition/Hydration Goal 1, SLP)  by discharge  -  --     Barriers (Oral Nutrition/Hydration Goal 1, SLP)  pt with throat clear x 2 during therapy with sips of thin; suspect d/t reduced oral contorl and mistiming as pt attmepting to talk once took sip. Reviewed with pt need to take small  sips/viewed diagram of swallow function. Pt stated he did not know about this despite SLP reviewing with pt once he first came to rehab. Provided handout so pt could review with family. Wrote swallow precautions on handout; small sips/bite; no talking with food/liuqid in mouth. Appears dilma diet; afebrile, BS CTA per MD note and po intake good. Pt verbalized understanding.   -SA  --        Articulation Goal 1 (SLP)    Progress/Outcomes (Articulation Goal 1, SLP)  --  goal met;goal no longer appropriate  -SA     Comment (Articulation Goal 1, SLP)  --  No articulation difficulties; intelligibility is good with familiar and unfamiliar communication partners  -SA        Awareness of Basic Personal Information Goal 1 (SLP)    Improve Awareness of Basic Personal Information Goal 1 (SLP)  --  answering open-ended questions regarding personal/biographical information;90%;independently (over 90% accuracy)  -SA     Progress (Awareness of Basic Personal Information Goal 1, SLP)  --  80%;with minimal cues (75-90%)  -SA     Progress/Outcomes (Awareness of Basic Personal Information Goal 1, SLP)  --  goal ongoing  -SA     Comment (Awareness of Basic Personal Information Goal 1, SLP)  --  80%; answering questions related to personal info: name, address, city, state, wifes name, childrens names, dogs names, , situation; place; phone number home and cell and wife's phone number.   -SA        Attention Goal 1 (SLP)    Progress (Attention Goal 1, SLP)  independently (over 90% accuracy)  -SA  --     Progress/Outcomes (Attention Goal 1, SLP)  continuing progress toward goal  -SA  --     Comment (Attention Goal 1, SLP)  blink game; matching colors timed: 2:18; increased by 2 seconds this session  -SA  --        Organizational Skills Goal 1 (SLP)    Progress (Thought Organization Skills Goal 1, SLP)  with minimal cues (75-90%);80%  -SA  --     Progress/Outcomes (Thought Organization Skills Goal 1, SLP)  continuing progress toward goal   -SA  goal ongoing  -SA     Comment (Thought Organization Skills Goal 1, SLP)  78%; organizing functional information with specifics: right/left columns; which item does not belong in category given chioce of 5 words: 80%  -SA  organizing functional information with specifics: right/left columns; did not finish 2/2 time constraints; will finish next session.  -SA        Right Hemisphere Function Goal 1 (SLP)    Progress (Right Hemisphere Function Goal 1, SLP)  --  independently (over 90% accuracy)  -SA     Progress/Outcomes (Right Hemisphere Function Goal 1, SLP)  --  continuing progress toward goal  -SA     Comment (Right Hemisphere Function Goal 1, SLP)  --  96%; visual scanning for target letters (2 target letters for each section)  -SA       User Key  (r) = Recorded By, (t) = Taken By, (c) = Cosigned By    Initials Name Provider Type    Narcisa Urias MS CCC-SLP Speech and Language Pathologist    Narcisa Gaston MS CCC-SLP Speech and Language Pathologist                  Time Calculation:       Time Calculation- SLP     Row Name 04/11/19 1449 04/11/19 0832          Time Calculation- [facility]    SLP Start Time  1300  -  0800  -     SLP Stop Time  1330  -SL  0830  -     SLP Time Calculation (min)  30 min  -  30 min  -       User Key  (r) = Recorded By, (t) = Taken By, (c) = Cosigned By    Initials Name Provider Type    Narcisa Urias MS CCC-SLP Speech and Language Pathologist            Therapy Charges for Today     Code Description Service Date Service Provider Modifiers Qty    79294871566 HC ST DEV OF COGN SKILLS EACH 15 MIN 4/11/2019 Narcisa Bonner MS CCC-SLP  2    [id number] HC ST DEV OF COGN SKILLS EACH 15 MIN 4/11/2019 Narcisa Bonner MS CCC-SLP  Rod Bonner MS CCC-JOSIAS  4/11/2019
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
## 2019-04-11 NOTE — PLAN OF CARE
Problem: Stroke (IRF) (Adult)  Goal: Promote Optimal Functional Morton  Outcome: Ongoing (interventions implemented as appropriate)   04/11/19 0349   Stroke (IRF) (Adult)   Promote Optimal Functional Morton demonstrating adequate progress       Problem: Fall Risk (Adult)  Goal: Absence of Fall  Outcome: Ongoing (interventions implemented as appropriate)   04/11/19 0349   Fall Risk (Adult)   Absence of Fall making progress toward outcome       Problem: Diabetes, Type 2 (Adult)  Goal: Signs and Symptoms of Listed Potential Problems Will be Absent, Minimized or Managed (Diabetes, Type 2)  Outcome: Ongoing (interventions implemented as appropriate)   04/11/19 0349   Goal/Outcome Evaluation   Problems Assessed (Type 2 Diabetes) all   Problems Present (Type 2 Diabetes) none       Problem: Skin Injury Risk (Adult)  Goal: Skin Health and Integrity  Outcome: Ongoing (interventions implemented as appropriate)   04/11/19 0349   Skin Injury Risk (Adult)   Skin Health and Integrity making progress toward outcome

## 2019-04-11 NOTE — PROGRESS NOTES
Inpatient Rehabilitation Functional Measures Assessment    Functional Measures  VANITA Eating:  Branch  VANITA Grooming: Branch  VANITA Bathing:  Branch  VANITA Upper Body Dressing:  Branch  VANITA Lower Body Dressing:  Branch  [facility] Toileting:  Branch    VANITA Bladder Management  Level of Assistance:  [place]  Frequency/Number of Accidents this Shift:  [place]    VANITA Bowel Management  Level of Assistance: [place]  Frequency/Number of Accidents this Shift: Branch    [facility] Bed/Chair/Wheelchair Transfer:  Activity was not observed.  VANITA Toilet Transfer:  Branch  [facility] Tub/Shower Transfer:  [place]    Previously Documented Mode of Locomotion at Discharge: Field  VANITA Expected Mode of Locomotion at Discharge: [facility] Walk/Wheelchair:  WHEELCHAIR OBSERVATION   Wheelchair locomotion was observed using a manual wheelchair. Wheelchair  Distance Scale = 3.  Distance traveled in wheelchair is greater than 150 feet.  Wheelchair Score = 5.  Patient is supervision or set up only for propelling  wheelchair, requiring: Stand by assistance. Patient was able to propel a  distance of 150 feet in a wheelchair. No other assistive devices were required.    WALK OBSERVATION   Walk Distance Scale = 2.  Distance walked is 50 -149 feet. Walk Score = 1.  Patient performs 75% or more of effort and requires minimal assistance of two or  more people. 2 people used for safety . Patient walked a distance of 80 feet.  Patient requires the following assistive device(s): Small based quad cane.  VANITA Stairs:  Stairs Score = 1.  Patient performs 75% or more of effort and  requires minimal assistance of two or more people for negotiating stairs. 2  people used for safety . Patient negotiated  6 stairs. Patient requires the  following assistive device(s): Handrail(s).    VANITA Comprehension:  Branch  [facility] Expression:  Branch  [facility] Social Interaction:  [facility] Problem Solving:  [facility] Memory:  [place]    Therapy Mode Minutes  Occupational Therapy: Branch  Physical Therapy:  Individual: 60 minutes.  Speech Language Pathology:  Branch    Signed by: Naomy Mendenhall PT Student     - CoSigned By: Eliane Blackburn PT 4/11/2019 3:07:26 PM

## 2019-04-11 NOTE — THERAPY TREATMENT NOTE
Inpatient Rehabilitation - Physical Therapy Treatment Note  [facility]     Patient Name: Nayan Ryan  : 1965  MRN: [id number]    Today's Date: 2019                 Admit Date: 3/24/2019      Visit Dx:    No diagnosis found.    Patient Active Problem List   Diagnosis   • Acute ischemic left PCA stroke (CMS/HCC)   • Status post placement of implantable loop recorder   • HTN (hypertension)   • Diabetes mellitus (CMS/HCC)   • Hyperlipidemia   • Morbid obesity (CMS/HCC)   • Anxiety disorder   • Migraine   • H/O hernia repair   • Abdominal wall abscess   • Back pain   • Stroke (cerebrum) (CMS/HCC)   • Vitamin D deficiency   • History of fatty infiltration of liver       Therapy Treatment    IRF Treatment Summary     Row Name 19 1330 19 1300 19 0953       Evaluation/Treatment Time and Intent    Subjective Information  no complaints  -SL  no complaints  -DN  no complaints  -LB,LS,LB2    Existing Precautions/Restrictions  fall  -SL  fall  -DN  fall  -LB,LS,LB2    Document Type  therapy note (daily note)  -SL  therapy note (daily note)  -DN  therapy note (daily note)  -LB,LS,LB2    Mode of Treatment  individual therapy;speech-language pathology  -SL  occupational therapy  -DN  physical therapy  -LB,LS,LB2    Patient/Family Observations  alert, cooperative  -SL  --  Pt arrives in w/c with OT. States he is feeling much better today and got a good night of sleep last night  -LB,LS,LB2    Start Time (Evaluation/Treatment)  1300  -SL  --  --    Stop Time (Evaluation/Treatment)  1330  -SL  --  --    Recorded by [SL] Narcisa Bonner, MS CCC-SLP [DN] Reg Mckinney, OT [LB,LS,LB2] Eliane Blackburn, PT (r) Naomy Mendenhall PT Student (t) Eliane Blackburn, PT (c)    Row Name 19 0800             Evaluation/Treatment Time and Intent    Subjective Information  no complaints  -SL      Existing Precautions/Restrictions  fall  -SL      Document Type  therapy note (daily note)  -      Mode of Treatment  individual  therapy;speech-language pathology  -SL      Patient/Family Observations  cooperative  -      Start Time (Evaluation/Treatment)  0800  -      Stop Time (Evaluation/Treatment)  0830  -SL      Recorded by [SL] Narcisa Bonner MS CCC-SLP      Row Name 04/11/19 1300             Safety Awareness/Health Promotion    Additional Documentation  Fall Prevention (Row)  -DN      Recorded by [DN] Reg Mckinney OT      Row Name 04/11/19 1300 04/11/19 0953          Cognition/Psychosocial- PT/OT    Affect/Mental Status (Cognitive)  --  flat/blunted affect more engagiung in recent sessions  -LB,LS,LB2     Behavioral Issues (Cognitive)  --  withdrawn less frequently   -LB,LS,LB2     Orientation Status (Cognition)  oriented x 3  -DN  --     Follows Commands (Cognition)  follows one step commands  -DN  follows one step commands;75-90% accuracy;repetition of directions required;verbal cues/prompting required  -LB,LS,LB2     Personal Safety Interventions  fall prevention program maintained;gait belt  -DN  fall prevention program maintained;gait belt;muscle strengthening facilitated;nonskid shoes/slippers when out of bed  -LB,LS,LB2     Cognitive Function (Cognitive)  attention deficit  -DN  --     Attention Deficit (Cognitive)  mild deficit  -DN  mild deficit  -LB,LS,LB2     Executive Function Deficit (Cognition)  severe deficit  -DN  --     Memory Deficit (Cognitive)  moderate deficit  -DN  moderate deficit  -LB,LS,LB2     Safety Deficit (Cognitive)  impulsivity;insight into deficits/self awareness;judgment;problem solving;safety precautions awareness;at risk behavior observed  -DN  impulsivity;ability to follow commands;insight into deficits/self awareness;judgment;problem solving  -LB,LS,LB2     Recorded by [DN] Reg Mckinney OT [LB,LS,LB2] Eliane Blackburn, PT (r) Naomy Mendenhall PT Student (t) Eliane Blackburn, PT (c)     Row Name 04/11/19 0953             Sit-Stand Transfer    Sit-Stand [place] (Transfers)  contact guard   -LB,LS,LB2      Assistive Device (Sit-Stand Transfers)  wheelchair  -LB,LS,LB2      Recorded by [LB,LS,LB2] Eliane Blackburn PT (r) Naomy Mendenhall, PT Student (t) Eliane Blackburn, PT (c)      Row Name 04/11/19 0953             Stand-Sit Transfer    Stand-Sit [place] (Transfers)  contact guard;minimum assist (75% patient effort)  -LB,LS,LB2      Assistive Device (Stand-Sit Transfers)  wheelchair  -LB,LS,LB2      Recorded by [LB,LS,LB2] Eliane Blackburn PT (r) Naomy Mendenhall, PT Student (t) Eliane Blackburn PT (c)      Row Name 04/11/19 1300             Shower Transfer    Type (Shower Transfer)  stand pivot/stand step  -DN      [place] Level (Shower Transfer)  verbal cues;nonverbal cues (demo/gesture);1 person assist;minimum assist (75% patient effort)  -DN      Assistive Device (Shower Transfer)  wheelchair;tub bench;grab bars/tub rail  -DN      Recorded by [DN] Reg Mckinney OT      Row Name 04/11/19 0953             Gait/Stairs Assessment/Training    [place] Level (Gait)  minimum assist (75% patient effort);2 person assist  -LB,LS,LB2      Assistive Device (Gait)  cane, quad SBQC  -LB,LS,LB2      Distance in Feet (Gait)  80 x 3  -LB,LS,LB2      Pattern (Gait)  step-through  -LB,LS,LB2      Deviations/Abnormal Patterns (Gait)  base of support, narrow;nancy decreased;gait speed decreased  -LB,LS,LB2      Bilateral Gait Deviations  forward flexed posture;heel strike decreased  -LB,LS,LB2      Left Sided Gait Deviations  weight shift ability decreased  -LB,LS,LB2      Right Sided Gait Deviations  foot drop/toe drag;knee buckling, right side;knee hyperextension;weight shift ability decreased  -LB,LS,LB2      Negotiation (Stairs)  stairs independence  -LB,LS,LB2      [place] Level (Stairs)  minimum assist (75% patient effort);2 person assist  -LB,LS,LB2      Handrail Location (Stairs)  both sides  -LB,LS,LB2      Number of Steps (Stairs)  6  -LB3      Ascending Technique (Stairs)  step-to-step  -LB,LS,LB2       Descending Technique (Stairs)  step-to-step  -LB,LS,LB2      Stairs, Safety Issues  weight-shifting ability decreased;balance decreased during turns;sequencing ability decreased  -LB,LS,LB2      Stairs, Impairments  strength decreased;impaired balance;motor control impaired  -LB,LS,LB2      Comment (Gait/Stairs)  Pt ambulating with posterior leaf AFO. Continues to demonstrate intermittent R knee buckling/ hyperextension, often when taking too big of step with R LE. Decreased need for cues with cane sequencing and improved cane placement this date. Pt with difficulty turning to sit in chair requiring cues and assist to remain safe. Began stair navigation on 4 inch steps--pt requiring consistent VCs for sequencing and step-to-step pattern. Assist for R foot placement and gaurding of R knee throughout.   -LB,LS,LB2      Recorded by [LB,LS,LB2] Eliane Blackburn, PT (r) Naomy Mendenhall, PT Student (t) Eliane Blackburn, PT (c)  [LB3] Elinae Blackburn, PT      Row Name 04/11/19 0953             Wheelchair Mobility/Management    Method of Wheelchair Locomotion (Mobility)  bipedal (lower extremity) propulsion  -LB,LS,LB2      Mobility Activities (Wheelchair)  forward propulsion;turning  -LB,LS,LB2      Forward Propulsion [place] (Wheelchair)  supervision;independent  -LB,LS,LB2      Steering [place] (Wheelchair)  verbal cues;supervision  -LB,LS,LB2      Turning [place] (Wheelchair)  verbal cues;supervision  -LB,LS,LB2      Distance Propelled in Feet (Wheelchair)  150  -LB,LS,LB2      Comment, Mobility Activities (Wheelchair)  Propelled with bilateral LEs for R LE strengthening/coordination exercise. Increased difficulty with turns when using R LE. Verbal cues improved technique  -LB,LS,LB2      Recorded by [LB,LS,LB2] Eliane Blackburn, PT (r) Naomy Mendenhall, PT Student (t) Eliane Blackburn, PT (c)      Row Name 04/11/19 0968             Safety Issues, Functional Mobility    Safety Issues Affecting Function (Mobility)   ability to follow commands;impulsivity;insight into deficits/self awareness;judgment;positioning of assistive device;problem solving  -LB,LS,LB2      Impairments Affecting Function (Mobility)  balance;cognition;coordination;strength;visual/perceptual  -LB,LS,LB2      Comment, Safety Issues/Impairments (Mobility)  Verbal cues needed to slow down and wait for instructions prior to getting up, etc  -LB,LS,LB2      Recorded by [LB,LS,LB2] Eliane Blackburn, PT (r) Naomy Mendenhall PT Student (t) Eliane Blackburn, PT (c)      Row Name 04/11/19 1300             Bathing Assessment/Treatment    Bathing [place] Level  bathing skills;contact guard assist;nonverbal cues (demo/gesture);verbal cues  -DN      Assistive Device (Bathing)  tub bench;hand held shower spray hose;grab bar/tub rail  -DN      Bathing Position  supported standing;supported sitting  -DN      Bathing Setup Assistance  adjust water temperature  -DN      Comment (Bathing)  pt requires vc 50% of time for sequencing, foot placement with standing and completeness  -DN      Recorded by [DN] Reg Mckinney OT      Row Name 04/11/19 1300             Upper Body Dressing Assessment/Treatment    Upper Body Dressing Task  upper body dressing skills;don;doff;pull over garment;supervision;verbal cues  -DN      Upper Body Dressing Position  supported sitting  -DN      Set-up Assistance (Upper Body Dressing)  obtain clothing  -DN      Recorded by [DN] Reg Mckinney OT      Row Name 04/11/19 1300             Lower Body Dressing Assessment/Treatment    Lower Body Dressing [place] Level  doff;don;pants/bottoms;shoes/slippers;socks;underwear;minimum assist (75% patient effort);verbal cues;nonverbal cues (demo/gesture);set up  -DN      Lower Body Dressing Position  supported sitting;supported standing  -DN      Lower Body Dressing Setup Assistance  obtain clothing  -DN      Comment (Lower Body Dressing)  pt required vc for foot placement with standing, and safety  -DN       Recorded by [DN] Reg Mckineny, OT      Row Name 04/11/19 1300             Grooming Assessment/Treatment    Grooming [place] Level  grooming skills;deodorant application;hair care, combing/brushing;shave face;supervision;verbal cues;nonverbal cues (demo/gesture);oral care regimen  -DN      Grooming Position  supported sitting;sink side  -DN      Comment (Grooming)  vcs sequencing, and to remind pt he already applied deoderant x3  -DN      Recorded by [DN] Reg Mckinney, OT      Row Name 04/11/19 1300             Toileting Assessment/Treatment    Comment (Toileting)  pt did not need to use commode during tx session  -DN      Recorded by [DN] Reg Mckinney, OT      Row Name 04/11/19 0953             Pain Assessment    Additional Documentation  Pain Scale: Numbers Pre/Post-Treatment (Group)  -LB,LS,LB2      Recorded by [LB,LS,LB2] Eliane Blackburn, PT (r) Naomy Mendenhall, PT Student (t) Eliane Blackburn, PT (c)      Row Name 04/11/19 1300 04/11/19 0953          Pain Scale: Numbers Pre/Post-Treatment    Pain Scale: Numbers, Pretreatment  0/10 - no pain  -DN  0/10 - no pain  -LB,LS,LB2     Pain Scale: Numbers, Post-Treatment  0/10 - no pain  -DN  0/10 - no pain  -LB,LS,LB2     Recorded by [DN] Reg Mckinney, OT [LB,LS,LB2] Eliane Blackburn, PT (r) Naomy Mendenhall, PT Student (t) Eliane Blackburn, PT (c)     Row Name 04/11/19 0953             Dynamic Balance Activity    Therapeutic Training Performed (Dynamic Balance)  -- side stepping  -LB,LS,LB2      Support Needed for Balance (Dynamic Balance Training)  uses both upper extremities for support;CGA;minimal external support for balance, 75% patient effort;2 person assist  -LB,LS,LB2      Comment (Dynamic Balance Training)  4 x 10 ft with min A for R foot placement and guarding R knee for intermittent buckling/hyperextension. cues for upright posture.   -LB,LS,LB2      Recorded by [LB,LS,LB2] Eliane Blackburn, PT (r) Naomy Mendenhall, PT Student (t) Eliane Blackburn, PT (c)      Row  Name 04/11/19 0953             Lower Extremity Orthosis Management    Type (Lower Extremity Orthosis)  posterior strut carbon fiber AFO  -LB,LS,LB2      Therapeutic Indications (Lower Extremity Orthosis)  compensation for lost function  -LB,LS,LB2      Orthosis Training (Lower Extremity Orthosis)  patient;purpose/goals of orthosis  -LB,LS,LB2      Skin Assessment (Lower Extremity Orthosis)  no skin issues noted this date  -LB,LS,LB2      Recorded by [LB,LS,LB2] Eliane Blackburn PT (r) Naomy Mendenhall PT Student (t) Eliane Blackburn PT (c)      Row Name 04/11/19 1300             Upper Extremity Seated Therapeutic Exercise    Performed, Seated Upper Extremity (Therapeutic Exercise)  shoulder flexion/extension;digit flexion/extension;wrist flexion/extension;forearm supination/pronation;elbow flexion/extension  -DN      Device, Seated Upper Extremity (Therapeutic Exercise)  free weights, barbell  -DN      Exercise Type, Seated Upper Extremity (Therapeutic Exercise)  AROM (active range of motion);resistive exercise  -DN      Expected Outcomes, Seated Upper Extremity (Therapeutic Exercise)  improve functional tolerance, self-care activity;improve performance, transfer skills  -DN      Sets/Reps Detail, Seated Upper Extremity (Therapeutic Exercise)  3/15s with 2# dumbell and dowel for RUE  -DN      Transfers Skills, Training to Functional Activity, Seated Upper Extremity (Therapeutic Exercise)  unable to transfer skills at this time  -DN      Comment, Seated Upper Extremity (Therapeutic Exercise)  pt also completed FMC irregular small peg placement exercises with min vc to use affected RUE   -DN      Recorded by [DN] Reg Mckinney OT      Row Name 04/11/19 0953             Lower Extremity Standing Therapeutic Exercise    Performed, Standing Lower Extremity (Therapeutic Exercise)  mini-squats  -LB,LS,LB2      Exercise Type, Standing Lower Extremity (Therapeutic Exercise)  AROM (active range of motion)  -LB,LS,LB2       Sets/Reps Detail, Standing Lower Extremity (Therapeutic Exercise)  1 x 12  -LB,LS,LB2      Comment, Standing Lower Extremity (Therapeutic Exercise)  B UE support on jay bar. Min A at R knee for control  -LB,LS,LB2      Recorded by [LB,LS,LB2] Eliane Blackburn, PT (r) Naomy Mendenhall, PT Student (t) Eliane Blackburn, PT (c)      Row Name 04/11/19 0953             Lower Extremity Seated Therapeutic Exercise    Performed, Seated Lower Extremity (Therapeutic Exercise)  hip abduction/adduction;knee flexion/extension;ankle dorsiflexion/plantarflexion;LAQ (long arc quad), knee extension;hip flexion/extension  -LB,LS,LB2      Device, Seated Lower Extremity (Therapeutic Exercise)  elastic bands/tubing  -LB,LS,LB2      Exercise Type, Seated Lower Extremity (Therapeutic Exercise)  AROM (active range of motion);resistive exercise  -LB,LS,LB2      Sets/Reps Detail, Seated Lower Extremity (Therapeutic Exercise)  1 x 12  -LB,LS,LB2      Comment, Seated Lower Extremity (Therapeutic Exercise)  red theraband used  -LB,LS,LB2      Recorded by [LB,LS,LB2] Eliane Blackburn, PT (r) Naomy Mendenhall, PT Student (t) Eliane Blackburn, PT (c)      Row Name 04/11/19 1300 04/11/19 0953          Positioning and Restraints    Pre-Treatment Position  sitting in chair/recliner  -DN  sitting in chair/recliner  -LB,LS,LB2     Post Treatment Position  wheelchair  -DN  wheelchair  -LB,LS,LB2     In Bed  with PT  -DN  --     In Wheelchair  --  sitting;exit alarm on;patient within staff view  -LB,LS,LB2     Recorded by [DN] Reg Mckinney, OT [LB,LS,LB2] Eliane Blackburn, PT (r) Naomy Mendenhall, PT Student (t) Eliane Blackburn, PT (c)       User Key  (r) = Recorded By, (t) = Taken By, (c) = Cosigned By    Initials Name Effective Dates    Narcisa Urias, MS CCC-SLP 06/08/18 -     Reg Bolden OT 06/08/18 -     Eliane Pereira, PT 04/03/18 -     Naomy Armas, PT Student 02/04/19 -         Wound 03/26/19 0900 Left chest incision (Active)   Dressing Appearance open  to air 4/11/2019  8:00 AM   Closure Liquid skin adhesive;Approximated 4/10/2019  7:58 PM   Periwound dry;intact 4/11/2019  8:00 AM   Drainage Amount none 4/10/2019  7:58 PM     Physical Therapy Education     Title: PT OT SLP Therapies (In Progress)     Topic: Physical Therapy (In Progress)     Point: Mobility training (Done)     Learning Progress Summary           Patient Acceptance, E,TB, VU,NR by LS at 4/11/2019  9:53 AM    Comment:  Continued discussion/answering pt questions about why R knee is weak. Educated on proper mechanics for stair navigation    Acceptance, E,TB, VU,NR by LS at 4/10/2019  9:43 AM    Comment:  Continued discussion regarding safety and slowing down during mobility. Educated on proper use of cane again this date    Acceptance, E,TB, VU,NR by LS at 4/9/2019  8:33 AM    Comment:  Continued discussion regarding how stroke has affected his R side. Educated on use of cane    Acceptance, E,TB, VU,NR by  at 4/8/2019  8:54 AM    Comment:  Educated on why R leg is weak and how the stroke is affecting his knee control    Acceptance, E,TB, VU,NR by LS at 4/5/2019 11:30 AM    Comment:  Discussed purpose of strengthening exercises. Educated on repeated practice of walking and other exercises to gradually build strength and endurance.    Acceptance, E, NR by  at 4/4/2019  9:52 AM    Acceptance, E, NR by  at 4/3/2019 10:16 AM    Acceptance, E, NR by  at 4/2/2019  3:18 PM    Acceptance, E, NR by  at 4/1/2019 10:32 AM    Acceptance, E,TB,D, NR by  at 3/30/2019 11:44 AM    Acceptance, E,TB,D, NR by  at 3/29/2019  2:58 PM    Acceptance, E,TB,D, NR by EE at 3/28/2019 11:38 AM    Acceptance, E,TB,D, NR by EE at 3/27/2019 10:05 AM    Acceptance, E,TB,D, NR by EE at 3/26/2019  9:48 AM    Acceptance, E,TB,D, NR by EE at 3/25/2019  3:09 PM                   Point: Home exercise program (In Progress)     Learning Progress Summary           Patient Acceptance, E, NR by  at 4/4/2019  9:52 AM     Acceptance, E, NR by  at 4/3/2019 10:16 AM    Acceptance, E, NR by  at 4/2/2019  3:18 PM    Acceptance, E, NR by  at 4/1/2019 10:32 AM    Acceptance, E,TB,D, NR by  at 3/29/2019  2:58 PM    Acceptance, E,TB,D, NR by  at 3/28/2019 11:38 AM    Acceptance, E,TB,D, NR by EE at 3/27/2019 10:05 AM    Acceptance, E,TB,D, NR by EE at 3/26/2019  9:48 AM    Acceptance, E,TB,D, NR by EE at 3/25/2019  3:09 PM                   Point: Body mechanics (In Progress)     Learning Progress Summary           Patient Acceptance, E, NR by  at 4/4/2019  9:52 AM    Acceptance, E, NR by  at 4/3/2019 10:16 AM    Acceptance, E, NR by  at 4/2/2019  3:18 PM    Acceptance, E, NR by  at 4/1/2019 10:32 AM    Acceptance, E,TB,D, NR by  at 3/29/2019  2:58 PM    Acceptance, E,TB,D, NR by  at 3/28/2019 11:38 AM    Acceptance, E,TB,D, NR by  at 3/27/2019 10:05 AM    Acceptance, E,TB,D, NR by  at 3/26/2019  9:48 AM    Acceptance, E,TB,D, NR by  at 3/25/2019  3:09 PM                   Point: Precautions (Done)     Learning Progress Summary           Patient Acceptance, E,TB, VU,NR by  at 4/11/2019  9:53 AM    Comment:  Continued discussion/answering pt questions about why R knee is weak. Educated on proper mechanics for stair navigation    Acceptance, E,TB, VU,NR by  at 4/10/2019  9:43 AM    Comment:  Continued discussion regarding safety and slowing down during mobility. Educated on proper use of cane again this date    Acceptance, E,TB, VU,NR by  at 4/9/2019  8:33 AM    Comment:  Continued discussion regarding how stroke has affected his R side. Educated on use of cane    Acceptance, E,TB, VU,NR by  at 4/8/2019  8:54 AM    Comment:  Educated on why R leg is weak and how the stroke is affecting his knee control    Acceptance, E,TB, VU,NR by KENZIE at 4/5/2019 11:30 AM    Comment:  Discussed purpose of strengthening exercises. Educated on repeated practice of walking and other exercises to gradually build strength and  endurance.    Acceptance, E, NR by  at 4/4/2019  9:52 AM    Acceptance, E, NR by  at 4/3/2019 10:16 AM    Acceptance, E, NR by  at 4/2/2019  3:18 PM    Acceptance, E, NR by  at 4/1/2019 10:32 AM    Acceptance, E,TB,D, NR by  at 3/29/2019  2:58 PM    Acceptance, E,TB,D, NR by  at 3/28/2019 11:38 AM    Acceptance, E,TB,D, NR by  at 3/27/2019 10:05 AM    Acceptance, E,TB,D, NR by  at 3/26/2019  9:48 AM    Acceptance, E,TB,D, NR by  at 3/25/2019  3:09 PM                               User Key     Initials Effective Dates Name Provider Type Discipline     06/08/18 -  Donna Inman, PT Physical Therapist PT     04/03/18 -  Clau Ayon, PT Physical Therapist PT     04/03/18 -  Ariadne Garcia, PT Physical Therapist PT     02/04/19 -  Naomy Mendenhall, PT Student PT Student                   PT Recommendation and Plan                        Time Calculation:     PT Charges     Row Name 04/11/19 1411 04/11/19 0952          Time Calculation    Start Time  1400  -LB (r) LS (t) LB (c)  0930  -LB (r) LS (t) LB (c)     Stop Time  1430  -LB (r) LS (t) LB (c)  1000  -LB (r) LS (t) LB (c)     Time Calculation (min)  30 min  -LB (r) LS (t)  30 min  -LB (r) LS (t)     PT Received On  04/11/19  -LB (r) LS (t) LB (c)  04/11/19  -LB (r) LS (t) LB (c)     PT - Next Appointment  04/12/19  -LB (r) LS (t) LB (c)  --       User Key  (r) = Recorded By, (t) = Taken By, (c) = Cosigned By    Initials Name Provider Type    LB Eliane Blackburn, PT Physical Therapist    LS Naomy Mendenhall, PT Student PT Student          Therapy Charges for Today     Code Description Service Date Service Provider Modifiers Qty    88942373572 HC PT NEUROMUSC RE EDUCATION EA 15 MIN 4/10/2019 Naomy Mendenhall, PT Student GP 4    26966846814 HC PT THER SUPP EA 15 MIN 4/10/2019 Naomy Mendenhall, PT Student GP 2    64853931992 HC PT NEUROMUSC RE EDUCATION EA 15 MIN 4/11/2019 aNomy Mendenhall, PT Student GP 4    61181892938 HC PT THER SUPP EA 15 MIN  4/11/2019 Naomy Mendenhall, PT Student GP 1                   Naomy Mendenhall, PT Student  4/11/2019

## 2019-04-12 LAB
GLUCOSE BLDC GLUCOMTR-MCNC: 94 MG/DL (ref 70–130)
GLUCOSE BLDC GLUCOMTR-MCNC: 95 MG/DL (ref 70–130)
GLUCOSE BLDC GLUCOMTR-MCNC: 96 MG/DL (ref 70–130)
GLUCOSE BLDC GLUCOMTR-MCNC: 96 MG/DL (ref 70–130)

## 2019-04-12 PROCEDURE — 82962 GLUCOSE BLOOD TEST: CPT

## 2019-04-12 PROCEDURE — 97112 NEUROMUSCULAR REEDUCATION: CPT

## 2019-04-12 PROCEDURE — 97535 SELF CARE MNGMENT TRAINING: CPT

## 2019-04-12 PROCEDURE — G0515 COGNITIVE SKILLS DEVELOPMENT: HCPCS

## 2019-04-12 PROCEDURE — 63710000001 INSULIN GLARGINE PER 5 UNITS: Performed by: INTERNAL MEDICINE

## 2019-04-12 RX ADMIN — ATORVASTATIN CALCIUM 80 MG: 80 TABLET, FILM COATED ORAL at 21:32

## 2019-04-12 RX ADMIN — Medication 1 TABLET: at 07:36

## 2019-04-12 RX ADMIN — ATENOLOL 25 MG: 25 TABLET ORAL at 21:32

## 2019-04-12 RX ADMIN — METFORMIN HYDROCHLORIDE 500 MG: 500 TABLET, EXTENDED RELEASE ORAL at 07:37

## 2019-04-12 RX ADMIN — LISINOPRIL 20 MG: 20 TABLET ORAL at 21:32

## 2019-04-12 RX ADMIN — ALPRAZOLAM 1 MG: 0.5 TABLET ORAL at 22:39

## 2019-04-12 RX ADMIN — PAROXETINE HYDROCHLORIDE 10 MG: 10 TABLET, FILM COATED ORAL at 07:36

## 2019-04-12 RX ADMIN — AMLODIPINE BESYLATE 5 MG: 5 TABLET ORAL at 07:37

## 2019-04-12 RX ADMIN — CLOPIDOGREL 75 MG: 75 TABLET, FILM COATED ORAL at 07:37

## 2019-04-12 RX ADMIN — OXYCODONE AND ACETAMINOPHEN 1 TABLET: 5; 325 TABLET ORAL at 19:17

## 2019-04-12 RX ADMIN — LISINOPRIL 20 MG: 20 TABLET ORAL at 07:37

## 2019-04-12 RX ADMIN — ASPIRIN 325 MG: 325 TABLET, COATED ORAL at 07:37

## 2019-04-12 RX ADMIN — ATENOLOL 25 MG: 25 TABLET ORAL at 07:37

## 2019-04-12 RX ADMIN — INSULIN GLARGINE 32 UNITS: 100 INJECTION, SOLUTION SUBCUTANEOUS at 21:33

## 2019-04-12 RX ADMIN — METFORMIN HYDROCHLORIDE 500 MG: 500 TABLET, EXTENDED RELEASE ORAL at 16:49

## 2019-04-12 NOTE — PROGRESS NOTES
Inpatient Rehabilitation Functional Measures Assessment    Functional Measures  VANITA Eating:  [facility] Grooming: [facility] Bathing:  [facility] Upper Body Dressing:  [facility] Lower Body Dressing:  [facility] Toileting:  [facility] Bladder Management  Level of Assistance:  [place]  Frequency/Number of Accidents this Shift:  [facility] Bowel Management  Level of Assistance: [place]  Frequency/Number of Accidents this Shift: [facility] Bed/Chair/Wheelchair Transfer:  [facility] Toilet Transfer:  [facility] Tub/Shower Transfer:  [place]    Previously Documented Mode of Locomotion at Discharge: Field  VANITA Expected Mode of Locomotion at Discharge: [facility] Walk/Wheelchair:  [facility] Stairs:  [facility] Comprehension:  Auditory comprehension is the usual mode. Comprehension  Score = 6, Modified [place].  Patient comprehends complex/abstract  information in their primary language, requiring: Additional time.  VANITA Expression:  Vocal expression is the usual mode. Expression Score = 6,  Modified Independent.  Patient expresses complex/abstract information in their  primary language, requiring: Additional time.  VANITA Social Interaction:  Social Interaction Score = 6, Modified Independent.  Patient is modified independent for social interaction, requiring: Requires  additional time. Requires additional time.  VANITA Problem Solving:  Problem Solving Score = 6, Modified [place].  Patient  makes appropriate decisions in order to solve complex problems, but requires  extra time.  VANITA Memory:  Memory Score = 6, Modified [place].  Patient is modified  independent for memory, requiring: Requires additional time.    Therapy Mode Minutes  Occupational Therapy: Branch  Physical Therapy: Branch  Speech Language Pathology:  Branch    Signed by: Courtney Holden RN

## 2019-04-12 NOTE — THERAPY TREATMENT NOTE
Inpatient Rehabilitation - Physical Therapy Treatment Note  [facility]     Patient Name: Nayan Ryan  : 1965  MRN: [id number]    Today's Date: 2019                 Admit Date: 3/24/2019      Visit Dx:    No diagnosis found.    Patient Active Problem List   Diagnosis   • Acute ischemic left PCA stroke (CMS/HCC)   • Status post placement of implantable loop recorder   • HTN (hypertension)   • Diabetes mellitus (CMS/HCC)   • Hyperlipidemia   • Morbid obesity (CMS/HCC)   • Anxiety disorder   • Migraine   • H/O hernia repair   • Abdominal wall abscess   • Back pain   • Stroke (cerebrum) (CMS/HCC)   • Vitamin D deficiency   • History of fatty infiltration of liver       Therapy Treatment    IRF Treatment Summary     Row Name 19 1318 19 1145 19 1043       Evaluation/Treatment Time and Intent    Subjective Information  no complaints  -SL  no complaints  -CC  no complaints  -SL    Existing Precautions/Restrictions  fall  -SL  fall  -CC  fall  -SL    Document Type  therapy note (daily note)  -SL  therapy note (daily note)  -CC  therapy note (daily note)  -SL    Mode of Treatment  individual therapy;speech-language pathology  -SL  occupational therapy  -CC  individual therapy;speech-language pathology  -SL    Patient/Family Observations  tracy tucker  -  --  caitlin  -SL    Start Time (Evaluation/Treatment)  1300  -SL  --  1030  -SL    Stop Time (Evaluation/Treatment)  1330  -SL  --  1100  -SL    Recorded by [SL] Narcisa Bonner MS CCC-SLP [CC] Yolanda Felder OTR [SL] Narcisa Bonner MS CCC-SLP    Row Name 19 0951             Evaluation/Treatment Time and Intent    Subjective Information  no complaints  (Pended)   -LS      Existing Precautions/Restrictions  fall  (Pended)   -      Document Type  therapy note (daily note)  (Pended)   -      Mode of Treatment  physical therapy  (Pended)   -LS      Patient/Family Observations  Pt arrives in w/c with OT. Feeling well and  ready to participate in therapy  (Pended)   -LS      Recorded by [LS] Naomy Mendenhall, PT Student      Row Name 04/12/19 1145 04/12/19 0951          Cognition/Psychosocial- PT/OT    Affect/Mental Status (Cognitive)  flat/blunted affect  -CC  flat/blunted affect  (Pended)  more engaged in recent sessions  -     Orientation Status (Cognition)  oriented x 3  -CC  --     Follows Commands (Cognition)  follows one step commands  -CC  --     Personal Safety Interventions  fall prevention program maintained;gait belt;nonskid shoes/slippers when out of bed  -CC  --     Attention Deficit (Cognitive)  mild deficit  -CC  --     Safety Deficit (Cognitive)  impulsivity  -CC  --     Recorded by [CC] Yolanda Felder, RAMO [LS] Naomy Mendenhall, PT Student     Row Name 04/12/19 1145 04/12/19 0951          Bed Mobility Assessment/Treatment    Sit-Supine [place] (Bed Mobility)  --  contact guard;supervision  (Pended)   -     Bed Mobility, Safety Issues  --  decreased use of arms for pushing/pulling;decreased use of legs for bridging/pushing  (Pended)   -LS     Comment (Bed Mobility)  up in w/c  -CC  --     Recorded by [CC] Yolanda Felder, RAMO [LS] Naomy Mendenhall, PT Student     Row Name 04/12/19 0951             Chair-Bed Transfer    Chair-Bed [place] (Transfers)  contact guard;verbal cues  (Pended)   -      Assistive Device (Chair-Bed Transfers)  wheelchair  (Pended)   -LS      Recorded by [LS] Naomy Mendenhall, PT Student      Row Name 04/12/19 1145             Sit-Stand Transfer    Sit-Stand [place] (Transfers)  contact guard  -CC      Assistive Device (Sit-Stand Transfers)  wheelchair  -CC      Recorded by [CC] Yolanda Felder, OTR      Row Name 04/12/19 1145 04/12/19 0951          Stand-Sit Transfer    Stand-Sit [place] (Transfers)  contact guard;minimum assist (75% patient effort)  -CC  contact guard  (Pended)   -     Assistive Device (Stand-Sit Transfers)  wheelchair  -CC  --     Recorded by [CC]  Yolanda Felder, OTR [LS] Naomy Mendenhall, PT Student     Row Name 04/12/19 1145             Shower Transfer    Type (Shower Transfer)  stand pivot/stand step  -CC      [place] Level (Shower Transfer)  verbal cues;minimum assist (75% patient effort)  -CC      Assistive Device (Shower Transfer)  grab bars/tub rail;tub bench;wheelchair  -CC      Recorded by [CC] Yolanda Felder, OTR      Row Name 04/12/19 0951             Gait/Stairs Assessment/Training    Distance in Feet (Gait)  80 x  4  (Pended)   -LS      Recorded by [LS] Naomy Mendenhall, PT Student      Row Name 04/12/19 0914             Wheelchair Mobility/Management    Method of Wheelchair Locomotion (Mobility)  jay-bipedal propulsion (right sided)  (Pended)   -LS      Mobility Activities (Wheelchair)  forward propulsion  (Pended)   -LS      Forward Propulsion [place] (Wheelchair)  contact guard  (Pended)   -LS      Comment, Mobility Activities (Wheelchair)  Pt propelling with R LE only for strengthening exercise  (Pended)   -LS      Recorded by [LS] Naomy Mendenhall, PT Student      Row Name 04/12/19 0906             Safety Issues, Functional Mobility    Safety Issues Affecting Function (Mobility)  impulsivity;judgment;problem solving;positioning of assistive device;insight into deficits/self awareness;sequencing abilities  (Pended)   -LS      Impairments Affecting Function (Mobility)  balance;cognition;coordination;strength;visual/perceptual  (Pended)   -LS      Comment, Safety Issues/Impairments (Mobility)  Verbal cues to wait for therapist positioning prior to standing  (Pended)   -LS      Recorded by [LS] Naomy Mendenhall, PT Student      Row Name 04/12/19 4421             Bathing Assessment/Treatment    Bathing [place] Level  bathing skills;verbal cues;nonverbal cues (demo/gesture);contact guard assist;minimum assist (75% patient effort)  -CC      Assistive Device (Bathing)  grab bar/tub rail;hand held shower spray hose;tub bench  -CC       "Bathing Position  supported sitting;supported standing  -CC      Bathing Setup Assistance  adjust water temperature;obtain supplies  -CC      Comment (Bathing)  vc for safety  -CC      Recorded by [CC] Yolanda Felder OTR      Row Name 04/12/19 1145             Upper Body Dressing Assessment/Treatment    Upper Body Dressing Task  upper body dressing skills;doff;don;pull over garment;supervision  -CC      Upper Body Dressing Position  supported sitting  -CC      Set-up Assistance (Upper Body Dressing)  obtain clothing  -CC      Recorded by [CC] Yolanda Felder OTELEUTERIO      [facility] Name 04/12/19 1145             Lower Body Dressing Assessment/Treatment    Lower Body Dressing [place] Level  doff;don;pants/bottoms;shoes/slippers;socks;underwear;set up;verbal cues;nonverbal cues (demo/gesture);minimum assist (75% patient effort)  -CC      Lower Body Dressing Position  supported sitting;supported standing  -CC      Lower Body Dressing Setup Assistance  obtain clothing  -CC      Comment (Lower Body Dressing)  vc for technique and safety  -CC      Recorded by [CC] Yolanda Felder OTR      [facility] Name 04/12/19 1145             Grooming Assessment/Treatment    Grooming [place] Level  grooming skills;deodorant application;hair care, combing/brushing;shave face;supervision;verbal cues;nonverbal cues (demo/gesture);oral care regimen  -CC      Grooming Position  supported sitting;sink side  -CC      Grooming Setup Assistance  obtain supplies  -CC      Recorded by [CC] Yolanda Felder OTR      Row Name 04/12/19 1145             Fine Motor Testing & Training    Comment, Fine Motor Coordination  manipulation of 1/4\" pegs with min difficulty   -CC      Recorded by [CC] Yolanda Felder OTR      [facility] Name 04/12/19 1145             Vision Assessment/Intervention    Visual Processing Deficit  jay-inattention/neglect, right  -CC      Vision Assessment Comment   min vc for overlapping small peg design  neglected right lower " quadrant   -CC      Recorded by [CC] Yolanda Felder OTR      Row Name 04/12/19 1145             Pain Scale: Numbers Pre/Post-Treatment    Pain Scale: Numbers, Pretreatment  0/10 - no pain  -CC      Pain Scale: Numbers, Post-Treatment  0/10 - no pain  -CC      Recorded by [CC] Yolanda Felder OTR      Row Name 04/12/19 0951             Dynamic Balance Activity    Therapeutic Training Performed (Dynamic Balance)  side stepping  (Pended)  step taps on 4 inch step  -LS      Support Needed for Balance (Dynamic Balance Training)  uses at least one upper extremity for support;CGA;minimal external support for balance, 75% patient effort  (Pended)   -LS      Comment (Dynamic Balance Training)  Step taps x 15 reps each leg. Gaurding R knee during step taps with L LE, tactile cue at medial hamstrings for knee flexion during step taps on R. Tactile cue at L hip abductor and CGA at R knee during side stepping 4 x 10 ft  (Pended)   -LS      Recorded by [LS] Naomy Mendenhall, PT Student      Row Name 04/12/19 0951             Lower Extremity Standing Therapeutic Exercise    Performed, Standing Lower Extremity (Therapeutic Exercise)  mini-squats  (Pended)   -LS      Exercise Type, Standing Lower Extremity (Therapeutic Exercise)  AROM (active range of motion)  (Pended)   -LS      Sets/Reps Detail, Standing Lower Extremity (Therapeutic Exercise)  1 x 15  (Pended)   -LS      Comment, Standing Lower Extremity (Therapeutic Exercise)  B UE support on jay bar. CGA at R knee with improved control this date  (Pended)   -LS      Recorded by [LS] Naomy Mendenhall, PT Student      Row Name 04/12/19 1145 04/12/19 0951          Positioning and Restraints    Pre-Treatment Position  sitting in chair/recliner  -CC  --     Post Treatment Position  wheelchair  -CC  bed  (Pended)   -LS     In Bed  --  supine;call light within reach;encouraged to call for assist;exit alarm on  (Pended)   -LS     In Wheelchair  sitting;with PT  -CC  --  (Pended)   -LS      Recorded by [CC] Yolanda Felder OTR [LS] Naomy Mendenhall, PT Student       User Key  (r) = Recorded By, (t) = Taken By, (c) = Cosigned By    Initials Name Effective Dates    CC Yolanda Felder, OTR 06/08/18 -     ZULY [place] Narcisa, MS CCC-SLP 06/08/18 -     LS Naomy Mendenhall, PT Student 02/04/19 -         Wound 03/26/19 0900 Left chest incision (Active)   Dressing Appearance open to air 4/11/2019  8:00 PM   Closure Liquid skin adhesive 4/11/2019  8:00 PM   Base dry;clean 4/11/2019  8:00 PM   Drainage Amount none 4/11/2019  8:00 PM     Physical Therapy Education     Title: PT OT SLP Therapies (In Progress)     Topic: Physical Therapy (In Progress)     Point: Mobility training (Done)     Learning Progress Summary           Patient Acceptance, E,TB, VU,NR by LS at 4/12/2019  9:51 AM    Comment:  Continued discussion regarding progress with knee control and walking    Acceptance, E,TB, VU,NR by LS at 4/11/2019  9:53 AM    Comment:  Continued discussion/answering pt questions about why R knee is weak. Educated on proper mechanics for stair navigation    Acceptance, E,TB, VU,NR by LS at 4/10/2019  9:43 AM    Comment:  Continued discussion regarding safety and slowing down during mobility. Educated on proper use of cane again this date    Acceptance, E,TB, VU,NR by LS at 4/9/2019  8:33 AM    Comment:  Continued discussion regarding how stroke has affected his R side. Educated on use of cane    Acceptance, E,TB, VU,NR by LS at 4/8/2019  8:54 AM    Comment:  Educated on why R leg is weak and how the stroke is affecting his knee control    Acceptance, E,TB, VU,NR by LS at 4/5/2019 11:30 AM    Comment:  Discussed purpose of strengthening exercises. Educated on repeated practice of walking and other exercises to gradually build strength and endurance.    Acceptance, E, NR by  at 4/4/2019  9:52 AM    Acceptance, E, NR by  at 4/3/2019 10:16 AM    Acceptance, E, NR by  at 4/2/2019  3:18 PM    Acceptance, E, NR by  at  4/1/2019 10:32 AM    Acceptance, E,TB,D, NR by  at 3/30/2019 11:44 AM    Acceptance, E,TB,D, NR by EE at 3/29/2019  2:58 PM    Acceptance, E,TB,D, NR by EE at 3/28/2019 11:38 AM    Acceptance, E,TB,D, NR by EE at 3/27/2019 10:05 AM    Acceptance, E,TB,D, NR by EE at 3/26/2019  9:48 AM    Acceptance, E,TB,D, NR by EE at 3/25/2019  3:09 PM                   Point: Home exercise program (In Progress)     Learning Progress Summary           Patient Acceptance, E, NR by  at 4/4/2019  9:52 AM    Acceptance, E, NR by  at 4/3/2019 10:16 AM    Acceptance, E, NR by  at 4/2/2019  3:18 PM    Acceptance, E, NR by  at 4/1/2019 10:32 AM    Acceptance, E,TB,D, NR by EE at 3/29/2019  2:58 PM    Acceptance, E,TB,D, NR by EE at 3/28/2019 11:38 AM    Acceptance, E,TB,D, NR by EE at 3/27/2019 10:05 AM    Acceptance, E,TB,D, NR by EE at 3/26/2019  9:48 AM    Acceptance, E,TB,D, NR by EE at 3/25/2019  3:09 PM                   Point: Body mechanics (In Progress)     Learning Progress Summary           Patient Acceptance, E, NR by  at 4/4/2019  9:52 AM    Acceptance, E, NR by  at 4/3/2019 10:16 AM    Acceptance, E, NR by  at 4/2/2019  3:18 PM    Acceptance, E, NR by  at 4/1/2019 10:32 AM    Acceptance, E,TB,D, NR by EE at 3/29/2019  2:58 PM    Acceptance, E,TB,D, NR by EE at 3/28/2019 11:38 AM    Acceptance, E,TB,D, NR by EE at 3/27/2019 10:05 AM    Acceptance, E,TB,D, NR by EE at 3/26/2019  9:48 AM    Acceptance, E,TB,D, NR by EE at 3/25/2019  3:09 PM                   Point: Precautions (Done)     Learning Progress Summary           Patient Acceptance, E,TB, VU,NR by LS at 4/12/2019  9:51 AM    Comment:  Continued discussion regarding progress with knee control and walking    Acceptance, E,TB, VU,NR by LS at 4/11/2019  9:53 AM    Comment:  Continued discussion/answering pt questions about why R knee is weak. Educated on proper mechanics for stair navigation    Acceptance, E,TB, VU,NR by LS at 4/10/2019  9:43 AM     Comment:  Continued discussion regarding safety and slowing down during mobility. Educated on proper use of cane again this date    Acceptance, E,TB, VU,NR by  at 4/9/2019  8:33 AM    Comment:  Continued discussion regarding how stroke has affected his R side. Educated on use of cane    Acceptance, E,TB, VU,NR by  at 4/8/2019  8:54 AM    Comment:  Educated on why R leg is weak and how the stroke is affecting his knee control    Acceptance, E,TB, VU,NR by  at 4/5/2019 11:30 AM    Comment:  Discussed purpose of strengthening exercises. Educated on repeated practice of walking and other exercises to gradually build strength and endurance.    Acceptance, E, NR by  at 4/4/2019  9:52 AM    Acceptance, E, NR by  at 4/3/2019 10:16 AM    Acceptance, E, NR by  at 4/2/2019  3:18 PM    Acceptance, E, NR by  at 4/1/2019 10:32 AM    Acceptance, E,TB,D, NR by  at 3/29/2019  2:58 PM    Acceptance, E,TB,D, NR by  at 3/28/2019 11:38 AM    Acceptance, E,TB,D, NR by  at 3/27/2019 10:05 AM    Acceptance, E,TB,D, NR by  at 3/26/2019  9:48 AM    Acceptance, E,TB,D, NR by  at 3/25/2019  3:09 PM                               User Key     Initials Effective Dates Name Provider Type Discipline     06/08/18 -  Donna Inman, PT Physical Therapist PT     04/03/18 -  Clau Ayon, PT Physical Therapist PT     04/03/18 -  Ariadne Garcia, PT Physical Therapist PT     02/04/19 -  Naomy Mendenhall, PT Student PT Student                   PT Recommendation and Plan                        Time Calculation:     PT Charges     Row Name 04/12/19 1415 04/12/19 0950          Time Calculation    Start Time  1400  (Pended)   -LS  0930  (Pended)   -LS     Stop Time  1430  (Pended)   -LS  1000  (Pended)   -LS     Time Calculation (min)  30 min  (Pended)   -LS  30 min  (Pended)   -     PT Received On  04/12/19  (Pended)   -LS  04/12/19  (Pended)   -     PT - Next Appointment  04/13/19  (Pended)   -LS  --       User Key  (r)  = Recorded By, (t) = Taken By, (c) = Cosigned By    Initials Name Provider Type    LS Naomy Mendenhall, PT Student PT Student          Therapy Charges for Today     Code Description Service Date Service Provider Modifiers Qty    [id number]  PT NEUROMUSC RE EDUCATION EA 15 MIN 4/11/2019 Naomy Mendenhall, PT Student GP 4    [id number]  PT THER SUPP EA 15 MIN 4/11/2019 Naomy Mendenhall, PT Student GP 1    [id number] HC PT NEUROMUSC RE EDUCATION EA 15 MIN 4/12/2019 Naomy Mendenhall, PT Student GP 4    [id number]  PT THER SUPP EA 15 MIN 4/12/2019 Naomy Mendenhall, PT Student GP 3                   Naomy Mendenhall, PT Student  4/12/2019
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
## 2019-04-12 NOTE — PROGRESS NOTES
Inpatient Rehabilitation Plan of Care Note    Plan of Care  Care Plan Reviewed - No updates at this time.    Safety    Performed Intervention(s)  Hourly rounding , items within reach  Assistance with out of bed activities  Falls protocol  bed/chair alarm      Psychosocial    Performed Intervention(s)   PRN  Patient to verbalize feelings and cocnerns  Psychologist PRN      Body Systems    Performed Intervention(s)  Accuchecks ACHS  Medication as ordered  consistent carb diet      Sphincter Control    Performed Intervention(s)  Assistance with urinal  Assistance to bathroom  Incontinence care PRN    Signed by: Courtney Holden RN

## 2019-04-12 NOTE — PROGRESS NOTES
Inpatient Rehabilitation Plan of Care Note    Plan of Care  Care Plan Reviewed - No updates at this time.    Safety    Performed Intervention(s)  bed/chair alarm      Sphincter Control    Performed Intervention(s)  Assistance with urinal  Assistance to bathroom  Incontinence care PRN    Signed by: Doreen Aguilar RN

## 2019-04-12 NOTE — PLAN OF CARE
Problem: Stroke (IRF) (Adult)  Goal: Promote Optimal Functional [place]  Outcome: Ongoing (interventions implemented as appropriate)      Problem: Fall Risk (Adult)  Goal: Absence of Fall  Outcome: Ongoing (interventions implemented as appropriate)      Problem: Diabetes, Type 2 (Adult)  Goal: Signs and Symptoms of Listed Potential Problems Will be Absent, Minimized or Managed (Diabetes, Type 2)  Outcome: Ongoing (interventions implemented as appropriate)      Problem: Skin Injury Risk (Adult)  Goal: Skin Health and Integrity  Outcome: Ongoing (interventions implemented as appropriate)      Problem: Patient Care Overview  Goal: Plan of Care Review  Outcome: Ongoing (interventions implemented as appropriate)   04/12/19 0446   Patient Care Overview   IRF Plan of Care Review progress ongoing, continue   Progress, Functional Goals demonstrating adequate progress   Coping/Psychosocial   Plan of Care Reviewed With patient   OTHER   Outcome Summary Pt rested well this shift. C/o of severe burning pain in right upper leg. Applied ice pack and prn tylenol for relief. Pt states has happened 4-5 times.

## 2019-04-12 NOTE — PROGRESS NOTES
Inpatient Rehabilitation Functional Measures Assessment and Plan of Care    Plan of Care  Updated Problems/Interventions  Field    Functional Measures  VANITA Eating:  [facility] Grooming: [facility] Bathing:  [facility] Upper Body Dressing:  [facility] Lower Body Dressing:  [facility] Toileting:  [facility] Bladder Management  Level of Assistance:  [place]  Frequency/Number of Accidents this Shift:  [facility] Bowel Management  Level of Assistance: [place]  Frequency/Number of Accidents this Shift: [facility] Bed/Chair/Wheelchair Transfer:  [facility] Toilet Transfer:  [facility] Tub/Shower Transfer:  [place]    Previously Documented Mode of Locomotion at Discharge: Field  VANITA Expected Mode of Locomotion at Discharge: [facility] Walk/Wheelchair:  [facility] Stairs:  [facility] Comprehension:  [facility] Expression:  [facility] Social Interaction:  [facility] Problem Solving:  [facility] Memory:  [place]    Therapy Mode Minutes  Occupational Therapy: Individual: 60 minutes.  Physical Therapy: [place]  Speech Language Pathology:  [place]    Signed by: RAMO Downs/SOFIA

## 2019-04-12 NOTE — PROGRESS NOTES
Inpatient Rehabilitation Functional Measures Assessment    Functional Measures  VANITA Eating:  [facility] Grooming: [facility] Bathing:  [facility] Upper Body Dressing:  [facility] Lower Body Dressing:  [facility] Toileting:  [facility] Bladder Management  Level of Assistance:  [place]  Frequency/Number of Accidents this Shift:  [facility] Bowel Management  Level of Assistance: [place]  Frequency/Number of Accidents this Shift: [facility] Bed/Chair/Wheelchair Transfer:  [facility] Toilet Transfer:  [facility] Tub/Shower Transfer:  [place]    Previously Documented Mode of Locomotion at Discharge: Field  VANITA Expected Mode of Locomotion at Discharge: [facility] Walk/Wheelchair:  [facility] Stairs:  [facility] Comprehension:  Auditory comprehension is the usual mode. Comprehension  Score = 6, Modified [place].  Patient comprehends complex/abstract  information in their primary language, requiring: Additional time.  VANITA Expression:  Vocal expression is the usual mode. Expression Score = 6,  Modified Independent.  Patient expresses complex/abstract information in their  primary language, requiring: Additional time.  VANITA Social Interaction:  Social Interaction Score = 6, Modified Independent.  Patient is modified independent for social interaction, requiring: Requires  additional time.  VANITA Problem Solving:  Activity was not observed.  VANITA Memory:  Memory Score = 6, Modified [place].  Patient is modified  independent for memory, requiring: Requires additional time.    Therapy Mode Minutes  Occupational Therapy: [place]  Physical Therapy: [place]  Speech Language Pathology:  [place]    Signed by: Doreen Aguilar RN

## 2019-04-12 NOTE — PLAN OF CARE
Problem: Stroke (IRF) (Adult)  Goal: Promote Optimal Functional [place]  Outcome: Ongoing (interventions implemented as appropriate)   04/12/19 1359   Stroke (IRF) (Adult)   Promote Optimal Functional [place] demonstrating adequate progress       Problem: Fall Risk (Adult)  Goal: Absence of Fall  Outcome: Ongoing (interventions implemented as appropriate)   04/12/19 1359   Fall Risk (Adult)   Absence of Fall making progress toward outcome       Problem: Diabetes, Type 2 (Adult)  Goal: Signs and Symptoms of Listed Potential Problems Will be Absent, Minimized or Managed (Diabetes, Type 2)  Outcome: Ongoing (interventions implemented as appropriate)   04/12/19 1359   Goal/Outcome Evaluation   Problems Assessed (Type 2 Diabetes) all   Problems Present (Type 2 Diabetes) none       Problem: Skin Injury Risk (Adult)  Goal: Skin Health and Integrity  Outcome: Ongoing (interventions implemented as appropriate)   04/12/19 1359   Skin Injury Risk (Adult)   Skin Health and Integrity making progress toward outcome       Problem: Patient Care Overview  Goal: Plan of Care Review  Outcome: Ongoing (interventions implemented as appropriate)   04/12/19 1359   Patient Care Overview   IRF Plan of Care Review progress ongoing, continue   Progress, Functional Goals demonstrating adequate progress   Coping/Psychosocial   Plan of Care Reviewed With patient   OTHER   Outcome Summary Patient has done well today, no complaints of pain this shift, no new issues present at this time.

## 2019-04-12 NOTE — THERAPY TREATMENT NOTE
Inpatient Rehabilitation - Occupational Therapy Treatment Note    [facility]     Patient Name: Nayan Ryan  : 1965  MRN: [id number]    Today's Date: 2019                 Admit Date: 3/24/2019      Visit Dx:  No diagnosis found.    Patient Active Problem List   Diagnosis   • Acute ischemic left PCA stroke (CMS/HCC)   • Status post placement of implantable loop recorder   • HTN (hypertension)   • Diabetes mellitus (CMS/HCC)   • Hyperlipidemia   • Morbid obesity (CMS/HCC)   • Anxiety disorder   • Migraine   • H/O hernia repair   • Abdominal wall abscess   • Back pain   • Stroke (cerebrum) (CMS/HCC)   • Vitamin D deficiency   • History of fatty infiltration of liver         Therapy Treatment    IRF Treatment Summary     Row Name 19 1145 19 1043 19 0951       Evaluation/Treatment Time and Intent    Subjective Information  no complaints  -CC  no complaints  -  no complaints  (Pended)   -    Existing Precautions/Restrictions  fall  -CC  fall  -  fall  (Pended)   -    Document Type  therapy note (daily note)  -CC  therapy note (daily note)  -  therapy note (daily note)  (Pended)   -    Mode of Treatment  occupational therapy  -CC  individual therapy;speech-language pathology  -SL  physical therapy  (Pended)   -    Patient/Family Observations  --  cooperative  -  Pt arrives in w/c with OT. Feeling well and ready to participate in therapy  (Pended)   -    Start Time (Evaluation/Treatment)  --  1030  -SL  --    Stop Time (Evaluation/Treatment)  --  1100  -SL  --    Recorded by [CC] Yolanda Felder, OTR [SL] Narcisa Bonner, MS CCC-SLP [LS] Naomy Mendenhall, PT Student    Row Name 19 1145 19 0951          Cognition/Psychosocial- PT/OT    Affect/Mental Status (Cognitive)  flat/blunted affect  -CC  flat/blunted affect  (Pended)  more engaged in recent sessions  -     Orientation Status (Cognition)  oriented x 3  -CC  --     Follows Commands (Cognition)  follows one step  commands  -CC  --     Personal Safety Interventions  fall prevention program maintained;gait belt;nonskid shoes/slippers when out of bed  -CC  --     Attention Deficit (Cognitive)  mild deficit  -CC  --     Safety Deficit (Cognitive)  impulsivity  -CC  --     Recorded by [CC] Yolanda Felder OTR [LS] Naomy Mendenhall, PT Student     Row Name 04/12/19 1145             Bed Mobility Assessment/Treatment    Comment (Bed Mobility)  up in w/c  -CC      Recorded by [CC] Yolanda Felder OTR      Row Name 04/12/19 1145             Sit-Stand Transfer    Sit-Stand [place] (Transfers)  contact guard  -CC      Assistive Device (Sit-Stand Transfers)  wheelchair  -CC      Recorded by [CC] Yolanda Felder OTR      Row Name 04/12/19 1145             Stand-Sit Transfer    Stand-Sit [place] (Transfers)  contact guard;minimum assist (75% patient effort)  -CC      Assistive Device (Stand-Sit Transfers)  wheelchair  -CC      Recorded by [CC] Yolanda Felder OTR      Row Name 04/12/19 1145             Shower Transfer    Type (Shower Transfer)  stand pivot/stand step  -CC      [place] Level (Shower Transfer)  verbal cues;minimum assist (75% patient effort)  -CC      Assistive Device (Shower Transfer)  grab bars/tub rail;tub bench;wheelchair  -CC      Recorded by [CC] Yolanda Felder OTR      Row Name 04/12/19 1145             Bathing Assessment/Treatment    Bathing [place] Level  bathing skills;verbal cues;nonverbal cues (demo/gesture);contact guard assist;minimum assist (75% patient effort)  -CC      Assistive Device (Bathing)  grab bar/tub rail;hand held shower spray hose;tub bench  -CC      Bathing Position  supported sitting;supported standing  -CC      Bathing Setup Assistance  adjust water temperature;obtain supplies  -CC      Comment (Bathing)  vc for safety  -CC      Recorded by [CC] Yolanda Felder OTR      Row Name 04/12/19 1145             Upper Body Dressing Assessment/Treatment    Upper Body  "Dressing Task  upper body dressing skills;doff;don;pull over garment;supervision  -CC      Upper Body Dressing Position  supported sitting  -CC      Set-up Assistance (Upper Body Dressing)  obtain clothing  -CC      Recorded by [CC] Yolanda Felder OTR      [facility] Name 04/12/19 1145             Lower Body Dressing Assessment/Treatment    Lower Body Dressing [place] Level  doff;don;pants/bottoms;shoes/slippers;socks;underwear;set up;verbal cues;nonverbal cues (demo/gesture);minimum assist (75% patient effort)  -CC      Lower Body Dressing Position  supported sitting;supported standing  -CC      Lower Body Dressing Setup Assistance  obtain clothing  -CC      Comment (Lower Body Dressing)  vc for technique and safety  -CC      Recorded by [CC] Yolanda Felder OTR Row Name 04/12/19 1145             Grooming Assessment/Treatment    Grooming [place] Level  grooming skills;deodorant application;hair care, combing/brushing;shave face;supervision;verbal cues;nonverbal cues (demo/gesture);oral care regimen  -CC      Grooming Position  supported sitting;sink side  -CC      Grooming Setup Assistance  obtain supplies  -CC      Recorded by [CC] Yolanda Felder OTR      [facility] Name 04/12/19 1145             Fine Motor Testing & Training    Comment, Fine Motor Coordination  manipulation of 1/4\" pegs with min difficulty   -CC      Recorded by [CC] Yolanda Felder OTR      [facility] Name 04/12/19 1145             Vision Assessment/Intervention    Visual Processing Deficit  jay-inattention/neglect, right  -CC      Vision Assessment Comment   min vc for overlapping small peg design  neglected right lower quadrant   -CC      Recorded by [CC] Yolanda Felder OTR      [facility] Name 04/12/19 1144             Pain Scale: Numbers Pre/Post-Treatment    Pain Scale: Numbers, Pretreatment  0/10 - no pain  -CC      Pain Scale: Numbers, Post-Treatment  0/10 - no pain  -CC      Recorded by [CC] Yolanda Felder OTR Row Name 04/12/19 " 1145             Positioning and Restraints    Pre-Treatment Position  sitting in chair/recliner  -CC      Post Treatment Position  wheelchair  -CC      In Wheelchair  sitting;with PT  -CC      Recorded by [CC] Yolanda Felder OTR        User Key  (r) = Recorded By, (t) = Taken By, (c) = Cosigned By    Initials Name Effective Dates    CC Yolanda Felder, OTR 06/08/18 -     SL Narcisa Bonner, MS CCC-SLP 06/08/18 -     LS Naomy Mendenhall, PT Student 02/04/19 -           Wound 03/26/19 0900 Left chest incision (Active)   Dressing Appearance open to air 4/11/2019  8:00 PM   Closure Liquid skin adhesive 4/11/2019  8:00 PM   Base dry;clean 4/11/2019  8:00 PM   Drainage Amount none 4/11/2019  8:00 PM         OT Recommendation and Plan                 OT IRF GOALS     Row Name 04/09/19 1400 04/02/19 1500          Bathing Goal 1 (OT-IRF)    Activity/Device (Bathing Goal 1, OT-IRF)  bathing skills, all  -DN  bathing skills, all  -DN     [place] Level (Bathing Goal 1, OT-IRF)  supervision required;verbal cues required  -DN  contact guard assist;verbal cues required  -DN     Time Frame (Bathing Goal 1, OT-IRF)  short term goal (STG)  -DN  short term goal (STG)  -DN     Progress/Outcomes (Bathing Goal 1, OT-IRF)  goal met;goal revised this date  -DN  goal met;goal revised this date  -DN        Bathing Goal 2 (OT-IRF)    Activity/Device (Bathing Goal 2, OT-IRF)  bathing skills, all  -DN  bathing skills, all  -DN     [place] Level (Bathing Goal 2, OT-IRF)  supervision required  -DN  supervision required  -DN     Time Frame (Bathing Goal 2, OT-IRF)  long term goal (LTG)  -DN  long term goal (LTG)  -DN     Progress/Outcomes (Bathing Goal 2, OT-IRF)  goal ongoing  -DN  goal ongoing  -DN        UB Dressing Goal 1 (OT-IRF)    Activity/Device (UB Dressing Goal 1, OT-IRF)  upper body dressing  -DN  upper body dressing  -DN     [place] (UB Dress Goal 1, OT-IRF)  supervision required  -DN  supervision required  -DN     Time  Frame (UB Dressing Goal 1, OT-IRF)  short term goal (STG)  -DN  short term goal (STG)  -DN     Progress/Outcomes (UB Dressing Goal 1, OT-IRF)  goal met;goal ongoing  -DN  goal met;goal revised this date  -DN        UB Dressing Goal 2 (OT-IRF)    Activity/Device (UB Dressing Goal 2, OT-IRF)  upper body dressing  -DN  upper body dressing  -DN     [place] (UB Dress Goal 2, OT-IRF)  supervision required  -DN  supervision required  -DN     Time Frame (UB Dressing Goal 2, OT-IRF)  long term goal (LTG)  -DN  long term goal (LTG)  -DN     Progress/Outcomes (UB Dressing Goal 2, OT-IRF)  goal ongoing;goal met  -DN  goal ongoing;goal met  -DN        LB Dressing Goal 1 (OT-IRF)    Activity/Device (LB Dressing Goal 1, OT-IRF)  lower body dressing  -DN  lower body dressing  -DN     [place] (LB Dressing Goal 1, OT-IRF)  minimum assist (75% or more patient effort);verbal cues required;tactile cues required  -DN  minimum assist (75% or more patient effort);verbal cues required;tactile cues required  -DN     Time Frame (LB Dressing Goal 1, OT-IRF)  short term goal (STG)  -DN  short term goal (STG)  -DN     Progress/Outcomes (LB Dressing Goal 1, OT-IRF)  goal met;goal ongoing  -DN  goal met;goal revised this date  -DN        LB Dressing Goal 2 (OT-IRF)    Activity/Device (LB Dressing Goal 2, OT-IRF)  lower body dressing  -DN  lower body dressing  -DN     [place] (LB Dressing Goal 2, OT-IRF)  verbal cues required;tactile cues required;contact guard assist  -DN  verbal cues required;tactile cues required;minimum assist (75% or more patient effort)  -DN     Time Frame (LB Dressing Goal 2, OT-IRF)  long term goal (LTG)  -DN  long term goal (LTG)  -DN     Progress/Outcomes (LB Dressing Goal 2, OT-IRF)  goal revised this date  -DN  goal ongoing  -DN        Grooming Goal 1 (OT-IRF)    Activity/Device (Grooming Goal 1, OT-IRF)  grooming skills, all  -DN  grooming skills, all  -DN     [place] (Grooming Goal 1, OT-IRF)   supervision required  -DN  supervision required  -DN     Time Frame (Grooming Goal 1, OT-IRF)  short term goal (STG)  -DN  short term goal (STG)  -DN     Progress/Outcomes (Grooming Goal 1, OT-IRF)  goal ongoing  -DN  goal partially met;goal ongoing  -DN        Grooming Goal 2 (OT-IRF)    Activity/Device (Grooming Goal 2, OT-IRF)  grooming skills, all  -DN  grooming skills, all  -DN     [place] (Grooming Goal 2, OT-IRF)  supervision required  -DN  supervision required  -DN     Time Frame (Grooming Goal 2, OT-IRF)  long term goal (LTG)  -DN  long term goal (LTG)  -DN     Progress/Outcomes (Grooming Goal 2, OT-IRF)  goal revised this date  -DN  goal revised this date  -DN        Toileting Goal 1 (OT-IRF)    Activity/Device (Toileting Goal 1, OT-IRF)  toileting skills, all  -DN  toileting skills, all  -DN     [place] Level (Toileting Goal 1, OT-IRF)  minimum assist (75% or more patient effort);verbal cues required;tactile cues required  -DN  minimum assist (75% or more patient effort);verbal cues required;tactile cues required  -DN     Time Frame (Toileting Goal 1, OT-IRF)  short term goal (STG)  -DN  short term goal (STG)  -DN     Progress/Outcomes (Toileting Goal 1, OT-IRF)  goal met;goal revised this date  -DN  goal met;goal revised this date  -DN        Toileting Goal 2 (OT-IRF)    Activity/Device (Toileting Goal 2, OT-IRF)  toileting skills, all  -DN  toileting skills, all  -DN     [place] Level (Toileting Goal 2, OT-IRF)  contact guard assist;verbal cues required;tactile cues required  -DN  contact guard assist;verbal cues required;tactile cues required  -DN     Time Frame (Toileting Goal 2, OT-IRF)  long term goal (LTG)  -DN  long term goal (LTG)  -DN     Progress/Outcomes (Toileting Goal 2, OT-IRF)  goal ongoing  -DN  goal ongoing  -DN        Self-Feeding Goal 1 (OT-IRF)    Activity/Device (Self-Feeding Goal 1, OT-IRF)  self-feeding skills, all  -DN  self-feeding skills, all  -DN     [place]  (Self-Feeding Goal 1, OT-IRF)  supervision required  -DN  supervision required  -DN     Time Frame (Self-Feeding Goal 1, OT-IRF)  short term goal (STG)  -DN  short term goal (STG)  -DN     Progress/Outcomes 1 (Self-Feeding Goal, OT-IRF)  goal met;goal ongoing  -DN  goal met;goal ongoing  -DN        Self-Feeding Goal 2 (OT-IRF)    Activity/Device (Self-Feeding Goal 2, OT-IRF)  self-feeding skills, all  -DN  self-feeding skills, all  -DN     [place] (Self-Feeding Goal 2, OT-IRF)  supervision required  -DN  supervision required  -DN     Time Frame (Self-Feeding Goal 2, OT-IRF)  long term goal (LTG)  -DN  long term goal (LTG)  -DN     Progress/Outcomes (Self-Feeding Goal 2, OT-IRF)  goal met;goal ongoing  -DN  goal met;goal ongoing  -DN        Caregiver Training Goal 2 (OT-IRF)    Caregiver Training Goal 2 (OT-IRF)  pt caregiver to be independent with any assist pt needs with adls, transfers and HEP for d/c home  -DN  pt caregiver to be independent with any assist pt needs with adls, transfers and HEP for d/c home  -DN     Time Frame (Caregiver Training Goal 2, OT-IRF)  long term goal (LTG)  -DN  long term goal (LTG)  -DN     Progress/Outcomes (Caregiver Training Goal 2, OT-IRF)  goal ongoing  -DN  goal ongoing  -DN       User Key  (r) = Recorded By, (t) = Taken By, (c) = Cosigned By    Initials Name Provider Type    Reg Bolden OT Occupational Therapist          Occupational Therapy Education     Title: PT OT SLP Therapies (In Progress)     Topic: Occupational Therapy (Done)     Point: ADL training (Done)     Description: Instruct learner(s) on proper safety adaptation and remediation techniques during self care or transfers.   Instruct in proper use of assistive devices.    Learning Progress Summary           Patient Acceptance, E,TB,D, VU,NR by DN at 4/9/2019  2:25 PM    Comment:  jay adls, adaptive visual scanning, transfer training    Acceptance, E,TB,D, VU,NR by DN at 4/8/2019 12:16 PM    Comment:   transfer training, jay skills with adls, safety,etc    Acceptance, E,TB,D, VU,NR by DN at 4/4/2019  3:19 PM    Comment:  theraputty exercises, jay adls and transfer trainning    Acceptance, E,TB,D, NR by DN at 3/26/2019 12:06 PM    Comment:  jay adls and commode/shower transfers, still max vc for all due to cognition and visual impairments    Acceptance, E,TB,D, VU,NR by DN at 3/25/2019  5:23 PM    Comment:  OT role in rehab, transfer traning, jay adls                   Point: Home exercise program (Done)     Description: Instruct learner(s) on appropriate technique for monitoring, assisting and/or progressing therapeutic exercises/activities.    Learning Progress Summary           Patient Acceptance, E,TB,D, VU,NR by DN at 4/9/2019  2:25 PM    Comment:  jay adls, adaptive visual scanning, transfer training    Acceptance, E,TB,D, VU,NR by DN at 4/8/2019 12:16 PM    Comment:  transfer training, jay skills with adls, safety,etc    Acceptance, E,TB,D, VU,NR by DN at 4/4/2019  3:19 PM    Comment:  theraputty exercises, jay adls and transfer trainning    Acceptance, E,TB,D, NR by DN at 3/26/2019 12:06 PM    Comment:  jay adls and commode/shower transfers, still max vc for all due to cognition and visual impairments    Acceptance, E,TB,D, VU,NR by DN at 3/25/2019  5:23 PM    Comment:  OT role in rehab, transfer traning, jay adls                   Point: Precautions (Done)     Description: Instruct learner(s) on prescribed precautions during self-care and functional transfers.    Learning Progress Summary           Patient Acceptance, E,TB,D, VU,NR by DN at 4/9/2019  2:25 PM    Comment:  jay adls, adaptive visual scanning, transfer training    Acceptance, E,TB,D, VU,NR by DN at 4/8/2019 12:16 PM    Comment:  transfer training, jay skills with adls, safety,etc    Acceptance, E,TB,D, VU,NR by DN at 4/4/2019  3:19 PM    Comment:  theraputty exercises, jay adls and transfer trainning    Acceptance, E,TB,D, NR by DN  at 3/26/2019 12:06 PM    Comment:  jay adls and commode/shower transfers, still max vc for all due to cognition and visual impairments    Acceptance, E,TB,D, VU,NR by DN at 3/25/2019  5:23 PM    Comment:  OT role in rehab, transfer traning, jay adls                   Point: Body mechanics (Done)     Description: Instruct learner(s) on proper positioning and spine alignment during self-care, functional mobility activities and/or exercises.    Learning Progress Summary           Patient Acceptance, E,TB,D, VU,NR by DN at 4/9/2019  2:25 PM    Comment:  jay adls, adaptive visual scanning, transfer training    Acceptance, E,TB,D, VU,NR by DN at 4/8/2019 12:16 PM    Comment:  transfer training, jay skills with adls, safety,etc    Acceptance, E,TB,D, VU,NR by DN at 4/4/2019  3:19 PM    Comment:  theraputty exercises, jay adls and transfer trainning    Acceptance, E,TB,D, NR by DN at 3/26/2019 12:06 PM    Comment:  jay adls and commode/shower transfers, still max vc for all due to cognition and visual impairments    Acceptance, E,TB,D, VU,NR by DN at 3/25/2019  5:23 PM    Comment:  OT role in rehab, transfer traning, jay adls                               User Key     Initials Effective Dates Name Provider Type Discipline    DN 06/08/18 -  Reg Mckinney OT Occupational Therapist OT                       Time Calculation:     Time Calculation- OT     Row Name 04/12/19 0800             Time Calculation- OT    OT Start Time  0800  -CC      OT Stop Time  0900  -CC      OT Time Calculation (min)  60 min  -CC        User Key  (r) = Recorded By, (t) = Taken By, (c) = Cosigned By    Initials Name Provider Type    CC Yolanda Felder OTR Occupational Therapist          Therapy Charges for Today     Code Description Service Date Service Provider Modifiers Qty    83833463382  OT NEUROMUSC RE EDUCATION EA 15 MIN 4/12/2019 Yolanda Felder OTR GO 2    99124033347 HC OT SELF CARE/MGMT/TRAIN EA 15 MIN 4/12/2019 [place]  Yolanda, OTR GO 2                   Yolanda Felder, OTR  4/12/2019

## 2019-04-12 NOTE — PROGRESS NOTES
Inpatient Rehabilitation Functional Measures Assessment    Functional Measures  VANITA Eating:  [facility] Grooming: [facility] Bathing:  [facility] Upper Body Dressing:  [facility] Lower Body Dressing:  [facility] Toileting:  [facility] Bladder Management  Level of Assistance:  [place]  Frequency/Number of Accidents this Shift:  [facility] Bowel Management  Level of Assistance: [place]  Frequency/Number of Accidents this Shift: [facility] Bed/Chair/Wheelchair Transfer:  Bed/chair/wheelchair Transfer Score = 4.  Patient performs 75% or more of effort and minimal assistance (little/incidental  help/lifting of one limb/steadying) for transferring to and from the  bed/chair/wheelchair, requiring: Contact guard. Patient requires the following  assistive device(s): Arm rest.  VANITA Toilet Transfer:  [facility] Tub/Shower Transfer:  [place]    Previously Documented Mode of Locomotion at Discharge: Field  VANITA Expected Mode of Locomotion at Discharge: [facility] Walk/Wheelchair:  WHEELCHAIR OBSERVATION   Activity was not observed.    WALK OBSERVATION   Walk Distance Scale = 2.  Distance walked is 50 -149 feet. Walk Score = 1.  Patient performs 75% or more of effort and requires minimal assistance of two or  more people. 2 people used for safety . Patient walked a distance of 80 feet.  Patient requires the following assistive device(s): Small based quad cane.  [place]ANITA Stairs:  Stairs Score = 1.  Patient performs 75% or more of effort and  requires minimal assistance of two or more people for negotiating stairs. 2  people used for safety . Patient negotiated  6 stairs. Patient requires the  following assistive device(s): Handrail(s).    VANITA Comprehension:  [facility] Expression:  [facility] Social Interaction:  [facility] Problem Solving:  [facility] Memory:  [place]    Therapy Mode Minutes  Occupational Therapy: [place]  Physical Therapy: Individual: 60 minutes.  Speech Language Pathology:  [place]    Signed by: Naomy  Za PT Student     - CoSigned By: Eliane Blackburn PT 4/12/2019 4:07:49 PM

## 2019-04-12 NOTE — THERAPY TREATMENT NOTE
Inpatient Rehabilitation - Speech Language Pathology Treatment Note    [facility]       Patient Name: Nayan Ryan  : 1965  MRN: [id number]    Today's Date: 2019           Admit Date: 3/24/2019      Visit Dx:      No diagnosis found.    Patient Active Problem List   Diagnosis   • Acute ischemic left PCA stroke (CMS/HCC)   • Status post placement of implantable loop recorder   • HTN (hypertension)   • Diabetes mellitus (CMS/HCC)   • Hyperlipidemia   • Morbid obesity (CMS/HCC)   • Anxiety disorder   • Migraine   • H/O hernia repair   • Abdominal wall abscess   • Back pain   • Stroke (cerebrum) (CMS/HCC)   • Vitamin D deficiency   • History of fatty infiltration of liver          Therapy Treatment    Evaluation/Coping    Evaluation/Treatment Time and Intent  Subjective Information: no complaints (19 : Narcisa Bonner MS CCC-SLP)  Existing Precautions/Restrictions: fall (19 : Narcisa Bonner MS CCC-SLP)  Document Type: therapy note (daily note) (19 : Narcisa Bonner MS CCC-SLP)  Mode of Treatment: individual therapy, speech-language pathology (19 : Narcisa Bonner, MS CCC-SLP)  Patient/Family Observations: cooperative, sltialty juliette (19 : Narcisa Bonner MS CCC-SLP)  Start Time (Evaluation/Treatment): 1300 (19 : Narcisa Bonner, MS CCC-SLP)  Stop Time (Evaluation/Treatment): 1330 (19 : Kailey, Narcisa, MS CCC-SLP)    Vitals/Pain/Safety         Cognition/Communication         Oral Motor/Eating         Mobility/Basic Activities/Instrumental Activities/Motor/Modality                   ROM/MMT                   Sensory/Myotome/Dermatome/Edema               Posture/Balance/Special Tests/Exercise/Transportation/Sexual Function                   Orthotics/Residual Limb/Prosthetic Management              Outcome Summary         EDUCATION    The patient has been educated in the following areas:     Cognitive Impairment.    SLP Recommendation and Plan                                                           SLP GOALS     Row Name 04/12/19 1300 04/12/19 1000 04/11/19 1400       Word Retrieval Skills Goal 1 (SLP)    Progress (Word Retrieval Skills Goal 1, SLP)  --  90%;independently (over 90% accuracy) naming opposites  -SL  --       Memory Skills Goal 1 (SLP)    Progress (Memory Skills Goal 1, SLP)  60%;independently (over 90% accuracy) recall of paragraphs/ stories - visual recall  -SL  --  --    Progress/Outcomes (Memory Skills Goal 1, SLP)  goal ongoing  -SL  --  --       Reasoning Goal 1 (SLP)    Progress (Reasoning Goal 1, SLP)  50%;independently (over 90% accuracy) overalpping word task  -SL  60%;70%;with minimal cues (75-90%) disguised words  -SL  70%;80%;with minimal cues (75-90%) simple closet organization task  -SL    Progress/Outcomes (Reasoning Goal 1, SLP)  goal ongoing  -SL  goal ongoing  -SL  goal ongoing  -SL    Comment (Reasoning Goal 1, SLP)  --  --  needed min cues for assist w/analysis of some sentence directives for chart organization task  -SL       Functional Problem Solving Skills Goal 1 (SLP)    Barriers (Problem Solving Goal 1, SLP)  --  decreased attn to details noted  -SL  --    Progress (Problem Solving Goal 1, SLP)  90%;independently (over 90% accuracy) answering ?'s re: coupons  -SL  60%;70%;independently (over 90% accuracy) sequencing  of 6 steps  -SL  80%;independently (over 90% accuracy);with minimal cues (75-90%) multistep directives- hospital map use  -SL    Progress/Outcomes (Problem Solving Goal 1, SLP)  goal ongoing  -SL  goal ongoing  -SL  goal ongoing  -SL    Comment (Problem Solving Goal 1, SLP)  --  answering ?'s re: bills/ statements-60%  -SL  --       Functional Math Skills Goal 1 (SLP)    Progress (Functional Math Skills Goal 1, SLP)  --  --  60%;with minimal cues (75-90%) money management- adding denominations  -SL    Progress/Outcomes (Functional Math Skills Goal 1, SLP)  --  --  goal ongoing  -SL    Comment (Functional Math  Skills Goal 1, SLP)  --  --  needed cues for attn; written amounts elicited more accurate responses than pt trying to compute fiogures mentally without any visual cues  -       Executive Functional Skills Goal 1 (SLP)    Progress (Executive Function Skills Goal 1, SLP)  60%;70%;with moderate cues (50-74%);with maximum cues (25-49%) sechedulin/planning task based on paragraph  -  --  --    Progress/Outcomes (Executive Function Skills Goal 1, SLP)  goal ongoing  -  --  --    Row Name 19 0800 04/10/19 1100          Word Retrieval Skills Goal 1 (SLP)    Progress (Word Retrieval Skills Goal 1, SLP)  80%;independently (over 90% accuracy);with minimal cues (75-90%) synonyms  -  --     Progress/Outcomes (Word Retrieval Goal 1, SLP)  goal ongoing  -  --        Awareness of Basic Personal Information Goal 1 (SLP)    Progress (Awareness of Basic Personal Information Goal 1, SLP)  --  80%;with minimal cues (75-90%)  -SA     Progress/Outcomes (Awareness of Basic Personal Information Goal 1, SLP)  --  goal ongoing  -SA     Comment (Awareness of Basic Personal Information Goal 1, SLP)  --  80%; answering questions related to personal info: name, address, city, state, wifes name, childrens names, dogs names, , situation; place; phone number home and cell and wife's phone number.   -SA        Attention Goal 1 (SLP)    Progress (Attention Goal 1, SLP)  --  80%  -SA     Progress/Outcomes (Attention Goal 1, SLP)  --  goal ongoing  -SA     Comment (Attention Goal 1, SLP)  --  82%; CT: Playing card slapjack; level 2; 82%   -SA        Memory Skills Goal 1 (SLP)    Progress (Memory Skills Goal 1, SLP)  60%;with minimal cues (75-90%) mental manip task- 3 items - alphabetical order  -SL  --     Progress/Outcomes (Memory Skills Goal 1, SLP)  goal ongoing  -  --        Organizational Skills Goal 1 (SLP)    Progress (Thought Organization Skills Goal 1, SLP)  --  90%;independently (over 90% accuracy)  -SA      Progress/Outcomes (Thought Organization Skills Goal 1, SLP)  --  good progress toward goal  -SA     Comment (Thought Organization Skills Goal 1, SLP)  --  90%; organizing functional information with specifics: right/left columns  -SA        Reasoning Goal 1 (SLP)    Progress (Reasoning Goal 1, SLP)  --  60%  -SA     Progress/Outcomes (Reasoning Goal 1, SLP)  --  goal ongoing  -SA     Comment (Reasoning Goal 1, SLP)  --  67% overall; divergent cat: named 11/12 drink items and 5/12 holidays for timed task of 1 minute  -SA        Functional Problem Solving Skills Goal 1 (SLP)    Progress (Problem Solving Goal 1, SLP)  70%;with minimal cues (75-90%) following 2 step, 4 component written directives  -SL  --     Progress/Outcomes (Problem Solving Goal 1, SLP)  goal ongoing  -SL  --     Comment (Problem Solving Goal 1, SLP)  6 step sequencing of adl tasks- 66%  -SL  --        Executive Functional Skills Goal 1 (SLP)    Progress (Executive Function Skills Goal 1, SLP)  --  60%  -SA     Progress/Outcomes (Executive Function Skills Goal 1, SLP)  --  goal ongoing  -SA     Comment (Executive Function Skills Goal 1, SLP)  --  CT: clock math; level 2  -       User Key  (r) = Recorded By, (t) = Taken By, (c) = Cosigned By    Initials Name Provider Type    Narcisa Urias MS CCC-SLP Speech and Language Pathologist    Narcisa Gaston MS CCC-SLP Speech and Language Pathologist                  Time Calculation:       Time Calculation- SLP     Row Name 04/12/19 1329 04/12/19 1100          Time Calculation- [facility]    SLP Start Time  1300  -SL  1030  -SL     SLP Stop Time  1330  -SL  1100  -SL     SLP Time Calculation (min)  30 min  -SL  30 min  -       User Key  (r) = Recorded By, (t) = Taken By, (c) = Cosigned By    Initials Name Provider Type    Narcisa Urias MS CCC-SLP Speech and Language Pathologist            Therapy Charges for Today     Code Description Service Date Service Provider Modifiers Qty    69086885612 [facility] DEV OF  COGN SKILLS EACH 15 MIN 4/11/2019 Narcisa Bonner, MS CCC-SLP  2    [id number] [facility] DEV OF COGN SKILLS EACH 15 MIN 4/11/2019 Narcisa Bonner, MS CCC-SLP  2    49396794509 [facility] DEV OF COGN SKILLS EACH 15 MIN 4/12/2019 Narcisa Bonner, MS CCC-SLP  2    [id number] [facility] ST DEV OF COGN SKILLS EACH 15 MIN 4/12/2019 Narcisa Bonner, MS CCC-SLP  2                           Narcisa Bonner MS CCC-SLP  4/12/2019

## 2019-04-13 LAB
GLUCOSE BLDC GLUCOMTR-MCNC: 111 MG/DL (ref 70–130)
GLUCOSE BLDC GLUCOMTR-MCNC: 113 MG/DL (ref 70–130)
GLUCOSE BLDC GLUCOMTR-MCNC: 147 MG/DL (ref 70–130)
GLUCOSE BLDC GLUCOMTR-MCNC: 96 MG/DL (ref 70–130)

## 2019-04-13 PROCEDURE — 82962 GLUCOSE BLOOD TEST: CPT

## 2019-04-13 PROCEDURE — 97110 THERAPEUTIC EXERCISES: CPT | Performed by: PHYSICAL THERAPIST

## 2019-04-13 PROCEDURE — 63710000001 INSULIN GLARGINE PER 5 UNITS: Performed by: INTERNAL MEDICINE

## 2019-04-13 RX ADMIN — ALPRAZOLAM 1 MG: 0.5 TABLET ORAL at 22:35

## 2019-04-13 RX ADMIN — ATORVASTATIN CALCIUM 80 MG: 80 TABLET, FILM COATED ORAL at 21:27

## 2019-04-13 RX ADMIN — Medication 1 TABLET: at 09:21

## 2019-04-13 RX ADMIN — ATENOLOL 25 MG: 25 TABLET ORAL at 09:21

## 2019-04-13 RX ADMIN — INSULIN GLARGINE 32 UNITS: 100 INJECTION, SOLUTION SUBCUTANEOUS at 21:27

## 2019-04-13 RX ADMIN — AMLODIPINE BESYLATE 5 MG: 5 TABLET ORAL at 09:20

## 2019-04-13 RX ADMIN — LISINOPRIL 20 MG: 20 TABLET ORAL at 21:26

## 2019-04-13 RX ADMIN — ATENOLOL 25 MG: 25 TABLET ORAL at 21:27

## 2019-04-13 RX ADMIN — CLOPIDOGREL 75 MG: 75 TABLET, FILM COATED ORAL at 09:20

## 2019-04-13 RX ADMIN — METFORMIN HYDROCHLORIDE 500 MG: 500 TABLET, EXTENDED RELEASE ORAL at 09:20

## 2019-04-13 RX ADMIN — METFORMIN HYDROCHLORIDE 500 MG: 500 TABLET, EXTENDED RELEASE ORAL at 17:36

## 2019-04-13 RX ADMIN — ASPIRIN 325 MG: 325 TABLET, COATED ORAL at 09:20

## 2019-04-13 RX ADMIN — PAROXETINE HYDROCHLORIDE 10 MG: 10 TABLET, FILM COATED ORAL at 09:21

## 2019-04-13 RX ADMIN — LISINOPRIL 20 MG: 20 TABLET ORAL at 09:20

## 2019-04-13 NOTE — PROGRESS NOTES
LOS: 20 days   Patient Care Team:  Qasim Asif MD as PCP - General  Qasim Asif MD as PCP - Family Medicine    Chief Complaint:   Left PCA / posterior MCA territories ischemic infarct March 19, 2019  multifocal restricted diffusion centered posteriorly in the corpus callosum left of midline adjacent to the atrium of the lateral ventricle, at the parieto-occipital cortical interface, in the left corona radiata and along the lateral margin of the left thalamus. There also appears to be subtle low ADC signal continuing cranially along the left posterior parietal cortex with possible involvement of the right as well  Right visual field deficit  Impaired cognition/mobility/self care          Subjective     History of Present Illness    Subjective   Feeling stronger on right side.  Continues with right visual field cut      History taken from: patient    Objective     Vital Signs  Temp:  [97.7 °F (36.5 °C)-98.7 °F (37.1 °C)] 97.7 °F (36.5 °C)  Heart Rate:  [73-87] 73  Resp:  [18-20] 18  BP: (140-164)/(86-89) 153/86    Objective   MENTAL STATUS -  AWAKE / ALERT  HEENT-    LUNGS - CTA, NO WHEEZES, RALES OR RHONCHI  HEART- RRR, NO RUB, MURMUR, OR GALLOP  ABD - NORMOACTIVE BOWEL SOUNDS, SOFT, NT.  Obese.    EXT - NO EDEMA OR CYANOSIS  NEURO -  He is oriented to person and situation.    Antigravity strength RUE and RLE        Results Review:     I reviewed the patient's new clinical results.  Glc - 96, 94, 96    Medication Review: reviewed    Assessment/Plan       Acute ischemic left PCA stroke (CMS/HCC)    Status post placement of implantable loop recorder    HTN (hypertension)    Diabetes mellitus (CMS/HCC)    Hyperlipidemia    Morbid obesity (CMS/HCC)    Anxiety disorder    Abdominal wall abscess    Stroke (cerebrum) (CMS/HCC)    Vitamin D deficiency    History of fatty infiltration of liver      Assessment & Plan   Left PCA / posterior MCA territories ischemic infarct March 19, 2019  multifocal  restricted diffusion centered posteriorly in the corpus callosum left of midline adjacent to the atrium of the lateral ventricle, at the parieto-occipital cortical interface, in the left corona radiata and along the lateral margin of the left thalamus. There also appears to be subtle low ADC signal continuing cranially along the left posterior parietal cortex with possible involvement of the right as well.     HTN- uncontrolled with /130 and 206/108 with ER visit on March 15, 2019 - multi-drug regimen - amlodipine/atenolol/clonidine/lisinopril/dyazide  March 27- holding beds while on IVF hydration as BP low yesterday, concern for perfusion Continues IVF. Recheck BMP in AM. /82 at 13:15 pm  March 28-/86 this AM  DC IVF.  Will resume Lisinopril and atenolol at 1/2 total daily doses initially dividing out bid  Lisinopril 10 mg q 12 hours and atenolol 12.5 mg q 12 hours and adjust meds based on response.  Remains off amlodipine 10 mg daily and Dyazide 37.5/25 and clonidine.  Held Farxiga today as was hydrating with IVF, resume tomorrow.  March 29 - /87 last PM and 175/80 this AM  Will increase atenolol to 25 mg q 12 hours and Lisinopril to 20 mg q 12 hours (both back to previous total daily dose but divided out) and remains off amlodpine 10 mg daily and clonidine 0.1 mg q 12 hours and Dyazide 37.5/25 mg daily.  Per Neruology - Maintain -180, DBP<110.  April 8-blood pressure range 142//88-150s//79.  Norvasc increased to 5 mg on April 2.  Discussed with patient possibly titrating up on Norvasc further to 10 mg daily if needed but may divide out to 5 mg twice a day for more even control.  We will continue to monitor his blood pressure pattern for now  April 12-continue to follow on present pattern.  Blood pressure was elevated 188 last evening but otherwise typically in target range.  Once further out from stroke, would adjust target range to typical therapeutic range  April 13 -  per notes above - Per Neruology - Maintain -180, DBP<110.  Continue meds as current.              Julia Pabon MD  04/13/19  8:29 AM    Time: 15min

## 2019-04-13 NOTE — PROGRESS NOTES
Inpatient Rehabilitation Plan of Care Note    Plan of Care  Care Plan Reviewed - No updates at this time.    Safety    Performed Intervention(s)  Hourly rounding , items within reach  Assistance with out of bed activities  Falls protocol  bed/chair alarm      Psychosocial    Performed Intervention(s)   PRN  Patient to verbalize feelings and cocnerns  Psychologist PRN      Body Systems    Performed Intervention(s)  Accuchecks ACHS  Medication as ordered  consistent carb diet      Sphincter Control    Performed Intervention(s)  Assistance with urinal  Assistance to bathroom  Incontinence care PRN    Signed by: Vidal Moore RN

## 2019-04-13 NOTE — PROGRESS NOTES
Inpatient Rehabilitation Functional Measures Assessment    Functional Measures  AVNITA Eating:  [facility] Grooming: [facility] Bathing:  [facility] Upper Body Dressing:  [facility] Lower Body Dressing:  [facility] Toileting:  [facility] Bladder Management  Level of Assistance:  Center  Frequency/Number of Accidents this Shift:  [facility] Bowel Management  Level of Assistance: Center  Frequency/Number of Accidents this Shift: [facility] Bed/Chair/Wheelchair Transfer:  [facility] Toilet Transfer:  [facility] Tub/Shower Transfer:  Center    Previously Documented Mode of Locomotion at Discharge: Field  VANITA Expected Mode of Locomotion at Discharge: [facility] Walk/Wheelchair:  [facility] Stairs:  [facility] Comprehension:  Auditory comprehension is the usual mode. Comprehension  Score = 7, Independent.  Patient comprehends complex/abstract information in  their primary language.  Patient is completely independent for auditory  comprehension.  There are no activity limitations.  VANITA Expression:  Vocal expression is the usual mode. Expression Score = 6,  Modified Independent.  Patient expresses complex/abstract information in their  primary language, requiring: Additional time.  VANITA Social Interaction:  Social Interaction Score = 6, Modified Independent.  Patient is modified independent for social interaction, requiring: Requires  additional time.  VANITA Problem Solving:  Problem Solving Score = 7, Independent.  Patient makes  appropriate decisions in order to solve complex problems.  Patient is completely  independent for problem solving.  There are no activity limitations.  VANITA Memory:  Memory Score = 6, Modified [place].  Patient is modified  independent for memory, requiring: Requires additional time. forgetful at times    Therapy Mode Minutes  Occupational Therapy: Branch  Physical Therapy: Branch  Speech Language Pathology:  Branch    Signed by: Vidal Moore RN

## 2019-04-13 NOTE — PROGRESS NOTES
Inpatient Rehabilitation Plan of Care Note    Plan of Care  Care Plan Reviewed - No updates at this time.    Safety    Performed Intervention(s)  Assistance with out of bed activities  Falls protocol  bed/chair alarm      Sphincter Control    Performed Intervention(s)  Assistance with urinal  Assistance to bathroom  Incontinence care PRN    Signed by: Prudence Merlos RN

## 2019-04-13 NOTE — THERAPY TREATMENT NOTE
Inpatient Rehabilitation - Physical Therapy Treatment Note  [facility]     Patient Name: Nayan Ryan  : 1965  MRN: [id number]    Today's Date: 2019                 Admit Date: 3/24/2019      Visit Dx:    No diagnosis found.    Patient Active Problem List   Diagnosis   • Acute ischemic left PCA stroke (CMS/HCC)   • Status post placement of implantable loop recorder   • HTN (hypertension)   • Diabetes mellitus (CMS/HCC)   • Hyperlipidemia   • Morbid obesity (CMS/HCC)   • Anxiety disorder   • Migraine   • H/O hernia repair   • Abdominal wall abscess   • Back pain   • Stroke (cerebrum) (CMS/HCC)   • Vitamin D deficiency   • History of fatty infiltration of liver       Therapy Treatment    IRF Treatment Summary     Row Name 19 1050             Evaluation/Treatment Time and Intent    Subjective Information  no complaints  -JK      Existing Precautions/Restrictions  fall  -JK      Document Type  therapy note (daily note)  -JK      Mode of Treatment  physical therapy  -JK      Patient/Family Observations  pt seated in WC  -JK      Recorded by [JK] Nicole Lawton, PT      Row Name 19 1050             Cognition/Psychosocial- PT/OT    Affect/Mental Status (Cognitive)  flat/blunted affect  -JK      Orientation Status (Cognition)  oriented x 3  -JK      Follows Commands (Cognition)  follows one step commands  -JK      Personal Safety Interventions  fall prevention program maintained;gait belt  -JK      Cognitive Function (Cognitive)  attention deficit  -JK      Attention Deficit (Cognitive)  mild deficit  -JK      Executive Function Deficit (Cognition)  moderate deficit  -JK      Memory Deficit (Cognitive)  moderate deficit  -JK      Safety Deficit (Cognitive)  judgment  -JK      Recorded by [JK] Nicole Lawton, PT      Row Name 19 1050             Bed Mobility Assessment/Treatment    Sit-Supine [place] (Bed Mobility)  not tested  -JK      Recorded by [JK] Nicole Lawton, PT      Row Name  04/13/19 1050             Sit-Stand Transfer    Sit-Stand [place] (Transfers)  contact guard  -JK      Assistive Device (Sit-Stand Transfers)  wheelchair  -JK      Recorded by [JK] Nicole Lawton PT      Row Name 04/13/19 1050             Stand-Sit Transfer    Stand-Sit [place] (Transfers)  contact guard;minimum assist (75% patient effort)  -JK      Assistive Device (Stand-Sit Transfers)  wheelchair  -JK      Recorded by [JK] Nicole Lawton, PT      Row Name 04/13/19 1050             Gait/Stairs Assessment/Training    [place] Level (Gait)  contact guard;minimum assist (75% patient effort);2 person assist  -JK      Assistive Device (Gait)  AFO;other (see comments);cane, quad SBQC, AFO R LE  -JK      Distance in Feet (Gait)  80' 3   -JK      Pattern (Gait)  step-to;step-through  -JK      Deviations/Abnormal Patterns (Gait)  nancy decreased;gait speed decreased  -JK      Bilateral Gait Deviations  weight shift ability decreased;heel strike decreased  -JK      Left Sided Gait Deviations  weight shift ability decreased  -JK      Right Sided Gait Deviations  foot drop/toe drag;knee buckling, right side;knee hyperextension;weight shift ability decreased  -JK      Recorded by [JK] Nicole Lawton, PT      Row Name 04/13/19 1050             Safety Issues, Functional Mobility    Safety Issues Affecting Function (Mobility)  impulsivity;judgment;positioning of assistive device;safety precautions follow-through/compliance;insight into deficits/self awareness  -JK      Impairments Affecting Function (Mobility)  balance;cognition;coordination;strength;visual/perceptual  -JK      Recorded by [JK] Nicole Lawton, PT      Row Name 04/13/19 1050             Pain Scale: Numbers Pre/Post-Treatment    Pain Scale: Numbers, Pretreatment  0/10 - no pain  -JK      Pain Scale: Numbers, Post-Treatment  0/10 - no pain  -JK      Recorded by [JK] Nicole Lawton, PT      Row Name 04/13/19 1050             Positioning and  Restraints    Pre-Treatment Position  sitting in chair/recliner  -JK      Post Treatment Position  wheelchair  -JK      In Wheelchair  sitting;exit alarm on;heels elevated  -JK      Recorded by [ODELL] Nicole Lawton PT        User Key  (r) = Recorded By, (t) = Taken By, (c) = Cosigned By    Initials Name Effective Dates    Nicole Merida, PT 04/03/18 -         Wound 03/26/19 0900 Left chest incision (Active)   Dressing Appearance open to air 4/12/2019  9:32 PM   Closure Approximated 4/12/2019  9:32 PM   Base dry;clean 4/12/2019  9:32 PM   Periwound dry;intact 4/12/2019  9:32 PM   Drainage Amount none 4/12/2019  9:32 PM   Dressing Care, Wound open to air 4/12/2019  9:32 PM     Physical Therapy Education     Title: PT OT SLP Therapies (In Progress)     Topic: Physical Therapy (In Progress)     Point: Mobility training (Done)     Learning Progress Summary           Patient Acceptance, E,D, VU,DU,NR by ODELL at 4/13/2019 12:04 PM    Acceptance, E,TB, VU,NR by LS at 4/12/2019  9:51 AM    Comment:  Continued discussion regarding progress with knee control and walking    Acceptance, E,TB, VU,NR by LS at 4/11/2019  9:53 AM    Comment:  Continued discussion/answering pt questions about why R knee is weak. Educated on proper mechanics for stair navigation    Acceptance, E,TB, VU,NR by LS at 4/10/2019  9:43 AM    Comment:  Continued discussion regarding safety and slowing down during mobility. Educated on proper use of cane again this date    Acceptance, E,TB, VU,NR by LS at 4/9/2019  8:33 AM    Comment:  Continued discussion regarding how stroke has affected his R side. Educated on use of cane    Acceptance, E,TB, VU,NR by LS at 4/8/2019  8:54 AM    Comment:  Educated on why R leg is weak and how the stroke is affecting his knee control    Acceptance, E,TB, VU,NR by LS at 4/5/2019 11:30 AM    Comment:  Discussed purpose of strengthening exercises. Educated on repeated practice of walking and other exercises to gradually  build strength and endurance.    Acceptance, E, NR by  at 4/4/2019  9:52 AM    Acceptance, E, NR by  at 4/3/2019 10:16 AM    Acceptance, E, NR by  at 4/2/2019  3:18 PM    Acceptance, E, NR by  at 4/1/2019 10:32 AM    Acceptance, E,TB,D, NR by  at 3/30/2019 11:44 AM    Acceptance, E,TB,D, NR by EE at 3/29/2019  2:58 PM    Acceptance, E,TB,D, NR by  at 3/28/2019 11:38 AM    Acceptance, E,TB,D, NR by EE at 3/27/2019 10:05 AM    Acceptance, E,TB,D, NR by EE at 3/26/2019  9:48 AM    Acceptance, E,TB,D, NR by  at 3/25/2019  3:09 PM                   Point: Home exercise program (In Progress)     Learning Progress Summary           Patient Acceptance, E, NR by  at 4/4/2019  9:52 AM    Acceptance, E, NR by  at 4/3/2019 10:16 AM    Acceptance, E, NR by  at 4/2/2019  3:18 PM    Acceptance, E, NR by  at 4/1/2019 10:32 AM    Acceptance, E,TB,D, NR by  at 3/29/2019  2:58 PM    Acceptance, E,TB,D, NR by  at 3/28/2019 11:38 AM    Acceptance, E,TB,D, NR by  at 3/27/2019 10:05 AM    Acceptance, E,TB,D, NR by  at 3/26/2019  9:48 AM    Acceptance, E,TB,D, NR by  at 3/25/2019  3:09 PM                   Point: Body mechanics (In Progress)     Learning Progress Summary           Patient Acceptance, E, NR by  at 4/4/2019  9:52 AM    Acceptance, E, NR by  at 4/3/2019 10:16 AM    Acceptance, E, NR by  at 4/2/2019  3:18 PM    Acceptance, E, NR by  at 4/1/2019 10:32 AM    Acceptance, E,TB,D, NR by  at 3/29/2019  2:58 PM    Acceptance, E,TB,D, NR by  at 3/28/2019 11:38 AM    Acceptance, E,TB,D, NR by EE at 3/27/2019 10:05 AM    Acceptance, E,TB,D, NR by EE at 3/26/2019  9:48 AM    Acceptance, E,TB,D, NR by EE at 3/25/2019  3:09 PM                   Point: Precautions (Done)     Learning Progress Summary           Patient Acceptance, E,TB, VU,NR by LS at 4/12/2019  9:51 AM    Comment:  Continued discussion regarding progress with knee control and walking    Acceptance, E,TB, VU,NR by LS at 4/11/2019   9:53 AM    Comment:  Continued discussion/answering pt questions about why R knee is weak. Educated on proper mechanics for stair navigation    Acceptance, E,TB, VU,NR by  at 4/10/2019  9:43 AM    Comment:  Continued discussion regarding safety and slowing down during mobility. Educated on proper use of cane again this date    Acceptance, E,TB, VU,NR by LS at 4/9/2019  8:33 AM    Comment:  Continued discussion regarding how stroke has affected his R side. Educated on use of cane    Acceptance, E,TB, VU,NR by LS at 4/8/2019  8:54 AM    Comment:  Educated on why R leg is weak and how the stroke is affecting his knee control    Acceptance, E,TB, VU,NR by  at 4/5/2019 11:30 AM    Comment:  Discussed purpose of strengthening exercises. Educated on repeated practice of walking and other exercises to gradually build strength and endurance.    Acceptance, E, NR by  at 4/4/2019  9:52 AM    Acceptance, E, NR by  at 4/3/2019 10:16 AM    Acceptance, E, NR by  at 4/2/2019  3:18 PM    Acceptance, E, NR by  at 4/1/2019 10:32 AM    Acceptance, E,TB,D, NR by  at 3/29/2019  2:58 PM    Acceptance, E,TB,D, NR by  at 3/28/2019 11:38 AM    Acceptance, E,TB,D, NR by  at 3/27/2019 10:05 AM    Acceptance, E,TB,D, NR by  at 3/26/2019  9:48 AM    Acceptance, E,TB,D, NR by  at 3/25/2019  3:09 PM                               User Key     Initials Effective Dates Name Provider Type Discipline     06/08/18 -  Donna Inman, PT Physical Therapist PT    LH 04/03/18 -  Clau Ayon, PT Physical Therapist PT    EE 04/03/18 -  Ariadne Garcia, PT Physical Therapist PT    JK 04/03/18 -  Nicole Lawton, PT Physical Therapist PT    LS 02/04/19 -  Naomy Mendenhall, PT Student PT Student                   PT Recommendation and Plan                        Time Calculation:     PT Charges     Row Name 04/13/19 1203             Time Calculation    Start Time  1050  -JK      Stop Time  1120  -JK      Time Calculation (min)  30 min   -ODELL        User Key  (r) = Recorded By, (t) = Taken By, (c) = Cosigned By    Initials Name Provider Type    Nicole Merida, PT Physical Therapist          Therapy Charges for Today     Code Description Service Date Service Provider Modifiers Qty    [id number]  PT THER PROC EA 15 MIN 4/13/2019 Nicole Lawton, PT GP 2                   Nicole Lawton, PT  4/13/2019

## 2019-04-13 NOTE — PROGRESS NOTES
Inpatient Rehabilitation Functional Measures Assessment    Functional Measures  VANITA Eating:  Branch  [facility] Grooming: [place]  [facility] Bathing:  Branch  [facility] Upper Body Dressing:  [place]  [facility] Lower Body Dressing:  [place]  [facility] Toileting:  [facility] Bladder Management  Level of Assistance:  [place]  Frequency/Number of Accidents this Shift:  [facility] Bowel Management  Level of Assistance: [place]  Frequency/Number of Accidents this Shift: [place]    [facility] Bed/Chair/Wheelchair Transfer:  Activity was not observed.  VANITA Toilet Transfer:  [facility] Tub/Shower Transfer:  [place]    Previously Documented Mode of Locomotion at Discharge: Field  VANITA Expected Mode of Locomotion at Discharge: [facility] Walk/Wheelchair:  WHEELCHAIR OBSERVATION   Activity was not observed.    WALK OBSERVATION   Walk Distance Scale = 2.  Distance walked is 50 -149 feet. Walk Score = 2.  Patient performs 75% or more of effort and requires minimal assistance.  Incidental assistance, contact guard or steadying was provided. Patient walked a  distance of 80 feet. Patient requires the following assistive device(s): Small  based quad cane. Orthosis.  VANITA Stairs:  Stairs did not occur.    VANITA Comprehension:  [facility] Expression:  [facility] Social Interaction:  [facility] Problem Solving:  [facility] Memory:  [place]    Therapy Mode Minutes  Occupational Therapy: [place]  Physical Therapy: Individual: 30 minutes.  Speech Language Pathology:  [place]    Signed by: Nicole Lawton PT

## 2019-04-13 NOTE — PROGRESS NOTES
Inpatient Rehabilitation Functional Measures Assessment    Functional Measures  VANITA Eating:  [facility] Grooming: [facility] Bathing:  [facility] Upper Body Dressing:  [facility] Lower Body Dressing:  [facility] Toileting:  [facility] Bladder Management  Level of Assistance:  [place]  Frequency/Number of Accidents this Shift:  [facility] Bowel Management  Level of Assistance: [place]  Frequency/Number of Accidents this Shift: [facility] Bed/Chair/Wheelchair Transfer:  [facility] Toilet Transfer:  [facility] Tub/Shower Transfer:  [place]    Previously Documented Mode of Locomotion at Discharge: Field  VANITA Expected Mode of Locomotion at Discharge: [facility] Walk/Wheelchair:  [facility] Stairs:  [facility] Comprehension:  Auditory comprehension is the usual mode. Patient does not  comprehend complex/abstract information in their primary language without  assistance from a helper. Comprehension Score = 5, Supervision. Patient  comprehends basic daily needs or ideas greater than 90% of the time. Patient  requires stand by/rare prompting. Patient requires the following assistive  device(s): Glasses.  VANITA Expression:  Vocal expression is the usual mode. Expression Score = 7,  Independent.  Patient expresses complex/abstract information in their primary  language.  Patient is completely independent for vocal expression.  There are no  activity limitations.  VANITA Social Interaction:  Social Interaction Score = 6, Modified Independent.  Patient is modified independent for social interaction, requiring: Medications.    VANITA Problem Solving:  Problem Solving Score = 6, Modified [place].  Patient  makes appropriate decisions in order to solve complex problems, but requires  extra time.  VANITA Memory:  Memory Score = 7, Independent.  Patient is completely independent  for memory.  There are no activity limitations.    Therapy Mode Minutes  Occupational Therapy: [place]  Physical Therapy: [place]  Speech Language  Pathology:  [place]    Signed by: Prudence Merlos RN

## 2019-04-13 NOTE — PLAN OF CARE
Problem: Stroke (IRF) (Adult)  Goal: Promote Optimal Functional [place]  Outcome: Ongoing (interventions implemented as appropriate)   04/13/19 0438   Stroke (IRF) (Adult)   Promote Optimal Functional [place] demonstrating adequate progress       Problem: Fall Risk (Adult)  Goal: Absence of Fall  Outcome: Ongoing (interventions implemented as appropriate)   04/13/19 0438   Fall Risk (Adult)   Absence of Fall making progress toward outcome       Problem: Skin Injury Risk (Adult)  Goal: Skin Health and Integrity  Outcome: Ongoing (interventions implemented as appropriate)   04/13/19 0438   Skin Injury Risk (Adult)   Skin Health and Integrity making progress toward outcome       Problem: Patient Care Overview  Goal: Plan of Care Review  Outcome: Ongoing (interventions implemented as appropriate)   04/13/19 0438   Patient Care Overview   IRF Plan of Care Review progress ongoing, continue   Progress, Functional Goals demonstrating adequate progress   Coping/Psychosocial   Plan of Care Reviewed With patient   OTHER   Outcome Summary Pt. is pleasant and cooperative. Expressive anxiety sometimes. A&O X 4. Complained of any pain to right upper leg. Percocet 1 tab PO PRN given at 1917, and then relieved very well. Family at bedside in the evening. AC&HS. BG is 94. HumaLOG no given, but Lantus 32 units given at night. Xanax 1 mg PO PRN given per pt. asked. Loop recorder to left chest. Family conference scheduled on Tuesday at 1PM. No further issues to note at this time. Will continue to monitor.     Goal: Coping Plan  Outcome: Ongoing (interventions implemented as appropriate)   04/13/19 0438   Coping Plan   Demonstration of Effective Coping Strategies demonstrating adequate progress

## 2019-04-13 NOTE — PLAN OF CARE
Problem: Stroke (IRF) (Adult)  Goal: Promote Optimal Functional [place]  Outcome: Ongoing (interventions implemented as appropriate)      Problem: Fall Risk (Adult)  Goal: Absence of Fall  Outcome: Ongoing (interventions implemented as appropriate)      Problem: Diabetes, Type 2 (Adult)  Goal: Signs and Symptoms of Listed Potential Problems Will be Absent, Minimized or Managed (Diabetes, Type 2)  Outcome: Ongoing (interventions implemented as appropriate)      Problem: Skin Injury Risk (Adult)  Goal: Skin Health and Integrity  Outcome: Ongoing (interventions implemented as appropriate)      Problem: Patient Care Overview  Goal: Plan of Care Review  Outcome: Ongoing (interventions implemented as appropriate)   04/13/19 1533   Patient Care Overview   IRF Plan of Care Review progress ongoing, continue   Progress, Functional Goals demonstrating adequate progress   Coping/Psychosocial   Plan of Care Reviewed With patient   OTHER   Outcome Summary Mr. Ryan has done well today. VS stable, glucose stable (no supplemental insulin needed), no numbness or tingling. He is alert and oriented. Loop recorder to left chest, small scab present, open to air. Patient is continent, has voided and had a bowel movement today. Skin is intact. He participated in therapy today. No safety concerns at this time; bed alarm and chair alarm in use.      Goal: Coping Plan  Outcome: Ongoing (interventions implemented as appropriate)

## 2019-04-14 LAB
GLUCOSE BLDC GLUCOMTR-MCNC: 106 MG/DL (ref 70–130)
GLUCOSE BLDC GLUCOMTR-MCNC: 134 MG/DL (ref 70–130)
GLUCOSE BLDC GLUCOMTR-MCNC: 92 MG/DL (ref 70–130)
GLUCOSE BLDC GLUCOMTR-MCNC: 95 MG/DL (ref 70–130)

## 2019-04-14 PROCEDURE — 63710000001 INSULIN GLARGINE PER 5 UNITS: Performed by: INTERNAL MEDICINE

## 2019-04-14 PROCEDURE — 82962 GLUCOSE BLOOD TEST: CPT

## 2019-04-14 RX ADMIN — ASPIRIN 325 MG: 325 TABLET, COATED ORAL at 08:55

## 2019-04-14 RX ADMIN — Medication 1 TABLET: at 08:55

## 2019-04-14 RX ADMIN — ATENOLOL 25 MG: 25 TABLET ORAL at 20:55

## 2019-04-14 RX ADMIN — ATENOLOL 25 MG: 25 TABLET ORAL at 08:55

## 2019-04-14 RX ADMIN — ATORVASTATIN CALCIUM 80 MG: 80 TABLET, FILM COATED ORAL at 20:55

## 2019-04-14 RX ADMIN — CLOPIDOGREL 75 MG: 75 TABLET, FILM COATED ORAL at 08:55

## 2019-04-14 RX ADMIN — ALPRAZOLAM 1 MG: 0.5 TABLET ORAL at 22:51

## 2019-04-14 RX ADMIN — OXYCODONE AND ACETAMINOPHEN 1 TABLET: 5; 325 TABLET ORAL at 20:54

## 2019-04-14 RX ADMIN — METFORMIN HYDROCHLORIDE 500 MG: 500 TABLET, EXTENDED RELEASE ORAL at 08:55

## 2019-04-14 RX ADMIN — AMLODIPINE BESYLATE 5 MG: 5 TABLET ORAL at 08:55

## 2019-04-14 RX ADMIN — LISINOPRIL 20 MG: 20 TABLET ORAL at 08:55

## 2019-04-14 RX ADMIN — INSULIN GLARGINE 32 UNITS: 100 INJECTION, SOLUTION SUBCUTANEOUS at 20:55

## 2019-04-14 RX ADMIN — METFORMIN HYDROCHLORIDE 500 MG: 500 TABLET, EXTENDED RELEASE ORAL at 17:06

## 2019-04-14 RX ADMIN — LISINOPRIL 20 MG: 20 TABLET ORAL at 20:55

## 2019-04-14 RX ADMIN — PAROXETINE HYDROCHLORIDE 10 MG: 10 TABLET, FILM COATED ORAL at 08:55

## 2019-04-14 NOTE — PROGRESS NOTES
Inpatient Rehabilitation Plan of Care Note    Plan of Care  Care Plan Reviewed - No updates at this time.    Body Systems    [RN] Endocrine(Active)  Current Status(04/14/2019): Patient at risk for altered blood sugars related to  recent health change.  Weekly Goal(04/21/2019): Patient will be compliant with accuchecks, diet, and  insulin coverage.  Discharge Goal: Patient will be independent with blood sugar management and be  compliant with treatment.        Psychosocial    [RN] Coping/Adjustment(Active)  Current Status(04/14/2019): Patient at risk for ineffective coping related to  recent change in health status and hospitalization.  Weekly Goal(04/21/2019): Patient will verbalize feelings and ask questions if he  has them.  Discharge Goal: Patient will develop coping skills to accomodate his health  changes.        Safety    [RN] Potential for Injury(Active)  Current Status(04/14/2019): Patient at risk for falls related to impaired vision  and impaired mobility.  Weekly Goal(04/21/2019): Patient will be free of falls on rehab and will be  compliant with call light use.  Discharge Goal: No falls while here on rehab. Pt family aware of fall/safety in  the home setting.    Performed Intervention(s)  Hourly rounding , items within reach  Assistance with out of bed activities  Falls protocol  bed/chair alarm      Sphincter Control    [RN] Bladder Management(Active)  Current Status(04/14/2019): Patient is continent of bladder.  Weekly Goal(04/21/2019): Patient will remain continent.  Discharge Goal: Patient will remain continent.    [RN] Bowel Management(Active)  Current Status(04/14/2019): Patient is continent of bowels.  Weekly Goal(04/21/2019): Patient will remain continent.  Discharge Goal: Patient will remain continent.    Performed Intervention(s)  Assistance with urinal  Assistance to bathroom  Incontinence care PRN    Signed by: Prudence Merlos RN

## 2019-04-14 NOTE — PROGRESS NOTES
Inpatient Rehabilitation Plan of Care Note    Plan of Care  Care Plan Reviewed - No updates at this time.    Safety    Performed Intervention(s)  Hourly rounding , items within reach  Assistance with out of bed activities  Falls protocol  bed/chair alarm      Psychosocial    Performed Intervention(s)  Patient to verbalize feelings and cocnerns  Psychologist PRN      Sphincter Control    Performed Intervention(s)  Assistance with urinal  Assistance to bathroom  Incontinence care PRN    Signed by: Hannah Gomez RN

## 2019-04-14 NOTE — PLAN OF CARE
Problem: Stroke (IRF) (Adult)  Goal: Promote Optimal Functional [place]  Outcome: Ongoing (interventions implemented as appropriate)      Problem: Fall Risk (Adult)  Goal: Absence of Fall  Outcome: Ongoing (interventions implemented as appropriate)      Problem: Diabetes, Type 2 (Adult)  Goal: Signs and Symptoms of Listed Potential Problems Will be Absent, Minimized or Managed (Diabetes, Type 2)  Outcome: Ongoing (interventions implemented as appropriate)      Problem: Skin Injury Risk (Adult)  Goal: Skin Health and Integrity  Outcome: Ongoing (interventions implemented as appropriate)      Problem: Patient Care Overview  Goal: Plan of Care Review  Outcome: Ongoing (interventions implemented as appropriate)   04/14/19 1512   Patient Care Overview   IRF Plan of Care Review progress ongoing, continue   Progress, Functional Goals demonstrating adequate progress   Coping/Psychosocial   Plan of Care Reviewed With patient   OTHER   Outcome Summary Mr. Ryan has done well today. VS stable, no complaints of pain. He is alert and oriented, denies numbness or tingling, hand grasp and push pull moderate. Patient acknoledges a right visual field cut. Loop recorder to left chest. He has voided and had a bowel movement. His sister came to visit and took him for a ride in his wheel chair off the unit for a little while. He has enjoyed having a day to rest. No safety concerns at this time.      Goal: Coping Plan  Outcome: Ongoing (interventions implemented as appropriate)

## 2019-04-14 NOTE — PROGRESS NOTES
LOS: 21 days   Patient Care Team:  Qasim Asif MD as PCP - General  Qasim Asif MD as PCP - Family Medicine    Chief Complaint:   Left PCA / posterior MCA territories ischemic infarct March 19, 2019  multifocal restricted diffusion centered posteriorly in the corpus callosum left of midline adjacent to the atrium of the lateral ventricle, at the parieto-occipital cortical interface, in the left corona radiata and along the lateral margin of the left thalamus. There also appears to be subtle low ADC signal continuing cranially along the left posterior parietal cortex with possible involvement of the right as well  Right visual field deficit  Impaired cognition/mobility/self care          Subjective     History of Present Illness    Subjective   Feeling well, weakness is improving over time.  Denies f/c/soa/cp    History taken from: patient    Objective     Vital Signs  Temp:  [97 °F (36.1 °C)-98.5 °F (36.9 °C)] 97 °F (36.1 °C)  Heart Rate:  [85-89] 89  Resp:  [18-20] 20  BP: (127-156)/(72-92) 156/80    Objective   MENTAL STATUS -  AWAKE / ALERT  HEENT-  nc/at  LUNGS - CTA, NO WHEEZES, RALES OR RHONCHI  HEART- RRR, NO RUB, MURMUR, OR GALLOP  ABD - NORMOACTIVE BOWEL SOUNDS, SOFT, NT.  Obese.    EXT - NO EDEMA OR CYANOSIS  NEURO -  He is oriented to person and situation.    Antigravity strength RUE and RLE          Results Review:     I reviewed the patient's new clinical results.   Results for MARGARITA HILL (MRN [id number]) as of 4/14/2019 09:34   Ref. Range 4/13/2019 11:21 4/13/2019 16:02 4/13/2019 21:23 4/14/2019 07:07   Glucose Latest Ref Range: 70 - 130 mg/dL 113 111 147 (H) 106       Medication Review: reviewed    Assessment/Plan       Acute ischemic left PCA stroke (CMS/HCC)    Status post placement of implantable loop recorder    HTN (hypertension)    Diabetes mellitus (CMS/HCC)    Hyperlipidemia    Morbid obesity (CMS/HCC)    Anxiety disorder    Abdominal wall abscess    Stroke (cerebrum)  (CMS/HCC)    Vitamin D deficiency    History of fatty infiltration of liver      Assessment & Plan   Left PCA / posterior MCA territories ischemic infarct March 19, 2019  multifocal restricted diffusion centered posteriorly in the corpus callosum left of midline adjacent to the atrium of the lateral ventricle, at the parieto-occipital cortical interface, in the left corona radiata and along the lateral margin of the left thalamus. There also appears to be subtle low ADC signal continuing cranially along the left posterior parietal cortex with possible involvement of the right as well.     HTN- uncontrolled with /130 and 206/108 with ER visit on March 15, 2019 - multi-drug regimen - amlodipine/atenolol/clonidine/lisinopril/dyazide  March 27- holding beds while on IVF hydration as BP low yesterday, concern for perfusion Continues IVF. Recheck BMP in AM. /82 at 13:15 pm  March 28-/86 this AM  DC IVF.  Will resume Lisinopril and atenolol at 1/2 total daily doses initially dividing out bid  Lisinopril 10 mg q 12 hours and atenolol 12.5 mg q 12 hours and adjust meds based on response.  Remains off amlodipine 10 mg daily and Dyazide 37.5/25 and clonidine.  Held Farxiga today as was hydrating with IVF, resume tomorrow.  March 29 - /87 last PM and 175/80 this AM  Will increase atenolol to 25 mg q 12 hours and Lisinopril to 20 mg q 12 hours (both back to previous total daily dose but divided out) and remains off amlodpine 10 mg daily and clonidine 0.1 mg q 12 hours and Dyazide 37.5/25 mg daily.  Per Neruology - Maintain -180, DBP<110.  April 8-blood pressure range 142//88-150s//79.  Norvasc increased to 5 mg on April 2.  Discussed with patient possibly titrating up on Norvasc further to 10 mg daily if needed but may divide out to 5 mg twice a day for more even control.  We will continue to monitor his blood pressure pattern for now  April 12-continue to follow on present pattern.   Blood pressure was elevated 188 last evening but otherwise typically in target range.  Once further out from stroke, would adjust target range to typical therapeutic range  April 13 and 14 - per notes above - Per Neurology - Maintain -180, DBP<110.  Continue meds as current.             Julia Pabon MD  04/14/19  9:33 AM    Time: 10min

## 2019-04-14 NOTE — PROGRESS NOTES
Inpatient Rehabilitation Functional Measures Assessment    Functional Measures  VANITA Eating:  [facility] [place] Grooming: [facility] Bathing:  [facility] Upper Body Dressing:  [facility] [place] Lower Body Dressing:  [facility] [place] Toileting:  [facility] [place] Bladder Management  Level of Assistance:  [place]  Frequency/Number of Accidents this Shift:  [facility] [place] Bowel Management  Level of Assistance: [place]  Frequency/Number of Accidents this Shift: [facility] [place] Bed/Chair/Wheelchair Transfer:  [facility] [place] Toilet Transfer:  [facility] [place] Tub/Shower Transfer:  [place]    Previously Documented Mode of Locomotion at Discharge: Field  VANITA Expected Mode of Locomotion at Discharge: [facility] [place] Walk/Wheelchair:  [facility] [place] Stairs:  [facility] [place] Comprehension:  Auditory comprehension is the usual mode. Comprehension  Score = 6, Modified [place].  Patient comprehends complex/abstract  information in their primary language with only mild difficulty.  VANITA Expression:  Vocal expression is the usual mode. Expression Score = 7,  Independent.  Patient expresses complex/abstract information in their primary  language.  Patient is completely independent for vocal expression.  There are no  activity limitations.  VANITA Social Interaction:  Social Interaction Score = 7, Independent. Patient is  completely independent for social interaction.  There are no activity  limitations.  VANITA Problem Solving:  Problem Solving Score = 6, Modified [place].  Patient  makes appropriate decisions in order to solve complex problems with mild  difficulty but self-corrects.  VANITA Memory:  Memory Score = 6, Modified [place].  Patient is modified  independent for memory, having only mild difficulty and using self-initiated or  environmental cues to remember.    Therapy Mode Minutes  Occupational Therapy: [place]  Physical Therapy: [place]  Speech Language Pathology:  [place]    Signed by: Hannah Gomez RN

## 2019-04-14 NOTE — PROGRESS NOTES
Inpatient Rehabilitation Functional Measures Assessment    Functional Measures  VANITA Eating:  [facility] Grooming: [facility] Bathing:  [facility] Upper Body Dressing:  [facility] Lower Body Dressing:  [facility] Toileting:  [facility] Bladder Management  Level of Assistance:  [place]  Frequency/Number of Accidents this Shift:  [facility] Bowel Management  Level of Assistance: [place]  Frequency/Number of Accidents this Shift: [facility] Bed/Chair/Wheelchair Transfer:  [facility] Toilet Transfer:  [facility] Tub/Shower Transfer:  [place]    Previously Documented Mode of Locomotion at Discharge: Field  VANITA Expected Mode of Locomotion at Discharge: [facility] Walk/Wheelchair:  [facility] Stairs:  [facility] Comprehension:  Auditory comprehension is the usual mode. Patient does not  comprehend complex/abstract information in their primary language without  assistance from a helper. Comprehension Score = 5, Supervision. Patient  comprehends basic daily needs or ideas greater than 90% of the time. Patient  requires stand by/rare prompting. Patient requires the following assistive  device(s): Glasses.  VANITA Expression:  Vocal expression is the usual mode. Expression Score = 7,  Independent.  Patient expresses complex/abstract information in their primary  language.  Patient is completely independent for vocal expression.  There are no  activity limitations.  VANITA Social Interaction:  Social Interaction Score = 6, Modified Independent.  Patient is modified independent for social interaction, requiring: Medications.    VANITA Problem Solving:  Problem Solving Score = 6, Modified [place].  Patient  makes appropriate decisions in order to solve complex problems, but requires  extra time.  VANITA Memory:  Memory Score = 6, Modified [place].  Patient is modified  independent for memory, requiring: Requires additional time.    Therapy Mode Minutes  Occupational Therapy: [place]  Physical Therapy: [place]  Speech  Language Pathology:  [place]    Signed by: Prudence Merlos RN

## 2019-04-14 NOTE — PROGRESS NOTES
Inpatient Rehabilitation Functional Measures Assessment    Functional Measures  VANITA Eating:  [facility] Grooming: [facility] Bathing:  [facility] Upper Body Dressing:  [facility] Lower Body Dressing:  [facility] Toileting:  [facility] Bladder Management  Level of Assistance:  [place]  Frequency/Number of Accidents this Shift:  [facility] Bowel Management  Level of Assistance: [place]  Frequency/Number of Accidents this Shift: [facility] Bed/Chair/Wheelchair Transfer:  [facility] Toilet Transfer:  [facility] Tub/Shower Transfer:  [place]    Previously Documented Mode of Locomotion at Discharge: Field  VANITA Expected Mode of Locomotion at Discharge: [facility] Walk/Wheelchair:  [facility] Stairs:  [facility] Comprehension:  Auditory comprehension is the usual mode. Comprehension  Score = 7, Independent.  Patient comprehends complex/abstract information in  their primary language.  Patient is completely independent for auditory  comprehension.  There are no activity limitations.  VANITA Expression:  Vocal expression is the usual mode. Expression Score = 7,  Independent.  Patient expresses complex/abstract information in their primary  language.  Patient is completely independent for vocal expression.  There are no  activity limitations.  VANITA Social Interaction:  Social Interaction Score = 6, Modified Independent.  Patient is modified independent for social interaction, requiring: Medications.    VANITA Problem Solving:  Problem Solving Score = 6, Modified [place].  Patient  makes appropriate decisions in order to solve complex problems, but requires  extra time.  VANITA Memory:  Memory Score = 7, Independent.  Patient is completely independent  for memory.  There are no activity limitations.    Therapy Mode Minutes  Occupational Therapy: [place]  Physical Therapy: [place]  Speech Language Pathology:  [place]    Signed by: Prudence Merlos RN

## 2019-04-14 NOTE — PLAN OF CARE
Problem: Stroke (IRF) (Adult)  Goal: Promote Optimal Functional [address]  Outcome: Ongoing (interventions implemented as appropriate)      Problem: Fall Risk (Adult)  Goal: Absence of Fall  Outcome: Ongoing (interventions implemented as appropriate)      Problem: Diabetes, Type 2 (Adult)  Goal: Signs and Symptoms of Listed Potential Problems Will be Absent, Minimized or Managed (Diabetes, Type 2)  Outcome: Ongoing (interventions implemented as appropriate)      Problem: Skin Injury Risk (Adult)  Goal: Skin Health and Integrity  Outcome: Ongoing (interventions implemented as appropriate)      Problem: Patient Care Overview  Goal: Plan of Care Review  Outcome: Ongoing (interventions implemented as appropriate)   04/14/19 0229   Patient Care Overview   IRF Plan of Care Review progress ongoing, continue   Progress, Functional Goals demonstrating adequate progress   Coping/Psychosocial   Plan of Care Reviewed With patient   OTHER   Outcome Summary Patient is calm and cooperative. Denied pain. PRN Xanax given at HS. Continent of bowel and bladder. Using the call light for assistance.

## 2019-04-15 LAB
ALBUMIN SERPL-MCNC: 4 G/DL (ref 3.5–5.2)
ALBUMIN/GLOB SERPL: 1.5 G/DL
ALP SERPL-CCNC: 92 U/L (ref 39–117)
ALT SERPL W P-5'-P-CCNC: 33 U/L (ref 1–41)
ANION GAP SERPL CALCULATED.3IONS-SCNC: 13.4 MMOL/L
AST SERPL-CCNC: 20 U/L (ref 1–40)
BASOPHILS # BLD AUTO: 0.03 10*3/MM3 (ref 0–0.2)
BASOPHILS NFR BLD AUTO: 0.5 % (ref 0–1.5)
BILIRUB SERPL-MCNC: 0.3 MG/DL (ref 0.2–1.2)
BUN BLD-MCNC: 9 MG/DL (ref 6–20)
BUN/CREAT SERPL: 12.9 (ref 7–25)
CALCIUM SPEC-SCNC: 9 MG/DL (ref 8.6–10.5)
CHLORIDE SERPL-SCNC: 104 MMOL/L (ref 98–107)
CO2 SERPL-SCNC: 26.6 MMOL/L (ref 22–29)
CREAT BLD-MCNC: 0.7 MG/DL (ref 0.76–1.27)
DEPRECATED RDW RBC AUTO: 41.5 FL (ref 37–54)
EOSINOPHIL # BLD AUTO: 0.29 10*3/MM3 (ref 0–0.4)
EOSINOPHIL NFR BLD AUTO: 4.6 % (ref 0.3–6.2)
ERYTHROCYTE [DISTWIDTH] IN BLOOD BY AUTOMATED COUNT: 13.7 % (ref 12.3–15.4)
GFR SERPL CREATININE-BSD FRML MDRD: 118 ML/MIN/1.73
GLOBULIN UR ELPH-MCNC: 2.7 GM/DL
GLUCOSE BLD-MCNC: 104 MG/DL (ref 65–99)
GLUCOSE BLDC GLUCOMTR-MCNC: 143 MG/DL (ref 70–130)
GLUCOSE BLDC GLUCOMTR-MCNC: 95 MG/DL (ref 70–130)
GLUCOSE BLDC GLUCOMTR-MCNC: 96 MG/DL (ref 70–130)
GLUCOSE BLDC GLUCOMTR-MCNC: 98 MG/DL (ref 70–130)
HCT VFR BLD AUTO: 42.1 % (ref 37.5–51)
HGB BLD-MCNC: 13.7 G/DL (ref 13–17.7)
IMM GRANULOCYTES # BLD AUTO: 0.02 10*3/MM3 (ref 0–0.05)
IMM GRANULOCYTES NFR BLD AUTO: 0.3 % (ref 0–0.5)
LYMPHOCYTES # BLD AUTO: 2.1 10*3/MM3 (ref 0.7–3.1)
LYMPHOCYTES NFR BLD AUTO: 33.3 % (ref 19.6–45.3)
MCH RBC QN AUTO: 27.3 PG (ref 26.6–33)
MCHC RBC AUTO-ENTMCNC: 32.5 G/DL (ref 31.5–35.7)
MCV RBC AUTO: 84 FL (ref 79–97)
MONOCYTES # BLD AUTO: 0.57 10*3/MM3 (ref 0.1–0.9)
MONOCYTES NFR BLD AUTO: 9 % (ref 5–12)
NEUTROPHILS # BLD AUTO: 3.29 10*3/MM3 (ref 1.4–7)
NEUTROPHILS NFR BLD AUTO: 52.3 % (ref 42.7–76)
NRBC BLD AUTO-RTO: 0 /100 WBC (ref 0–0)
PLATELET # BLD AUTO: 345 10*3/MM3 (ref 140–450)
PMV BLD AUTO: 9.5 FL (ref 6–12)
POTASSIUM BLD-SCNC: 4 MMOL/L (ref 3.5–5.2)
PROT SERPL-MCNC: 6.7 G/DL (ref 6–8.5)
RBC # BLD AUTO: 5.01 10*6/MM3 (ref 4.14–5.8)
SODIUM BLD-SCNC: 144 MMOL/L (ref 136–145)
WBC NRBC COR # BLD: 6.3 10*3/MM3 (ref 3.4–10.8)

## 2019-04-15 PROCEDURE — 82962 GLUCOSE BLOOD TEST: CPT

## 2019-04-15 PROCEDURE — 80053 COMPREHEN METABOLIC PANEL: CPT | Performed by: PHYSICAL MEDICINE & REHABILITATION

## 2019-04-15 PROCEDURE — 85025 COMPLETE CBC W/AUTO DIFF WBC: CPT | Performed by: PHYSICAL MEDICINE & REHABILITATION

## 2019-04-15 PROCEDURE — 63710000001 INSULIN GLARGINE PER 5 UNITS: Performed by: INTERNAL MEDICINE

## 2019-04-15 PROCEDURE — 97535 SELF CARE MNGMENT TRAINING: CPT

## 2019-04-15 PROCEDURE — 97112 NEUROMUSCULAR REEDUCATION: CPT

## 2019-04-15 PROCEDURE — G0515 COGNITIVE SKILLS DEVELOPMENT: HCPCS

## 2019-04-15 RX ADMIN — METFORMIN HYDROCHLORIDE 500 MG: 500 TABLET, EXTENDED RELEASE ORAL at 17:31

## 2019-04-15 RX ADMIN — INSULIN GLARGINE 32 UNITS: 100 INJECTION, SOLUTION SUBCUTANEOUS at 21:24

## 2019-04-15 RX ADMIN — METFORMIN HYDROCHLORIDE 500 MG: 500 TABLET, EXTENDED RELEASE ORAL at 07:26

## 2019-04-15 RX ADMIN — Medication 1 TABLET: at 09:22

## 2019-04-15 RX ADMIN — ALPRAZOLAM 1 MG: 0.5 TABLET ORAL at 22:46

## 2019-04-15 RX ADMIN — AMLODIPINE BESYLATE 5 MG: 5 TABLET ORAL at 09:22

## 2019-04-15 RX ADMIN — ASPIRIN 325 MG: 325 TABLET, COATED ORAL at 09:22

## 2019-04-15 RX ADMIN — ERGOCALCIFEROL 50000 UNITS: 1.25 CAPSULE ORAL at 09:22

## 2019-04-15 RX ADMIN — CLOPIDOGREL 75 MG: 75 TABLET, FILM COATED ORAL at 09:22

## 2019-04-15 RX ADMIN — ATENOLOL 25 MG: 25 TABLET ORAL at 09:22

## 2019-04-15 RX ADMIN — ATORVASTATIN CALCIUM 80 MG: 80 TABLET, FILM COATED ORAL at 21:24

## 2019-04-15 RX ADMIN — ATENOLOL 25 MG: 25 TABLET ORAL at 21:24

## 2019-04-15 RX ADMIN — LISINOPRIL 20 MG: 20 TABLET ORAL at 09:22

## 2019-04-15 RX ADMIN — LISINOPRIL 20 MG: 20 TABLET ORAL at 21:24

## 2019-04-15 RX ADMIN — PAROXETINE HYDROCHLORIDE 10 MG: 10 TABLET, FILM COATED ORAL at 09:22

## 2019-04-15 NOTE — THERAPY TREATMENT NOTE
Inpatient Rehabilitation - Occupational Therapy Treatment Note    [facility]     Patient Name: Nayan Ryan  : 1965  MRN: [id number]    Today's Date: 4/15/2019                 Admit Date: 3/24/2019      Visit Dx:  No diagnosis found.    Patient Active Problem List   Diagnosis   • Acute ischemic left PCA stroke (CMS/HCC)   • Status post placement of implantable loop recorder   • HTN (hypertension)   • Diabetes mellitus (CMS/HCC)   • Hyperlipidemia   • Morbid obesity (CMS/HCC)   • Anxiety disorder   • Migraine   • H/O hernia repair   • Abdominal wall abscess   • Back pain   • Stroke (cerebrum) (CMS/HCC)   • Vitamin D deficiency   • History of fatty infiltration of liver         Therapy Treatment    IRF Treatment Summary     Row Name 04/15/19 1503 04/15/19 1438 04/15/19 1157       Evaluation/Treatment Time and Intent    Subjective Information  no complaints  -DN  no complaints  -SL  no complaints  -DN    Existing Precautions/Restrictions  fall  -DN  fall  -SL  fall  -DN    Document Type  therapy note (daily note)  -DN  therapy note (daily note)  -SL  therapy note (daily note)  -DN    Mode of Treatment  occupational therapy  -DN  individual therapy;speech-language pathology  -  occupational therapy  -DN    Patient/Family Observations  --  cooperative  -  cooperative  -DN    Start Time (Evaluation/Treatment)  --  1430  -SL  --    Stop Time (Evaluation/Treatment)  --  1500  -SL  --    Recorded by [DN] Reg Mckniney OT [SL] Narcisa Bonner MS CCC-SLP [DN] Reg Mckinney OT    Row Name 04/15/19 1000 04/15/19 0950          Evaluation/Treatment Time and Intent    Subjective Information  no complaints  -SL  no complaints  -LB,LS,LB2     Existing Precautions/Restrictions  fall  -SL  fall  -LB,LS,LB2     Document Type  therapy note (daily note)  -SL  therapy note (daily note)  -LB,LS,LB2     Mode of Treatment  individual therapy;speech-language pathology  -  physical therapy  -LB,LS,LB2     [facility]  [facility]  [facility] in w/c with OT  -LB,LS,LB2     Start Time (Evaluation/Treatment)  1000  -SL  --     Stop Time (Evaluation/Treatment)  1030  -SL  --     Recorded by [SL] Narcisa Bonner MS CCC-SLP [LB,LS,LB2] Eliane Blackburn, PT (r) Naomy Mendenhall, PT Student (t) Eliane Blackburn, PT (c)     Row Name 04/15/19 1157             Safety Awareness/Health Promotion    Additional Documentation  Fall Prevention (Row)  -DN      Recorded by [DN] Reg Mckinney OT      Row Name 04/15/19 1157 04/15/19 0950          Cognition/Psychosocial- PT/OT    Affect/Mental Status (Cognitive)  --  flat/blunted affect  -LB,LS,LB2     Orientation Status (Cognition)  oriented x 3  -DN  --     Follows Commands (Cognition)  follows one step commands;over 90% accuracy;verbal cues/prompting required  -DN  follows one step commands;75-90% accuracy;verbal cues/prompting required;repetition of directions required  -LB,LS,LB2     Personal Safety Interventions  fall prevention program maintained;gait belt  -DN  fall prevention program maintained;gait belt;muscle strengthening facilitated;nonskid shoes/slippers when out of bed  -LB,LS,LB2     Cognitive Function (Cognitive)  attention deficit;safety deficit;memory deficit;executive function deficit  -DN  --     Attention Deficit (Cognitive)  mild deficit  -DN  --     Executive Function Deficit (Cognition)  moderate deficit  -DN  --     Memory Deficit (Cognitive)  moderate deficit  -DN  moderate deficit  -LB,LS,LB2     Safety Deficit (Cognitive)  moderate deficit  -DN  impulsivity;insight into deficits/self awareness;judgment;problem solving;ability to follow commands  -LB,LS,LB2     Recorded by [DN] Reg Mckinney, OT [LB,LS,LB2] Eliane Blackburn, PT (r) Naomy Mendenhall, PT Student (t) Eliane Blackburn PT (c)     Row Name 04/15/19 0950             Bed Mobility Assessment/Treatment    Rolling Left [place] (Bed Mobility)  verbal cues;supervision  -LB,LS,LB2      Rolling Right [place]  (Bed Mobility)  verbal cues;supervision  -LB,LS,LB2      Supine-Sit [place] (Bed Mobility)  verbal cues;supervision SBA  -LB,LS,LB2      Sit-Supine [place] (Bed Mobility)  verbal cues;supervision SBA  -LB,LS,LB2      Bed Mobility, Safety Issues  decreased use of arms for pushing/pulling;decreased use of legs for bridging/pushing  -LB,LS,LB2      Recorded by [LB,LS,LB2] Eliane Blackburn, PT (r) Naomy Mendenhall, PT Student (t) Eliane Blackburn, PT (c)      Row Name 04/15/19 0950             Transfer Assessment/Treatment    Transfer Assessment/Treatment  car transfer  -LB,LS,LB2      Recorded by [LB,LS,LB2] Eliane Blackburn, PT (r) Naomy Mendenhall, PT Student (t) Eliane Blackburn, PT (c)      Row Name 04/15/19 0950             Bed-Chair Transfer    Bed-Chair [place] (Transfers)  verbal cues;contact guard  -LB,LS,LB2      Assistive Device (Bed-Chair Transfers)  wheelchair  -LB,LS,LB2      Recorded by [LB,LS,LB2] Eliane Blackburn, PT (r) Naomy Mendenhall, PT Student (t) Eliane Blackburn, PT (c)      Row Name 04/15/19 0950             Chair-Bed Transfer    Chair-Bed [place] (Transfers)  verbal cues;contact guard  -LB,LS,LB2      Assistive Device (Chair-Bed Transfers)  wheelchair  -LB,LS,LB2      Recorded by [LB,LS,LB2] Eliane Blackburn, PT (r) Naomy Mendenhall, PT Student (t) Eliane Blackburn, PT (c)      Row Name 04/15/19 0950             Sit-Stand Transfer    Sit-Stand [place] (Transfers)  contact guard  -LB,LS,LB2      Assistive Device (Sit-Stand Transfers)  wheelchair  -LB,LS,LB2      Recorded by [LB,LS,LB2] Eliane Blackburn, PT (r) Naomy Mendenhall, PT Student (t) Eliane Blackburn, PT (c)      Row Name 04/15/19 0950             Stand-Sit Transfer    Stand-Sit [place] (Transfers)  contact guard  -LB,LS,LB2      Assistive Device (Stand-Sit Transfers)  wheelchair  -LB,LS,LB2      Recorded by [LB,LS,LB2] Eliane Blackburn, PT (r) Naomy Mendenhall PT Student (t) Eliane Blackburn PT (c)      Row Name 04/15/19 9400             Toilet  Transfer    Type (Toilet Transfer)  stand pivot/stand step  -DN      [place] Level (Toilet Transfer)  minimum assist (75% patient effort);nonverbal cues (demo/gesture);contact guard  -DN      Assistive Device (Toilet Transfer)  commode, 3-in-1;walker, front-wheeled mod vc for foot placement and to concentrate  -DN      Recorded by [DN] Reg Mckinney, OT      Row Name 04/15/19 1157             Shower Transfer    Type (Shower Transfer)  stand pivot/stand step  -DN      [place] Level (Shower Transfer)  contact guard;verbal cues  -DN      Assistive Device (Shower Transfer)  grab bars/tub rail  -DN      Recorded by [DN] Reg Mckinney, OT      Row Name 04/15/19 0950             Car Transfer    Type (Car Transfer)  stand pivot/stand step  -LB,LS,LB2      [place] Level (Car Transfer)  minimum assist (75% patient effort);verbal cues  -LB,LS,LB2      Assistive Device (Car Transfer)  --  -LB3      Recorded by [LB,LS,LB2] Eliane Blackburn, PT (r) Naomy Mendenhall, PT Student (t) Eliane Blackburn, PT (c)  [LB3] Eliane Blackburn, PT      Row Name 04/15/19 0950             Gait/Stairs Assessment/Training    [place] Level (Gait)  contact guard;2 person assist 1 person for shorter distances  -LB,LS,LB2      Assistive Device (Gait)  cane, quad;walker, front-wheeled  -LB,LS,LB2      Distance in Feet (Gait)  80 x 3, 50 x 5  -LB,LS,LB2      Pattern (Gait)  step-through  -LB,LS,LB2      Deviations/Abnormal Patterns (Gait)  nancy decreased;gait speed decreased  -LB,LS,LB2      Bilateral Gait Deviations  weight shift ability decreased;heel strike decreased  -LB,LS,LB2      Right Sided Gait Deviations  heel strike decreased;knee buckling, right side;knee hyperextension  -LB,LS,LB2      [place] Level (Stairs)  verbal cues;contact guard;minimum assist (75% patient effort);2 person assist  -LB,LS,LB2      Handrail Location (Stairs)  both sides  -LB,LS,LB2      Number of Steps (Stairs)  4  -LB,LS,LB2      Ascending Technique  (Stairs)  step-to-step  -LB,LS,LB2      Descending Technique (Stairs)  step-to-step  -LB,LS,LB2      Stairs, Safety Issues  weight-shifting ability decreased;sequencing ability decreased  -LB,LS,LB2      Stairs, Impairments  strength decreased;impaired balance;motor control impaired  -LB,LS,LB2      Comment (Gait/Stairs)  ambulated without AFO this date--pt foot clearance improved with no instance of catching/dragging R foot. Intermittent knee buckling/hyperextension which pt now able to correct independently. Ambulated with Rwx--improved walker navigation and sequencing compared to prior use of walker. Pt gait more steady when using Rwx compared to cane. Pt requires significant and constant cueing for proper sequencing and step to step pattern on stairs.  -LB,LS,LB2      Recorded by [LB,LS,LB2] Eliane Blackburn PT (r) Naomy Mendenhall, PT Student (t) Eliane Blackburn PT (c)      Row Name 04/15/19 115 04/15/19 0950          Safety Issues, Functional Mobility    Safety Issues Affecting Function (Mobility)  ability to follow commands;at risk behavior observed;awareness of need for assistance;impulsivity;insight into deficits/self awareness;judgment;problem solving;safety precaution awareness;sequencing abilities  -DN  impulsivity;judgment;insight into deficits/self awareness;problem solving;sequencing abilities  -LB,LS,LB2     Impairments Affecting Function (Mobility)  balance;cognition;coordination;motor control;strength  -DN  balance;cognition;coordination;strength;visual/perceptual  -LB,LS,LB2     Comment, Safety Issues/Impairments (Mobility)  R visual field deficit  -DN  poor carryover/working memory of verbal cues and instructions given to maintain safety  -LB,LS,LB2     Recorded by [DN] Reg Mckinney, OT [LB,LS,LB2] Eliane Blackburn PT (r) Naomy Mendenhall, PT Student (t) Eliane Blackburn PT (c)     Row Name 04/15/19 1151             Bathing Assessment/Treatment    Bathing [place] Level  bathing skills;verbal  cues;contact guard assist;nonverbal cues (demo/gesture)  -DN      Assistive Device (Bathing)  grab bar/tub rail;hand held shower spray hose;shower chair  -DN      Bathing Position  supported sitting;supported standing  -DN      Bathing Setup Assistance  adjust water temperature;obtain supplies  -DN      Recorded by [DN] Reg Mckinney, OT      Row Name 04/15/19 1157             Upper Body Dressing Assessment/Treatment    Upper Body Dressing Task  upper body dressing skills;don;doff;pull over garment  -DN      Upper Body Dressing Position  supported sitting  -DN      Set-up Assistance (Upper Body Dressing)  obtain clothing  -DN      Recorded by [DN] Reg Mckinney, OT      Row Name 04/15/19 1157             Lower Body Dressing Assessment/Treatment    Lower Body Dressing [place] Level  don;doff;pants/bottoms;shoes/slippers;socks;underwear;minimum assist (75% patient effort);verbal cues;set up  -DN      Lower Body Dressing Position  supported sitting;supported standing  -DN      Lower Body Dressing Setup Assistance  orthosis application;obtain clothing  -DN      Recorded by [DN] Reg Mckinney, OT      Row Name 04/15/19 1157             Grooming Assessment/Treatment    Grooming [place] Level  grooming skills;deodorant application;hair care, combing/brushing;oral care regimen;shave face;supervision;verbal cues  -DN      Grooming Position  sink side;supported sitting  -DN      Grooming Setup Assistance  obtain supplies  -DN      Recorded by [DN] Reg Mckinney, OT      Row Name 04/15/19 0950             Pain Assessment    Additional Documentation  Pain Scale: Numbers Pre/Post-Treatment (Group)  -LB,LS,LB2      Recorded by [LB,LS,LB2] Eliane Blackburn, PT (r) Naomy Mendenhall PT Student (t) Eliane Blackburn, PT (c)      Row Name 04/15/19 1503 04/15/19 1157 04/15/19 0950       Pain Scale: Numbers Pre/Post-Treatment    Pain Scale: Numbers, Pretreatment  0/10 - no pain  -DN  0/10 - no pain  -DN  0/10 - no pain  -LB,LS,LB2     Pain Scale: Numbers, Post-Treatment  0/10 - no pain  -DN  0/10 - no pain  -DN  0/10 - no pain  -LB,LS,LB2    Recorded by [DN] Reg Mckinney OT [DN] Reg Mckinney OT [LB,LS,LB2] Eliane Blackburn, PT (r) Naomy Mendenhall, PT Student (t) Eliane Blackburn, PT (c)    Row Name 04/15/19 0950             Dynamic Balance Activity    Therapeutic Training Performed (Dynamic Balance)  obstacle course  -LB,LS,LB2      Support Needed for Balance (Dynamic Balance Training)  CGA;uses both upper extremities for support  -LB3      Comment (Dynamic Balance Training)  Practiced ambulating with turns between cones 4 x 10 ft with RWx  -LB3      Recorded by [LB,LS,LB2] Eliane Blackburn, PT (r) Naomy Mendenhall, PT Student (t) Eliane Blackburn, PT (c)  [LB3] Eliane Blackburn, PT      Row Name 04/15/19 1503             Upper Extremity Seated Therapeutic Exercise    Performed, Seated Upper Extremity (Therapeutic Exercise)  shoulder flexion/extension;shoulder abduction/adduction;shoulder external/internal rotation;shoulder horizontal abduction/adduction  -DN      Device, Seated Upper Extremity (Therapeutic Exercise)  free weights, barbell  -DN      Exercise Type, Seated Upper Extremity (Therapeutic Exercise)  AAROM (active assistive range of motion);active stretching  -DN      Expected Outcomes, Seated Upper Extremity (Therapeutic Exercise)  improve functional tolerance, self-care activity;improve performance, reaching above shoulder level;improve performance, transfer skills  -DN      Sets/Reps Detail, Seated Upper Extremity (Therapeutic Exercise)  3 sets of 10 reps with 3# dumbell for elbow, forearm and wrist, also handgripper at 1 set of 20 reps for 2 bands  -DN      Recorded by [DN] Reg Mckinney OT      Row Name 04/15/19 0950             Lower Extremity Standing Therapeutic Exercise    Performed, Standing Lower Extremity (Therapeutic Exercise)  mini-squats  -LB,LS,LB2      Exercise Type, Standing Lower Extremity (Therapeutic Exercise)  AROM  (active range of motion)  -LB,LS,LB2      Sets/Reps Detail, Standing Lower Extremity (Therapeutic Exercise)  2 x 10  -LB,LS,LB2      Comment, Standing Lower Extremity (Therapeutic Exercise)  Bilateral UE support on jay bar. Pt able to control R knee with no need for assist this date. VCs for proper form  -LB,LS,LB2      Recorded by [LB,LS,LB2] Eliane Blackburn, PT (r) Naomy Mendenhall, PT Student (t) Eliane Blackburn, PT (c)      Row Name 04/15/19 0950             Lower Extremity Seated Therapeutic Exercise    Performed, Seated Lower Extremity (Therapeutic Exercise)  hip flexion/extension;ankle dorsiflexion/plantarflexion ankle eversion  -LB,LS,LB2      Device, Seated Lower Extremity (Therapeutic Exercise)  elastic bands/tubing  -LB,LS,LB2      Exercise Type, Seated Lower Extremity (Therapeutic Exercise)  resistive exercise  -LB,LS,LB2      Sets/Reps Detail, Seated Lower Extremity (Therapeutic Exercise)  1 x 12  -LB,LS,LB2      Comment, Seated Lower Extremity (Therapeutic Exercise)  red theraband  -LB,LS,LB2      Recorded by [LB,LS,LB2] Eliane Blackbunr, PT (r) Naomy Mendenhall, PT Student (t) Eliane Blackburn, PT (c)      Row Name 04/15/19 1503 04/15/19 1157 04/15/19 0950       Positioning and Restraints    Pre-Treatment Position  sitting in chair/recliner  -DN  sitting in chair/recliner  -DN  sitting in chair/recliner  -LB,LS,LB2    Post Treatment Position  wheelchair  -DN  wheelchair  -DN  wheelchair  -LB,LS,LB2    In Bed  sitting;with PT  -DN  sitting;call light within reach;encouraged to call for assist  -DN  --    In Wheelchair  --  --  sitting;exit alarm on;patient within staff view;with SLP  -LB,LS,LB2    Recorded by [DN] Reg Mckinney, OT [DN] Reg Mckinney, OT [LB,LS,LB2] Eliane Blackburn, PT (r) Naomy Mendenhall, PT Student (t) Eliane Blackburn, PT (c)    Row Name 04/15/19 0950             Weekly Summary of Progress (PT)    Weekly Outcome Summary: Physical Therapy  Pt demonstrates improved R LE strength and coordination. Pt  toe clearance improved during ambulation, now able to walk without AFO. Pt still requires CGA of 2 for ambulating longer distances due to decreased safety with intermittent decreased R knee control. Pt need consistent cueing for sequencing with complex mobility tasks; cognitive deficits and memory limit carryover with cues. Pt continues to be more engaged in sessions, but is still impulsive at times and has poor insight into deficits with poor safety decision making at times. Pt will continue to benefit from skilled PT to address safety concerns,  increase independence with mobility and continue progress with R LE strength and coordination  -LB,LS,LB2      Recorded by [LB,LS,LB2] Eliane Blackburn, PT (r) Naomy Mendenhall, PT Student (t) Eliane Blackburn, PT (c)        User Key  (r) = Recorded By, (t) = Taken By, (c) = Cosigned By    Initials Name Effective Dates    SL Narcisa Bonner, MS CCC-SLP 06/08/18 -     Reg Bolden, OT 06/08/18 -     Eliane Pereira, PT 04/03/18 -     Naomy Armas, PT Student 02/04/19 -           Wound 03/26/19 0900 Left chest incision (Active)   Dressing Appearance open to air 4/15/2019  7:20 AM   Closure None 4/14/2019  9:02 PM   Base clean;dry 4/14/2019  9:02 PM   Periwound dry;intact 4/15/2019  7:20 AM   Drainage Amount none 4/14/2019  9:02 PM   Dressing Care, Wound open to air 4/14/2019  9:02 PM         OT Recommendation and Plan    Anticipated Equipment Needs At Discharge (OT Eval): commode, 3-in-1, shower chair, tub bench  Planned Therapy Interventions (OT Eval): BADL retraining, cognitive/visual perception retraining, functional balance retraining, neuromuscular control/coordination retraining, strengthening exercise, transfer/mobility retraining            OT IRF GOALS     Row Name 04/09/19 1400 04/02/19 1500          Bathing Goal 1 (OT-IRF)    Activity/Device (Bathing Goal 1, OT-IRF)  bathing skills, all  -DN  bathing skills, all  -DN     [place] Level (Bathing Goal 1, OT-IRF)   supervision required;verbal cues required  -DN  contact guard assist;verbal cues required  -DN     Time Frame (Bathing Goal 1, OT-IRF)  short term goal (STG)  -DN  short term goal (STG)  -DN     Progress/Outcomes (Bathing Goal 1, OT-IRF)  goal met;goal revised this date  -DN  goal met;goal revised this date  -DN        Bathing Goal 2 (OT-IRF)    Activity/Device (Bathing Goal 2, OT-IRF)  bathing skills, all  -DN  bathing skills, all  -DN     [place] Level (Bathing Goal 2, OT-IRF)  supervision required  -DN  supervision required  -DN     Time Frame (Bathing Goal 2, OT-IRF)  long term goal (LTG)  -DN  long term goal (LTG)  -DN     Progress/Outcomes (Bathing Goal 2, OT-IRF)  goal ongoing  -DN  goal ongoing  -DN        UB Dressing Goal 1 (OT-IRF)    Activity/Device (UB Dressing Goal 1, OT-IRF)  upper body dressing  -DN  upper body dressing  -DN     [place] (UB Dress Goal 1, OT-IRF)  supervision required  -DN  supervision required  -DN     Time Frame (UB Dressing Goal 1, OT-IRF)  short term goal (STG)  -DN  short term goal (STG)  -DN     Progress/Outcomes (UB Dressing Goal 1, OT-IRF)  goal met;goal ongoing  -DN  goal met;goal revised this date  -DN        UB Dressing Goal 2 (OT-IRF)    Activity/Device (UB Dressing Goal 2, OT-IRF)  upper body dressing  -DN  upper body dressing  -DN     [place] (UB Dress Goal 2, OT-IRF)  supervision required  -DN  supervision required  -DN     Time Frame (UB Dressing Goal 2, OT-IRF)  long term goal (LTG)  -DN  long term goal (LTG)  -DN     Progress/Outcomes (UB Dressing Goal 2, OT-IRF)  goal ongoing;goal met  -DN  goal ongoing;goal met  -DN        LB Dressing Goal 1 (OT-IRF)    Activity/Device (LB Dressing Goal 1, OT-IRF)  lower body dressing  -DN  lower body dressing  -DN     [place] (LB Dressing Goal 1, OT-IRF)  minimum assist (75% or more patient effort);verbal cues required;tactile cues required  -DN  minimum assist (75% or more patient effort);verbal cues  required;tactile cues required  -DN     Time Frame (LB Dressing Goal 1, OT-IRF)  short term goal (STG)  -DN  short term goal (STG)  -DN     Progress/Outcomes (LB Dressing Goal 1, OT-IRF)  goal met;goal ongoing  -DN  goal met;goal revised this date  -DN        LB Dressing Goal 2 (OT-IRF)    Activity/Device (LB Dressing Goal 2, OT-IRF)  lower body dressing  -DN  lower body dressing  -DN     [place] (LB Dressing Goal 2, OT-IRF)  verbal cues required;tactile cues required;contact guard assist  -DN  verbal cues required;tactile cues required;minimum assist (75% or more patient effort)  -DN     Time Frame (LB Dressing Goal 2, OT-IRF)  long term goal (LTG)  -DN  long term goal (LTG)  -DN     Progress/Outcomes (LB Dressing Goal 2, OT-IRF)  goal revised this date  -DN  goal ongoing  -DN        Grooming Goal 1 (OT-IRF)    Activity/Device (Grooming Goal 1, OT-IRF)  grooming skills, all  -DN  grooming skills, all  -DN     [place] (Grooming Goal 1, OT-IRF)  supervision required  -DN  supervision required  -DN     Time Frame (Grooming Goal 1, OT-IRF)  short term goal (STG)  -DN  short term goal (STG)  -DN     Progress/Outcomes (Grooming Goal 1, OT-IRF)  goal ongoing  -DN  goal partially met;goal ongoing  -DN        Grooming Goal 2 (OT-IRF)    Activity/Device (Grooming Goal 2, OT-IRF)  grooming skills, all  -DN  grooming skills, all  -DN     [place] (Grooming Goal 2, OT-IRF)  supervision required  -DN  supervision required  -DN     Time Frame (Grooming Goal 2, OT-IRF)  long term goal (LTG)  -DN  long term goal (LTG)  -DN     Progress/Outcomes (Grooming Goal 2, OT-IRF)  goal revised this date  -DN  goal revised this date  -DN        Toileting Goal 1 (OT-IRF)    Activity/Device (Toileting Goal 1, OT-IRF)  toileting skills, all  -DN  toileting skills, all  -DN     [place] Level (Toileting Goal 1, OT-IRF)  minimum assist (75% or more patient effort);verbal cues required;tactile cues required  -DN  minimum assist  (75% or more patient effort);verbal cues required;tactile cues required  -DN     Time Frame (Toileting Goal 1, OT-IRF)  short term goal (STG)  -DN  short term goal (STG)  -DN     Progress/Outcomes (Toileting Goal 1, OT-IRF)  goal met;goal revised this date  -DN  goal met;goal revised this date  -DN        Toileting Goal 2 (OT-IRF)    Activity/Device (Toileting Goal 2, OT-IRF)  toileting skills, all  -DN  toileting skills, all  -DN     [place] Level (Toileting Goal 2, OT-IRF)  contact guard assist;verbal cues required;tactile cues required  -DN  contact guard assist;verbal cues required;tactile cues required  -DN     Time Frame (Toileting Goal 2, OT-IRF)  long term goal (LTG)  -DN  long term goal (LTG)  -DN     Progress/Outcomes (Toileting Goal 2, OT-IRF)  goal ongoing  -DN  goal ongoing  -DN        Self-Feeding Goal 1 (OT-IRF)    Activity/Device (Self-Feeding Goal 1, OT-IRF)  self-feeding skills, all  -DN  self-feeding skills, all  -DN     [place] (Self-Feeding Goal 1, OT-IRF)  supervision required  -DN  supervision required  -DN     Time Frame (Self-Feeding Goal 1, OT-IRF)  short term goal (STG)  -DN  short term goal (STG)  -DN     Progress/Outcomes 1 (Self-Feeding Goal, OT-IRF)  goal met;goal ongoing  -DN  goal met;goal ongoing  -DN        Self-Feeding Goal 2 (OT-IRF)    Activity/Device (Self-Feeding Goal 2, OT-IRF)  self-feeding skills, all  -DN  self-feeding skills, all  -DN     [place] (Self-Feeding Goal 2, OT-IRF)  supervision required  -DN  supervision required  -DN     Time Frame (Self-Feeding Goal 2, OT-IRF)  long term goal (LTG)  -DN  long term goal (LTG)  -DN     Progress/Outcomes (Self-Feeding Goal 2, OT-IRF)  goal met;goal ongoing  -DN  goal met;goal ongoing  -DN        Caregiver Training Goal 2 (OT-IRF)    Caregiver Training Goal 2 (OT-IRF)  pt caregiver to be independent with any assist pt needs with adls, transfers and HEP for d/c home  -DN  pt caregiver to be independent with any  assist pt needs with adls, transfers and HEP for d/c home  -DN     Time Frame (Caregiver Training Goal 2, OT-IRF)  long term goal (LTG)  -DN  long term goal (LTG)  -DN     Progress/Outcomes (Caregiver Training Goal 2, OT-IRF)  goal ongoing  -DN  goal ongoing  -DN       User Key  (r) = Recorded By, (t) = Taken By, (c) = Cosigned By    Initials Name Provider Type    Reg Bolden OT Occupational Therapist          Occupational Therapy Education     Title: PT OT SLP Therapies (In Progress)     Topic: Occupational Therapy (Done)     Point: ADL training (Done)     Description: Instruct learner(s) on proper safety adaptation and remediation techniques during self care or transfers.   Instruct in proper use of assistive devices.    Learning Progress Summary           Patient Acceptance, E,TB,D, VU,NR by DN at 4/15/2019 12:04 PM    Comment:  pt presents with carryover of adapitive adls, functional transfers, and overall strength with min vc for safety    Acceptance, E,TB,D, VU,NR by DN at 4/9/2019  2:25 PM    Comment:  jay adls, adaptive visual scanning, transfer training    Acceptance, E,TB,D, VU,NR by DN at 4/8/2019 12:16 PM    Comment:  transfer training, jay skills with adls, safety,etc    Acceptance, E,TB,D, VU,NR by DN at 4/4/2019  3:19 PM    Comment:  theraputty exercises, jay adls and transfer trainning    Acceptance, E,TB,D, NR by DN at 3/26/2019 12:06 PM    Comment:  jay adls and commode/shower transfers, still max vc for all due to cognition and visual impairments    Acceptance, E,TB,D, VU,NR by DN at 3/25/2019  5:23 PM    Comment:  OT role in rehab, transfer traning, jay adls                   Point: Home exercise program (Done)     Description: Instruct learner(s) on appropriate technique for monitoring, assisting and/or progressing therapeutic exercises/activities.    Learning Progress Summary           Patient Acceptance, E,TB,D, VU,NR by DN at 4/15/2019 12:04 PM    Comment:  pt presents with  carryover of adapitive adls, functional transfers, and overall strength with min vc for safety    Acceptance, E,TB,D, VU,NR by DN at 4/9/2019  2:25 PM    Comment:  jay adls, adaptive visual scanning, transfer training    Acceptance, E,TB,D, VU,NR by DN at 4/8/2019 12:16 PM    Comment:  transfer training, jay skills with adls, safety,etc    Acceptance, E,TB,D, VU,NR by DN at 4/4/2019  3:19 PM    Comment:  theraputty exercises, jay adls and transfer trainning    Acceptance, E,TB,D, NR by DN at 3/26/2019 12:06 PM    Comment:  jay adls and commode/shower transfers, still max vc for all due to cognition and visual impairments    Acceptance, E,TB,D, VU,NR by DN at 3/25/2019  5:23 PM    Comment:  OT role in rehab, transfer traning, jay adls                   Point: Precautions (Done)     Description: Instruct learner(s) on prescribed precautions during self-care and functional transfers.    Learning Progress Summary           Patient Acceptance, E,TB,D, VU,NR by DN at 4/15/2019 12:04 PM    Comment:  pt presents with carryover of adapitive adls, functional transfers, and overall strength with min vc for safety    Acceptance, E,TB,D, VU,NR by DN at 4/9/2019  2:25 PM    Comment:  jay adls, adaptive visual scanning, transfer training    Acceptance, E,TB,D, VU,NR by DN at 4/8/2019 12:16 PM    Comment:  transfer training, jay skills with adls, safety,etc    Acceptance, E,TB,D, VU,NR by DN at 4/4/2019  3:19 PM    Comment:  theraputty exercises, jay adls and transfer trainning    Acceptance, E,TB,D, NR by DN at 3/26/2019 12:06 PM    Comment:  jay adls and commode/shower transfers, still max vc for all due to cognition and visual impairments    Acceptance, E,TB,D, VU,NR by DN at 3/25/2019  5:23 PM    Comment:  OT role in rehab, transfer traning, jay adls                   Point: Body mechanics (Done)     Description: Instruct learner(s) on proper positioning and spine alignment during self-care, functional mobility  activities and/or exercises.    Learning Progress Summary           Patient Acceptance, E,TB,D, VU,NR by DN at 4/15/2019 12:04 PM    Comment:  pt presents with carryover of adapitive adls, functional transfers, and overall strength with min vc for safety    Acceptance, E,TB,D, VU,NR by DN at 4/9/2019  2:25 PM    Comment:  jay adls, adaptive visual scanning, transfer training    Acceptance, E,TB,D, VU,NR by DN at 4/8/2019 12:16 PM    Comment:  transfer training, jay skills with adls, safety,etc    Acceptance, E,TB,D, VU,NR by DN at 4/4/2019  3:19 PM    Comment:  theraputty exercises, jay adls and transfer trainning    Acceptance, E,TB,D, NR by DN at 3/26/2019 12:06 PM    Comment:  jay adls and commode/shower transfers, still max vc for all due to cognition and visual impairments    Acceptance, E,TB,D, VU,NR by DN at 3/25/2019  5:23 PM    Comment:  OT role in rehab, transfer traning, jay adls                               User Key     Initials Effective Dates Name Provider Type Discipline    DN 06/08/18 -  Reg Mckinney OT Occupational Therapist OT                       Time Calculation:     Time Calculation- OT     Row Name 04/15/19 1506 04/15/19 1205          Time Calculation- OT    OT Start Time  1230  -DN  0900  -DN     OT Stop Time  1300  -DN  0930  -DN     OT Time Calculation (min)  30 min  -DN  30 min  -DN     OT Received On  04/15/19  -DN  04/15/19  -DN       User Key  (r) = Recorded By, (t) = Taken By, (c) = Cosigned By    Initials Name Provider Type    Reg Bolden OT Occupational Therapist          Therapy Charges for Today     Code Description Service Date Service Provider Modifiers Qty    94414892711 HC OT SELF CARE/MGMT/TRAIN EA 15 MIN 4/15/2019 Reg Mckinney OT GO 2    90433101808 HC OT SELF CARE/MGMT/TRAIN EA 15 MIN 4/15/2019 Reg Mckinney OT GO 2    23918449677 HC OT SELF CARE/MGMT/TRAIN EA 15 MIN 4/15/2019 Reg Mckinney OT GO 1    88553970913 HC OT NEUROMUSC RE EDUCATION EA 15 MIN  4/15/2019 Reg Mckinney, OT GO 1                   Reg Mckinney, OT  4/15/2019

## 2019-04-15 NOTE — PROGRESS NOTES
Inpatient Rehabilitation Plan of Care Note    Plan of Care  Care Plan Reviewed - No updates at this time.    Safety    Performed Intervention(s)  bed/chair alarm      Sphincter Control    Performed Intervention(s)  Assistance with urinal  Assistance to bathroom  Incontinence care PRN    Signed by: Hannah Gomez RN

## 2019-04-15 NOTE — PLAN OF CARE
Problem: Stroke (IRF) (Adult)  Goal: Promote Optimal Functional [place]  Outcome: Ongoing (interventions implemented as appropriate)      Problem: Fall Risk (Adult)  Goal: Absence of Fall  Outcome: Ongoing (interventions implemented as appropriate)      Problem: Diabetes, Type 2 (Adult)  Goal: Signs and Symptoms of Listed Potential Problems Will be Absent, Minimized or Managed (Diabetes, Type 2)  Outcome: Ongoing (interventions implemented as appropriate)      Problem: Skin Injury Risk (Adult)  Goal: Skin Health and Integrity  Outcome: Ongoing (interventions implemented as appropriate)      Problem: Patient Care Overview  Goal: Plan of Care Review  Outcome: Ongoing (interventions implemented as appropriate)   04/15/19 0229 04/15/19 1734   Patient Care Overview   IRF Plan of Care Review progress ongoing, continue --    OTHER   Outcome Summary --  Pt with no c/o pain. Transfering with min assitance. Pt preparing for DC.     Goal: Individualization and Mutuality  Outcome: Ongoing (interventions implemented as appropriate)    Goal: Discharge Needs Assessment  Outcome: Ongoing (interventions implemented as appropriate)    Goal: Home Safety Plan  Outcome: Ongoing (interventions implemented as appropriate)    Goal: Coping Plan  Outcome: Ongoing (interventions implemented as appropriate)    Goal: Community Reintegration Plan  Outcome: Ongoing (interventions implemented as appropriate)

## 2019-04-15 NOTE — PROGRESS NOTES
Inpatient Rehabilitation Functional Measures Assessment and Plan of Care    Plan of Care  Updated Problems/Interventions  Mobility    [PT] Walk(Active)  Current Status(04/08/2019): 80 ft CGA- Min A of 2 with Rwx, 20 ft CGA x 1 w/ Rwx  Weekly Goal(04/15/2019): Rwx to BR CGA  Discharge Goal: 80' CGA of 1 w/ Rwx    [PT] Bed/Chair/Wheelchair(Active)  Current Status(04/15/2019): CGA  Weekly Goal(04/19/2019): CGA-SBA  Discharge Goal: CGA- SBA    [PT] Bed Mobility(Active)  Current Status(04/15/2019): Supervision  Weekly Goal(04/19/2019): Supervision  Discharge Goal: Supervision    [PT] Wheelchair(Active)  Current Status(04/15/2019): 150 ft Mod I  Weekly Goal(04/19/2019): 150ft Mod I  Discharge Goal: 150ft Mod I    [PT] Stairs(Active)  Current Status(04/15/2019): 4 w/ HR CGA-Min A x 2, max VCs  Weekly Goal(04/19/2019): PT only  Discharge Goal: 4 w/ HR CGA    Functional Measures  VANITA Eating:  Branch  VANITA Grooming: Branch  VANITA Bathing:  Branch  VANITA Upper Body Dressing:  Branch  VANITA Lower Body Dressing:  Branch  [facility] Toileting:  Branch    [facility] Bladder Management  Level of Assistance:  Branch  Frequency/Number of Accidents this Shift:  Branch    [facility] Bowel Management  Level of Assistance: Branch  Frequency/Number of Accidents this Shift: Branch    [facility] Bed/Chair/Wheelchair Transfer:  Bed/chair/wheelchair Transfer Score = 4.  Patient performs 75% or more of effort and minimal assistance (little/incidental  help/lifting of one limb/steadying) for transferring to and from the  bed/chair/wheelchair, requiring: Contact guard. Patient requires the following  assistive device(s): Arm rest.  VANITA Toilet Transfer:  Branch  [facility] Tub/Shower Transfer:  Branch    Previously Documented Mode of Locomotion at Discharge: Field  VANITA Expected Mode of Locomotion at Discharge: [facility] Walk/Wheelchair:  WHEELCHAIR OBSERVATION   Activity was not observed.    WALK OBSERVATION   Walk Distance Scale = 2.  Distance walked is 50 -149 feet. Walk Score =  1.  Patient performs 75% or more of effort and requires minimal assistance of two or  more people. 2 people used for safety . Patient walked a distance of 80 feet.  Patient requires the following assistive device(s): Rolling walker.  VANITA Stairs:  Stairs Score = 1.  Patient performs 75% or more of effort and  requires minimal assistance of two or more people for negotiating stairs. 2  people used for safety . Patient negotiated  4 stairs. Patient requires the  following assistive device(s): Handrail(s).    VANITA Comprehension:  Branch  AVNITA Expression:  Branch  VANITA Social Interaction:  Branch  VANITA Problem Solving:  Branch  VANITA Memory:  Branch    Therapy Mode Minutes  Occupational Therapy: Branch  Physical Therapy: Individual: 60 minutes.  Speech Language Pathology:  Branch    Signed by: Naomy Mendenhall PT Student     - CoSigned By: Eliane Blackburn PT 4/15/2019 3:03:14 PM

## 2019-04-15 NOTE — PROGRESS NOTES
Inpatient Rehabilitation Functional Measures Assessment    Functional Measures  VANITA Eating:  [facility] Grooming: [facility] Bathing:  [facility] Upper Body Dressing:  [facility] Lower Body Dressing:  [facility] Toileting:  [facility] Bladder Management  Level of Assistance:  Wing  Frequency/Number of Accidents this Shift:  [facility] Bowel Management  Level of Assistance: [place]  Frequency/Number of Accidents this Shift: [facility] Bed/Chair/Wheelchair Transfer:  [facility] Toilet Transfer:  [facility] Tub/Shower Transfer:  Wing    Previously Documented Mode of Locomotion at Discharge: Field  VANITA Expected Mode of Locomotion at Discharge: [facility] Walk/Wheelchair:  [facility] Stairs:  [facility] Comprehension:  Auditory comprehension is the usual mode. Comprehension  Score = 7, Independent.  Patient comprehends complex/abstract information in  their primary language.  Patient is completely independent for auditory  comprehension.  There are no activity limitations.  VANITA Expression:  Vocal expression is the usual mode. Expression Score = 7,  Independent.  Patient expresses complex/abstract information in their primary  language.  Patient is completely independent for vocal expression.  There are no  activity limitations.  VANITA Social Interaction:  Activity was not observed.  VANITA Problem Solving:  Activity was not observed.  VANITA Memory:  Memory Score = 7, Independent.  Patient is completely independent  for memory.  There are no activity limitations.    Therapy Mode Minutes  Occupational Therapy: Branch  Physical Therapy: Branch  Speech Language Pathology:  Branch    Signed by: Ana Thomas RN

## 2019-04-15 NOTE — PROGRESS NOTES
Nutrition Services    Patient Name:  Nayan Ryan  YOB: 1965  MRN: [id number]  Admit Date:  3/24/2019    Nutrition follow up:    Consistent Carb, Heart Healthy diet  PO intake 100% at meals  Comes to DR for meals, can feeds self with basic set up assist  BG levels well controlled on current regimen  No skin issues  Continue same    Electronically signed by:  Keerthi Putnam RD  04/15/19 9:35 AM

## 2019-04-15 NOTE — PROGRESS NOTES
LOS: 22 days   Patient Care Team:  Qasim Asif MD as PCP - General  Qasim Asif MD as PCP - Family Medicine    Chief Complaint:   Left PCA / posterior MCA territories ischemic infarct March 19, 2019  multifocal restricted diffusion centered posteriorly in the corpus callosum left of midline adjacent to the atrium of the lateral ventricle, at the parieto-occipital cortical interface, in the left corona radiata and along the lateral margin of the left thalamus. There also appears to be subtle low ADC signal continuing cranially along the left posterior parietal cortex with possible involvement of the right as well  Right visual field deficit  Impaired cognition/mobility/self care          Subjective     History of Present Illness    Subjective      Strength continues better.  Still decreased vision to the right.  Tolerates therapies.  History taken from: patient    Objective     Vital Signs  Temp:  [97.1 °F (36.2 °C)-98.4 °F (36.9 °C)] 97.1 °F (36.2 °C)  Heart Rate:  [74-83] 74  Resp:  [16-18] 18  BP: (135-158)/(74-84) 135/81    Objective   MENTAL STATUS -  AWAKE / ALERT  HEENT-  nc/at  LUNGS - CTA, NO WHEEZES, RALES OR RHONCHI  HEART- RRR, NO RUB, MURMUR, OR GALLOP  ABD - NORMOACTIVE BOWEL SOUNDS, SOFT, NT.  Obese.    EXT - NO EDEMA OR CYANOSIS  NEURO -  He is oriented to person and situation.    Takes resistance in the right upper extremity and right lower semi-.  Still decreased motor control in the right upper extremity.  Right visual field cut          Results Review:     I reviewed the patient's new clinical results.   Results for MARGARITA HILL (MRN [id number]) as of 4/14/2019 09:34   Ref. Range 4/13/2019 11:21 4/13/2019 16:02 4/13/2019 21:23 4/14/2019 07:07   Glucose Latest Ref Range: 70 - 130 mg/dL 113 111 147 (H) 106       Medication Review: reviewed    Assessment/Plan       Acute ischemic left PCA stroke (CMS/HCC)    Status post placement of implantable loop recorder    HTN (hypertension)     Diabetes mellitus (CMS/HCC)    Hyperlipidemia    Morbid obesity (CMS/HCC)    Anxiety disorder    Abdominal wall abscess    Stroke (cerebrum) (CMS/HCC)    Vitamin D deficiency    History of fatty infiltration of liver      Assessment & Plan   Left PCA / posterior MCA territories ischemic infarct March 19, 2019  multifocal restricted diffusion centered posteriorly in the corpus callosum left of midline adjacent to the atrium of the lateral ventricle, at the parieto-occipital cortical interface, in the left corona radiata and along the lateral margin of the left thalamus. There also appears to be subtle low ADC signal continuing cranially along the left posterior parietal cortex with possible involvement of the right as well.     HTN- uncontrolled with /130 and 206/108 with ER visit on March 15, 2019 - multi-drug regimen - amlodipine/atenolol/clonidine/lisinopril/dyazide  March 27- holding beds while on IVF hydration as BP low yesterday, concern for perfusion Continues IVF. Recheck BMP in AM. /82 at 13:15 pm  March 28-/86 this AM  DC IVF.  Will resume Lisinopril and atenolol at 1/2 total daily doses initially dividing out bid  Lisinopril 10 mg q 12 hours and atenolol 12.5 mg q 12 hours and adjust meds based on response.  Remains off amlodipine 10 mg daily and Dyazide 37.5/25 and clonidine.  Held Farxiga today as was hydrating with IVF, resume tomorrow.  March 29 - /87 last PM and 175/80 this AM  Will increase atenolol to 25 mg q 12 hours and Lisinopril to 20 mg q 12 hours (both back to previous total daily dose but divided out) and remains off amlodpine 10 mg daily and clonidine 0.1 mg q 12 hours and Dyazide 37.5/25 mg daily.  Per Neruology - Maintain -180, DBP<110.  April 8-blood pressure range 142//88-150s//79.  Norvasc increased to 5 mg on April 2.  Discussed with patient possibly titrating up on Norvasc further to 10 mg daily if needed but may divide out to 5 mg twice a  day for more even control.  We will continue to monitor his blood pressure pattern for now  April 12-continue to follow on present pattern.  Blood pressure was elevated 188 last evening but otherwise typically in target range.  Once further out from stroke, would adjust target range to typical therapeutic range  April 13 and 14 - per notes above - Per Neurology - Maintain -180, DBP<110.  Continue meds as current.             Familia James MD  04/15/19  10:39 AM    Time: 10min

## 2019-04-15 NOTE — PROGRESS NOTES
Inpatient Rehabilitation Functional Measures Assessment    Functional Measures  VANITA Eating:  [facility] Grooming: [facility] Bathing:  [facility] Upper Body Dressing:  [facility] Lower Body Dressing:  [facility] Toileting:  [facility] Bladder Management  Level of Assistance:  [place]  Frequency/Number of Accidents this Shift:  [facility] Bowel Management  Level of Assistance: [place]  Frequency/Number of Accidents this Shift: [facility] Bed/Chair/Wheelchair Transfer:  [facility] Toilet Transfer:  [facility] Tub/Shower Transfer:  [place]    Previously Documented Mode of Locomotion at Discharge: Field  VANITA Expected Mode of Locomotion at Discharge: [facility] Walk/Wheelchair:  [facility] Stairs:  [facility] Comprehension:  Auditory comprehension is the usual mode. Comprehension  Score = 6, Modified [place].  Patient comprehends complex/abstract  information in their primary language with only mild difficulty.  VANITA Expression:  Vocal expression is the usual mode. Expression Score = 7,  Independent.  Patient expresses complex/abstract information in their primary  language.  Patient is completely independent for vocal expression.  There are no  activity limitations.  VANITA Social Interaction:  Social Interaction Score = 7, Independent. Patient is  completely independent for social interaction.  There are no activity  limitations.  VANITA Problem Solving:  Problem Solving Score = 6, Modified [place].  Patient  makes appropriate decisions in order to solve complex problems with mild  difficulty but self-corrects.  VANITA Memory:  Memory Score = 6, Modified [place].  Patient is modified  independent for memory, having only mild difficulty and using self-initiated or  environmental cues to remember.    Therapy Mode Minutes  Occupational Therapy: Branch  Physical Therapy: [place]  Speech Language Pathology:  [place]    Signed by: Hannah Gomez RN

## 2019-04-15 NOTE — THERAPY TREATMENT NOTE
Inpatient Rehabilitation - Occupational Therapy Treatment Note    [facility]     Patient Name: Nayan Ryan  : 1965  MRN: [id number]    Today's Date: 4/15/2019                 Admit Date: 3/24/2019      Visit Dx:  No diagnosis found.    Patient Active Problem List   Diagnosis   • Acute ischemic left PCA stroke (CMS/HCC)   • Status post placement of implantable loop recorder   • HTN (hypertension)   • Diabetes mellitus (CMS/HCC)   • Hyperlipidemia   • Morbid obesity (CMS/HCC)   • Anxiety disorder   • Migraine   • H/O hernia repair   • Abdominal wall abscess   • Back pain   • Stroke (cerebrum) (CMS/HCC)   • Vitamin D deficiency   • History of fatty infiltration of liver         Therapy Treatment    IRF Treatment Summary     Row Name 04/15/19 1157 04/15/19 1000 04/15/19 0950       Evaluation/Treatment Time and Intent    Subjective Information  no complaints  -DN  no complaints  -SL  no complaints  (Pended)   -LS    Existing Precautions/Restrictions  fall  -DN  fall  -SL  fall  (Pended)   -    Document Type  therapy note (daily note)  -DN  therapy note (daily note)  -SL  therapy note (daily note)  (Pended)   -    Mode of Treatment  occupational therapy  -DN  individual therapy;speech-language pathology  -SL  physical therapy  (Pended)   -LS    Patient/Family Observations  cooperative  -DN  cooperatine  -SL  Pt arrives in w/c with OT  (Pended)   -    Start Time (Evaluation/Treatment)  --  1000  -SL  --    Stop Time (Evaluation/Treatment)  --  1030  -SL  --    Recorded by [DN] Reg Mckinney OT [SL] Narcisa Bonner, MS CCC-SLP [LS] Naomy Mendenhall, PT Student    Row Name 04/15/19 1157             Safety Awareness/Health Promotion    Additional Documentation  Fall Prevention (Row)  -DN      Recorded by [DN] Reg Mckinney OT      Row Name 04/15/19 1157             Cognition/Psychosocial- PT/OT    Orientation Status (Cognition)  oriented x 3  -DN      Follows Commands (Cognition)  follows one step commands;over  90% accuracy;verbal cues/prompting required  -DN      Personal Safety Interventions  fall prevention program maintained;gait belt  -DN      Cognitive Function (Cognitive)  attention deficit;safety deficit;memory deficit;executive function deficit  -DN      Attention Deficit (Cognitive)  mild deficit  -DN      Executive Function Deficit (Cognition)  moderate deficit  -DN      Memory Deficit (Cognitive)  moderate deficit  -DN      Safety Deficit (Cognitive)  moderate deficit  -DN      Recorded by [DN] Reg Mckinney, OT      Row Name 04/15/19 1157             Shower Transfer    Type (Shower Transfer)  stand pivot/stand step  -DN      [place] Level (Shower Transfer)  contact guard;verbal cues  -DN      Assistive Device (Shower Transfer)  grab bars/tub rail  -DN      Recorded by [DN] Reg Mckinney, OT      Row Name 04/15/19 1157             Safety Issues, Functional Mobility    Safety Issues Affecting Function (Mobility)  ability to follow commands;at risk behavior observed;awareness of need for assistance;impulsivity;insight into deficits/self awareness;judgment;problem solving;safety precaution awareness;sequencing abilities  -DN      Impairments Affecting Function (Mobility)  balance;cognition;coordination;motor control;strength  -DN      Comment, Safety Issues/Impairments (Mobility)  R visual field deficit  -DN      Recorded by [DN] Reg Mckinney, OT      Row Name 04/15/19 1157             Bathing Assessment/Treatment    Bathing [place] Level  bathing skills;verbal cues;contact guard assist;nonverbal cues (demo/gesture)  -DN      Assistive Device (Bathing)  grab bar/tub rail;hand held shower spray hose;shower chair  -DN      Bathing Position  supported sitting;supported standing  -DN      Bathing Setup Assistance  adjust water temperature;obtain supplies  -DN      Recorded by [DN] Reg Mckinney, OT      Row Name 04/15/19 1157             Upper Body Dressing Assessment/Treatment    Upper Body Dressing  Task  upper body dressing skills;don;doff;pull over garment  -DN      Upper Body Dressing Position  supported sitting  -DN      Set-up Assistance (Upper Body Dressing)  obtain clothing  -DN      Recorded by [BRENDAN] Reg Mckinney OT      Row Name 04/15/19 1157             Lower Body Dressing Assessment/Treatment    Lower Body Dressing [place] Level  don;doff;pants/bottoms;shoes/slippers;socks;underwear;minimum assist (75% patient effort);verbal cues;set up  -DN      Lower Body Dressing Position  supported sitting;supported standing  -DN      Lower Body Dressing Setup Assistance  orthosis application;obtain clothing  -DN      Recorded by [BRENDAN] Reg Mckinney OT      Row Name 04/15/19 1157             Grooming Assessment/Treatment    Grooming [place] Level  grooming skills;deodorant application;hair care, combing/brushing;oral care regimen;shave face;supervision;verbal cues  -DN      Grooming Position  sink side;supported sitting  -DN      Grooming Setup Assistance  obtain supplies  -DN      Recorded by [BRENDAN] Reg Mckinney OT      Row Name 04/15/19 1157             Pain Scale: Numbers Pre/Post-Treatment    Pain Scale: Numbers, Pretreatment  0/10 - no pain  -DN      Pain Scale: Numbers, Post-Treatment  0/10 - no pain  -DN      Recorded by [BRENDAN] Reg Mckinney OT      Row Name 04/15/19 1157             Positioning and Restraints    Pre-Treatment Position  sitting in chair/recliner  -DN      Post Treatment Position  wheelchair  -DN      In Bed  sitting;call light within reach;encouraged to call for assist  -DN      Recorded by [BRENDAN] Reg Mckinney OT        User Key  (r) = Recorded By, (t) = Taken By, (c) = Cosigned By    Initials Name Effective Dates    Narcisa Urias, MS LONGROIA-SLP 06/08/18 -     Reg Bolden OT 06/08/18 -     LS Naomy Mendenhall, PT Student 02/04/19 -           Wound 03/26/19 0900 Left chest incision (Active)   Dressing Appearance open to air 4/15/2019  7:20 AM   Closure None 4/14/2019  9:02  PM   Base clean;dry 4/14/2019  9:02 PM   Periwound dry;intact 4/15/2019  7:20 AM   Drainage Amount none 4/14/2019  9:02 PM   Dressing Care, Wound open to air 4/14/2019  9:02 PM         OT Recommendation and Plan    Anticipated Equipment Needs At Discharge (OT Eval): commode, 3-in-1, shower chair, tub bench  Planned Therapy Interventions (OT Eval): BADL retraining, cognitive/visual perception retraining, functional balance retraining, neuromuscular control/coordination retraining, strengthening exercise, transfer/mobility retraining            OT IRF GOALS     Row Name 04/09/19 1400 04/02/19 1500          Bathing Goal 1 (OT-IRF)    Activity/Device (Bathing Goal 1, OT-IRF)  bathing skills, all  -DN  bathing skills, all  -DN     [place] Level (Bathing Goal 1, OT-IRF)  supervision required;verbal cues required  -DN  contact guard assist;verbal cues required  -DN     Time Frame (Bathing Goal 1, OT-IRF)  short term goal (STG)  -DN  short term goal (STG)  -DN     Progress/Outcomes (Bathing Goal 1, OT-IRF)  goal met;goal revised this date  -DN  goal met;goal revised this date  -DN        Bathing Goal 2 (OT-IRF)    Activity/Device (Bathing Goal 2, OT-IRF)  bathing skills, all  -DN  bathing skills, all  -DN     [place] Level (Bathing Goal 2, OT-IRF)  supervision required  -DN  supervision required  -DN     Time Frame (Bathing Goal 2, OT-IRF)  long term goal (LTG)  -DN  long term goal (LTG)  -DN     Progress/Outcomes (Bathing Goal 2, OT-IRF)  goal ongoing  -DN  goal ongoing  -DN        UB Dressing Goal 1 (OT-IRF)    Activity/Device (UB Dressing Goal 1, OT-IRF)  upper body dressing  -DN  upper body dressing  -DN     [place] (UB Dress Goal 1, OT-IRF)  supervision required  -DN  supervision required  -DN     Time Frame (UB Dressing Goal 1, OT-IRF)  short term goal (STG)  -DN  short term goal (STG)  -DN     Progress/Outcomes (UB Dressing Goal 1, OT-IRF)  goal met;goal ongoing  -DN  goal met;goal revised this date   -DN        UB Dressing Goal 2 (OT-IRF)    Activity/Device (UB Dressing Goal 2, OT-IRF)  upper body dressing  -DN  upper body dressing  -DN     [place] (UB Dress Goal 2, OT-IRF)  supervision required  -DN  supervision required  -DN     Time Frame (UB Dressing Goal 2, OT-IRF)  long term goal (LTG)  -DN  long term goal (LTG)  -DN     Progress/Outcomes (UB Dressing Goal 2, OT-IRF)  goal ongoing;goal met  -DN  goal ongoing;goal met  -DN        LB Dressing Goal 1 (OT-IRF)    Activity/Device (LB Dressing Goal 1, OT-IRF)  lower body dressing  -DN  lower body dressing  -DN     [place] (LB Dressing Goal 1, OT-IRF)  minimum assist (75% or more patient effort);verbal cues required;tactile cues required  -DN  minimum assist (75% or more patient effort);verbal cues required;tactile cues required  -DN     Time Frame (LB Dressing Goal 1, OT-IRF)  short term goal (STG)  -DN  short term goal (STG)  -DN     Progress/Outcomes (LB Dressing Goal 1, OT-IRF)  goal met;goal ongoing  -DN  goal met;goal revised this date  -DN        LB Dressing Goal 2 (OT-IRF)    Activity/Device (LB Dressing Goal 2, OT-IRF)  lower body dressing  -DN  lower body dressing  -DN     [place] (LB Dressing Goal 2, OT-IRF)  verbal cues required;tactile cues required;contact guard assist  -DN  verbal cues required;tactile cues required;minimum assist (75% or more patient effort)  -DN     Time Frame (LB Dressing Goal 2, OT-IRF)  long term goal (LTG)  -DN  long term goal (LTG)  -DN     Progress/Outcomes (LB Dressing Goal 2, OT-IRF)  goal revised this date  -DN  goal ongoing  -DN        Grooming Goal 1 (OT-IRF)    Activity/Device (Grooming Goal 1, OT-IRF)  grooming skills, all  -DN  grooming skills, all  -DN     [place] (Grooming Goal 1, OT-IRF)  supervision required  -DN  supervision required  -DN     Time Frame (Grooming Goal 1, OT-IRF)  short term goal (STG)  -DN  short term goal (STG)  -DN     Progress/Outcomes (Grooming Goal 1, OT-IRF)  goal  ongoing  -DN  goal partially met;goal ongoing  -DN        Grooming Goal 2 (OT-IRF)    Activity/Device (Grooming Goal 2, OT-IRF)  grooming skills, all  -DN  grooming skills, all  -DN     [place] (Grooming Goal 2, OT-IRF)  supervision required  -DN  supervision required  -DN     Time Frame (Grooming Goal 2, OT-IRF)  long term goal (LTG)  -DN  long term goal (LTG)  -DN     Progress/Outcomes (Grooming Goal 2, OT-IRF)  goal revised this date  -DN  goal revised this date  -DN        Toileting Goal 1 (OT-IRF)    Activity/Device (Toileting Goal 1, OT-IRF)  toileting skills, all  -DN  toileting skills, all  -DN     [place] Level (Toileting Goal 1, OT-IRF)  minimum assist (75% or more patient effort);verbal cues required;tactile cues required  -DN  minimum assist (75% or more patient effort);verbal cues required;tactile cues required  -DN     Time Frame (Toileting Goal 1, OT-IRF)  short term goal (STG)  -DN  short term goal (STG)  -DN     Progress/Outcomes (Toileting Goal 1, OT-IRF)  goal met;goal revised this date  -DN  goal met;goal revised this date  -DN        Toileting Goal 2 (OT-IRF)    Activity/Device (Toileting Goal 2, OT-IRF)  toileting skills, all  -DN  toileting skills, all  -DN     [place] Level (Toileting Goal 2, OT-IRF)  contact guard assist;verbal cues required;tactile cues required  -DN  contact guard assist;verbal cues required;tactile cues required  -DN     Time Frame (Toileting Goal 2, OT-IRF)  long term goal (LTG)  -DN  long term goal (LTG)  -DN     Progress/Outcomes (Toileting Goal 2, OT-IRF)  goal ongoing  -DN  goal ongoing  -DN        Self-Feeding Goal 1 (OT-IRF)    Activity/Device (Self-Feeding Goal 1, OT-IRF)  self-feeding skills, all  -DN  self-feeding skills, all  -DN     [place] (Self-Feeding Goal 1, OT-IRF)  supervision required  -DN  supervision required  -DN     Time Frame (Self-Feeding Goal 1, OT-IRF)  short term goal (STG)  -DN  short term goal (STG)  -DN      Progress/Outcomes 1 (Self-Feeding Goal, OT-IRF)  goal met;goal ongoing  -DN  goal met;goal ongoing  -DN        Self-Feeding Goal 2 (OT-IRF)    Activity/Device (Self-Feeding Goal 2, OT-IRF)  self-feeding skills, all  -DN  self-feeding skills, all  -DN     [place] (Self-Feeding Goal 2, OT-IRF)  supervision required  -DN  supervision required  -DN     Time Frame (Self-Feeding Goal 2, OT-IRF)  long term goal (LTG)  -DN  long term goal (LTG)  -DN     Progress/Outcomes (Self-Feeding Goal 2, OT-IRF)  goal met;goal ongoing  -DN  goal met;goal ongoing  -DN        Caregiver Training Goal 2 (OT-IRF)    Caregiver Training Goal 2 (OT-IRF)  pt caregiver to be independent with any assist pt needs with adls, transfers and HEP for d/c home  -DN  pt caregiver to be independent with any assist pt needs with adls, transfers and HEP for d/c home  -DN     Time Frame (Caregiver Training Goal 2, OT-IRF)  long term goal (LTG)  -DN  long term goal (LTG)  -DN     Progress/Outcomes (Caregiver Training Goal 2, OT-IRF)  goal ongoing  -DN  goal ongoing  -DN       User Key  (r) = Recorded By, (t) = Taken By, (c) = Cosigned By    Initials Name Provider Type    Reg Bolden OT Occupational Therapist          Occupational Therapy Education     Title: PT OT SLP Therapies (In Progress)     Topic: Occupational Therapy (Done)     Point: ADL training (Done)     Description: Instruct learner(s) on proper safety adaptation and remediation techniques during self care or transfers.   Instruct in proper use of assistive devices.    Learning Progress Summary           Patient Acceptance, E,TB,D, VU,NR by DN at 4/15/2019 12:04 PM    Comment:  pt presents with carryover of adapitive adls, functional transfers, and overall strength with min vc for safety    Acceptance, E,TB,D, VU,NR by DN at 4/9/2019  2:25 PM    Comment:  jay adls, adaptive visual scanning, transfer training    Acceptance, E,TB,D, VU,NR by DN at 4/8/2019 12:16 PM    Comment:  transfer  training, jay skills with adls, safety,etc    Acceptance, E,TB,D, VU,NR by DN at 4/4/2019  3:19 PM    Comment:  theraputty exercises, jay adls and transfer trainning    Acceptance, E,TB,D, NR by DN at 3/26/2019 12:06 PM    Comment:  jay adls and commode/shower transfers, still max vc for all due to cognition and visual impairments    Acceptance, E,TB,D, VU,NR by DN at 3/25/2019  5:23 PM    Comment:  OT role in rehab, transfer traning, jay adls                   Point: Home exercise program (Done)     Description: Instruct learner(s) on appropriate technique for monitoring, assisting and/or progressing therapeutic exercises/activities.    Learning Progress Summary           Patient Acceptance, E,TB,D, VU,NR by DN at 4/15/2019 12:04 PM    Comment:  pt presents with carryover of adapitive adls, functional transfers, and overall strength with min vc for safety    Acceptance, E,TB,D, VU,NR by DN at 4/9/2019  2:25 PM    Comment:  jay adls, adaptive visual scanning, transfer training    Acceptance, E,TB,D, VU,NR by DN at 4/8/2019 12:16 PM    Comment:  transfer training, jay skills with adls, safety,etc    Acceptance, E,TB,D, VU,NR by DN at 4/4/2019  3:19 PM    Comment:  theraputty exercises, jay adls and transfer trainning    Acceptance, E,TB,D, NR by DN at 3/26/2019 12:06 PM    Comment:  jay adls and commode/shower transfers, still max vc for all due to cognition and visual impairments    Acceptance, E,TB,D, VU,NR by DN at 3/25/2019  5:23 PM    Comment:  OT role in rehab, transfer traning, jay adls                   Point: Precautions (Done)     Description: Instruct learner(s) on prescribed precautions during self-care and functional transfers.    Learning Progress Summary           Patient Acceptance, E,TB,D, VU,NR by DN at 4/15/2019 12:04 PM    Comment:  pt presents with carryover of adapitive adls, functional transfers, and overall strength with min vc for safety    Acceptance, E,TB,D, VU,NR by DN at 4/9/2019   2:25 PM    Comment:  jay adls, adaptive visual scanning, transfer training    Acceptance, E,TB,D, VU,NR by DN at 4/8/2019 12:16 PM    Comment:  transfer training, jay skills with adls, safety,etc    Acceptance, E,TB,D, VU,NR by DN at 4/4/2019  3:19 PM    Comment:  theraputty exercises, jay adls and transfer trainning    Acceptance, E,TB,D, NR by DN at 3/26/2019 12:06 PM    Comment:  jay adls and commode/shower transfers, still max vc for all due to cognition and visual impairments    Acceptance, E,TB,D, VU,NR by DN at 3/25/2019  5:23 PM    Comment:  OT role in rehab, transfer traning, jay adls                   Point: Body mechanics (Done)     Description: Instruct learner(s) on proper positioning and spine alignment during self-care, functional mobility activities and/or exercises.    Learning Progress Summary           Patient Acceptance, E,TB,D, VU,NR by DN at 4/15/2019 12:04 PM    Comment:  pt presents with carryover of adapitive adls, functional transfers, and overall strength with min vc for safety    Acceptance, E,TB,D, VU,NR by DN at 4/9/2019  2:25 PM    Comment:  jay adls, adaptive visual scanning, transfer training    Acceptance, E,TB,D, VU,NR by DN at 4/8/2019 12:16 PM    Comment:  transfer training, jay skills with adls, safety,etc    Acceptance, E,TB,D, VU,NR by DN at 4/4/2019  3:19 PM    Comment:  theraputty exercises, jay adls and transfer trainning    Acceptance, E,TB,D, NR by DN at 3/26/2019 12:06 PM    Comment:  jay adls and commode/shower transfers, still max vc for all due to cognition and visual impairments    Acceptance, E,TB,D, VU,NR by DN at 3/25/2019  5:23 PM    Comment:  OT role in rehab, transfer traning, jay adls                               User Key     Initials Effective Dates Name Provider Type Discipline    DN 06/08/18 -  Reg Mckinney OT Occupational Therapist OT                       Time Calculation:     Time Calculation- OT     Row Name 04/15/19 1205             Time  Calculation- OT    OT Start Time  0900  -DN      OT Stop Time  0930  -DN      OT Time Calculation (min)  30 min  -DN      OT Received On  04/15/19  -DN        User Key  (r) = Recorded By, (t) = Taken By, (c) = Cosigned By    Initials Name Provider Type    Reg Bolden OT Occupational Therapist          Therapy Charges for Today     Code Description Service Date Service Provider Modifiers Qty    96537688336 HC OT SELF CARE/MGMT/TRAIN EA 15 MIN 4/15/2019 Reg Mckinney OT GO 2    93994263079 HC OT SELF CARE/MGMT/TRAIN EA 15 MIN 4/15/2019 Reg Mckinney OT GO 2                   Reg Mckinney OT  4/15/2019

## 2019-04-15 NOTE — PROGRESS NOTES
Case Management  Inpatient Rehabilitation Plan of Care and Discharge Plan Note    Rehabilitation Diagnosis:  Branch  Date of Onset:  Branch    Medical Summary:  Branch  Past Medical History: Branch    Plan of Care  Updated Problems/Interventions  Field    Expected Intensity:  Branch  Interdisciplinary Team:  Branch  Estimated Length of Stay/Anticipated Discharge Date: Branch  Anticipated Discharge Destination:  Anticipated discharge destination from inpatient rehabilitation is community  discharge with assistance. Family conference Tuesday, 4/16/2019 @ 1:00.      Based on the patient's medical and functional status, their prognosis and  expected level of functional improvement is:  Branch    Signed by: LEISA Sharma

## 2019-04-15 NOTE — THERAPY TREATMENT NOTE
Inpatient Rehabilitation - Speech Language Pathology Treatment Note    [facility]       Patient Name: Nayan Ryan  : 1965  MRN: [id number]    Today's Date: 4/15/2019           Admit Date: 3/24/2019      Visit Dx:      No diagnosis found.    Patient Active Problem List   Diagnosis   • Acute ischemic left PCA stroke (CMS/HCC)   • Status post placement of implantable loop recorder   • HTN (hypertension)   • Diabetes mellitus (CMS/HCC)   • Hyperlipidemia   • Morbid obesity (CMS/HCC)   • Anxiety disorder   • Migraine   • H/O hernia repair   • Abdominal wall abscess   • Back pain   • Stroke (cerebrum) (CMS/HCC)   • Vitamin D deficiency   • History of fatty infiltration of liver          Therapy Treatment    Evaluation/Coping    Evaluation/Treatment Time and Intent  Subjective Information: no complaints (04/15/19 1438 : Narcisa Bonner MS CCC-SLP)  Existing Precautions/Restrictions: fall (04/15/19 1438 : Narcisa Bonner MS CCC-SLP)  Document Type: therapy note (daily note) (04/15/19 1438 : Narcisa Bonner MS CCC-SLP)  Mode of Treatment: individual therapy, speech-language pathology (04/15/19 1438 : Narcisa Bonner MS CCC-SLP)  Patient/Family Observations: cooperative (04/15/19 1438 : Narcisa Bonner MS CCC-SLP)  Start Time (Evaluation/Treatment): 1430 (04/15/19 1438 : Narcisa Bonner, MS CCC-SLP)  Stop Time (Evaluation/Treatment): 1500 (04/15/19 1438 : Narcisa Bonner MS CCC-SLP)    Vitals/Pain/Safety         Cognition/Communication         Oral Motor/Eating         Mobility/Basic Activities/Instrumental Activities/Motor/Modality                   ROM/MMT                   Sensory/Myotome/Dermatome/Edema               Posture/Balance/Special Tests/Exercise/Transportation/Sexual Function                   Orthotics/Residual Limb/Prosthetic Management              Outcome Summary         EDUCATION    The patient has been educated in the following areas:     Cognitive Impairment.    SLP Recommendation and Plan                                                           SLP GOALS     Row Name 04/15/19 1400 04/15/19 1000          Memory Skills Goal 1 (SLP)    Progress (Memory Skills Goal 1, SLP)  80%;independently (over 90% accuracy) visual recall- 4 numbers- spatial components- boxed info  -SL  60%;with minimal cues (75-90%) 3 word mental manipulation task- alphabetical ordering  -SL     Progress/Outcomes (Memory Skills Goal 1, SLP)  goal ongoing  -SL  --     Comment (Memory Skills Goal 1, SLP)  name- picture associations- 60%  -SL  30 item grid- hidden picture matching task-40%  -SL        Organizational Skills Goal 1 (SLP)    Progress (Thought Organization Skills Goal 1, SLP)  60%;independently (over 90% accuracy) adding item to lists- abstract categories  -SL  --     Progress/Outcomes (Thought Organization Skills Goal 1, SLP)  goal ongoing  -SL  --        Reasoning Goal 1 (SLP)    Progress (Reasoning Goal 1, SLP)  70%;with minimal cues (75-90%);with moderate cues (50-74%) unscrambling L-R- words in category  -SL  --     Progress/Outcomes (Reasoning Goal 1, SLP)  goal ongoing  -SL  --        Executive Functional Skills Goal 1 (SLP)    Progress (Executive Function Skills Goal 1, SLP)  --  30%;40%;with moderate cues (50-74%) scheduling/planning task based on paragraph  -SL       User Key  (r) = Recorded By, (t) = Taken By, (c) = Cosigned By    Initials Name Provider Type    Narcisa Urias MS CCC-SLP Speech and Language Pathologist                  Time Calculation:       Time Calculation- SLP     Row Name 04/15/19 1500 04/15/19 1029          Time Calculation- SLP    SLP Start Time  1430  -SL  1000  -SL     SLP Stop Time  1500  -SL  1030  -SL     SLP Time Calculation (min)  30 min  -SL  30 min  -       User Key  (r) = Recorded By, (t) = Taken By, (c) = Cosigned By    Initials Name Provider Type    Narcisa Urias MS CCC-SLP Speech and Language Pathologist            Therapy Charges for Today     Code Description Service Date Service Provider Modifiers Qty     53892562773  ST DEV OF COGN SKILLS EACH 15 MIN 4/15/2019 Narcisa Bonner, MS CCC-SLP  2    [id number]  ST DEV OF COGN SKILLS EACH 15 MIN 4/15/2019 Narcisa Bonner, MS CCC-SLP  2                           Narcisa Bonner, MS CCC-SLP  4/15/2019

## 2019-04-15 NOTE — PLAN OF CARE
Problem: Stroke (IRF) (Adult)  Goal: Promote Optimal Functional [place]  Outcome: Ongoing (interventions implemented as appropriate)      Problem: Fall Risk (Adult)  Goal: Absence of Fall  Outcome: Ongoing (interventions implemented as appropriate)      Problem: Diabetes, Type 2 (Adult)  Goal: Signs and Symptoms of Listed Potential Problems Will be Absent, Minimized or Managed (Diabetes, Type 2)  Outcome: Ongoing (interventions implemented as appropriate)      Problem: Skin Injury Risk (Adult)  Goal: Skin Health and Integrity  Outcome: Ongoing (interventions implemented as appropriate)      Problem: Patient Care Overview  Goal: Plan of Care Review  Outcome: Ongoing (interventions implemented as appropriate)   04/15/19 0229   Patient Care Overview   IRF Plan of Care Review progress ongoing, continue   Progress, Functional Goals demonstrating adequate progress   Coping/Psychosocial   Plan of Care Reviewed With patient   OTHER   Outcome Summary Patient is calm and cooperative. Became upset when family left and requested PRN xanax to help him rest. PRN xanax given with positive result. Using the call light for assistance. No safety issues observed.

## 2019-04-15 NOTE — PROGRESS NOTES
Inpatient Rehabilitation Functional Measures Assessment and Plan of Care    Plan of Care  Updated Problems/Interventions  Cognition    [ST] Executive Functions(Active)  Current Status(04/15/2019): Moderate cognitive deficits: MOD IMPAIRED EXEC FXN,  DECREASED INTEGRATION OF MULTIPLE PIECES OF INFO, impaired attn to detail, mod  impaired math , memory;  impulsivity noted. Improving with 4-5 step verbal  sequencing of ADL's and visual memory.  Weekly Goal(04/22/2019): Attend to tasks and recall details of daily activities  Discharge Goal: Improved cognitive skills        Communication    [ST] Expression(Active)  Current Status(04/15/2019): Mild anomic aphasia; word finding and comprehension  deficits. Improving in spontaneous and confrontational expression and  comprehension  Weekly Goal(04/15/2019): improve word retrieval with min phonemic cues and  follow commands with min assist  Discharge Goal: Improve receptive and expressive language skills        Swallow Function    [ST] Swallowing(Active)  Current Status(04/15/2019): On reg/thin; Mild dysphagia. Suspect mistiming of  swallow and question poss asp/pen. Pt needs verbal cues for small sips/bites and  NO talking with foodl/liquid in mouth. Monitoring for diet dilma and possible need  for VFSS if clinically warranted.  Weekly Goal(04/22/2019): Tolerate diet; Recall swallow strategies (no talking;  small sip/bite)  Discharge Goal: Tolerate least restrictive diet    Functional Measures  VANITA Eating:  Branch  [facility] Grooming: Branch  [facility] Bathing:  Branch  [facility] Upper Body Dressing:  Branch  [facility] Lower Body Dressing:  Branch  [facility] Toileting:  [facility] Bladder Management  Level of Assistance:  Branch  Frequency/Number of Accidents this Shift:  [facility] Bowel Management  Level of Assistance: Branch  Frequency/Number of Accidents this Shift: [facility] Bed/Chair/Wheelchair Transfer:  [facility] Toilet Transfer:  Branch  [facility] Tub/Shower Transfer:  Branch    [facility]  Documented Mode of Locomotion at Discharge: Field  VANITA Expected Mode of Locomotion at Discharge: Branch  [facility] Walk/Wheelchair:  Branch  [facility] Stairs:  Branch    [facility] Comprehension:  Branch  [facility] Expression:  [facility] Social Interaction:  [facility] Problem Solving:  [facility] Memory:  [place]    Therapy Mode Minutes  Occupational Therapy: Branch  Physical Therapy: Branch  Speech Language Pathology:  Individual: 60 minutes.    Signed by: Narcisa Bonner, SLP

## 2019-04-15 NOTE — PROGRESS NOTES
Inpatient Rehabilitation Functional Measures Assessment and Plan of Care    Plan of Care  Updated Problems/Interventions  Mobility    [OT] Toilet Transfers(Active)  Current Status(04/15/2019): MIn/CGA Rwx vc for safety  Weekly Goal(04/23/2019): CGA RWX for safety  Discharge Goal: CGA RWX vc for safety    [OT] Tub/Shower Transfers(Active)  Current Status(04/15/2019): CGA SPS Min vc for safety  Weekly Goal(04/23/2019): CGA RWX vc  Discharge Goal: CGA RWX vc        Self Care    [OT] Bathing(Active)  Current Status(04/15/2019): CGA vc for safety,sequencing  Weekly Goal(04/23/2019): SBA vc for safety  Discharge Goal: SBA    [OT] Dressing (Lower)(Active)  Current Status(04/15/2019): Min vc for jay tech,foot placement  Weekly Goal(04/23/2019): CGA vc  Discharge Goal: CGA    [OT] Dressing (Upper)(Active)  Current Status(04/15/2019): SBA with min cues  Weekly Goal(04/23/2019): SBA  Discharge Goal: SBA    [OT] Eating(Active)  Current Status(04/15/2019): SBA  Weekly Goal(04/23/2019): SBA  Discharge Goal: SBA    [OT] Grooming(Active)  Current Status(04/15/2019): SBA vc seated  Weekly Goal(04/23/2019): SBA  Discharge Goal: SBA    [OT] Toileting(Active)  Current Status(04/15/2019): CGA VC for safe technique  Weekly Goal(04/23/2019): CGA vc  Discharge Goal: SBA vc    Functional Measures  VANITA Eating:  Branch  [facility] Grooming: Branch  [facility]AN[facility] Bathing:  Branch  [facility] Upper Body Dressing:  Branch  [facility] Lower Body Dressing:  Branch  [facility] Toileting:  Branch    [facility] Bladder Management  Level of Assistance:  Branch  Frequency/Number of Accidents this Shift:  Branch    [facility] Bowel Management  Level of Assistance: Branch  Frequency/Number of Accidents this Shift: Branch    [facility] Bed/Chair/Wheelchair Transfer:  Branch  [facility] Toilet Transfer:  Branch  [facility] Tub/Shower Transfer:  Branch    Previously Documented Mode of Locomotion at Discharge: Field  [facility] Expected Mode of Locomotion at Discharge: Branch  [facility] Walk/Wheelchair:  Branch  VANITA Stairs:  Branch    [facility]  Comprehension:  Branch  VANITA Expression:  Branch  [facility] Social Interaction:  Branch  [facility] Problem Solving:  Branch  [facility] Memory:  Branch    Therapy Mode Minutes  Occupational Therapy: Individual: 60 minutes.  Physical Therapy: Branch  Speech Language Pathology:  Branch    Signed by: RAMO Cox/L

## 2019-04-16 LAB
GLUCOSE BLDC GLUCOMTR-MCNC: 105 MG/DL (ref 70–130)
GLUCOSE BLDC GLUCOMTR-MCNC: 105 MG/DL (ref 70–130)
GLUCOSE BLDC GLUCOMTR-MCNC: 126 MG/DL (ref 70–130)
GLUCOSE BLDC GLUCOMTR-MCNC: 98 MG/DL (ref 70–130)

## 2019-04-16 PROCEDURE — 97535 SELF CARE MNGMENT TRAINING: CPT | Performed by: OCCUPATIONAL THERAPIST

## 2019-04-16 PROCEDURE — 97110 THERAPEUTIC EXERCISES: CPT

## 2019-04-16 PROCEDURE — 97112 NEUROMUSCULAR REEDUCATION: CPT

## 2019-04-16 PROCEDURE — 63710000001 INSULIN GLARGINE PER 5 UNITS: Performed by: INTERNAL MEDICINE

## 2019-04-16 PROCEDURE — 82962 GLUCOSE BLOOD TEST: CPT

## 2019-04-16 PROCEDURE — G0515 COGNITIVE SKILLS DEVELOPMENT: HCPCS

## 2019-04-16 RX ADMIN — ATENOLOL 25 MG: 25 TABLET ORAL at 22:01

## 2019-04-16 RX ADMIN — LISINOPRIL 20 MG: 20 TABLET ORAL at 22:02

## 2019-04-16 RX ADMIN — OXYCODONE AND ACETAMINOPHEN 1 TABLET: 5; 325 TABLET ORAL at 17:24

## 2019-04-16 RX ADMIN — CLOPIDOGREL 75 MG: 75 TABLET, FILM COATED ORAL at 08:53

## 2019-04-16 RX ADMIN — ATENOLOL 25 MG: 25 TABLET ORAL at 08:53

## 2019-04-16 RX ADMIN — LISINOPRIL 20 MG: 20 TABLET ORAL at 08:53

## 2019-04-16 RX ADMIN — AMLODIPINE BESYLATE 5 MG: 5 TABLET ORAL at 08:52

## 2019-04-16 RX ADMIN — METFORMIN HYDROCHLORIDE 500 MG: 500 TABLET, EXTENDED RELEASE ORAL at 08:53

## 2019-04-16 RX ADMIN — Medication 1 TABLET: at 08:52

## 2019-04-16 RX ADMIN — PAROXETINE HYDROCHLORIDE 10 MG: 10 TABLET, FILM COATED ORAL at 08:53

## 2019-04-16 RX ADMIN — INSULIN GLARGINE 32 UNITS: 100 INJECTION, SOLUTION SUBCUTANEOUS at 22:02

## 2019-04-16 RX ADMIN — METFORMIN HYDROCHLORIDE 500 MG: 500 TABLET, EXTENDED RELEASE ORAL at 17:24

## 2019-04-16 RX ADMIN — ASPIRIN 325 MG: 325 TABLET, COATED ORAL at 08:53

## 2019-04-16 RX ADMIN — ATORVASTATIN CALCIUM 80 MG: 80 TABLET, FILM COATED ORAL at 22:02

## 2019-04-16 RX ADMIN — ALPRAZOLAM 1 MG: 0.5 TABLET ORAL at 22:02

## 2019-04-16 NOTE — PROGRESS NOTES
LOS: 23 days   Patient Care Team:  Qasim Asif MD as PCP - General  Qasim Asif MD as PCP - Family Medicine    Chief Complaint:   Left PCA / posterior MCA territories ischemic infarct March 19, 2019  multifocal restricted diffusion centered posteriorly in the corpus callosum left of midline adjacent to the atrium of the lateral ventricle, at the parieto-occipital cortical interface, in the left corona radiata and along the lateral margin of the left thalamus. There also appears to be subtle low ADC signal continuing cranially along the left posterior parietal cortex with possible involvement of the right as well  Right visual field deficit  Impaired cognition/mobility/self care          Subjective     History of Present Illness    Subjective      Strength continues better.  Still decreased vision to the right.  Tolerates therapies.  History taken from: patient    Objective     Vital Signs  Temp:  [97.2 °F (36.2 °C)-98.4 °F (36.9 °C)] 97.6 °F (36.4 °C)  Heart Rate:  [78-85] 82  Resp:  [16-18] 16  BP: (134-155)/(72-89) 155/89    Objective   MENTAL STATUS -  AWAKE / ALERT  HEENT-  nc/at  LUNGS - CTA, NO WHEEZES, RALES OR RHONCHI  HEART- RRR, NO RUB, MURMUR, OR GALLOP  ABD - NORMOACTIVE BOWEL SOUNDS, SOFT, NT.  Obese.    EXT - NO EDEMA OR CYANOSIS  NEURO -  He is oriented to person and situation.    Takes resistance in the right upper extremity and right lower semi-.  Still decreased motor control in the right upper extremity.  Right visual field cut          Results Review:     I reviewed the patient's new clinical results.   Results for MARGARITA HILL (MRN [id number]) as of 4/14/2019 09:34   Ref. Range 4/13/2019 11:21 4/13/2019 16:02 4/13/2019 21:23 4/14/2019 07:07   Glucose Latest Ref Range: 70 - 130 mg/dL 113 111 147 (H) 106       Medication Review: reviewed    Assessment/Plan       Acute ischemic left PCA stroke (CMS/HCC)    Status post placement of implantable loop recorder    HTN (hypertension)     Diabetes mellitus (CMS/HCC)    Hyperlipidemia    Morbid obesity (CMS/HCC)    Anxiety disorder    Abdominal wall abscess    Stroke (cerebrum) (CMS/HCC)    Vitamin D deficiency    History of fatty infiltration of liver      Assessment & Plan   Left PCA / posterior MCA territories ischemic infarct March 19, 2019  multifocal restricted diffusion centered posteriorly in the corpus callosum left of midline adjacent to the atrium of the lateral ventricle, at the parieto-occipital cortical interface, in the left corona radiata and along the lateral margin of the left thalamus. There also appears to be subtle low ADC signal continuing cranially along the left posterior parietal cortex with possible involvement of the right as well.     HTN- uncontrolled with /130 and 206/108 with ER visit on March 15, 2019 - multi-drug regimen - amlodipine/atenolol/clonidine/lisinopril/dyazide  March 27- holding beds while on IVF hydration as BP low yesterday, concern for perfusion Continues IVF. Recheck BMP in AM. /82 at 13:15 pm  March 28-/86 this AM  DC IVF.  Will resume Lisinopril and atenolol at 1/2 total daily doses initially dividing out bid  Lisinopril 10 mg q 12 hours and atenolol 12.5 mg q 12 hours and adjust meds based on response.  Remains off amlodipine 10 mg daily and Dyazide 37.5/25 and clonidine.  Held Farxiga today as was hydrating with IVF, resume tomorrow.  March 29 - /87 last PM and 175/80 this AM  Will increase atenolol to 25 mg q 12 hours and Lisinopril to 20 mg q 12 hours (both back to previous total daily dose but divided out) and remains off amlodpine 10 mg daily and clonidine 0.1 mg q 12 hours and Dyazide 37.5/25 mg daily.  Per Neruology - Maintain -180, DBP<110.  April 8-blood pressure range 142//88-150s//79.  Norvasc increased to 5 mg on April 2.  Discussed with patient possibly titrating up on Norvasc further to 10 mg daily if needed but may divide out to 5 mg twice a  day for more even control.  We will continue to monitor his blood pressure pattern for now  April 12-continue to follow on present pattern.  Blood pressure was elevated 188 last evening but otherwise typically in target range.  Once further out from stroke, would adjust target range to typical therapeutic range  April 13 and 14 - per notes above - Per Neurology - Maintain -180, DBP<110.  Continue meds as current.     TEAM CONF - April 16- BED SUP. TRANSFERS CTG. 4 STAIRS CTG-MIN.  80 FEET CRG-MIN 2. RW, NO AFO. TURNS TO RIGHT DIFFICULT DUE TO VISION . R VISUAL FIELD CUT. ADLS CTG-SBA. ELASTIC SHOELACES WOULD HELP, OTHERWISE MIN ASSIST TO TIE SHOES.  DOES NOT PAY ATTENTION TO HIS ENVIRONMENT. MIN CUES FOR SEQUENCING.  IMPAIRED MEMORY AND ATTENTION TO DETAIL WITH COGNITIVE TASKS, VISUAL DEFICITS CAN LEAD TO MIS-READ INSTRUCTIONS.  ELOS - ONE WEEK.         Familia James MD  04/16/19  8:57 AM    Time: 10min

## 2019-04-16 NOTE — PROGRESS NOTES
Inpatient Rehabilitation Functional Measures Assessment    Functional Measures  AVNITA Eating:  [facility] Grooming: [facility] Bathing:  [facility] Upper Body Dressing:  [facility] Lower Body Dressing:  [facility] Toileting:  [facility] Bladder Management  Level of Assistance:  [place]  Frequency/Number of Accidents this Shift:  [facility] Bowel Management  Level of Assistance: [place]  Frequency/Number of Accidents this Shift: [facility] Bed/Chair/Wheelchair Transfer:  [facility] Toilet Transfer:  [facility] Tub/Shower Transfer:  [place]    Previously Documented Mode of Locomotion at Discharge: Field  VANITA Expected Mode of Locomotion at Discharge: [facility] Walk/Wheelchair:  [facility] Stairs:  [facility] Comprehension:  Auditory comprehension is the usual mode. Comprehension  Score = 6, Modified [place].  Patient comprehends complex/abstract  information in their primary language with only mild difficulty.  VANITA Expression:  Vocal expression is the usual mode. Expression Score = 6,  Modified Independent.  Patient expresses complex/abstract information in their  primary language with only mild difficulty with tasks.  VANITA Social Interaction:  Social Interaction Score = 7, Independent. Patient is  completely independent for social interaction.  There are no activity  limitations.  VANITA Problem Solving:  Problem Solving Score = 6, Modified [place].  Patient  makes appropriate decisions in order to solve complex problems with mild  difficulty but self-corrects.  VANITA Memory:  Memory Score = 6, Modified [place].  Patient is modified  independent for memory, having only mild difficulty and using self-initiated or  environmental cues to remember.    Therapy Mode Minutes  Occupational Therapy: Branch  Physical Therapy: Branch  Speech Language Pathology:  Branch    Signed by: Brayan Haji RN

## 2019-04-16 NOTE — PROGRESS NOTES
"Family conference held today with pt, pt's wife, sister,brother Familia, sister-in-law, Clau, and various family members including his son and daughter by conference call.  Discussed pt's progress, current status and discharge plans.  Pt has made very good  Progress in all therapies.    PT reports pt completes bed mobility with supervision and transfers with CG.  He ambulates 80' with a rolling walker and CG assist. He can ascend/descend 4 steps with 2 handrails and CG-MIN assist. Pt requires verbal cues for safe technique and to attend to the right visual field.      In OT, pt completes commode and shower transfers with MIN-CG assist. Pt can be impulsive and needs cues for safe technique. Pt completes bathing, dressing, grooming and toileting tasks with SBA-CG. Initially, pt required MAX verbal cues to sequence each task. He has improved in this area, now needing MIN verbal cues.  Pt seems to be compensating more for his visual deficits during ADL activities.     ST reports pt demonstrates MOD deficits in the areas of executive functioning, attention to detail, memory and basic math.  Pt tends to answer impulsively and is more accurate when he is cued to slow down and process the task.  He continues to have word finding and comprehension deficits, but is improving in this area.  He is attending to the right during reading tasks with fewer cues.   Diet and swallow precautions discussed. He is on a regular diet with thin liquids but needs verbal cues for small bites/sips and to \"slow down\" when eating.    Nursing reviewed current medications and provided a list. Medical follow up after discharge with pt's PCP, [facility] also discussed.   The team recommends 24 hour CG assistance at home along with supervision with medication management and finances.  Outpatient PT,OT and ST is also recommended. Pt lives near the [facility] on [facility] and he can receive all 3 therapies at this location. " Pt may also be appropriate for the [facility].  Will discuss further with the team before making a referral.    DME discussed. Pt to receive a rolling walker. Family to purchase a tub bench and a toilet safety frame. Family to install a second handrail on the steps to enter pt's home.   Family teaching for day pass completed today.    Pt to discharge 4/23 to his home with 24 hour assistance from his wife, adult children and his siblings.

## 2019-04-16 NOTE — THERAPY TREATMENT NOTE
Inpatient Rehabilitation - Occupational Therapy Treatment Note    [facility]     Patient Name: Nayan Ryan  : 1965  MRN: [id number]    Today's Date: 2019                 Admit Date: 3/24/2019      Visit Dx:  No diagnosis found.    Patient Active Problem List   Diagnosis   • Acute ischemic left PCA stroke (CMS/HCC)   • Status post placement of implantable loop recorder   • HTN (hypertension)   • Diabetes mellitus (CMS/HCC)   • Hyperlipidemia   • Morbid obesity (CMS/HCC)   • Anxiety disorder   • Migraine   • H/O hernia repair   • Abdominal wall abscess   • Back pain   • Stroke (cerebrum) (CMS/HCC)   • Vitamin D deficiency   • History of fatty infiltration of liver         Therapy Treatment    IRF Treatment Summary     Row Name 19 1317 19 1044 19 0943       Evaluation/Treatment Time and Intent    Subjective Information  no complaints  -CC  no complaints  -  no complaints  (Pended)   -    Existing Precautions/Restrictions  fall  -CC  fall  -  fall  (Pended)   -    Document Type  therapy note (daily note)  -CC  therapy note (daily note)  -  therapy note (daily note)  (Pended)   -    Mode of Treatment  occupational therapy  -CC  individual therapy;speech-language pathology  -SL  physical therapy  (Pended)   -    Patient/Family Observations  --  cooperative  -  Pt sitting in room in w/c, ready to participate with therapy  (Pended)   -    Start Time (Evaluation/Treatment)  --  1030  -SL  --    Stop Time (Evaluation/Treatment)  --  1100  -SL  --    Recorded by [CC] Yolanda Felder, OTR [SL] Narcisa Bonner, MS CCC-SLP [LS] Naomy Mendenhall, PT Student    Row Name 19 1317 19 0943          Cognition/Psychosocial- PT/OT    Affect/Mental Status (Cognitive)  --  WNL  (Pended)   -LS     Behavioral Issues (Cognitive)  --  --  (Pended)  engaging on more consistent basis, occasionally withdrawn  -     Orientation Status (Cognition)  oriented x 3  -CC  --     Follows Commands  (Cognition)  follows one step commands;over 90% accuracy;verbal cues/prompting required  -CC  follows one step commands;over 90% accuracy;verbal cues/prompting required;repetition of directions required;physical/tactile prompts required  (Pended)   -LS     Personal Safety Interventions  fall prevention program maintained;gait belt;nonskid shoes/slippers when out of bed  -CC  fall prevention program maintained;gait belt;muscle strengthening facilitated;nonskid shoes/slippers when out of bed  (Pended)   -LS     Attention Deficit (Cognitive)  --  distractible in noisy environment  (Pended)   -LS     Memory Deficit (Cognitive)  --  moderate deficit;working memory  (Pended)   -LS     Safety Deficit (Cognitive)  --  ability to follow commands;impulsivity;insight into deficits/self awareness;judgment  (Pended)   -LS     Recorded by [CC] Yolanda Felder OTR [LS] Naomy Mendenhall, PT Student     Row Name 04/16/19 0943             Transfer Assessment/Treatment    Comment (Transfers)  Practiced approaching and turning to sit in multiple different chairs around clinic  (Pended)   -LS      Recorded by [LS] Naomy Mendenhall, PT Student      Row Name 04/16/19 0943             Sit-Stand Transfer    Sit-Stand [place] (Transfers)  contact guard  (Pended)   -      Assistive Device (Sit-Stand Transfers)  wheelchair  (Pended)   -LS      Recorded by [LS] Naomy Mendenhall, PT Student      Row Name 04/16/19 0943             Stand-Sit Transfer    Stand-Sit [place] (Transfers)  contact guard  (Pended)   -      Assistive Device (Stand-Sit Transfers)  wheelchair  (Pended)   -LS      Recorded by [LS] Naomy Mendenhall, PT Student      Row Name 04/16/19 0943             Car Transfer    Type (Car Transfer)  stand pivot/stand step  (Pended)   -LS      [place] Level (Car Transfer)  verbal cues;contact guard;minimum assist (75% patient effort)  (Pended)   -      Assistive Device (Car Transfer)  wheelchair  (Pended)  Min A only to get R  LE into car, otherwise CGA  -LS      Recorded by [LS] Naomy Mendenhall, PT Student      Row Name 04/16/19 0943             Gait/Stairs Assessment/Training    [place] Level (Gait)  verbal cues;contact guard  (Pended)   -LS      Assistive Device (Gait)  walker, front-wheeled  (Pended)   -LS      Distance in Feet (Gait)  50 x 4  (Pended)   -LS      Pattern (Gait)  step-through  (Pended)   -LS      Deviations/Abnormal Patterns (Gait)  nancy decreased;gait speed decreased  (Pended)   -LS      Bilateral Gait Deviations  weight shift ability decreased;heel strike decreased  (Pended)   -LS      Left Sided Gait Deviations  weight shift ability decreased  (Pended)   -LS      Right Sided Gait Deviations  heel strike decreased;knee buckling, right side;knee hyperextension  (Pended)   -      [place] Level (Stairs)  verbal cues;contact guard;2 person assist  (Pended)   -LS      Handrail Location (Stairs)  both sides  (Pended)   -LS      Number of Steps (Stairs)  4  (Pended)   -LS      Ascending Technique (Stairs)  step-to-step  (Pended)   -LS      Descending Technique (Stairs)  step-to-step  (Pended)   -LS      Stairs, Safety Issues  sequencing ability decreased;balance decreased during turns  (Pended)   -LS      Stairs, Impairments  strength decreased;impaired balance;motor control impaired  (Pended)   -LS      Comment (Gait/Stairs)  Ambulating with Rwx with occasional VC for advancing AD properly when turning to sit in chair. VC to focus on R knee control--intermittent hyperextension/buckling but pt able to correct and maintain balance with CGA. Continued need for consistent cues and reminders for sequencing correctly and performing step to step method with stairs  (Pended)   -LS      Recorded by [LS] Naomy Mendenhall, PT Student      Row Name 04/16/19 0943             Safety Issues, Functional Mobility    Safety Issues Affecting Function (Mobility)  ability to follow commands;insight into deficits/self  awareness;judgment;sequencing abilities  (Pended)   -LS      Impairments Affecting Function (Mobility)  balance;cognition;coordination;motor control;strength  (Pended)   -LS      Recorded by [LS] Naomy Mendenhall, PT Student      Row Name 04/16/19 0777             Vision Assessment/Intervention    Vision Assessment Comment  less vc for overlapping small peg design. Pt more attentive to Right lower quadrant, completed 2 designs from visual pattern card w 1-2 vc needed for each  -CC      Recorded by [CC] Yolanda Felder OTR      Row Name 04/16/19 0917             Pain Assessment    Additional Documentation  Pain Scale: Numbers Pre/Post-Treatment (Group)  (Pended)   -LS      Recorded by [LS] Naomy Mendenhall, PT Student      Row Name 04/16/19 1317 04/16/19 8415          Pain Scale: Numbers Pre/Post-Treatment    Pain Scale: Numbers, Pretreatment  0/10 - no pain  -CC  0/10 - no pain  (Pended)   -LS     Pain Scale: Numbers, Post-Treatment  0/10 - no pain  -CC  0/10 - no pain  (Pended)   -LS     Recorded by [CC] Yolanda Felder, RAMO [LS] Naomy Mendenhall, PT Student     Row Name 04/16/19 4450             Dynamic Balance Activity    Therapeutic Training Performed (Dynamic Balance)  --  (Pended)  marching in place  -LS      Support Needed for Balance (Dynamic Balance Training)  CGA;uses both upper extremities for support  (Pended)   -LS      Upper Extremity Activity with Device (Dynamic Balance Training)  --  (Pended)  jay bar  -LS      Comment (Dynamic Balance Training)  2 x 10 reps marching in place on each leg. Cues for knee control during stance phase on RLE and R hip flexor cue when lifting R LE  (Pended)   -LS      Recorded by [LS] Naomy Mendenhall, PT Student      Row Name 04/16/19 7505             Upper Extremity Seated Therapeutic Exercise    Performed, Seated Upper Extremity (Therapeutic Exercise)  shoulder abduction/adduction;elbow flexion/extension;forearm supination/pronation;wrist flexion/extension  -CC       Device, Seated Upper Extremity (Therapeutic Exercise)  free weights, cuff  -CC      Exercise Type, Seated Upper Extremity (Therapeutic Exercise)  resistive exercise  -CC      Expected Outcomes, Seated Upper Extremity (Therapeutic Exercise)  improve functional tolerance, self-care activity  -CC      Sets/Reps Detail, Seated Upper Extremity (Therapeutic Exercise)  1# hand wt shld 10x3; 2# hand wt elbow and wrist 10x3  -CC      Recorded by [CC] Yolanda Felder OTR      Row Name 04/16/19 1317 04/16/19 0943          Positioning and Restraints    Pre-Treatment Position  sitting in chair/recliner  -CC  sitting in chair/recliner  (Pended)   -LS     Post Treatment Position  wheelchair  -CC  wheelchair  (Pended)   -LS     In Wheelchair  with family/caregiver;with other staff SW  -CC  sitting;exit alarm on;with other staff;patient within staff view  (Pended)   -LS     Recorded by [CC] Yolanda Felder OTR [LS] Naomy Mendenhall, PT Student       User Key  (r) = Recorded By, (t) = Taken By, (c) = Cosigned By    Initials Name Effective Dates    CC Yolanda Felder OTR 06/08/18 -     SL Narcisa Bonner, MS CCC-SLP 06/08/18 -     LS Naomy Mendenhall, PT Student 02/04/19 -           Wound 03/26/19 0900 Left chest incision (Active)   Dressing Appearance open to air 4/16/2019  8:45 AM   Closure None 4/16/2019  8:45 AM   Base clean;dry 4/16/2019  8:45 AM   Drainage Amount none 4/16/2019  8:45 AM   Dressing Care, Wound open to air 4/16/2019  8:45 AM         OT Recommendation and Plan                 OT IRF GOALS     Row Name 04/09/19 1400             Bathing Goal 1 (OT-IRF)    Activity/Device (Bathing Goal 1, OT-IRF)  bathing skills, all  -DN      [place] Level (Bathing Goal 1, OT-IRF)  supervision required;verbal cues required  -DN      Time Frame (Bathing Goal 1, OT-IRF)  short term goal (STG)  -DN      Progress/Outcomes (Bathing Goal 1, OT-IRF)  goal met;goal revised this date  -DN         Bathing Goal 2 (OT-IRF)    Activity/Device  (Bathing Goal 2, OT-IRF)  bathing skills, all  -DN      [place] Level (Bathing Goal 2, OT-IRF)  supervision required  -DN      Time Frame (Bathing Goal 2, OT-IRF)  long term goal (LTG)  -DN      Progress/Outcomes (Bathing Goal 2, OT-IRF)  goal ongoing  -DN         UB Dressing Goal 1 (OT-IRF)    Activity/Device (UB Dressing Goal 1, OT-IRF)  upper body dressing  -DN      [place] (UB Dress Goal 1, OT-IRF)  supervision required  -DN      Time Frame (UB Dressing Goal 1, OT-IRF)  short term goal (STG)  -DN      Progress/Outcomes (UB Dressing Goal 1, OT-IRF)  goal met;goal ongoing  -DN         UB Dressing Goal 2 (OT-IRF)    Activity/Device (UB Dressing Goal 2, OT-IRF)  upper body dressing  -DN      [place] (UB Dress Goal 2, OT-IRF)  supervision required  -DN      Time Frame (UB Dressing Goal 2, OT-IRF)  long term goal (LTG)  -DN      Progress/Outcomes (UB Dressing Goal 2, OT-IRF)  goal ongoing;goal met  -DN         LB Dressing Goal 1 (OT-IRF)    Activity/Device (LB Dressing Goal 1, OT-IRF)  lower body dressing  -DN      [place] (LB Dressing Goal 1, OT-IRF)  minimum assist (75% or more patient effort);verbal cues required;tactile cues required  -DN      Time Frame (LB Dressing Goal 1, OT-IRF)  short term goal (STG)  -DN      Progress/Outcomes (LB Dressing Goal 1, OT-IRF)  goal met;goal ongoing  -DN         LB Dressing Goal 2 (OT-IRF)    Activity/Device (LB Dressing Goal 2, OT-IRF)  lower body dressing  -DN      [place] (LB Dressing Goal 2, OT-IRF)  verbal cues required;tactile cues required;contact guard assist  -DN      Time Frame (LB Dressing Goal 2, OT-IRF)  long term goal (LTG)  -DN      Progress/Outcomes (LB Dressing Goal 2, OT-IRF)  goal revised this date  -DN         Grooming Goal 1 (OT-IRF)    Activity/Device (Grooming Goal 1, OT-IRF)  grooming skills, all  -DN      [place] (Grooming Goal 1, OT-IRF)  supervision required  -DN      Time Frame (Grooming Goal 1, OT-IRF)  short term goal  (STG)  -DN      Progress/Outcomes (Grooming Goal 1, OT-IRF)  goal ongoing  -DN         Grooming Goal 2 (OT-IRF)    Activity/Device (Grooming Goal 2, OT-IRF)  grooming skills, all  -DN      [place] (Grooming Goal 2, OT-IRF)  supervision required  -DN      Time Frame (Grooming Goal 2, OT-IRF)  long term goal (LTG)  -DN      Progress/Outcomes (Grooming Goal 2, OT-IRF)  goal revised this date  -DN         Toileting Goal 1 (OT-IRF)    Activity/Device (Toileting Goal 1, OT-IRF)  toileting skills, all  -DN      [place] Level (Toileting Goal 1, OT-IRF)  minimum assist (75% or more patient effort);verbal cues required;tactile cues required  -DN      Time Frame (Toileting Goal 1, OT-IRF)  short term goal (STG)  -DN      Progress/Outcomes (Toileting Goal 1, OT-IRF)  goal met;goal revised this date  -DN         Toileting Goal 2 (OT-IRF)    Activity/Device (Toileting Goal 2, OT-IRF)  toileting skills, all  -DN      [place] Level (Toileting Goal 2, OT-IRF)  contact guard assist;verbal cues required;tactile cues required  -DN      Time Frame (Toileting Goal 2, OT-IRF)  long term goal (LTG)  -DN      Progress/Outcomes (Toileting Goal 2, OT-IRF)  goal ongoing  -DN         Self-Feeding Goal 1 (OT-IRF)    Activity/Device (Self-Feeding Goal 1, OT-IRF)  self-feeding skills, all  -DN      Real (Self-Feeding Goal 1, OT-IRF)  supervision required  -DN      Time Frame (Self-Feeding Goal 1, OT-IRF)  short term goal (STG)  -DN      Progress/Outcomes 1 (Self-Feeding Goal, OT-IRF)  goal met;goal ongoing  -DN         Self-Feeding Goal 2 (OT-IRF)    Activity/Device (Self-Feeding Goal 2, OT-IRF)  self-feeding skills, all  -DN      Real (Self-Feeding Goal 2, OT-IRF)  supervision required  -DN      Time Frame (Self-Feeding Goal 2, OT-IRF)  long term goal (LTG)  -DN      Progress/Outcomes (Self-Feeding Goal 2, OT-IRF)  goal met;goal ongoing  -DN         Caregiver Training Goal 2 (OT-IRF)    Caregiver Training Goal 2  (OT-IRF)  pt caregiver to be independent with any assist pt needs with adls, transfers and HEP for d/c home  -DN      Time Frame (Caregiver Training Goal 2, OT-IRF)  long term goal (LTG)  -DN      Progress/Outcomes (Caregiver Training Goal 2, OT-IRF)  goal ongoing  -DN        User Key  (r) = Recorded By, (t) = Taken By, (c) = Cosigned By    Initials Name Provider Type    Reg Bolden OT Occupational Therapist          Occupational Therapy Education     Title: PT OT SLP Therapies (In Progress)     Topic: Occupational Therapy (Done)     Point: ADL training (Done)     Description: Instruct learner(s) on proper safety adaptation and remediation techniques during self care or transfers.   Instruct in proper use of assistive devices.    Learning Progress Summary           Patient Acceptance, E,TB,D, VU,NR by DN at 4/15/2019 12:04 PM    Comment:  pt presents with carryover of adapitive adls, functional transfers, and overall strength with min vc for safety    Acceptance, E,TB,D, VU,NR by DN at 4/9/2019  2:25 PM    Comment:  jay adls, adaptive visual scanning, transfer training    Acceptance, E,TB,D, VU,NR by DN at 4/8/2019 12:16 PM    Comment:  transfer training, jay skills with adls, safety,etc    Acceptance, E,TB,D, VU,NR by DN at 4/4/2019  3:19 PM    Comment:  theraputty exercises, jay adls and transfer trainning    Acceptance, E,TB,D, NR by DN at 3/26/2019 12:06 PM    Comment:  jay adls and commode/shower transfers, still max vc for all due to cognition and visual impairments    Acceptance, E,TB,D, VU,NR by DN at 3/25/2019  5:23 PM    Comment:  OT role in rehab, transfer traning, jay adls                   Point: Home exercise program (Done)     Description: Instruct learner(s) on appropriate technique for monitoring, assisting and/or progressing therapeutic exercises/activities.    Learning Progress Summary           Patient Acceptance, E,TB,D, VU,NR by DN at 4/15/2019 12:04 PM    Comment:  pt presents with  carryover of adapitive adls, functional transfers, and overall strength with min vc for safety    Acceptance, E,TB,D, VU,NR by DN at 4/9/2019  2:25 PM    Comment:  jay adls, adaptive visual scanning, transfer training    Acceptance, E,TB,D, VU,NR by DN at 4/8/2019 12:16 PM    Comment:  transfer training, jay skills with adls, safety,etc    Acceptance, E,TB,D, VU,NR by DN at 4/4/2019  3:19 PM    Comment:  theraputty exercises, jay adls and transfer trainning    Acceptance, E,TB,D, NR by DN at 3/26/2019 12:06 PM    Comment:  jay adls and commode/shower transfers, still max vc for all due to cognition and visual impairments    Acceptance, E,TB,D, VU,NR by DN at 3/25/2019  5:23 PM    Comment:  OT role in rehab, transfer traning, jay adls                   Point: Precautions (Done)     Description: Instruct learner(s) on prescribed precautions during self-care and functional transfers.    Learning Progress Summary           Patient Acceptance, E,TB,D, VU,NR by DN at 4/15/2019 12:04 PM    Comment:  pt presents with carryover of adapitive adls, functional transfers, and overall strength with min vc for safety    Acceptance, E,TB,D, VU,NR by DN at 4/9/2019  2:25 PM    Comment:  jay adls, adaptive visual scanning, transfer training    Acceptance, E,TB,D, VU,NR by DN at 4/8/2019 12:16 PM    Comment:  transfer training, jay skills with adls, safety,etc    Acceptance, E,TB,D, VU,NR by DN at 4/4/2019  3:19 PM    Comment:  theraputty exercises, jay adls and transfer trainning    Acceptance, E,TB,D, NR by DN at 3/26/2019 12:06 PM    Comment:  jay adls and commode/shower transfers, still max vc for all due to cognition and visual impairments    Acceptance, E,TB,D, VU,NR by DN at 3/25/2019  5:23 PM    Comment:  OT role in rehab, transfer traning, jay adls                   Point: Body mechanics (Done)     Description: Instruct learner(s) on proper positioning and spine alignment during self-care, functional mobility  activities and/or exercises.    Learning Progress Summary           Patient Acceptance, E,TB,D, VU,NR by DN at 4/15/2019 12:04 PM    Comment:  pt presents with carryover of adapitive adls, functional transfers, and overall strength with min vc for safety    Acceptance, E,TB,D, VU,NR by DN at 4/9/2019  2:25 PM    Comment:  jay adls, adaptive visual scanning, transfer training    Acceptance, E,TB,D, VU,NR by DN at 4/8/2019 12:16 PM    Comment:  transfer training, jay skills with adls, safety,etc    Acceptance, E,TB,D, VU,NR by DN at 4/4/2019  3:19 PM    Comment:  theraputty exercises, jay adls and transfer trainning    Acceptance, E,TB,D, NR by DN at 3/26/2019 12:06 PM    Comment:  jay adls and commode/shower transfers, still max vc for all due to cognition and visual impairments    Acceptance, E,TB,D, VU,NR by DN at 3/25/2019  5:23 PM    Comment:  OT role in rehab, transfer traning, jay adls                               User Key     Initials Effective Dates Name Provider Type Discipline    DN 06/08/18 -  Reg Mckinney OT Occupational Therapist OT                       Time Calculation:     Time Calculation- OT     Row Name 04/16/19 1230             Time Calculation- OT    OT Start Time  1230  -CC      OT Stop Time  1300  -CC      OT Time Calculation (min)  30 min  -CC        User Key  (r) = Recorded By, (t) = Taken By, (c) = Cosigned By    Initials Name Provider Type    CC Yolanda Felder OTR Occupational Therapist          Therapy Charges for Today     Code Description Service Date Service Provider Modifiers Qty    [id number]  OT NEUROMUSC RE EDUCATION EA 15 MIN 4/16/2019 Yolanda Felder OTR GO 1    [id number]  OT THER PROC EA 15 MIN 4/16/2019 Yolanda Felder OTR GO 1                   RAMO [place]  4/16/2019

## 2019-04-16 NOTE — THERAPY TREATMENT NOTE
Inpatient Rehabilitation - Speech Language Pathology Treatment Note    [facility]       Patient Name: Nayan Ryan  : 1965  MRN: [id number]    Today's Date: 2019           Admit Date: 3/24/2019      Visit Dx:      No diagnosis found.    Patient Active Problem List   Diagnosis   • Acute ischemic left PCA stroke (CMS/HCC)   • Status post placement of implantable loop recorder   • HTN (hypertension)   • Diabetes mellitus (CMS/HCC)   • Hyperlipidemia   • Morbid obesity (CMS/HCC)   • Anxiety disorder   • Migraine   • H/O hernia repair   • Abdominal wall abscess   • Back pain   • Stroke (cerebrum) (CMS/HCC)   • Vitamin D deficiency   • History of fatty infiltration of liver          Therapy Treatment    Evaluation/Coping    Evaluation/Treatment Time and Intent  Subjective Information: no complaints (19 1500 : Narcisa Bonner MS CCC-SLP)  Existing Precautions/Restrictions: fall (19 1500 : Narcisa Bonner MS CCC-SLP)  Document Type: therapy note (daily note) (19 1500 : Narcisa Bonner MS CCC-SLP)  Mode of Treatment: individual therapy, speech-language pathology (19 1500 : Narcisa Bonner MS CCC-SLP)  Patient/Family Observations: cooperative (19 1500 : Narcisa Bonner MS CCC-SLP)  Start Time (Evaluation/Treatment): 1430 (19 1500 : Narcisa Bonner MS CCC-SLP)  Stop Time (Evaluation/Treatment): 1500 (19 1500 : Kailey, Narcisa, MS CCC-SLP)    Vitals/Pain/Safety         Cognition/Communication         Oral Motor/Eating         Mobility/Basic Activities/Instrumental Activities/Motor/Modality                   ROM/MMT                   Sensory/Myotome/Dermatome/Edema               Posture/Balance/Special Tests/Exercise/Transportation/Sexual Function                   Orthotics/Residual Limb/Prosthetic Management              Outcome Summary         EDUCATION    The patient has been educated in the following areas:     Cognitive Impairment.    SLP Recommendation and Plan                                                           SLP GOALS     Row Name 04/16/19 1500 04/16/19 1000 04/15/19 1400       Memory Skills Goal 1 (SLP)    Progress (Memory Skills Goal 1, SLP)  60%;independently (over 90% accuracy) visual recall- 12 items- grouping strategy  -SL  --  80%;independently (over 90% accuracy) visual recall- 4 numbers- spatial components- boxed info  -SL    Progress/Outcomes (Memory Skills Goal 1, SLP)  --  --  goal ongoing  -SL    Comment (Memory Skills Goal 1, SLP)  --  --  name- picture associations- 60%  -SL       Organizational Skills Goal 1 (SLP)    Progress (Thought Organization Skills Goal 1, SLP)  --  --  60%;independently (over 90% accuracy) adding item to lists- abstract categories  -SL    Progress/Outcomes (Thought Organization Skills Goal 1, SLP)  --  --  goal ongoing  -SL       Reasoning Goal 1 (SLP)    Progress (Reasoning Goal 1, SLP)  70%;80%;independently (over 90% accuracy)  1 and 2 factor figural sequencing  -SL  --  70%;with minimal cues (75-90%);with moderate cues (50-74%) unscrambling L-R- words in category  -SL    Progress/Outcomes (Reasoning Goal 1, SLP)  --  --  goal ongoing  -SL       Functional Problem Solving Skills Goal 1 (SLP)    Progress (Problem Solving Goal 1, SLP)  --  80%;with minimal cues (75-90%) 2 step written directives  -SL  --    Comment (Problem Solving Goal 1, SLP)  --  sequencing 6 sentences for stories- 50%   -SL  --       Functional Math Skills Goal 1 (SLP)    Progress (Functional Math Skills Goal 1, SLP)  60%;70%;independently (over 90% accuracy) simple time calculations related to water park hours  -SL  60%;independently (over 90% accuracy) time calculations- airline flight times  -SL  --    Row Name 04/15/19 1000             Memory Skills Goal 1 (SLP)    Progress (Memory Skills Goal 1, SLP)  60%;with minimal cues (75-90%) 3 word mental manipulation task- alphabetical ordering  -SL      Comment (Memory Skills Goal 1, SLP)  30 item grid- hidden picture matching task-40%   -SL         Executive Functional Skills Goal 1 (SLP)    Progress (Executive Function Skills Goal 1, SLP)  30%;40%;with moderate cues (50-74%) scheduling/planning task based on paragraph  -SL        User Key  (r) = Recorded By, (t) = Taken By, (c) = Cosigned By    Initials Name Provider Type    Narcisa Urias MS CCC-SLP Speech and Language Pathologist                  Time Calculation:       Time Calculation- SLP     Row Name 04/16/19 1524 04/16/19 1217 04/16/19 1100       Time Calculation- SLP    SLP Start Time  1430  -SL  --  1030  -SL    SLP Stop Time  1500  -SL  --  1100  -SL    SLP Time Calculation (min)  30 min  -SL  --  30 min  -SL    SLP Non-Billable Time (min)  --  15 min rounds  -SL  --      User Key  (r) = Recorded By, (t) = Taken By, (c) = Cosigned By    Initials Name Provider Type    Narcisa Urias MS CCC-SLP Speech and Language Pathologist            Therapy Charges for Today     Code Description Service Date Service Provider Modifiers Qty    15161594853 HC ST DEV OF COGN SKILLS EACH 15 MIN 4/15/2019 Narcisa Bonner MS CCC-SLP  2    96574608458 HC ST DEV OF COGN SKILLS EACH 15 MIN 4/15/2019 Narcisa Bonner MS CCC-SLP  2    26012523074 HC ST DEV OF COGN SKILLS EACH 15 MIN 4/16/2019 Narcisa Bonner MS CCC-SLP  2    51631122824 HC ST DEV OF COGN SKILLS EACH 15 MIN 4/16/2019 Narcisa Bonner MS CCC-SLP  2                           Narcisa Bonner MS CCC-SLP  4/16/2019

## 2019-04-16 NOTE — PROGRESS NOTES
Inpatient Rehabilitation Functional Measures Assessment    Functional Measures  VANITA Eating:  [facility] Grooming: [facility] Bathing:  [facility] Upper Body Dressing:  [facility] Lower Body Dressing:  [facility] Toileting:  [facility] Bladder Management  Level of Assistance:  [place]  Frequency/Number of Accidents this Shift:  [facility] Bowel Management  Level of Assistance: [place]  Frequency/Number of Accidents this Shift: [facility] Bed/Chair/Wheelchair Transfer:  [facility] Toilet Transfer:  [facility] Tub/Shower Transfer:  [place]    Previously Documented Mode of Locomotion at Discharge: Field  VANITA Expected Mode of Locomotion at Discharge: [facility] Walk/Wheelchair:  [facility] Stairs:  [facility] Comprehension:  Auditory comprehension is the usual mode. Comprehension  Score = 6, Modified [place].  Patient comprehends complex/abstract  information in their primary language, requiring: Additional time.  VANITA Expression:  Vocal expression is the usual mode. Expression Score = 6,  Modified Independent.  Patient expresses complex/abstract information in their  primary language, requiring: Additional time.  VANITA Social Interaction:  Social Interaction Score = 6, Modified Independent.  Patient is modified independent for social interaction, requiring: Requires  additional time.  VANITA Problem Solving:  Problem Solving Score = 6, Modified [place].  Patient  makes appropriate decisions in order to solve complex problems, but requires  extra time.  VANITA Memory:  Memory Score = 6, Modified [place].  Patient is modified  independent for memory, requiring: Requires additional time.    Therapy Mode Minutes  Occupational Therapy: Branch  Physical Therapy: Branch  Speech Language Pathology:  [facility]    Signed by: Michael Khan RN

## 2019-04-16 NOTE — THERAPY TREATMENT NOTE
Inpatient Rehabilitation - Occupational Therapy Treatment Note    [facility]     Patient Name: Nayan Ryan  : 1965  MRN: [id number]    Today's Date: 2019                 Admit Date: 3/24/2019      Visit Dx:  No diagnosis found.    Patient Active Problem List   Diagnosis   • Acute ischemic left PCA stroke (CMS/HCC)   • Status post placement of implantable loop recorder   • HTN (hypertension)   • Diabetes mellitus (CMS/HCC)   • Hyperlipidemia   • Morbid obesity (CMS/HCC)   • Anxiety disorder   • Migraine   • H/O hernia repair   • Abdominal wall abscess   • Back pain   • Stroke (cerebrum) (CMS/HCC)   • Vitamin D deficiency   • History of fatty infiltration of liver         Therapy Treatment    IRF Treatment Summary     Row Name 19 1326 19 1317 19 1044       Evaluation/Treatment Time and Intent    Subjective Information  no complaints  -SG  no complaints  -CC  no complaints  -SL    Existing Precautions/Restrictions  fall  -SG  fall  -CC  fall  -SL    Document Type  therapy note (daily note)  -SG  therapy note (daily note)  -CC  therapy note (daily note)  -SL    Mode of Treatment  individual therapy;occupational therapy  -SG  occupational therapy  -CC  individual therapy;speech-language pathology  -SL    Patient/Family Observations  Pt sitting up in w/c   -SG  --  cooperative  -SL    Start Time (Evaluation/Treatment)  --  --  1030  -SL    Stop Time (Evaluation/Treatment)  --  --  1100  -SL    Recorded by [SG] Irma Jackson OTR [CC] Yolanda Felder OTR [SL] Narcisa Bonner MS CCC-SLP    Row Name 19 0943             Evaluation/Treatment Time and Intent    Subjective Information  no complaints  (Pended)   -LS      Existing Precautions/Restrictions  fall  (Pended)   -LS      Document Type  therapy note (daily note)  (Pended)   -LS      Mode of Treatment  physical therapy  (Pended)   -LS      Patient/Family Observations  Pt sitting in room in w/c, ready to participate with therapy   (Pended)   -LS      Recorded by [LS] Naomy Mendenhall, PT Student      Row Name 04/16/19 1326 04/16/19 1317 04/16/19 0943       Cognition/Psychosocial- PT/OT    Affect/Mental Status (Cognitive)  WNL  -SG  --  WNL  (Pended)   -LS    Behavioral Issues (Cognitive)  --  --  --  (Pended)  engaging on more consistent basis, occasionally withdrawn  -LS    Orientation Status (Cognition)  oriented x 3  -SG  oriented x 3  -CC  --    Follows Commands (Cognition)  follows one step commands;over 90% accuracy;verbal cues/prompting required  -SG  follows one step commands;over 90% accuracy;verbal cues/prompting required  -CC  follows one step commands;over 90% accuracy;verbal cues/prompting required;repetition of directions required;physical/tactile prompts required  (Pended)   -LS    Personal Safety Interventions  gait belt;fall prevention program maintained  -SG  fall prevention program maintained;gait belt;nonskid shoes/slippers when out of bed  -CC  fall prevention program maintained;gait belt;muscle strengthening facilitated;nonskid shoes/slippers when out of bed  (Pended)   -    Attention Deficit (Cognitive)  --  --  distractible in noisy environment  (Pended)   -LS    Memory Deficit (Cognitive)  --  --  moderate deficit;working memory  (Pended)   -LS    Safety Deficit (Cognitive)  --  --  ability to follow commands;impulsivity;insight into deficits/self awareness;judgment  (Pended)   -LS    Recorded by [SG] Irma Jackson OTR [CC] Yolanda Felder OTR [LS] Naomy Mendenhall PT Student    Row Name 04/16/19 1326             Bed Mobility Assessment/Treatment    Supine-Sit [place] (Bed Mobility)  not tested  -SG      Sit-Supine [place] (Bed Mobility)  not tested  -SG      Recorded by [SG] Irma Jackson OTR      Row Name 04/16/19 1326 04/16/19 0943          Transfer Assessment/Treatment    Transfer Assessment/Treatment  sit-stand transfer;stand-sit transfer;shower transfer  -SG  --     Comment (Transfers)  --   Practiced approaching and turning to sit in multiple different chairs around clinic  (Pended)   -LS     Recorded by [SG] Irma Jackson OTR [LS] Naomy Mendenhall, PT Student     Row Name 04/16/19 1326 04/16/19 0943          Sit-Stand Transfer    Sit-Stand [place] (Transfers)  contact guard  -SG  contact guard  (Pended)   -LS     Assistive Device (Sit-Stand Transfers)  wheelchair  -SG  wheelchair  (Pended)   -LS     Recorded by [SG] Irma Jackson OTR [LS] Naomy Mendenhall, PT Student     Row Name 04/16/19 1326 04/16/19 0943          Stand-Sit Transfer    Stand-Sit [place] (Transfers)  contact guard  -SG  contact guard  (Pended)   -LS     Assistive Device (Stand-Sit Transfers)  wheelchair  -SG  wheelchair  (Pended)   -LS     Recorded by [SG] Irma Jackson OTR [LS] Naomy Mendenhall, PT Student     Row Name 04/16/19 1326             Shower Transfer    Type (Shower Transfer)  stand pivot/stand step  -SG      [place] Level (Shower Transfer)  minimum assist (75% patient effort);verbal cues  -SG      Assistive Device (Shower Transfer)  tub bench;wheelchair;grab bars/tub rail  -SG      Recorded by [SG] Irma Jackson OTR      Row Name 04/16/19 0943             Car Transfer    Type (Car Transfer)  stand pivot/stand step  (Pended)   -LS      [place] Level (Car Transfer)  verbal cues;contact guard;minimum assist (75% patient effort)  (Pended)   -LS      Assistive Device (Car Transfer)  wheelchair  (Pended)  Min A only to get R LE into car, otherwise CGA  -LS      Recorded by [LS] Naomy Mendenhall, PT Student      Row Name 04/16/19 0943             Gait/Stairs Assessment/Training    [place] Level (Gait)  verbal cues;contact guard  (Pended)   -LS      Assistive Device (Gait)  walker, front-wheeled  (Pended)   -LS      Distance in Feet (Gait)  50 x 4  (Pended)   -LS      Pattern (Gait)  step-through  (Pended)   -LS      Deviations/Abnormal Patterns (Gait)  nancy decreased;gait speed decreased   (Pended)   -LS      Bilateral Gait Deviations  weight shift ability decreased;heel strike decreased  (Pended)   -LS      Left Sided Gait Deviations  weight shift ability decreased  (Pended)   -LS      Right Sided Gait Deviations  heel strike decreased;knee buckling, right side;knee hyperextension  (Pended)   -      [place] Level (Stairs)  verbal cues;contact guard;2 person assist  (Pended)   -      Handrail Location (Stairs)  both sides  (Pended)   -      Number of Steps (Stairs)  4  (Pended)   -LS      Ascending Technique (Stairs)  step-to-step  (Pended)   -LS      Descending Technique (Stairs)  step-to-step  (Pended)   -LS      Stairs, Safety Issues  sequencing ability decreased;balance decreased during turns  (Pended)   -LS      Stairs, Impairments  strength decreased;impaired balance;motor control impaired  (Pended)   -      Comment (Gait/Stairs)  Ambulating with Rwx with occasional VC for advancing AD properly when turning to sit in chair. VC to focus on R knee control--intermittent hyperextension/buckling but pt able to correct and maintain balance with CGA. Continued need for consistent cues and reminders for sequencing correctly and performing step to step method with stairs  (Pended)   -LS      Recorded by [LS] Naomy Mendenhall, PT Student      Row Name 04/16/19 09             Safety Issues, Functional Mobility    Safety Issues Affecting Function (Mobility)  ability to follow commands;insight into deficits/self awareness;judgment;sequencing abilities  (Pended)   -      Impairments Affecting Function (Mobility)  balance;cognition;coordination;motor control;strength  (Pended)   -LS      Recorded by [LS] Naomy Mendenhall, PT Student      Row Name 04/16/19 1326             Bathing Assessment/Treatment    Bathing [place] Level  upper body;set up;lower body;contact guard assist;verbal cues;nonverbal cues (demo/gesture)  -      Assistive Device (Bathing)  grab bar/tub rail;hand held shower  spray hose;shower chair  -SG      Bathing Position  supported sitting;supported standing  -SG      Recorded by [SG] Irma Jackson OTR      Row Name 04/16/19 1326             Upper Body Dressing Assessment/Treatment    Upper Body Dressing Task  doff;don;set up assistance;verbal cues;nonverbal cues (demo/gesture)  -SG      Upper Body Dressing Position  supported sitting  -SG      Recorded by [SG] Irma Jackson OTR      Row Name 04/16/19 1326             Lower Body Dressing Assessment/Treatment    Lower Body Dressing [place] Level  doff;don;pants/bottoms;shoes/slippers;socks;underwear;minimum assist (75% patient effort);verbal cues;nonverbal cues (demo/gesture)  -SG      Lower Body Dressing Position  supported sitting;supported standing  -SG      Recorded by [SG] Irma Jackson OTR      Row Name 04/16/19 1326             Grooming Assessment/Treatment    Grooming [place] Level  grooming skills;deodorant application;wash face, hands;oral care regimen  -SG      Grooming Position  supported sitting;sink side  -SG      Recorded by [SG] Irma Jackson OTR      Row Name 04/16/19 1317             Vision Assessment/Intervention    Vision Assessment Comment  less vc for overlapping small peg design. Pt more attentive to Right lower quadrant, completed 2 designs from visual pattern card w 1-2 vc needed for each  -CC      Recorded by [CC] Yolanda Felder OTR      Row Name 04/16/19 0956             Pain Assessment    Additional Documentation  Pain Scale: Numbers Pre/Post-Treatment (Group)  (Pended)   -LS      Recorded by [LS] Naomy Mendenhall, PT Student      Row Name 04/16/19 1326 04/16/19 1317 04/16/19 0958       Pain Scale: Numbers Pre/Post-Treatment    Pain Scale: Numbers, Pretreatment  0/10 - no pain  -SG  0/10 - no pain  -CC  0/10 - no pain  (Pended)   -LS    Pain Scale: Numbers, Post-Treatment  0/10 - no pain  -SG  0/10 - no pain  -CC  0/10 - no pain  (Pended)   -LS    Recorded by [SG] Renetta  RAMO Solis [CC] Yolanda Felder OTR [LS] Naomy Mendenahll, PT Student    Row Name 04/16/19 0943             Dynamic Balance Activity    Therapeutic Training Performed (Dynamic Balance)  --  (Pended)  marching in place  -      Support Needed for Balance (Dynamic Balance Training)  CGA;uses both upper extremities for support  (Pended)   -      Upper Extremity Activity with Device (Dynamic Balance Training)  --  (Pended)  jay bar  -LS      Comment (Dynamic Balance Training)  2 x 10 reps marching in place on each leg. Cues for knee control during stance phase on RLE and R hip flexor cue when lifting R LE  (Pended)   -LS      Recorded by [LS] Naomy Mendenhall, PT Student      Row Name 04/16/19 1317             Upper Extremity Seated Therapeutic Exercise    Performed, Seated Upper Extremity (Therapeutic Exercise)  shoulder abduction/adduction;elbow flexion/extension;forearm supination/pronation;wrist flexion/extension  -CC      Device, Seated Upper Extremity (Therapeutic Exercise)  free weights, cuff  -CC      Exercise Type, Seated Upper Extremity (Therapeutic Exercise)  resistive exercise  -CC      Expected Outcomes, Seated Upper Extremity (Therapeutic Exercise)  improve functional tolerance, self-care activity  -CC      Sets/Reps Detail, Seated Upper Extremity (Therapeutic Exercise)  1# hand wt shld 10x3; 2# hand wt elbow and wrist 10x3  -CC      Recorded by [CC] Yolanda Felder OTR      Row Name 04/16/19 1326 04/16/19 1317 04/16/19 0943       Positioning and Restraints    Pre-Treatment Position  sitting in chair/recliner  -SG  sitting in chair/recliner  -CC  sitting in chair/recliner  (Pended)   -    Post Treatment Position  wheelchair  -SG  wheelchair  -CC  wheelchair  (Pended)   -    In Wheelchair  sitting;call light within reach;encouraged to call for assist;exit alarm on  -SG  with family/caregiver;with other staff SW  -  sitting;exit alarm on;with other staff;patient within staff view  (Pended)   -     Recorded by [SG] Irma Jackson, OTR [CC] Yolanda Felder, OTR [LS] Naomy Mendenhall, PT Student      User Key  (r) = Recorded By, (t) = Taken By, (c) = Cosigned By    Initials Name Effective Dates    CC Yolanda Felder, OTR 06/08/18 -     SL KaileyNarcisa lopez, MS CCC-SLP 06/08/18 -     Irma Guerrier, OTR 12/26/18 -     Naomy Armas, PT Student 02/04/19 -           Wound 03/26/19 0900 Left chest incision (Active)   Dressing Appearance open to air 4/16/2019  8:45 AM   Closure None 4/16/2019  8:45 AM   Base clean;dry 4/16/2019  8:45 AM   Drainage Amount none 4/16/2019  8:45 AM   Dressing Care, Wound open to air 4/16/2019  8:45 AM         OT Recommendation and Plan                 OT IRF GOALS     Row Name 04/09/19 1400             Bathing Goal 1 (OT-IRF)    Activity/Device (Bathing Goal 1, OT-IRF)  bathing skills, all  -DN      [place] Level (Bathing Goal 1, OT-IRF)  supervision required;verbal cues required  -DN      Time Frame (Bathing Goal 1, OT-IRF)  short term goal (STG)  -DN      Progress/Outcomes (Bathing Goal 1, OT-IRF)  goal met;goal revised this date  -DN         Bathing Goal 2 (OT-IRF)    Activity/Device (Bathing Goal 2, OT-IRF)  bathing skills, all  -DN      [place] Level (Bathing Goal 2, OT-IRF)  supervision required  -DN      Time Frame (Bathing Goal 2, OT-IRF)  long term goal (LTG)  -DN      Progress/Outcomes (Bathing Goal 2, OT-IRF)  goal ongoing  -DN         UB Dressing Goal 1 (OT-IRF)    Activity/Device (UB Dressing Goal 1, OT-IRF)  upper body dressing  -DN      [place] (UB Dress Goal 1, OT-IRF)  supervision required  -DN      Time Frame (UB Dressing Goal 1, OT-IRF)  short term goal (STG)  -DN      Progress/Outcomes (UB Dressing Goal 1, OT-IRF)  goal met;goal ongoing  -DN         UB Dressing Goal 2 (OT-IRF)    Activity/Device (UB Dressing Goal 2, OT-IRF)  upper body dressing  -DN      [place] (UB Dress Goal 2, OT-IRF)  supervision required  -DN      Time Frame (UB  Dressing Goal 2, OT-IRF)  long term goal (LTG)  -DN      Progress/Outcomes (UB Dressing Goal 2, OT-IRF)  goal ongoing;goal met  -DN         LB Dressing Goal 1 (OT-IRF)    Activity/Device (LB Dressing Goal 1, OT-IRF)  lower body dressing  -DN      [place] (LB Dressing Goal 1, OT-IRF)  minimum assist (75% or more patient effort);verbal cues required;tactile cues required  -DN      Time Frame (LB Dressing Goal 1, OT-IRF)  short term goal (STG)  -DN      Progress/Outcomes (LB Dressing Goal 1, OT-IRF)  goal met;goal ongoing  -DN         LB Dressing Goal 2 (OT-IRF)    Activity/Device (LB Dressing Goal 2, OT-IRF)  lower body dressing  -DN      [place] (LB Dressing Goal 2, OT-IRF)  verbal cues required;tactile cues required;contact guard assist  -DN      Time Frame (LB Dressing Goal 2, OT-IRF)  long term goal (LTG)  -DN      Progress/Outcomes (LB Dressing Goal 2, OT-IRF)  goal revised this date  -DN         Grooming Goal 1 (OT-IRF)    Activity/Device (Grooming Goal 1, OT-IRF)  grooming skills, all  -DN      [place] (Grooming Goal 1, OT-IRF)  supervision required  -DN      Time Frame (Grooming Goal 1, OT-IRF)  short term goal (STG)  -DN      Progress/Outcomes (Grooming Goal 1, OT-IRF)  goal ongoing  -DN         Grooming Goal 2 (OT-IRF)    Activity/Device (Grooming Goal 2, OT-IRF)  grooming skills, all  -DN      [place] (Grooming Goal 2, OT-IRF)  supervision required  -DN      Time Frame (Grooming Goal 2, OT-IRF)  long term goal (LTG)  -DN      Progress/Outcomes (Grooming Goal 2, OT-IRF)  goal revised this date  -DN         Toileting Goal 1 (OT-IRF)    Activity/Device (Toileting Goal 1, OT-IRF)  toileting skills, all  -DN      [place] Level (Toileting Goal 1, OT-IRF)  minimum assist (75% or more patient effort);verbal cues required;tactile cues required  -DN      Time Frame (Toileting Goal 1, OT-IRF)  short term goal (STG)  -DN      Progress/Outcomes (Toileting Goal 1, OT-IRF)  goal met;goal revised  this date  -DN         Toileting Goal 2 (OT-IRF)    Activity/Device (Toileting Goal 2, OT-IRF)  toileting skills, all  -DN      [place] Level (Toileting Goal 2, OT-IRF)  contact guard assist;verbal cues required;tactile cues required  -DN      Time Frame (Toileting Goal 2, OT-IRF)  long term goal (LTG)  -DN      Progress/Outcomes (Toileting Goal 2, OT-IRF)  goal ongoing  -DN         Self-Feeding Goal 1 (OT-IRF)    Activity/Device (Self-Feeding Goal 1, OT-IRF)  self-feeding skills, all  -DN      [place] (Self-Feeding Goal 1, OT-IRF)  supervision required  -DN      Time Frame (Self-Feeding Goal 1, OT-IRF)  short term goal (STG)  -DN      Progress/Outcomes 1 (Self-Feeding Goal, OT-IRF)  goal met;goal ongoing  -DN         Self-Feeding Goal 2 (OT-IRF)    Activity/Device (Self-Feeding Goal 2, OT-IRF)  self-feeding skills, all  -DN      [place] (Self-Feeding Goal 2, OT-IRF)  supervision required  -DN      Time Frame (Self-Feeding Goal 2, OT-IRF)  long term goal (LTG)  -DN      Progress/Outcomes (Self-Feeding Goal 2, OT-IRF)  goal met;goal ongoing  -DN         Caregiver Training Goal 2 (OT-IRF)    Caregiver Training Goal 2 (OT-IRF)  pt caregiver to be independent with any assist pt needs with adls, transfers and HEP for d/c home  -DN      Time Frame (Caregiver Training Goal 2, OT-IRF)  long term goal (LTG)  -DN      Progress/Outcomes (Caregiver Training Goal 2, OT-IRF)  goal ongoing  -DN        User Key  (r) = Recorded By, (t) = Taken By, (c) = Cosigned By    Initials Name Provider Type    Reg Bolden OT Occupational Therapist          Occupational Therapy Education     Title: PT OT SLP Therapies (In Progress)     Topic: Occupational Therapy (Done)     Point: ADL training (Done)     Description: Instruct learner(s) on proper safety adaptation and remediation techniques during self care or transfers.   Instruct in proper use of assistive devices.    Learning Progress Summary           Patient  Acceptance, E,TB,D, VU,NR by DN at 4/15/2019 12:04 PM    Comment:  pt presents with carryover of adapitive adls, functional transfers, and overall strength with min vc for safety    Acceptance, E,TB,D, VU,NR by DN at 4/9/2019  2:25 PM    Comment:  jay adls, adaptive visual scanning, transfer training    Acceptance, E,TB,D, VU,NR by DN at 4/8/2019 12:16 PM    Comment:  transfer training, jay skills with adls, safety,etc    Acceptance, E,TB,D, VU,NR by DN at 4/4/2019  3:19 PM    Comment:  theraputty exercises, jay adls and transfer trainning    Acceptance, E,TB,D, NR by DN at 3/26/2019 12:06 PM    Comment:  jay adls and commode/shower transfers, still max vc for all due to cognition and visual impairments    Acceptance, E,TB,D, VU,NR by DN at 3/25/2019  5:23 PM    Comment:  OT role in rehab, transfer traning, jay adls                   Point: Home exercise program (Done)     Description: Instruct learner(s) on appropriate technique for monitoring, assisting and/or progressing therapeutic exercises/activities.    Learning Progress Summary           Patient Acceptance, E,TB,D, VU,NR by DN at 4/15/2019 12:04 PM    Comment:  pt presents with carryover of adapitive adls, functional transfers, and overall strength with min vc for safety    Acceptance, E,TB,D, VU,NR by DN at 4/9/2019  2:25 PM    Comment:  jay adls, adaptive visual scanning, transfer training    Acceptance, E,TB,D, VU,NR by DN at 4/8/2019 12:16 PM    Comment:  transfer training, jay skills with adls, safety,etc    Acceptance, E,TB,D, VU,NR by DN at 4/4/2019  3:19 PM    Comment:  theraputty exercises, jay adls and transfer trainning    Acceptance, E,TB,D, NR by DN at 3/26/2019 12:06 PM    Comment:  jay adls and commode/shower transfers, still max vc for all due to cognition and visual impairments    Acceptance, E,TB,D, VU,NR by DN at 3/25/2019  5:23 PM    Comment:  OT role in rehab, transfer traning, jay adls                   Point: Precautions  (Done)     Description: Instruct learner(s) on prescribed precautions during self-care and functional transfers.    Learning Progress Summary           Patient Acceptance, E,TB,D, VU,NR by DN at 4/15/2019 12:04 PM    Comment:  pt presents with carryover of adapitive adls, functional transfers, and overall strength with min vc for safety    Acceptance, E,TB,D, VU,NR by DN at 4/9/2019  2:25 PM    Comment:  jay adls, adaptive visual scanning, transfer training    Acceptance, E,TB,D, VU,NR by DN at 4/8/2019 12:16 PM    Comment:  transfer training, jay skills with adls, safety,etc    Acceptance, E,TB,D, VU,NR by DN at 4/4/2019  3:19 PM    Comment:  theraputty exercises, jay adls and transfer trainning    Acceptance, E,TB,D, NR by DN at 3/26/2019 12:06 PM    Comment:  jay adls and commode/shower transfers, still max vc for all due to cognition and visual impairments    Acceptance, E,TB,D, VU,NR by DN at 3/25/2019  5:23 PM    Comment:  OT role in rehab, transfer traning, jay adls                   Point: Body mechanics (Done)     Description: Instruct learner(s) on proper positioning and spine alignment during self-care, functional mobility activities and/or exercises.    Learning Progress Summary           Patient Acceptance, E,TB,D, VU,NR by DN at 4/15/2019 12:04 PM    Comment:  pt presents with carryover of adapitive adls, functional transfers, and overall strength with min vc for safety    Acceptance, E,TB,D, VU,NR by DN at 4/9/2019  2:25 PM    Comment:  jay adls, adaptive visual scanning, transfer training    Acceptance, E,TB,D, VU,NR by DN at 4/8/2019 12:16 PM    Comment:  transfer training, jay skills with adls, safety,etc    Acceptance, E,TB,D, VU,NR by DN at 4/4/2019  3:19 PM    Comment:  theraputty exercises, jay adls and transfer trainning    Acceptance, E,TB,D, NR by DN at 3/26/2019 12:06 PM    Comment:  jay adls and commode/shower transfers, still max vc for all due to cognition and visual impairments     Acceptance, E,TB,D, VU,NR by BRENDAN at 3/25/2019  5:23 PM    Comment:  OT role in rehab, transfer traning, jay adls                               User Key     Initials Effective Dates Name Provider Type Discipline    DN 06/08/18 -  Reg Mckinney, OT Occupational Therapist OT                       Time Calculation:     Time Calculation- OT     Row Name 04/16/19 1333 04/16/19 1230          Time Calculation- OT    OT Start Time  0900  -SG  1230  -CC     OT Stop Time  0930  -SG  1300  -CC     OT Time Calculation (min)  30 min  -SG  30 min  -CC     OT Received On  04/16/19  -SG  --       User Key  (r) = Recorded By, (t) = Taken By, (c) = Cosigned By    Initials Name Provider Type    CC Yolanda Felder OTR Occupational Therapist    SG Irma Jackson OTR Occupational Therapist          Therapy Charges for Today     Code Description Service Date Service Provider Modifiers Qty    [id number] HC OT SELF CARE/MGMT/TRAIN EA 15 MIN 4/16/2019 Irma Jackson OTR GO 2                   RAMO Conteh  4/16/2019

## 2019-04-16 NOTE — PROGRESS NOTES
Occupational Therapy: Individual: 30 minutes.    Physical Therapy: Branch    Speech Language Pathology:  Branch    Signed by: RAMO Cox/SOFIA

## 2019-04-16 NOTE — THERAPY TREATMENT NOTE
Inpatient Rehabilitation - Physical Therapy Treatment Note  [facility]     Patient Name: Nayan Ryan  : 1965  MRN: [id number]    Today's Date: 2019                 Admit Date: 3/24/2019      Visit Dx:    No diagnosis found.    Patient Active Problem List   Diagnosis   • Acute ischemic left PCA stroke (CMS/HCC)   • Status post placement of implantable loop recorder   • HTN (hypertension)   • Diabetes mellitus (CMS/HCC)   • Hyperlipidemia   • Morbid obesity (CMS/HCC)   • Anxiety disorder   • Migraine   • H/O hernia repair   • Abdominal wall abscess   • Back pain   • Stroke (cerebrum) (CMS/HCC)   • Vitamin D deficiency   • History of fatty infiltration of liver       Therapy Treatment    IRF Treatment Summary     Row Name 19 1500 19 1326 19 1317       Evaluation/Treatment Time and Intent    Subjective Information  no complaints  -SL  no complaints  -SG  no complaints  -CC    Existing Precautions/Restrictions  fall  -SL  fall  -SG  fall  -CC    Document Type  therapy note (daily note)  -SL  therapy note (daily note)  -SG  therapy note (daily note)  -CC    Mode of Treatment  individual therapy;speech-language pathology  -SL  individual therapy;occupational therapy  -SG  occupational therapy  -CC    Patient/Family Observations  cooperative  -  Pt sitting up in w/c   -SG  --    Start Time (Evaluation/Treatment)  1430  -SL  --  --    Stop Time (Evaluation/Treatment)  1500  -SL  --  --    Recorded by [SL] Narcisa Bonner MS CCC-SLP [SG] Irma Jackson OTR [CC] Yolanda Felder OTR    Row Name 19 1044 19 0943          Evaluation/Treatment Time and Intent    Subjective Information  no complaints  -SL  no complaints  -LB,LS,LB2     Existing Precautions/Restrictions  fall  -SL  fall  -LB,LS,LB2     Document Type  therapy note (daily note)  -  therapy note (daily note)  -LB,LS,LB2     Mode of Treatment  individual therapy;speech-language pathology  -  physical therapy  -LB,LS,LB2      Patient/Family Observations  cooperative  -  Pt sitting in room in w/c, ready to participate with therapy  -LB,LS,LB2     Start Time (Evaluation/Treatment)  1030  -SL  --     Stop Time (Evaluation/Treatment)  1100  -SL  --     Recorded by [SL] Narcisa Bonner MS CCC-SLP [LB,LS,LB2] Eliane Blackburn, PT (r) Naomy Mendenhall, PT Student (t) Eliane Blackburn, PT (c)     Row Name 04/16/19 1326 04/16/19 1317 04/16/19 0943       Cognition/Psychosocial- PT/OT    Affect/Mental Status (Cognitive)  WNL  -SG  --  WNL  -LB,LS,LB2    Behavioral Issues (Cognitive)  --  --  -- engaging on more consistent basis, occasionally withdrawn  -LB,LS,LB2    Orientation Status (Cognition)  oriented x 3  -SG  oriented x 3  -CC  --    Follows Commands (Cognition)  follows one step commands;over 90% accuracy;verbal cues/prompting required  -SG  follows one step commands;over 90% accuracy;verbal cues/prompting required  -CC  follows one step commands;over 90% accuracy;verbal cues/prompting required;repetition of directions required;physical/tactile prompts required  -LB,LS,LB2    Personal Safety Interventions  gait belt;fall prevention program maintained  -SG  fall prevention program maintained;gait belt;nonskid shoes/slippers when out of bed  -CC  fall prevention program maintained;gait belt;muscle strengthening facilitated;nonskid shoes/slippers when out of bed  -LB,LS,LB2    Attention Deficit (Cognitive)  --  --  distractible in noisy environment  -LB,LS,LB2    Memory Deficit (Cognitive)  --  --  moderate deficit;working memory  -LB,LS,LB2    Safety Deficit (Cognitive)  --  --  ability to follow commands;impulsivity;insight into deficits/self awareness;judgment  -LB,LS,LB2    Recorded by [SG] Irma Jackson OTR [CC] Yolanda Felder OTR [LB,LS,LB2] Eliane Blackburn, PT (r) Naomy Mendenhall, PT Student (t) Eliane Blackburn, PT (c)    Row Name 04/16/19 1326 04/16/19 0943          Bed Mobility Assessment/Treatment    Rolling Left [place] (Bed  Mobility)  --  verbal cues;supervision  -LB,LS,LB2     Rolling Right [place] (Bed Mobility)  --  verbal cues;supervision  -LB,LS,LB2     Supine-Sit [place] (Bed Mobility)  not tested  -SG  verbal cues;supervision  -LB,LS,LB2     Sit-Supine [place] (Bed Mobility)  not tested  -SG  verbal cues;supervision  -LB,LS,LB2     Recorded by [SG] Irma Jackson, OTR [LB,LS,LB2] Eliane Blackburn, PT (r) Naomy Mendenhall, PT Student (t) Eliane Blackburn, PT (c)     Row Name 04/16/19 1326 04/16/19 0943          Transfer Assessment/Treatment    Transfer Assessment/Treatment  sit-stand transfer;stand-sit transfer;shower transfer  -SG  --     Comment (Transfers)  --  Practiced approaching and turning to sit in multiple different chairs around clinic. Performed all transfers with pt spouse providing CGA for family teaching  -LB,LS,LB2     Recorded by [SG] Irma Jackson, OTR [LB,LS,LB2] Eliane Blackburn, PT (r) Naomy Mendenhall, PT Student (t) Eliane Blackburn, PT (c)     Row Name 04/16/19 0943             Bed-Chair Transfer    Bed-Chair [place] (Transfers)  verbal cues;contact guard  -LB,LS,LB2      Assistive Device (Bed-Chair Transfers)  wheelchair  -LB,LS,LB2      Recorded by [LB,LS,LB2] Eliane Blackburn, PT (r) Naomy Mendenhall, PT Student (t) Eliane Blackburn, PT (c)      Row Name 04/16/19 0943             Chair-Bed Transfer    Chair-Bed [place] (Transfers)  verbal cues;contact guard  -LB,LS,LB2      Assistive Device (Chair-Bed Transfers)  wheelchair  -LB,LS,LB2      Recorded by [LB,LS,LB2] Eliane Blackburn, PT (r) Naomy Mendenhall, PT Student (t) Eliane Blackburn, PT (c)      Row Name 04/16/19 1326 04/16/19 0943          Sit-Stand Transfer    Sit-Stand [place] (Transfers)  contact guard  -SG  contact guard  -LB,LS,LB2     Assistive Device (Sit-Stand Transfers)  wheelchair  -SG  wheelchair  -LB,LS,LB2     Recorded by [SG] Irma Jackson, OTR [LB,LS,LB2] Eliane Blackburn, PT (r) Naomy Mendenhall, ZAIDA Student (t) Eliane Blackburn, PT  (c)     Row Name 04/16/19 1326 04/16/19 0943          Stand-Sit Transfer    Stand-Sit [place] (Transfers)  contact guard  -SG  contact guard  -LB,LS,LB2     Assistive Device (Stand-Sit Transfers)  wheelchair  -SG  wheelchair  -LB,LS,LB2     Recorded by [SG] Irma Jackson OTR [LB,LS,LB2] Eliane Blackburn, PT (r) Naomy Mendenhall, PT Student (t) Eliane Blackburn, PT (c)     Row Name 04/16/19 1326             Shower Transfer    Type (Shower Transfer)  stand pivot/stand step  -SG      [place] Level (Shower Transfer)  minimum assist (75% patient effort);verbal cues  -SG      Assistive Device (Shower Transfer)  tub bench;wheelchair;grab bars/tub rail  -SG      Recorded by [SG] Irma Jackson OTR      Row Name 04/16/19 0943             Car Transfer    Type (Car Transfer)  stand pivot/stand step  -LB,LS,LB2      [place] Level (Car Transfer)  verbal cues;contact guard;minimum assist (75% patient effort)  -LB,LS,LB2      Assistive Device (Car Transfer)  wheelchair Min A only to get R LE into car, otherwise CGA  -LB,LS,LB2      Recorded by [LB,LS,LB2] Eliane Blackburn, PT (r) Naomy Mendenhall, PT Student (t) Eliane Blackburn, PT (c)      Row Name 04/16/19 0943             Gait/Stairs Assessment/Training    [place] Level (Gait)  verbal cues;contact guard  -LB,LS,LB2      Assistive Device (Gait)  walker, front-wheeled  -LB,LS,LB2      Distance in Feet (Gait)  50 x 6, 80  -LB,LS,LB2      Pattern (Gait)  step-through  -LB,LS,LB2      Deviations/Abnormal Patterns (Gait)  nancy decreased;gait speed decreased  -LB,LS,LB2      Bilateral Gait Deviations  weight shift ability decreased;heel strike decreased  -LB,LS,LB2      Left Sided Gait Deviations  weight shift ability decreased  -LB,LS,LB2      Right Sided Gait Deviations  heel strike decreased;knee buckling, right side;knee hyperextension  -LB,LS,LB2      [place] Level (Stairs)  verbal cues;contact guard;2 person assist  -LB,LS,LB2      Handrail Location (Stairs)   both sides  -LB,LS,LB2      Number of Steps (Stairs)  4 x 2  -LB,LS,LB2      Ascending Technique (Stairs)  step-to-step  -LB,LS,LB2      Descending Technique (Stairs)  step-to-step  -LB,LS,LB2      Stairs, Safety Issues  sequencing ability decreased;balance decreased during turns  -LB,LS,LB2      Stairs, Impairments  strength decreased;impaired balance;motor control impaired  -LB,LS,LB2      Comment (Gait/Stairs)  Ambulating with Rwx with occasional VC for advancing AD properly when turning to sit in chair. VC to focus on R knee control--intermittent hyperextension/buckling but pt able to correct and maintain balance with CGA. Continued need for consistent cues and reminders for sequencing correctly and performing step to step method with stairs  -LB,LS,LB2      Recorded by [LB,LS,LB2] Eliane Blackburn, PT (r) Naomy Mendenhall, PT Student (t) Eliane Blackburn, PT (c)      Row Name 04/16/19 0943             Safety Issues, Functional Mobility    Safety Issues Affecting Function (Mobility)  ability to follow commands;insight into deficits/self awareness;judgment;sequencing abilities  -LB,LS,LB2      Impairments Affecting Function (Mobility)  balance;cognition;coordination;motor control;strength  -LB,LS,LB2      Recorded by [LB,LS,LB2] Eliane Blackburn, PT (r) Naomy Mendenhall, PT Student (t) Eliane Blackburn, PT (c)      Row Name 04/16/19 1326             Bathing Assessment/Treatment    Bathing [place] Level  upper body;set up;lower body;contact guard assist;verbal cues;nonverbal cues (demo/gesture)  -      Assistive Device (Bathing)  grab bar/tub rail;hand held shower spray hose;shower chair  -      Bathing Position  supported sitting;supported standing  -SG      Recorded by [SG] Irma Jackson OTR      Row Name 04/16/19 1326             Upper Body Dressing Assessment/Treatment    Upper Body Dressing Task  doff;don;set up assistance;verbal cues;nonverbal cues (demo/gesture)  -      Upper Body Dressing Position   supported sitting  -SG      Recorded by [SG] Irma Jackson OTR      Row Name 04/16/19 1326             Lower Body Dressing Assessment/Treatment    Lower Body Dressing [place] Level  doff;don;pants/bottoms;shoes/slippers;socks;underwear;minimum assist (75% patient effort);verbal cues;nonverbal cues (demo/gesture)  -SG      Lower Body Dressing Position  supported sitting;supported standing  -SG      Recorded by [SG] Irma Jackson OTR      Row Name 04/16/19 1326             Grooming Assessment/Treatment    Grooming [place] Level  grooming skills;deodorant application;wash face, hands;oral care regimen  -SG      Grooming Position  supported sitting;sink side  -SG      Recorded by [SG] Irma Jackson OTR      Row Name 04/16/19 1317             Vision Assessment/Intervention    Vision Assessment Comment  less vc for overlapping small peg design. Pt more attentive to Right lower quadrant, completed 2 designs from visual pattern card w 1-2 vc needed for each  -CC      Recorded by [CC] Yolanda Felder OTR      Row Name 04/16/19 0943             Pain Assessment    Additional Documentation  Pain Scale: Numbers Pre/Post-Treatment (Group)  -LB,LS,LB2      Recorded by [LB,LS,LB2] Eliane Blackburn, PT (r) Naomy Mendenhall, PT Student (t) Eliane Blackburn, PT (c)      Row Name 04/16/19 1326 04/16/19 1317 04/16/19 0943       Pain Scale: Numbers Pre/Post-Treatment    Pain Scale: Numbers, Pretreatment  0/10 - no pain  -SG  0/10 - no pain  -CC  0/10 - no pain  -LB,LS,LB2    Pain Scale: Numbers, Post-Treatment  0/10 - no pain  -SG  0/10 - no pain  -CC  0/10 - no pain  -LB,LS,LB2    Recorded by [SG] Irma Jackson OTR [CC] Yolanda Felder OTR [LB,LS,LB2] Eliane Blackburn, PT (r) Naomy Mendenhall, PT Student (t) Eliane Blackburn, PT (c)    Row Name 04/16/19 0925             Dynamic Balance Activity    Therapeutic Training Performed (Dynamic Balance)  -- marching in place  -LB,LS,LB2      Support Needed for Balance (Dynamic  Balance Training)  CGA;uses both upper extremities for support  -LB,LS,LB2      Upper Extremity Activity with Device (Dynamic Balance Training)  -- jay bar  -LB,LS,LB2      Comment (Dynamic Balance Training)  2 x 10 reps marching in place on each leg. Cues for knee control during stance phase on RLE and R hip flexor cue when lifting R LE  -LB,LS,LB2      Recorded by [LB,LS,LB2] Eliane Blackburn, PT (r) Naomy Mendenhall PT Student (t) Eliane Blackburn, PT (c)      Row Name 04/16/19 1317             Upper Extremity Seated Therapeutic Exercise    Performed, Seated Upper Extremity (Therapeutic Exercise)  shoulder abduction/adduction;elbow flexion/extension;forearm supination/pronation;wrist flexion/extension  -CC      Device, Seated Upper Extremity (Therapeutic Exercise)  free weights, cuff  -CC      Exercise Type, Seated Upper Extremity (Therapeutic Exercise)  resistive exercise  -CC      Expected Outcomes, Seated Upper Extremity (Therapeutic Exercise)  improve functional tolerance, self-care activity  -CC      Sets/Reps Detail, Seated Upper Extremity (Therapeutic Exercise)  1# hand wt shld 10x3; 2# hand wt elbow and wrist 10x3  -CC      Recorded by [CC] Yolanda Felder OTR      Row Name 04/16/19 1326 04/16/19 1317 04/16/19 0943       Positioning and Restraints    Pre-Treatment Position  sitting in chair/recliner  -SG  sitting in chair/recliner  -CC  sitting in chair/recliner  -LB,LS,LB2    Post Treatment Position  wheelchair  -SG  wheelchair  -CC  wheelchair  -LB,LS,LB2    In Wheelchair  sitting;call light within reach;encouraged to call for assist;exit alarm on  -SG  with family/caregiver;with other staff SW  -  sitting;exit alarm on;patient within staff view;with SLP  -LB,LS,LB2    Recorded by [SG] Irma Jackson OTR [CC] Yolanda Felder OTR [LB,LS,LB2] Eliane Blackburn, PT (r) Naomy Mendenhall PT Student (t) Eliane Blackburn, PT (c)      User Key  (r) = Recorded By, (t) = Taken By, (c) = Cosigned By    Initials Name  Effective Dates    CC Yolanda Felder, OTR 06/08/18 -     Narcisa Urias, MS CCC-SLP 06/08/18 -     SG Irma Jackson, OTR 12/26/18 -     Eliane Pereira, PT 04/03/18 -     Naomy Armas, PT Student 02/04/19 -         Wound 03/26/19 0900 Left chest incision (Active)   Dressing Appearance open to air 4/16/2019  8:45 AM   Closure None 4/16/2019  8:45 AM   Base clean;dry 4/16/2019  8:45 AM   Drainage Amount none 4/16/2019  8:45 AM   Dressing Care, Wound open to air 4/16/2019  8:45 AM     Physical Therapy Education     Title: PT OT SLP Therapies (In Progress)     Topic: Physical Therapy (In Progress)     Point: Mobility training (Done)     Learning Progress Summary           Patient Acceptance, E,TB,D, VU,DU by RENNY at 4/16/2019  2:52 PM    Comment:  Transfers, ambulation, steps and car transfer    Acceptance, E,TB, VU,NR by KENZIE at 4/15/2019  9:50 AM    Comment:  Educated on safer/more stable gait with Rwx. Discussed proper stair navigation and sequencing.    Acceptance, E,D, VU,DU,NR by ODELL at 4/13/2019 12:04 PM    Acceptance, E,TB, VU,NR by KENZIE at 4/12/2019  9:51 AM    Comment:  Continued discussion regarding progress with knee control and walking    Acceptance, E,TB, VU,NR by LS at 4/11/2019  9:53 AM    Comment:  Continued discussion/answering pt questions about why R knee is weak. Educated on proper mechanics for stair navigation    Acceptance, E,TB, VU,NR by LS at 4/10/2019  9:43 AM    Comment:  Continued discussion regarding safety and slowing down during mobility. Educated on proper use of cane again this date    Acceptance, E,TB, VU,NR by LS at 4/9/2019  8:33 AM    Comment:  Continued discussion regarding how stroke has affected his R side. Educated on use of cane    Acceptance, E,TB, VU,NR by LS at 4/8/2019  8:54 AM    Comment:  Educated on why R leg is weak and how the stroke is affecting his knee control    Acceptance, E,TB, VU,NR by LS at 4/5/2019 11:30 AM    Comment:  Discussed purpose of strengthening  exercises. Educated on repeated practice of walking and other exercises to gradually build strength and endurance.    Acceptance, E, NR by  at 4/4/2019  9:52 AM    Acceptance, E, NR by  at 4/3/2019 10:16 AM    Acceptance, E, NR by  at 4/2/2019  3:18 PM    Acceptance, E, NR by  at 4/1/2019 10:32 AM    Acceptance, E,TB,D, NR by  at 3/30/2019 11:44 AM    Acceptance, E,TB,D, NR by EE at 3/29/2019  2:58 PM    Acceptance, E,TB,D, NR by EE at 3/28/2019 11:38 AM    Acceptance, E,TB,D, NR by EE at 3/27/2019 10:05 AM    Acceptance, E,TB,D, NR by EE at 3/26/2019  9:48 AM    Acceptance, E,TB,D, NR by EE at 3/25/2019  3:09 PM   Family Acceptance, E,TB,D, VU,DU by  at 4/16/2019  2:52 PM    Comment:  Transfers, ambulation, steps and car transfer                   Point: Home exercise program (In Progress)     Learning Progress Summary           Patient Acceptance, E, NR by  at 4/4/2019  9:52 AM    Acceptance, E, NR by  at 4/3/2019 10:16 AM    Acceptance, E, NR by  at 4/2/2019  3:18 PM    Acceptance, E, NR by  at 4/1/2019 10:32 AM    Acceptance, E,TB,D, NR by  at 3/29/2019  2:58 PM    Acceptance, E,TB,D, NR by  at 3/28/2019 11:38 AM    Acceptance, E,TB,D, NR by EE at 3/27/2019 10:05 AM    Acceptance, E,TB,D, NR by EE at 3/26/2019  9:48 AM    Acceptance, E,TB,D, NR by EE at 3/25/2019  3:09 PM                   Point: Body mechanics (Done)     Learning Progress Summary           Patient Acceptance, E, VU by  at 4/16/2019  3:26 PM    Comment:  family conf w/pt and multiple family members- discussed cognitive deficits - memory, reasoning, attn, math, and impulsivity- rec: supervision for med management/finances, cont ST at D/C    Acceptance, E, NR by  at 4/4/2019  9:52 AM    Acceptance, E, NR by  at 4/3/2019 10:16 AM    Acceptance, E, NR by  at 4/2/2019  3:18 PM    Acceptance, E, NR by  at 4/1/2019 10:32 AM    Acceptance, E,TB,D, NR by EE at 3/29/2019  2:58 PM    Acceptance, E,TB,D, NR by  at  3/28/2019 11:38 AM    Acceptance, E,TB,D, NR by EE at 3/27/2019 10:05 AM    Acceptance, E,TB,D, NR by EE at 3/26/2019  9:48 AM    Acceptance, E,TB,D, NR by EE at 3/25/2019  3:09 PM   Family Acceptance, E, VU by  at 4/16/2019  3:26 PM    Comment:  family conf w/pt and multiple family members- discussed cognitive deficits - memory, reasoning, attn, math, and impulsivity- rec: supervision for med management/finances, cont ST at D/C                   Point: Precautions (Done)     Learning Progress Summary           Patient Acceptance, E,TB, VU,NR by LS at 4/16/2019  9:45 AM    Comment:  Continued education on proper car transfer technique    Acceptance, E,TB, VU,NR by LS at 4/15/2019  9:50 AM    Comment:  Educated on safer/more stable gait with Rwx. Discussed proper stair navigation and sequencing.    Acceptance, E,TB, VU,NR by LS at 4/12/2019  9:51 AM    Comment:  Continued discussion regarding progress with knee control and walking    Acceptance, E,TB, VU,NR by LS at 4/11/2019  9:53 AM    Comment:  Continued discussion/answering pt questions about why R knee is weak. Educated on proper mechanics for stair navigation    Acceptance, E,TB, VU,NR by LS at 4/10/2019  9:43 AM    Comment:  Continued discussion regarding safety and slowing down during mobility. Educated on proper use of cane again this date    Acceptance, E,TB, VU,NR by LS at 4/9/2019  8:33 AM    Comment:  Continued discussion regarding how stroke has affected his R side. Educated on use of cane    Acceptance, E,TB, VU,NR by LS at 4/8/2019  8:54 AM    Comment:  Educated on why R leg is weak and how the stroke is affecting his knee control    Acceptance, E,TB, VU,NR by LS at 4/5/2019 11:30 AM    Comment:  Discussed purpose of strengthening exercises. Educated on repeated practice of walking and other exercises to gradually build strength and endurance.    Acceptance, E, NR by  at 4/4/2019  9:52 AM    Acceptance, E, NR by  at 4/3/2019 10:16 AM     Acceptance, E, NR by  at 4/2/2019  3:18 PM    Acceptance, E, NR by  at 4/1/2019 10:32 AM    Acceptance, E,TB,D, NR by  at 3/29/2019  2:58 PM    Acceptance, E,TB,D, NR by  at 3/28/2019 11:38 AM    Acceptance, E,TB,D, NR by  at 3/27/2019 10:05 AM    Acceptance, E,TB,D, NR by  at 3/26/2019  9:48 AM    Acceptance, E,TB,D, NR by  at 3/25/2019  3:09 PM                               User Key     Initials Effective Dates Name Provider Type Discipline     06/08/18 -  Narcisa Bonner, MS CCC-SLP Speech and Language Pathologist SLP    ELISSA 06/08/18 -  Donna Inman, PT Physical Therapist PT    LB 04/03/18 -  Eliane Blackburn, PT Physical Therapist PT     04/03/18 -  Clau Ayon, PT Physical Therapist PT    EE 04/03/18 -  Ariadne Garcia, PT Physical Therapist PT    JK 04/03/18 -  Nicole Lawton, PT Physical Therapist PT     02/04/19 -  Naomy Mendenhall, PT Student PT Student                   PT Recommendation and Plan                        Time Calculation:     PT Charges     Row Name 04/16/19 1438 04/16/19 0945          Time Calculation    Start Time  1400  -LB (r) LS (t) LB (c)  0930  -LB (r) LS (t) LB (c)     Stop Time  1430  -LB (r) LS (t) LB (c)  1000  -LB (r) LS (t) LB (c)     Time Calculation (min)  30 min  -LB (r) LS (t)  30 min  -LB (r) LS (t)     PT Received On  04/16/19  -LB (r) LS (t) LB (c)  04/16/19  -LB (r) LS (t) LB (c)     PT - Next Appointment  04/17/19  -LB (r) LS (t) LB (c)  --       User Key  (r) = Recorded By, (t) = Taken By, (c) = Cosigned By    Initials Name Provider Type    LB Eliane Blackburn, PT Physical Therapist    LS Naomy Mendenhall, PT Student PT Student          Therapy Charges for Today     Code Description Service Date Service Provider Modifiers Qty    23791254946  PT NEUROMUSC RE EDUCATION EA 15 MIN 4/15/2019 Naomy Mendenhall, PT Student GP 4    [id number]  PT THER SUPP EA 15 MIN 4/15/2019 Naomy Mendenhall, PT Student GP 3    [id number]  PT NEUROMUSC RE EDUCATION EA 15 MIN  4/16/2019 Naomy Mendenhall, PT Student GP 4    [id number] HC PT THER SUPP EA 15 MIN 4/16/2019 Naomy Mendenhall, PT Student GP 1                   Naomy Mendenhall, PT Student  4/16/2019

## 2019-04-16 NOTE — PLAN OF CARE
Problem: Fall Risk (Adult)  Goal: Absence of Fall  Outcome: Ongoing (interventions implemented as appropriate)   04/16/19 1609   Fall Risk (Adult)   Absence of Fall making progress toward outcome       Problem: Patient Care Overview  Goal: Plan of Care Review  Outcome: Ongoing (interventions implemented as appropriate)   04/16/19 1609   Patient Care Overview   IRF Plan of Care Review progress ongoing, continue   Progress, Functional Goals demonstrating adequate progress   Coping/Psychosocial   Plan of Care Reviewed With patient   OTHER   Outcome Summary family conf today. post DC MD f/u appts made. Home Tuesday.

## 2019-04-16 NOTE — THERAPY TREATMENT NOTE
Inpatient Rehabilitation - Occupational Therapy Treatment Note    [facility]     Patient Name: Nayan Ryan  : 1965  MRN: [id number]    Today's Date: 2019                 Admit Date: 3/24/2019      Visit Dx:  No diagnosis found.    Patient Active Problem List   Diagnosis   • Acute ischemic left PCA stroke (CMS/HCC)   • Status post placement of implantable loop recorder   • HTN (hypertension)   • Diabetes mellitus (CMS/HCC)   • Hyperlipidemia   • Morbid obesity (CMS/HCC)   • Anxiety disorder   • Migraine   • H/O hernia repair   • Abdominal wall abscess   • Back pain   • Stroke (cerebrum) (CMS/HCC)   • Vitamin D deficiency   • History of fatty infiltration of liver         Therapy Treatment    IRF Treatment Summary     Row Name 19 1500 19 1326 19 1317       Evaluation/Treatment Time and Intent    Subjective Information  no complaints  -SL  no complaints  -SG  no complaints  -CC    Existing Precautions/Restrictions  fall  -SL  fall  -SG  fall  -CC    Document Type  therapy note (daily note)  -SL  therapy note (daily note)  -SG  therapy note (daily note)  -CC    Mode of Treatment  individual therapy;speech-language pathology  -SL  individual therapy;occupational therapy  -SG  occupational therapy  -CC    Patient/Family Observations  cooperative  -  Pt sitting up in w/c   -SG  --    Start Time (Evaluation/Treatment)  1430  -SL  --  --    Stop Time (Evaluation/Treatment)  1500  -SL  --  --    Recorded by [SL] Narcisa Bonner MS CCC-SLP [SG] Irma Jackson OTR [CC] Yolanda Felder OTR    Row Name 19 1044 19 0943          Evaluation/Treatment Time and Intent    Subjective Information  no complaints  -SL  no complaints  -LB,LS,LB2     Existing Precautions/Restrictions  fall  -SL  fall  -LB,LS,LB2     Document Type  therapy note (daily note)  -  therapy note (daily note)  -LB,LS,LB2     Mode of Treatment  individual therapy;speech-language pathology  -SL  physical therapy   -LB,LS,LB2     Patient/Family Observations  cooperative  -  Pt sitting in room in w/c, ready to participate with therapy  -LB,LS,LB2     Start Time (Evaluation/Treatment)  1030  -SL  --     Stop Time (Evaluation/Treatment)  1100  -SL  --     Recorded by [SL] Narcisa Bonner MS CCC-SLP [LB,LS,LB2] Eliane Blackburn, PT (r) Naomy Mendenhall, PT Student (t) Eliane Blackburn, PT (c)     Row Name 04/16/19 1326 04/16/19 1317 04/16/19 0943       Cognition/Psychosocial- PT/OT    Affect/Mental Status (Cognitive)  WNL  -SG  --  WNL  -LB,LS,LB2    Behavioral Issues (Cognitive)  --  --  -- engaging on more consistent basis, occasionally withdrawn  -LB,LS,LB2    Orientation Status (Cognition)  oriented x 3  -SG  oriented x 3  -CC  --    Follows Commands (Cognition)  follows one step commands;over 90% accuracy;verbal cues/prompting required  -SG  follows one step commands;over 90% accuracy;verbal cues/prompting required  -CC  follows one step commands;over 90% accuracy;verbal cues/prompting required;repetition of directions required;physical/tactile prompts required  -LB,LS,LB2    Personal Safety Interventions  gait belt;fall prevention program maintained  -SG  fall prevention program maintained;gait belt;nonskid shoes/slippers when out of bed  -CC  fall prevention program maintained;gait belt;muscle strengthening facilitated;nonskid shoes/slippers when out of bed  -LB,LS,LB2    Attention Deficit (Cognitive)  --  --  distractible in noisy environment  -LB,LS,LB2    Memory Deficit (Cognitive)  --  --  moderate deficit;working memory  -LB,LS,LB2    Safety Deficit (Cognitive)  --  --  ability to follow commands;impulsivity;insight into deficits/self awareness;judgment  -LB,LS,LB2    Recorded by [SG] Irma Jackson OTR [CC] Yolanda Felder OTR [LB,LS,LB2] Eliane Blackburn, PT (r) Naomy Mendenhall, PT Student (t) Eliane Blackburn PT (c)    Row Name 04/16/19 1326 04/16/19 0943          Bed Mobility Assessment/Treatment    Rolling Left [place]  (Bed Mobility)  --  verbal cues;supervision  -LB,LS,LB2     Rolling Right [place] (Bed Mobility)  --  verbal cues;supervision  -LB,LS,LB2     Supine-Sit [place] (Bed Mobility)  not tested  -SG  verbal cues;supervision  -LB,LS,LB2     Sit-Supine [place] (Bed Mobility)  not tested  -SG  verbal cues;supervision  -LB,LS,LB2     Recorded by [SG] Irma Jackson, OTR [LB,LS,LB2] Eliane Blackburn, PT (r) Naomy Mendenhall, PT Student (t) Eliane Blackburn, PT (c)     Row Name 04/16/19 1326 04/16/19 0943          Transfer Assessment/Treatment    Transfer Assessment/Treatment  sit-stand transfer;stand-sit transfer;shower transfer  -SG  --     Comment (Transfers)  --  Practiced approaching and turning to sit in multiple different chairs around clinic. Performed all transfers with pt spouse providing CGA for family teaching  -LB,LS,LB2     Recorded by [SG] Irma Jackson, OTR [LB,LS,LB2] Eliane Blackburn, PT (r) Naomy Mendenhall, PT Student (t) Eliane Blackburn, PT (c)     Row Name 04/16/19 0943             Bed-Chair Transfer    Bed-Chair [place] (Transfers)  verbal cues;contact guard  -LB,LS,LB2      Assistive Device (Bed-Chair Transfers)  wheelchair  -LB,LS,LB2      Recorded by [LB,LS,LB2] Eliane Blackburn, PT (r) Naomy Mendenhall, PT Student (t) Eliane Blackburn, PT (c)      Row Name 04/16/19 0943             Chair-Bed Transfer    Chair-Bed [place] (Transfers)  verbal cues;contact guard  -LB,LS,LB2      Assistive Device (Chair-Bed Transfers)  wheelchair  -LB,LS,LB2      Recorded by [LB,LS,LB2] Eliane Blackburn, PT (r) Naomy Mendenhall, PT Student (t) Eliane Blackburn, PT (c)      Row Name 04/16/19 1326 04/16/19 0943          Sit-Stand Transfer    Sit-Stand [place] (Transfers)  contact guard  -SG  contact guard  -LB,LS,LB2     Assistive Device (Sit-Stand Transfers)  wheelchair  -SG  wheelchair  -LB,LS,LB2     Recorded by [SG] Irma Jackson, OTR [LB,LS,LB2] Eliane Blackburn, PT (r) Naomy Mendenhall, ZAIDA Student (t) Eliane Blackburn,  PT (c)     Row Name 04/16/19 1326 04/16/19 0943          Stand-Sit Transfer    Stand-Sit [place] (Transfers)  contact guard  -SG  contact guard  -LB,LS,LB2     Assistive Device (Stand-Sit Transfers)  wheelchair  -SG  wheelchair  -LB,LS,LB2     Recorded by [SG] Irma Jackson OTR [LB,LS,LB2] Eliane Blackburn, PT (r) Naomy Mendenhall, PT Student (t) Eliane Blackburn, PT (c)     Row Name 04/16/19 1326             Shower Transfer    Type (Shower Transfer)  stand pivot/stand step  -SG      [place] Level (Shower Transfer)  minimum assist (75% patient effort);verbal cues  -SG      Assistive Device (Shower Transfer)  tub bench;wheelchair;grab bars/tub rail  -SG      Recorded by [SG] Irma Jackson OTR      Row Name 04/16/19 0943             Car Transfer    Type (Car Transfer)  stand pivot/stand step  -LB,LS,LB2      [place] Level (Car Transfer)  verbal cues;contact guard;minimum assist (75% patient effort)  -LB,LS,LB2      Assistive Device (Car Transfer)  wheelchair Min A only to get R LE into car, otherwise CGA  -LB,LS,LB2      Recorded by [LB,LS,LB2] Eliane Blackburn, PT (r) Naomy Mendenhall, PT Student (t) Eliane Blackburn, PT (c)      Row Name 04/16/19 0943             Gait/Stairs Assessment/Training    [place] Level (Gait)  verbal cues;contact guard  -LB,LS,LB2      Assistive Device (Gait)  walker, front-wheeled  -LB,LS,LB2      Distance in Feet (Gait)  50 x 6, 80  -LB,LS,LB2      Pattern (Gait)  step-through  -LB,LS,LB2      Deviations/Abnormal Patterns (Gait)  nancy decreased;gait speed decreased  -LB,LS,LB2      Bilateral Gait Deviations  weight shift ability decreased;heel strike decreased  -LB,LS,LB2      Left Sided Gait Deviations  weight shift ability decreased  -LB,LS,LB2      Right Sided Gait Deviations  heel strike decreased;knee buckling, right side;knee hyperextension  -LB,LS,LB2      [place] Level (Stairs)  verbal cues;contact guard;2 person assist  -LB,LS,LB2      Handrail Location  (Stairs)  both sides  -LB,LS,LB2      Number of Steps (Stairs)  4 x 2  -LB,LS,LB2      Ascending Technique (Stairs)  step-to-step  -LB,LS,LB2      Descending Technique (Stairs)  step-to-step  -LB,LS,LB2      Stairs, Safety Issues  sequencing ability decreased;balance decreased during turns  -LB,LS,LB2      Stairs, Impairments  strength decreased;impaired balance;motor control impaired  -LB,LS,LB2      Comment (Gait/Stairs)  Ambulating with Rwx with occasional VC for advancing AD properly when turning to sit in chair. VC to focus on R knee control--intermittent hyperextension/buckling but pt able to correct and maintain balance with CGA. Continued need for consistent cues and reminders for sequencing correctly and performing step to step method with stairs  -LB,LS,LB2      Recorded by [LB,LS,LB2] Eliane Blackburn, PT (r) Naomy Mendenhall, PT Student (t) Elinae Blackburn, PT (c)      Row Name 04/16/19 0943             Safety Issues, Functional Mobility    Safety Issues Affecting Function (Mobility)  ability to follow commands;insight into deficits/self awareness;judgment;sequencing abilities  -LB,LS,LB2      Impairments Affecting Function (Mobility)  balance;cognition;coordination;motor control;strength  -LB,LS,LB2      Recorded by [LB,LS,LB2] Eliane Blackburn, PT (r) Naomy Mendenhall, PT Student (t) Eliane Blackburn, PT (c)      Row Name 04/16/19 1326             Bathing Assessment/Treatment    Bathing [place] Level  upper body;set up;lower body;contact guard assist;verbal cues;nonverbal cues (demo/gesture)  -      Assistive Device (Bathing)  grab bar/tub rail;hand held shower spray hose;shower chair  -      Bathing Position  supported sitting;supported standing  -      Recorded by [SG] Irma Jackson OTR      Row Name 04/16/19 1326             Upper Body Dressing Assessment/Treatment    Upper Body Dressing Task  doff;don;set up assistance;verbal cues;nonverbal cues (demo/gesture)  -      Upper Body Dressing Position   supported sitting  -SG      Recorded by [SG] Irma Jackson OTR      Row Name 04/16/19 1326             Lower Body Dressing Assessment/Treatment    Lower Body Dressing [place] Level  doff;don;pants/bottoms;shoes/slippers;socks;underwear;minimum assist (75% patient effort);verbal cues;nonverbal cues (demo/gesture)  -SG      Lower Body Dressing Position  supported sitting;supported standing  -SG      Recorded by [SG] Irma Jackson OTR      Row Name 04/16/19 1326             Grooming Assessment/Treatment    Grooming [place] Level  grooming skills;deodorant application;wash face, hands;oral care regimen  -SG      Grooming Position  supported sitting;sink side  -SG      Recorded by [SG] Irma Jackson OTR      Row Name 04/16/19 1317             Vision Assessment/Intervention    Vision Assessment Comment  less vc for overlapping small peg design. Pt more attentive to Right lower quadrant, completed 2 designs from visual pattern card w 1-2 vc needed for each  -CC      Recorded by [CC] Yolanda Felder OTR      Row Name 04/16/19 0943             Pain Assessment    Additional Documentation  Pain Scale: Numbers Pre/Post-Treatment (Group)  -LB,LS,LB2      Recorded by [LB,LS,LB2] Eliane Blackburn, PT (r) Naomy Mendenhall, PT Student (t) Eliane Blackburn, PT (c)      Row Name 04/16/19 1326 04/16/19 1317 04/16/19 0943       Pain Scale: Numbers Pre/Post-Treatment    Pain Scale: Numbers, Pretreatment  0/10 - no pain  -SG  0/10 - no pain  -CC  0/10 - no pain  -LB,LS,LB2    Pain Scale: Numbers, Post-Treatment  0/10 - no pain  -SG  0/10 - no pain  -CC  0/10 - no pain  -LB,LS,LB2    Recorded by [SG] Irma Jackson OTR [CC] Yolanda Felder OTR [LB,LS,LB2] Eliane Blackburn, PT (r) Naomy Mendenhall, PT Student (t) Eliane Blackburn, PT (c)    Row Name 04/16/19 0956             Dynamic Balance Activity    Therapeutic Training Performed (Dynamic Balance)  -- marching in place  -LB,LS,LB2      Support Needed for Balance (Dynamic  Balance Training)  CGA;uses both upper extremities for support  -LB,LS,LB2      Upper Extremity Activity with Device (Dynamic Balance Training)  -- jay bar  -LB,LS,LB2      Comment (Dynamic Balance Training)  2 x 10 reps marching in place on each leg. Cues for knee control during stance phase on RLE and R hip flexor cue when lifting R LE  -LB,LS,LB2      Recorded by [LB,LS,LB2] Eliane Blackburn, PT (r) Naomy Mendenhall PT Student (t) Eliane Blackburn, PT (c)      Row Name 04/16/19 1317             Upper Extremity Seated Therapeutic Exercise    Performed, Seated Upper Extremity (Therapeutic Exercise)  shoulder abduction/adduction;elbow flexion/extension;forearm supination/pronation;wrist flexion/extension  -CC      Device, Seated Upper Extremity (Therapeutic Exercise)  free weights, cuff  -CC      Exercise Type, Seated Upper Extremity (Therapeutic Exercise)  resistive exercise  -CC      Expected Outcomes, Seated Upper Extremity (Therapeutic Exercise)  improve functional tolerance, self-care activity  -CC      Sets/Reps Detail, Seated Upper Extremity (Therapeutic Exercise)  1# hand wt shld 10x3; 2# hand wt elbow and wrist 10x3  -CC      Recorded by [CC] Yolanda Felder OTR      Row Name 04/16/19 1326 04/16/19 1317 04/16/19 0943       Positioning and Restraints    Pre-Treatment Position  sitting in chair/recliner  -SG  sitting in chair/recliner  -CC  sitting in chair/recliner  -LB,LS,LB2    Post Treatment Position  wheelchair  -SG  wheelchair  -CC  wheelchair  -LB,LS,LB2    In Wheelchair  sitting;call light within reach;encouraged to call for assist;exit alarm on  -SG  with family/caregiver;with other staff SW  -  sitting;exit alarm on;patient within staff view;with SLP  -LB,LS,LB2    Recorded by [SG] Irma Jackson OTR [CC] Yolanda Felder OTR [LB,LS,LB2] Eliane Blackburn, PT (r) Naomy Mendenhall PT Student (t) Eliane Blackburn, PT (c)      User Key  (r) = Recorded By, (t) = Taken By, (c) = Cosigned By    Initials Name  Effective Dates    CC Yolanda Felder, OTR 06/08/18 -     Narcisa Urias, MS CCC-SLP 06/08/18 -     SG Irma Jackson, OTR 12/26/18 -     Eliane Pereira, PT 04/03/18 -     LS Naomy Mendenhall, PT Student 02/04/19 -           Wound 03/26/19 0900 Left chest incision (Active)   Dressing Appearance open to air 4/16/2019  8:45 AM   Closure None 4/16/2019  8:45 AM   Base clean;dry 4/16/2019  8:45 AM   Drainage Amount none 4/16/2019  8:45 AM   Dressing Care, Wound open to air 4/16/2019  8:45 AM         OT Recommendation and Plan    Anticipated Equipment Needs At Discharge (OT Eval): commode, 3-in-1, shower chair, tub bench  Planned Therapy Interventions (OT Eval): BADL retraining, cognitive/visual perception retraining, functional balance retraining, neuromuscular control/coordination retraining, strengthening exercise, transfer/mobility retraining            OT IRF GOALS     Row Name 04/09/19 1400             Bathing Goal 1 (OT-IRF)    Activity/Device (Bathing Goal 1, OT-IRF)  bathing skills, all  -DN      [place] Level (Bathing Goal 1, OT-IRF)  supervision required;verbal cues required  -DN      Time Frame (Bathing Goal 1, OT-IRF)  short term goal (STG)  -DN      Progress/Outcomes (Bathing Goal 1, OT-IRF)  goal met;goal revised this date  -DN         Bathing Goal 2 (OT-IRF)    Activity/Device (Bathing Goal 2, OT-IRF)  bathing skills, all  -DN      [place] Level (Bathing Goal 2, OT-IRF)  supervision required  -DN      Time Frame (Bathing Goal 2, OT-IRF)  long term goal (LTG)  -DN      Progress/Outcomes (Bathing Goal 2, OT-IRF)  goal ongoing  -DN         UB Dressing Goal 1 (OT-IRF)    Activity/Device (UB Dressing Goal 1, OT-IRF)  upper body dressing  -DN      [place] (UB Dress Goal 1, OT-IRF)  supervision required  -DN      Time Frame (UB Dressing Goal 1, OT-IRF)  short term goal (STG)  -DN      Progress/Outcomes (UB Dressing Goal 1, OT-IRF)  goal met;goal ongoing  -DN         UB Dressing Goal 2 (OT-IRF)     Activity/Device (UB Dressing Goal 2, OT-IRF)  upper body dressing  -DN      [place] (UB Dress Goal 2, OT-IRF)  supervision required  -DN      Time Frame (UB Dressing Goal 2, OT-IRF)  long term goal (LTG)  -DN      Progress/Outcomes (UB Dressing Goal 2, OT-IRF)  goal ongoing;goal met  -DN         LB Dressing Goal 1 (OT-IRF)    Activity/Device (LB Dressing Goal 1, OT-IRF)  lower body dressing  -DN      [place] (LB Dressing Goal 1, OT-IRF)  minimum assist (75% or more patient effort);verbal cues required;tactile cues required  -DN      Time Frame (LB Dressing Goal 1, OT-IRF)  short term goal (STG)  -DN      Progress/Outcomes (LB Dressing Goal 1, OT-IRF)  goal met;goal ongoing  -DN         LB Dressing Goal 2 (OT-IRF)    Activity/Device (LB Dressing Goal 2, OT-IRF)  lower body dressing  -DN      [place] (LB Dressing Goal 2, OT-IRF)  verbal cues required;tactile cues required;contact guard assist  -DN      Time Frame (LB Dressing Goal 2, OT-IRF)  long term goal (LTG)  -DN      Progress/Outcomes (LB Dressing Goal 2, OT-IRF)  goal revised this date  -DN         Grooming Goal 1 (OT-IRF)    Activity/Device (Grooming Goal 1, OT-IRF)  grooming skills, all  -DN      [place] (Grooming Goal 1, OT-IRF)  supervision required  -DN      Time Frame (Grooming Goal 1, OT-IRF)  short term goal (STG)  -DN      Progress/Outcomes (Grooming Goal 1, OT-IRF)  goal ongoing  -DN         Grooming Goal 2 (OT-IRF)    Activity/Device (Grooming Goal 2, OT-IRF)  grooming skills, all  -DN      [place] (Grooming Goal 2, OT-IRF)  supervision required  -DN      Time Frame (Grooming Goal 2, OT-IRF)  long term goal (LTG)  -DN      Progress/Outcomes (Grooming Goal 2, OT-IRF)  goal revised this date  -DN         Toileting Goal 1 (OT-IRF)    Activity/Device (Toileting Goal 1, OT-IRF)  toileting skills, all  -DN      [place] Level (Toileting Goal 1, OT-IRF)  minimum assist (75% or more patient effort);verbal cues required;tactile  cues required  -DN      Time Frame (Toileting Goal 1, OT-IRF)  short term goal (STG)  -DN      Progress/Outcomes (Toileting Goal 1, OT-IRF)  goal met;goal revised this date  -DN         Toileting Goal 2 (OT-IRF)    Activity/Device (Toileting Goal 2, OT-IRF)  toileting skills, all  -DN      [place] Level (Toileting Goal 2, OT-IRF)  contact guard assist;verbal cues required;tactile cues required  -DN      Time Frame (Toileting Goal 2, OT-IRF)  long term goal (LTG)  -DN      Progress/Outcomes (Toileting Goal 2, OT-IRF)  goal ongoing  -DN         Self-Feeding Goal 1 (OT-IRF)    Activity/Device (Self-Feeding Goal 1, OT-IRF)  self-feeding skills, all  -DN      [place] (Self-Feeding Goal 1, OT-IRF)  supervision required  -DN      Time Frame (Self-Feeding Goal 1, OT-IRF)  short term goal (STG)  -DN      Progress/Outcomes 1 (Self-Feeding Goal, OT-IRF)  goal met;goal ongoing  -DN         Self-Feeding Goal 2 (OT-IRF)    Activity/Device (Self-Feeding Goal 2, OT-IRF)  self-feeding skills, all  -DN      [place] (Self-Feeding Goal 2, OT-IRF)  supervision required  -DN      Time Frame (Self-Feeding Goal 2, OT-IRF)  long term goal (LTG)  -DN      Progress/Outcomes (Self-Feeding Goal 2, OT-IRF)  goal met;goal ongoing  -DN         Caregiver Training Goal 2 (OT-IRF)    Caregiver Training Goal 2 (OT-IRF)  pt caregiver to be independent with any assist pt needs with adls, transfers and HEP for d/c home  -DN      Time Frame (Caregiver Training Goal 2, OT-IRF)  long term goal (LTG)  -DN      Progress/Outcomes (Caregiver Training Goal 2, OT-IRF)  goal ongoing  -DN        User Key  (r) = Recorded By, (t) = Taken By, (c) = Cosigned By    Initials Name Provider Type    Reg Bolden OT Occupational Therapist          Occupational Therapy Education     Title: PT OT SLP Therapies (In Progress)     Topic: Occupational Therapy (Done)     Point: ADL training (Done)     Description: Instruct learner(s) on proper safety adaptation  and remediation techniques during self care or transfers.   Instruct in proper use of assistive devices.    Learning Progress Summary           Patient Acceptance, E,TB,D, VU,NR by DN at 4/15/2019 12:04 PM    Comment:  pt presents with carryover of adapitive adls, functional transfers, and overall strength with min vc for safety    Acceptance, E,TB,D, VU,NR by DN at 4/9/2019  2:25 PM    Comment:  jay adls, adaptive visual scanning, transfer training    Acceptance, E,TB,D, VU,NR by DN at 4/8/2019 12:16 PM    Comment:  transfer training, jay skills with adls, safety,etc    Acceptance, E,TB,D, VU,NR by DN at 4/4/2019  3:19 PM    Comment:  theraputty exercises, jay adls and transfer trainning    Acceptance, E,TB,D, NR by DN at 3/26/2019 12:06 PM    Comment:  jay adls and commode/shower transfers, still max vc for all due to cognition and visual impairments    Acceptance, E,TB,D, VU,NR by DN at 3/25/2019  5:23 PM    Comment:  OT role in rehab, transfer traning, jay adls                   Point: Home exercise program (Done)     Description: Instruct learner(s) on appropriate technique for monitoring, assisting and/or progressing therapeutic exercises/activities.    Learning Progress Summary           Patient Acceptance, E,TB,D, VU,NR by DN at 4/15/2019 12:04 PM    Comment:  pt presents with carryover of adapitive adls, functional transfers, and overall strength with min vc for safety    Acceptance, E,TB,D, VU,NR by DN at 4/9/2019  2:25 PM    Comment:  jay adls, adaptive visual scanning, transfer training    Acceptance, E,TB,D, VU,NR by DN at 4/8/2019 12:16 PM    Comment:  transfer training, jay skills with adls, safety,etc    Acceptance, E,TB,D, VU,NR by DN at 4/4/2019  3:19 PM    Comment:  theraputty exercises, jay adls and transfer trainning    Acceptance, E,TB,D, NR by DN at 3/26/2019 12:06 PM    Comment:  jay adls and commode/shower transfers, still max vc for all due to cognition and visual impairments     Acceptance, E,TB,D, VU,NR by DN at 3/25/2019  5:23 PM    Comment:  OT role in rehab, transfer traning, jay adls                   Point: Precautions (Done)     Description: Instruct learner(s) on prescribed precautions during self-care and functional transfers.    Learning Progress Summary           Patient Acceptance, E,TB,D, VU,NR by DN at 4/15/2019 12:04 PM    Comment:  pt presents with carryover of adapitive adls, functional transfers, and overall strength with min vc for safety    Acceptance, E,TB,D, VU,NR by DN at 4/9/2019  2:25 PM    Comment:  jay adls, adaptive visual scanning, transfer training    Acceptance, E,TB,D, VU,NR by DN at 4/8/2019 12:16 PM    Comment:  transfer training, jay skills with adls, safety,etc    Acceptance, E,TB,D, VU,NR by DN at 4/4/2019  3:19 PM    Comment:  theraputty exercises, jay adls and transfer trainning    Acceptance, E,TB,D, NR by DN at 3/26/2019 12:06 PM    Comment:  jay adls and commode/shower transfers, still max vc for all due to cognition and visual impairments    Acceptance, E,TB,D, VU,NR by DN at 3/25/2019  5:23 PM    Comment:  OT role in rehab, transfer traning, jay adls                   Point: Body mechanics (Done)     Description: Instruct learner(s) on proper positioning and spine alignment during self-care, functional mobility activities and/or exercises.    Learning Progress Summary           Patient Acceptance, E,TB,D, VU,NR by DN at 4/15/2019 12:04 PM    Comment:  pt presents with carryover of adapitive adls, functional transfers, and overall strength with min vc for safety    Acceptance, E,TB,D, VU,NR by DN at 4/9/2019  2:25 PM    Comment:  jay adls, adaptive visual scanning, transfer training    Acceptance, E,TB,D, VU,NR by DN at 4/8/2019 12:16 PM    Comment:  transfer training, jay skills with adls, safety,etc    Acceptance, E,TB,D, VU,NR by DN at 4/4/2019  3:19 PM    Comment:  theraputty exercises, jay adls and transfer trainning    Acceptance,  SARA,FLAVIO REEVES, NR by DN at 3/26/2019 12:06 PM    Comment:  jay adls and commode/shower transfers, still max vc for all due to cognition and visual impairments    Acceptance, E,LEANDRO,FLAVIO, VU,NR by DN at 3/25/2019  5:23 PM    Comment:  OT role in rehab, transfer traning, jay adls                               User Key     Initials Effective Dates Name Provider Type Discipline    DN 06/08/18 -  Reg Mckinney, OT Occupational Therapist OT                       Time Calculation:     Time Calculation- OT     Row Name 04/16/19 1528 04/16/19 1333 04/16/19 1230       Time Calculation- OT    OT Start Time  --  0900  -SG  1230  -CC    OT Stop Time  --  0930  -SG  1300  -CC    OT Time Calculation (min)  --  30 min  -SG  30 min  -CC    OT Non-Billable Time (min)  45 min team rounds and family conferance  -DN  --  --    OT Received On  04/16/19  -DN  04/16/19  -  --      User Key  (r) = Recorded By, (t) = Taken By, (c) = Cosigned By    Initials Name Provider Type    CC Yolanda Felder OTR Occupational Therapist    Reg Bolden, OT Occupational Therapist    SG Irma Jackson OTR Occupational Therapist          Therapy Charges for Today     Code Description Service Date Service Provider Modifiers Qty    30606649353 HC OT SELF CARE/MGMT/TRAIN EA 15 MIN 4/15/2019 Reg Mckinney OT GO 2    22167182939 HC OT SELF CARE/MGMT/TRAIN EA 15 MIN 4/15/2019 Reg Mckinney OT GO 2    [id number] HC OT SELF CARE/MGMT/TRAIN EA 15 MIN 4/15/2019 Reg Mckinney OT GO 1    [id number] HC OT NEUROMUSC RE EDUCATION EA 15 MIN 4/15/2019 Reg Mckinney OT GO 1    [id number] HC OT CARE PLAN EA 15 MIN 4/16/2019 Reg Mckinney OT GO 3                   Reg Mckinney OT  4/16/2019

## 2019-04-16 NOTE — PROGRESS NOTES
Inpatient Rehabilitation Plan of Care Note    Plan of Care  Care Plan Reviewed - No updates at this time.    Signed by: Michael Khan RN

## 2019-04-16 NOTE — PLAN OF CARE
Problem: Stroke (IRF) (Adult)  Goal: Promote Optimal Functional [place]  Outcome: Ongoing (interventions implemented as appropriate)      Problem: Fall Risk (Adult)  Goal: Absence of Fall  Outcome: Ongoing (interventions implemented as appropriate)      Problem: Diabetes, Type 2 (Adult)  Goal: Signs and Symptoms of Listed Potential Problems Will be Absent, Minimized or Managed (Diabetes, Type 2)  Outcome: Ongoing (interventions implemented as appropriate)      Problem: Skin Injury Risk (Adult)  Goal: Skin Health and Integrity  Outcome: Ongoing (interventions implemented as appropriate)      Problem: Patient Care Overview  Goal: Plan of Care Review  Outcome: Ongoing (interventions implemented as appropriate)   04/16/19 9929   Patient Care Overview   IRF Plan of Care Review progress ongoing, continue   Progress, Functional Goals demonstrating adequate progress   Coping/Psychosocial   Plan of Care Reviewed With patient   OTHER   Outcome Summary Patient doing fine tonight. Pleasant and cooperative. No c/o any pain. BS was 95 last night, no SSI given, Lantus given as ordered.      Goal: Coping Plan  Outcome: Ongoing (interventions implemented as appropriate)

## 2019-04-16 NOTE — PROGRESS NOTES
Inpatient Rehabilitation Functional Measures Assessment and Plan of Care    Plan of Care  Updated Problems/Interventions  Field    Functional Measures  VANITA Eating:  [facility] Grooming: [facility] Bathing:  [facility] Upper Body Dressing:  [facility] Lower Body Dressing:  [facility] Toileting:  [facility] Bladder Management  Level of Assistance:  [place]  Frequency/Number of Accidents this Shift:  [facility] Bowel Management  Level of Assistance: [place]  Frequency/Number of Accidents this Shift: [facility] Bed/Chair/Wheelchair Transfer:  [facility] Toilet Transfer:  [facility] Tub/Shower Transfer:  [place]    Previously Documented Mode of Locomotion at Discharge: Field  VANITA Expected Mode of Locomotion at Discharge: [facility] Walk/Wheelchair:  [facility] Stairs:  [facility] Comprehension:  [facility] Expression:  [facility] Social Interaction:  [facility] Problem Solving:  [facility] Memory:  [place]    Therapy Mode Minutes  Occupational Therapy: Individual: 30 minutes.  Physical Therapy: [place]  Speech Language Pathology:  [place]    Signed by: RAMO Downs/SOFIA

## 2019-04-17 LAB
GLUCOSE BLDC GLUCOMTR-MCNC: 106 MG/DL (ref 70–130)
GLUCOSE BLDC GLUCOMTR-MCNC: 114 MG/DL (ref 70–130)
GLUCOSE BLDC GLUCOMTR-MCNC: 89 MG/DL (ref 70–130)
GLUCOSE BLDC GLUCOMTR-MCNC: 96 MG/DL (ref 70–130)

## 2019-04-17 PROCEDURE — 97112 NEUROMUSCULAR REEDUCATION: CPT

## 2019-04-17 PROCEDURE — 97535 SELF CARE MNGMENT TRAINING: CPT

## 2019-04-17 PROCEDURE — 82962 GLUCOSE BLOOD TEST: CPT

## 2019-04-17 PROCEDURE — 63710000001 INSULIN GLARGINE PER 5 UNITS: Performed by: INTERNAL MEDICINE

## 2019-04-17 PROCEDURE — G0515 COGNITIVE SKILLS DEVELOPMENT: HCPCS

## 2019-04-17 RX ADMIN — METFORMIN HYDROCHLORIDE 500 MG: 500 TABLET, EXTENDED RELEASE ORAL at 17:17

## 2019-04-17 RX ADMIN — INSULIN GLARGINE 32 UNITS: 100 INJECTION, SOLUTION SUBCUTANEOUS at 21:09

## 2019-04-17 RX ADMIN — LISINOPRIL 20 MG: 20 TABLET ORAL at 07:25

## 2019-04-17 RX ADMIN — AMLODIPINE BESYLATE 5 MG: 5 TABLET ORAL at 07:26

## 2019-04-17 RX ADMIN — Medication 1 TABLET: at 07:26

## 2019-04-17 RX ADMIN — LISINOPRIL 20 MG: 20 TABLET ORAL at 21:08

## 2019-04-17 RX ADMIN — METFORMIN HYDROCHLORIDE 500 MG: 500 TABLET, EXTENDED RELEASE ORAL at 07:26

## 2019-04-17 RX ADMIN — OXYCODONE AND ACETAMINOPHEN 1 TABLET: 5; 325 TABLET ORAL at 21:08

## 2019-04-17 RX ADMIN — CLOPIDOGREL 75 MG: 75 TABLET, FILM COATED ORAL at 07:26

## 2019-04-17 RX ADMIN — OXYCODONE AND ACETAMINOPHEN 1 TABLET: 5; 325 TABLET ORAL at 07:27

## 2019-04-17 RX ADMIN — ACETAMINOPHEN 650 MG: 325 TABLET, FILM COATED ORAL at 17:23

## 2019-04-17 RX ADMIN — PAROXETINE HYDROCHLORIDE 10 MG: 10 TABLET, FILM COATED ORAL at 07:27

## 2019-04-17 RX ADMIN — ALPRAZOLAM 1 MG: 0.5 TABLET ORAL at 23:23

## 2019-04-17 RX ADMIN — ATENOLOL 25 MG: 25 TABLET ORAL at 21:08

## 2019-04-17 RX ADMIN — ASPIRIN 325 MG: 325 TABLET, COATED ORAL at 07:25

## 2019-04-17 RX ADMIN — ATORVASTATIN CALCIUM 80 MG: 80 TABLET, FILM COATED ORAL at 21:08

## 2019-04-17 RX ADMIN — ATENOLOL 25 MG: 25 TABLET ORAL at 07:26

## 2019-04-17 NOTE — PROGRESS NOTES
Inpatient Rehabilitation Functional Measures Assessment    Functional Measures  VANITA Eating:  Branch  [facility] Grooming: [place]  [facility] Bathing:  [place]  [facility] Upper Body Dressing:  [place]  [facility] Lower Body Dressing:  [place]  [facility] Toileting:  [facility] Bladder Management  Level of Assistance:  [place]  Frequency/Number of Accidents this Shift:  [facility] Bowel Management  Level of Assistance: [place]  Frequency/Number of Accidents this Shift: Branch    [facility] Bed/Chair/Wheelchair Transfer:  Activity was not observed.  VANITA Toilet Transfer:  Branch  [facility] Tub/Shower Transfer:  [place]    Previously Documented Mode of Locomotion at Discharge: Field  VANITA Expected Mode of Locomotion at Discharge: [facility] Walk/Wheelchair:  WHEELCHAIR OBSERVATION   Wheelchair locomotion was observed using a manual wheelchair. Wheelchair  Distance Scale = 3.  Distance traveled in wheelchair is greater than 150 feet.  Wheelchair Score = 5.  Patient is supervision or set up only for propelling  wheelchair, requiring: Stand by assistance. Patient was able to propel a  distance of 150 feet in a wheelchair. No other assistive devices were required.    WALK OBSERVATION   Walk Distance Scale = 3.  Distance walked is greater than 150 feet. Walk Score  = 4.  Patient performs 75% or more of effort and requires minimal assistance.  Incidental help/contact guard/steadying was provided. Patient walked a distance  of  160 feet. Patient requires the following assistive device(s): Rolling  walker.  VANITA Stairs:  Activity was not observed.    VANITA Comprehension:  [facility] Expression:  [facility] Social Interaction:  [facility] Problem Solving:  [facility] Memory:  [place]    Therapy Mode Minutes  Occupational Therapy: [place]  Physical Therapy: Individual: 60 minutes.  Speech Language Pathology:  [place]    Signed by: Naomy Mendenhall PT Student     - CoSigned By: Eliane Blackburn PT 4/17/2019 3:30:15 PM

## 2019-04-17 NOTE — PLAN OF CARE
Problem: Fall Risk (Adult)  Goal: Absence of Fall  Outcome: Ongoing (interventions implemented as appropriate)   04/17/19 1812   Fall Risk (Adult)   Absence of Fall making progress toward outcome       Problem: Patient Care Overview  Goal: Plan of Care Review  Outcome: Ongoing (interventions implemented as appropriate)   04/17/19 1812   Patient Care Overview   IRF Plan of Care Review progress ongoing, continue   Progress, Functional Goals demonstrating adequate progress   Coping/Psychosocial   Plan of Care Reviewed With patient   OTHER   Outcome Summary Gave percocet and tylenol for leg pain.

## 2019-04-17 NOTE — PROGRESS NOTES
Inpatient Rehabilitation Functional Measures Assessment    Functional Measures  VANITA Eating:  [facility] Grooming: [facility] Bathing:  [facility] Upper Body Dressing:  [facility] Lower Body Dressing:  [facility] Toileting:  [facility] Bladder Management  Level of Assistance:  [place]  Frequency/Number of Accidents this Shift:  [facility] Bowel Management  Level of Assistance: [place]  Frequency/Number of Accidents this Shift: [facility] Bed/Chair/Wheelchair Transfer:  [facility] Toilet Transfer:  [facility] Tub/Shower Transfer:  [place]    Previously Documented Mode of Locomotion at Discharge: Field  VANITA Expected Mode of Locomotion at Discharge: [facility] Walk/Wheelchair:  [facility] Stairs:  [facility] Comprehension:  Auditory comprehension is the usual mode. Comprehension  Score = 6, Modified [place].  Patient comprehends complex/abstract  information in their primary language, requiring:  [facility] Expression:  Vocal expression is the usual mode. Expression Score = 6,  Modified Independent.  Patient expresses complex/abstract information in their  primary language, requiring:  [facility] Social Interaction:  Social Interaction Score = 6, Modified Independent.  Patient is modified independent for social interaction, requiring:  [facility] Problem Solving:  Activity was not observed.  VANITA Memory:  Memory Score = 6, Modified [place].  Patient is modified  independent for memory, requiring:    Therapy Mode Minutes  Occupational Therapy: [place]  Physical Therapy: [place]  Speech Language Pathology:  [place]    Signed by: Rosa Isela Fleming RN

## 2019-04-17 NOTE — THERAPY TREATMENT NOTE
Inpatient Rehabilitation - Physical Therapy Treatment Note  [facility]     Patient Name: Nayan Ryan  : 1965  MRN: [id number]    Today's Date: 2019                 Admit Date: 3/24/2019      Visit Dx:    No diagnosis found.    Patient Active Problem List   Diagnosis   • Acute ischemic left PCA stroke (CMS/HCC)   • Status post placement of implantable loop recorder   • HTN (hypertension)   • Diabetes mellitus (CMS/HCC)   • Hyperlipidemia   • Morbid obesity (CMS/HCC)   • Anxiety disorder   • Migraine   • H/O hernia repair   • Abdominal wall abscess   • Back pain   • Stroke (cerebrum) (CMS/HCC)   • Vitamin D deficiency   • History of fatty infiltration of liver       Therapy Treatment    IRF Treatment Summary     Row Name 19 1300 19 1036 19 0839       Evaluation/Treatment Time and Intent    Subjective Information  no complaints  -  no complaints  -  no complaints  -LB,LS,LB2    Existing Precautions/Restrictions  fall  -  fall  -  fall  -LB,LS,LB2    Document Type  therapy note (daily note)  -  therapy note (daily note)  -  therapy note (daily note)  -LB,LS,LB2    Mode of Treatment  individual therapy;speech-language pathology  -  individual therapy;speech-language pathology  -  physical therapy  -LB,LS,LB2    Patient/Family Observations  alert, cooperative  -  cooperative  -  Pt motivated to participate in therapy  (Pended)  298.6 lbs  -LS2    Start Time (Evaluation/Treatment)  1300  -SL  1030  -SL  --    Stop Time (Evaluation/Treatment)  1330  -SL  1100  -SL  --    Recorded by [SL] Narcisa Bonner MS CCC-SLP [SL] Narcisa Bonner MS CCC-SLP [LB,LS,LB2] Eliane Blackburn, PT (r) Naomy Mendenhall, PT Student (t) Eliane Blackburn, PT (c)  [LS2] Naomy Mendenhall, PT Student    Row Name 19 0839             Cognition/Psychosocial- PT/OT    Affect/Mental Status (Cognitive)  WFL  -LB,LS,LB2      Follows Commands (Cognition)  follows one step commands;over 90% accuracy;verbal  cues/prompting required  -LB,LS,LB2      Personal Safety Interventions  fall prevention program maintained;gait belt;muscle strengthening facilitated;nonskid shoes/slippers when out of bed  -LB,LS,LB2      Attention Deficit (Cognitive)  distractible in noisy environment  -LB,LS,LB2      Memory Deficit (Cognitive)  moderate deficit;working memory  -LB,LS,LB2      Safety Deficit (Cognitive)  ability to follow commands;impulsivity;judgment;problem solving  -LB,LS,LB2      Recorded by [LB,LS,LB2] Eliane Blackubrn, PT (r) Naomy Mendenhall, PT Student (t) Eliane Blackburn, PT (c)      Row Name 04/17/19 0839             Mobility    Advanced Gait Activity  rough/uneven surfaces  -LB,LS,LB2      Additional Documentation  Advanced Gait Activity (Row)  -LB,LS,LB2      Recorded by [LB,LS,LB2] Eliane Blackburn, PT (r) Naomy Mendenhall, PT Student (t) Eliane Blackburn, PT (c)      Row Name 04/17/19 0839             Sit-Stand Transfer    Sit-Stand [place] (Transfers)  contact guard  -LB,LS,LB2      Assistive Device (Sit-Stand Transfers)  wheelchair  -LB,LS,LB2      Recorded by [LB,LS,LB2] Eliane Blackburn, PT (r) Naomy Mendenhall, PT Student (t) Eliane Blackburn, PT (c)      Row Name 04/17/19 0839             Stand-Sit Transfer    Stand-Sit [place] (Transfers)  contact guard  -LB,LS,LB2      Assistive Device (Stand-Sit Transfers)  wheelchair  -LB,LS,LB2      Recorded by [LB,LS,LB2] Eliane Blackburn, PT (r) Naomy Mendenhall, PT Student (t) Eliane Blackburn, PT (c)      Row Name 04/17/19 0839             Gait/Stairs Assessment/Training    [place] Level (Gait)  verbal cues;contact guard  -LB,LS,LB2      Assistive Device (Gait)  walker, front-wheeled  -LB,LS,LB2      Distance in Feet (Gait)  160 x 2, 50 x 4  -LB,LS,LB2      Pattern (Gait)  step-through  -LB,LS,LB2      Deviations/Abnormal Patterns (Gait)  nancy decreased;gait speed decreased  -LB,LS,LB2      Bilateral Gait Deviations  weight shift ability decreased;heel strike decreased  -LB,LS,LB2       Left Sided Gait Deviations  weight shift ability decreased  -LB,LS,LB2      Right Sided Gait Deviations  heel strike decreased;knee buckling, right side;knee hyperextension  -LB,LS,LB2      Comment (Gait/Stairs)  Improved knee control noted during ambulation and increased endurance for longer walking duration. No instance of knee hyperextension or buckling   -LB,LS,LB2      Recorded by [LB,LS,LB2] Eliane Blackburn, PT (r) Naomy Mendenhall, PT Student (t) Eliane Blackburn, PT (c)      Row Name 04/17/19 0839             Wheelchair Mobility/Management    Method of Wheelchair Locomotion (Mobility)  jay-bipedal propulsion (left sided)  -LB,LS,LB2      Forward Propulsion [place] (Wheelchair)  independent  -LB,LS,LB2      Steering [place] (Wheelchair)  --  -LB,LS,LB2      Turning [place] (Wheelchair)  supervision  -LB,LS,LB2      Distance Propelled in Feet (Wheelchair)  150  -LB,LS,LB2      Comment, Mobility Activities (Wheelchair)  Pt propelling himself from room to therapy gym. Noted to run into wall on one occasion during R sided turn,otherwise independent with propulsion  -LB,LS,LB2      Recorded by [LB,LS,LB2] Eliane Blackburn, PT (r) Naomy Mendenhall, PT Student (t) Eliane Blackburn, PT (c)      Row Name 04/17/19 0839             Safety Issues, Functional Mobility    Safety Issues Affecting Function (Mobility)  ability to follow commands;insight into deficits/self awareness;judgment;sequencing abilities  -LB,LS,LB2      Impairments Affecting Function (Mobility)  balance;cognition;coordination;motor control;strength  -LB,LS,LB2      Comment, Safety Issues/Impairments (Mobility)  VC needed to slow down at times  -LB,LS,LB2      Recorded by [LB,LS,LB2] Eliane Blackburn, PT (r) Naomy Mendenhall, PT Student (t) Eliane Blackburn, PT (c)      Row Name 04/17/19 0839             Rough/Uneven Surface Gait Skills (Mobility)    [place], Gait on Rough/Uneven Surface (Mobility)  contact guard;verbal cues  -LB,LS,LB2      Comment,  Gait Rough/Uneven Surface (Mobility)  Walked across gravel/stepping stones to practice navigating with walker--pt has gravel driveway at home. Demonstration given prior for gait sequencing/picking up walker. Pt able to follow instructions and complete safely with CGA x 20 ft  -LB,LS,LB2      Recorded by [LB,LS,LB2] Eliane Blackburn, PT (r) Naomy Mendenhall, PT Student (t) Eliane Blackburn, PT (c)      Row Name 04/17/19 [facility]             Pain Assessment    Additional Documentation  Pain Scale: Numbers Pre/Post-Treatment (Group)  -LB,LS,LB2      Recorded by [LB,LS,LB2] Eliane Blackburn, PT (r) Naomy Mendenhall, PT Student (t) Eliane Blackburn, PT (c)      Row Name 04/17/19 [facility]             Pain Scale: Numbers Pre/Post-Treatment    Pain Scale: Numbers, Pretreatment  0/10 - no pain  -LB,LS,LB2      Pain Scale: Numbers, Post-Treatment  0/10 - no pain  -LB,LS,LB2      Recorded by [LB,LS,LB2] Eliane Blackburn, PT (r) Naomy Mendenhall, PT Student (t) Eliane Blackburn, PT (c)      Row Name 04/17/19 [facility]             Lower Extremity Standing Therapeutic Exercise    Performed, Standing Lower Extremity (Therapeutic Exercise)  mini-squats marching in place  -LB,LS,LB2      Exercise Type, Standing Lower Extremity (Therapeutic Exercise)  AROM (active range of motion)  -LB,LS,LB2      Sets/Reps Detail, Standing Lower Extremity (Therapeutic Exercise)  2 x 12  -LB,LS,LB2      Comment, Standing Lower Extremity (Therapeutic Exercise)  Bilateral UE support on jay bars. Tactile cues for hip flexion during marching. VC for slow controlled motion  -LB,LS,LB2      Recorded by [LB,LS,LB2] Eliane Blackburn, PT (r) Naomy Mendenhall PT Student (t) Eliane Blackburn, PT (c)      Row Name 04/17/19 56             Lower Extremity Seated Therapeutic Exercise    Performed, Seated Lower Extremity (Therapeutic Exercise)  hip flexion/extension;LAQ (long arc quad), knee extension;ankle dorsiflexion/plantarflexion;knee flexion/extension  -LB,LS,LB2      Device, Seated Lower Extremity  (Therapeutic Exercise)  elastic bands/tubing red therband  -LB,LS,LB2      Sets/Reps Detail, Seated Lower Extremity (Therapeutic Exercise)  1 x 12  -LB,LS,LB2      Recorded by [LB,LS,LB2] Eliane Blackburn PT (r) Naomy Mendenhall, PT Student (t) Eliane Blackburn, PT (c)      Row Name 04/17/19 0839             Positioning and Restraints    Pre-Treatment Position  sitting in chair/recliner  -LB,LS,LB2      Post Treatment Position  wheelchair  -LB,LS,LB2      In Wheelchair  sitting;exit alarm on;patient within staff view;with other staff  -LB,LS,LB2      Recorded by [LB,LS,LB2] Eliane Blackburn PT (r) Naomy Mendenhall, PT Student (t) Eliane Blackburn, PT (c)        User Key  (r) = Recorded By, (t) = Taken By, (c) = Cosigned By    Initials Name Effective Dates    SL Narcisa Bonner, MS CCC-SLP 06/08/18 -     Eliane Pereira PT 04/03/18 -     Naomy Armas, PT Student 02/04/19 -         Wound 03/26/19 0900 Left chest incision (Active)   Dressing Appearance open to air 4/16/2019 10:02 PM   Closure Liquid skin adhesive 4/16/2019 10:02 PM   Drainage Amount none 4/16/2019 10:02 PM     Physical Therapy Education     Title: PT OT SLP Therapies (In Progress)     Topic: Physical Therapy (In Progress)     Point: Mobility training (Done)     Learning Progress Summary           Patient Acceptance, E,TB, VU,NR by KENZIE at 4/17/2019  8:39 AM    Comment:  discussed weight loss and educated on how increase exercise can contribute to this and decreased stroke risk    Acceptance, E,TB,D, VU,DU by RENNY at 4/16/2019  2:52 PM    Comment:  Transfers, ambulation, steps and car transfer    Acceptance, E,TB, VU,NR by KENZIE at 4/15/2019  9:50 AM    Comment:  Educated on safer/more stable gait with Rwx. Discussed proper stair navigation and sequencing.    Acceptance, E,D, VU,DU,NR by ODELL at 4/13/2019 12:04 PM    Acceptance, E,TB, VU,NR by KENZIE at 4/12/2019  9:51 AM    Comment:  Continued discussion regarding progress with knee control and walking    Acceptance, LEANDRO RUIZ,  VU,NR by  at 4/11/2019  9:53 AM    Comment:  Continued discussion/answering pt questions about why R knee is weak. Educated on proper mechanics for stair navigation    Acceptance, E,TB, VU,NR by  at 4/10/2019  9:43 AM    Comment:  Continued discussion regarding safety and slowing down during mobility. Educated on proper use of cane again this date    Acceptance, E,TB, VU,NR by  at 4/9/2019  8:33 AM    Comment:  Continued discussion regarding how stroke has affected his R side. Educated on use of cane    Acceptance, E,TB, VU,NR by  at 4/8/2019  8:54 AM    Comment:  Educated on why R leg is weak and how the stroke is affecting his knee control    Acceptance, E,TB, VU,NR by  at 4/5/2019 11:30 AM    Comment:  Discussed purpose of strengthening exercises. Educated on repeated practice of walking and other exercises to gradually build strength and endurance.    Acceptance, E, NR by  at 4/4/2019  9:52 AM    Acceptance, E, NR by  at 4/3/2019 10:16 AM    Acceptance, E, NR by  at 4/2/2019  3:18 PM    Acceptance, E, NR by  at 4/1/2019 10:32 AM    Acceptance, E,TB,D, NR by  at 3/30/2019 11:44 AM    Acceptance, E,TB,D, NR by  at 3/29/2019  2:58 PM    Acceptance, E,TB,D, NR by  at 3/28/2019 11:38 AM    Acceptance, E,TB,D, NR by  at 3/27/2019 10:05 AM    Acceptance, E,TB,D, NR by  at 3/26/2019  9:48 AM    Acceptance, E,TB,D, NR by EE at 3/25/2019  3:09 PM   Family Acceptance, E,TB,D, VU,DU by  at 4/16/2019  2:52 PM    Comment:  Transfers, ambulation, steps and car transfer                   Point: Home exercise program (In Progress)     Learning Progress Summary           Patient Acceptance, E, NR by  at 4/4/2019  9:52 AM    Acceptance, E, NR by  at 4/3/2019 10:16 AM    Acceptance, E, NR by  at 4/2/2019  3:18 PM    Acceptance, E, NR by  at 4/1/2019 10:32 AM    Acceptance, LEANDRO RUIZ D, NR by EE at 3/29/2019  2:58 PM    Acceptance, LEANDRO RUIZ D, NR by SHANICE at 3/28/2019 11:38 AM    Acceptance, LEANDRO RUIZ D NR by   at 3/27/2019 10:05 AM    Acceptance, E,TB,D, NR by  at 3/26/2019  9:48 AM    Acceptance, E,TB,D, NR by  at 3/25/2019  3:09 PM                   Point: Body mechanics (Done)     Learning Progress Summary           Patient Acceptance, E, VU by  at 4/16/2019  3:26 PM    Comment:  family conf w/pt and multiple family members- discussed cognitive deficits - memory, reasoning, attn, math, and impulsivity- rec: supervision for med management/finances, cont ST at D/C    Acceptance, E, NR by  at 4/4/2019  9:52 AM    Acceptance, E, NR by  at 4/3/2019 10:16 AM    Acceptance, E, NR by  at 4/2/2019  3:18 PM    Acceptance, E, NR by  at 4/1/2019 10:32 AM    Acceptance, E,TB,D, NR by  at 3/29/2019  2:58 PM    Acceptance, E,TB,D, NR by  at 3/28/2019 11:38 AM    Acceptance, E,TB,D, NR by  at 3/27/2019 10:05 AM    Acceptance, E,TB,D, NR by  at 3/26/2019  9:48 AM    Acceptance, E,TB,D, NR by  at 3/25/2019  3:09 PM   Family Acceptance, E, VU by  at 4/16/2019  3:26 PM    Comment:  family conf w/pt and multiple family members- discussed cognitive deficits - memory, reasoning, attn, math, and impulsivity- rec: supervision for med management/finances, cont ST at D/C                   Point: Precautions (Done)     Learning Progress Summary           Patient Acceptance, E,TB, VU,NR by  at 4/17/2019  8:39 AM    Comment:  discussed weight loss and educated on how increase exercise can contribute to this and decreased stroke risk    Acceptance, E,TB, VU,NR by  at 4/16/2019  9:45 AM    Comment:  Continued education on proper car transfer technique    Acceptance, E,TB, VU,NR by  at 4/15/2019  9:50 AM    Comment:  Educated on safer/more stable gait with Rwx. Discussed proper stair navigation and sequencing.    Acceptance, E,TB, VU,NR by  at 4/12/2019  9:51 AM    Comment:  Continued discussion regarding progress with knee control and walking    Acceptance, E,TB, VU,NR by LS at 4/11/2019  9:53 AM    Comment:   Continued discussion/answering pt questions about why R knee is weak. Educated on proper mechanics for stair navigation    Acceptance, E,TB, VU,NR by LS at 4/10/2019  9:43 AM    Comment:  Continued discussion regarding safety and slowing down during mobility. Educated on proper use of cane again this date    Acceptance, E,TB, VU,NR by LS at 4/9/2019  8:33 AM    Comment:  Continued discussion regarding how stroke has affected his R side. Educated on use of cane    Acceptance, E,TB, VU,NR by LS at 4/8/2019  8:54 AM    Comment:  Educated on why R leg is weak and how the stroke is affecting his knee control    Acceptance, E,TB, VU,NR by  at 4/5/2019 11:30 AM    Comment:  Discussed purpose of strengthening exercises. Educated on repeated practice of walking and other exercises to gradually build strength and endurance.    Acceptance, E, NR by  at 4/4/2019  9:52 AM    Acceptance, E, NR by  at 4/3/2019 10:16 AM    Acceptance, E, NR by  at 4/2/2019  3:18 PM    Acceptance, E, NR by  at 4/1/2019 10:32 AM    Acceptance, E,TB,D, NR by  at 3/29/2019  2:58 PM    Acceptance, E,TB,D, NR by  at 3/28/2019 11:38 AM    Acceptance, E,TB,D, NR by  at 3/27/2019 10:05 AM    Acceptance, E,TB,D, NR by  at 3/26/2019  9:48 AM    Acceptance, E,TB,D, NR by  at 3/25/2019  3:09 PM                               User Key     Initials Effective Dates Name Provider Type Discipline     06/08/18 -  Narcisa Bonner, MS CCC-SLP Speech and Language Pathologist SLP    ELISSA 06/08/18 -  Donna Inman, PT Physical Therapist PT    LB 04/03/18 -  Eliane Blackburn, PT Physical Therapist PT    LH 04/03/18 -  Clau Ayon, PT Physical Therapist PT    EE 04/03/18 -  Ariadne Garcia, PT Physical Therapist PT    JK 04/03/18 -  Nicole Lawton, PT Physical Therapist PT    LS 02/04/19 -  Naomy Mendenhall, PT Student PT Student                   PT Recommendation and Plan                        Time Calculation:     PT Charges     Row Name 04/17/19 2090  04/17/19 0838          Time Calculation    Start Time  1400  -LB (r) LS (t) LB (c)  0830  -LB (r) LS (t) LB (c)     Stop Time  1430  -LB (r) LS (t) LB (c)  0900  -LB (r) LS (t) LB (c)     Time Calculation (min)  30 min  -LB (r) LS (t)  30 min  -LB (r) LS (t)     PT Received On  04/17/19  -LB (r) LS (t) LB (c)  04/17/19  -LB (r) LS (t) LB (c)     PT - Next Appointment  04/18/19  -LB (r) LS (t) LB (c)  --       User Key  (r) = Recorded By, (t) = Taken By, (c) = Cosigned By    Initials Name Provider Type    Eliane Pereira, PT Physical Therapist    Naomy Armas, PT Student PT Student          Therapy Charges for Today     Code Description Service Date Service Provider Modifiers Qty    53642272491 HC PT NEUROMUSC RE EDUCATION EA 15 MIN 4/16/2019 Naomy Mendenhall, PT Student GP 4    86491475738 HC PT THER SUPP EA 15 MIN 4/16/2019 Naomy Mendenhall, PT Student GP 1    99105022047 HC PT NEUROMUSC RE EDUCATION EA 15 MIN 4/17/2019 Naomy Mendenhall, PT Student GP 4    92687349857 HC PT THER SUPP EA 15 MIN 4/17/2019 Naomy Mendenhall, PT Student GP 2                   Naomy Mendenhall PT Student  4/17/2019

## 2019-04-17 NOTE — PROGRESS NOTES
LOS: 24 days   Patient Care Team:  Qasim Asif MD as PCP - General  Qasim Asif MD as PCP - Family Medicine    Chief Complaint: same    Subjective     History of Present Illness    SubjectivePt is awake and alert. No new issues. Pt remains anxious to be dcd.     History taken from: patient    Objective     Vital Signs  Temp:  [96.7 °F (35.9 °C)-98.3 °F (36.8 °C)] 96.7 °F (35.9 °C)  Heart Rate:  [76-87] 76  Resp:  [18] 18  BP: (143-145)/(73-83) 145/73    Objectiveexam unchanged    Results Review:     I reviewed the patient's new clinical results.    Medication Review:     Assessment/Plan       Acute ischemic left PCA stroke (CMS/HCC)    Status post placement of implantable loop recorder    HTN (hypertension)    Diabetes mellitus (CMS/HCC)    Hyperlipidemia    Morbid obesity (CMS/HCC)    Anxiety disorder    Abdominal wall abscess    Stroke (cerebrum) (CMS/HCC)    Vitamin D deficiency    History of fatty infiltration of liver      Assessment & Plan Continue to prepare for dc 4/23    Santo Noonan MD  04/17/19  1:52 PM    Time:

## 2019-04-17 NOTE — PROGRESS NOTES
Inpatient Rehabilitation Functional Measures Assessment    Functional Measures  VANITA Eating:  [facility] Grooming: [facility] Bathing:  [facility] Upper Body Dressing:  [facility] Lower Body Dressing:  [facility] Toileting:  [facility] Bladder Management  Level of Assistance:  [place]  Frequency/Number of Accidents this Shift:  [facility] Bowel Management  Level of Assistance: [place]  Frequency/Number of Accidents this Shift: [facility] Bed/Chair/Wheelchair Transfer:  [facility] Toilet Transfer:  [facility] Tub/Shower Transfer:  [place]    Previously Documented Mode of Locomotion at Discharge: Field  VANITA Expected Mode of Locomotion at Discharge: [facility] Walk/Wheelchair:  [facility] Stairs:  [facility] Comprehension:  Auditory comprehension is the usual mode. Comprehension  Score = 6, Modified [place].  Patient comprehends complex/abstract  information in their primary language with only mild difficulty.  VANITA Expression:  Vocal expression is the usual mode. Expression Score = 6,  Modified Independent.  Patient expresses complex/abstract information in their  primary language with only mild difficulty with tasks.  VANITA Social Interaction:  Social Interaction Score = 6, Modified Independent.  Patient is modified independent for social interaction, requiring: Requires  additional time.  VANITA Problem Solving:  Problem Solving Score = 6, Modified [place].  Patient  makes appropriate decisions in order to solve complex problems with mild  difficulty but self-corrects.  VANITA Memory:  Memory Score = 6, Modified [place].  Patient is modified  independent for memory, having only mild difficulty and using self-initiated or  environmental cues to remember.    Therapy Mode Minutes  Occupational Therapy: Branch  Physical Therapy: Branch  Speech Language Pathology:  Branch    Signed by: Brayan Haji RN

## 2019-04-17 NOTE — PLAN OF CARE
Problem: Stroke (IRF) (Adult)  Goal: Promote Optimal Functional [place]  Outcome: Ongoing (interventions implemented as appropriate)   04/17/19 0404   Stroke (IRF) (Adult)   Promote Optimal Functional [place] demonstrating adequate progress       Problem: Fall Risk (Adult)  Goal: Absence of Fall  Outcome: Ongoing (interventions implemented as appropriate)   04/17/19 0404   Fall Risk (Adult)   Absence of Fall making progress toward outcome       Problem: Diabetes, Type 2 (Adult)  Goal: Signs and Symptoms of Listed Potential Problems Will be Absent, Minimized or Managed (Diabetes, Type 2)  Outcome: Ongoing (interventions implemented as appropriate)   04/17/19 0404   Goal/Outcome Evaluation   Problems Assessed (Type 2 Diabetes) all   Problems Present (Type 2 Diabetes) none       Problem: Skin Injury Risk (Adult)  Goal: Skin Health and Integrity  Outcome: Ongoing (interventions implemented as appropriate)   04/17/19 0404   Skin Injury Risk (Adult)   Skin Health and Integrity making progress toward outcome

## 2019-04-17 NOTE — THERAPY TREATMENT NOTE
Inpatient Rehabilitation - Speech Language Pathology Treatment Note    [facility]       Patient Name: Nayan Ryan  : 1965  MRN: [id number]    Today's Date: 2019           Admit Date: 3/24/2019      Visit Dx:      No diagnosis found.    Patient Active Problem List   Diagnosis   • Acute ischemic left PCA stroke (CMS/HCC)   • Status post placement of implantable loop recorder   • HTN (hypertension)   • Diabetes mellitus (CMS/HCC)   • Hyperlipidemia   • Morbid obesity (CMS/HCC)   • Anxiety disorder   • Migraine   • H/O hernia repair   • Abdominal wall abscess   • Back pain   • Stroke (cerebrum) (CMS/HCC)   • Vitamin D deficiency   • History of fatty infiltration of liver          Therapy Treatment    Evaluation/Coping    Evaluation/Treatment Time and Intent  Subjective Information: no complaints (19 1300 : Narcisa Bonner MS CCC-SLP)  Existing Precautions/Restrictions: fall (19 1300 : Narcisa Bonner MS CCC-SLP)  Document Type: therapy note (daily note) (19 1300 : Narcisa Bonner MS CCC-SLP)  Mode of Treatment: individual therapy, speech-language pathology (19 1300 : Narcisa Bonner MS CCC-SLP)  Patient/Family Observations: alert, cooperative (19 1300 : Narcisa Bonner MS CCC-SLP)  Start Time (Evaluation/Treatment): 1300 (19 1300 : Narcisa Bonner MS CCC-SLP)  Stop Time (Evaluation/Treatment): 1330 (19 1300 : Kailey, Narcisa, MS CCC-SLP)    Vitals/Pain/Safety         Cognition/Communication         Oral Motor/Eating         Mobility/Basic Activities/Instrumental Activities/Motor/Modality                   ROM/MMT                   Sensory/Myotome/Dermatome/Edema               Posture/Balance/Special Tests/Exercise/Transportation/Sexual Function                   Orthotics/Residual Limb/Prosthetic Management              Outcome Summary         EDUCATION    The patient has been educated in the following areas:     Cognitive Impairment.    SLP Recommendation and Plan                                                           SLP GOALS     Row Name 04/17/19 1300 04/17/19 1000 04/16/19 1500       Memory Skills Goal 1 (SLP)    Progress (Memory Skills Goal 1, SLP)  --  50%;with minimal cues (75-90%) reading recall- short stories- multi paragraph  -SL  60%;independently (over 90% accuracy) visual recall- 12 items- grouping strategy  -SL       Organizational Skills Goal 1 (SLP)    Progress (Thought Organization Skills Goal 1, SLP)  90%;independently (over 90% accuracy) category matrixes  -SL  --  --       Reasoning Goal 1 (SLP)    Progress (Reasoning Goal 1, SLP)  --  --  70%;80%;independently (over 90% accuracy)  1 and 2 factor figural sequencing  -SL       Functional Problem Solving Skills Goal 1 (SLP)    Progress (Problem Solving Goal 1, SLP)  --  70%;80%;with minimal cues (75-90%) sequencing of 6 sentences for stories  -SL  --       Functional Math Skills Goal 1 (SLP)    Progress (Functional Math Skills Goal 1, SLP)  80%;with minimal cues (75-90%) calculating denominataion amts  -SL  --  60%;70%;independently (over 90% accuracy) simple time calculations related to water park hours  -    Comment (Functional Math Skills Goal 1, SLP)  line guide assisted w/visual scanning across row of amts when adding money  -SL  --  --       Executive Functional Skills Goal 1 (SLP)    Progress (Executive Function Skills Goal 1, SLP)  70%;independently (over 90% accuracy) scheduling task ( restaurant workers)  -SL  --  --    Comment (Executive Function Skills Goal 1, SLP)  min cues- multiple restraints- scheduling task  -SL  --  --    Row Name 04/16/19 1000 04/15/19 1400 04/15/19 1000       Memory Skills Goal 1 (SLP)    Progress (Memory Skills Goal 1, SLP)  --  80%;independently (over 90% accuracy) visual recall- 4 numbers- spatial components- boxed info  -SL  60%;with minimal cues (75-90%) 3 word mental manipulation task- alphabetical ordering  -    Progress/Outcomes (Memory Skills Goal 1, SLP)  --  goal ongoing  -  --     Comment (Memory Skills Goal 1, SLP)  --  name- picture associations- 60%  -SL  30 item grid- hidden picture matching task-40%  -SL       Organizational Skills Goal 1 (SLP)    Progress (Thought Organization Skills Goal 1, SLP)  --  60%;independently (over 90% accuracy) adding item to lists- abstract categories  -SL  --    Progress/Outcomes (Thought Organization Skills Goal 1, SLP)  --  goal ongoing  -SL  --       Reasoning Goal 1 (SLP)    Progress (Reasoning Goal 1, SLP)  --  70%;with minimal cues (75-90%);with moderate cues (50-74%) unscrambling L-R- words in category  -SL  --    Progress/Outcomes (Reasoning Goal 1, SLP)  --  goal ongoing  -SL  --       Functional Problem Solving Skills Goal 1 (SLP)    Progress (Problem Solving Goal 1, SLP)  80%;with minimal cues (75-90%) 2 step written directives  -SL  --  --    Comment (Problem Solving Goal 1, SLP)  sequencing 6 sentences for stories- 50%   -SL  --  --       Functional Math Skills Goal 1 (SLP)    Progress (Functional Math Skills Goal 1, SLP)  60%;independently (over 90% accuracy) time calculations- airline flight times  -SL  --  --       Executive Functional Skills Goal 1 (SLP)    Progress (Executive Function Skills Goal 1, SLP)  --  --  30%;40%;with moderate cues (50-74%) scheduling/planning task based on paragraph  -SL      User Key  (r) = Recorded By, (t) = Taken By, (c) = Cosigned By    Initials Name Provider Type    Narcisa Urias MS CCC-SLP Speech and Language Pathologist                  Time Calculation:       Time Calculation- SLP     Row Name 04/17/19 1328 04/17/19 1101          Time Calculation- SLP    SLP Start Time  1300  -SL  1030  -SL     SLP Stop Time  1330  -SL  1100  -SL     SLP Time Calculation (min)  30 min  -SL  30 min  -       User Key  (r) = Recorded By, (t) = Taken By, (c) = Cosigned By    Initials Name Provider Type    Narcisa Urias MS CCC-SLP Speech and Language Pathologist            Therapy Charges for Today     Code Description  Service Date Service Provider Modifiers Qty    58112394520 HC ST DEV OF COGN SKILLS EACH 15 MIN 4/16/2019 Narcisa Bonner, MS CCC-SLP  2    56868070876 HC ST DEV OF COGN SKILLS EACH 15 MIN 4/16/2019 Narcisa Bonner, MS CCC-SLP  2    [id number] HC ST DEV OF COGN SKILLS EACH 15 MIN 4/17/2019 Narcisa Bonner, MS CCC-SLP  2    [id number] HC ST DEV OF COGN SKILLS EACH 15 MIN 4/17/2019 Narcisa Bonner MS CCC-SLP  2                           Narcisa Bonner MS CCC-SLP  4/17/2019

## 2019-04-17 NOTE — THERAPY TREATMENT NOTE
Inpatient Rehabilitation - Occupational Therapy Treatment Note    [facility]     Patient Name: Nayan Ryan  : 1965  MRN: [id number]    Today's Date: 2019                 Admit Date: 3/24/2019      Visit Dx:  No diagnosis found.    Patient Active Problem List   Diagnosis   • Acute ischemic left PCA stroke (CMS/HCC)   • Status post placement of implantable loop recorder   • HTN (hypertension)   • Diabetes mellitus (CMS/HCC)   • Hyperlipidemia   • Morbid obesity (CMS/HCC)   • Anxiety disorder   • Migraine   • H/O hernia repair   • Abdominal wall abscess   • Back pain   • Stroke (cerebrum) (CMS/HCC)   • Vitamin D deficiency   • History of fatty infiltration of liver         Therapy Treatment    IRF Treatment Summary     Row Name 19 1533 19 1300 19 1036       Evaluation/Treatment Time and Intent    Subjective Information  no complaints  -DN  no complaints  -SL  no complaints  -SL    Existing Precautions/Restrictions  fall  -DN  fall  -SL  fall  -SL    Document Type  therapy note (daily note)  -DN  therapy note (daily note)  -SL  therapy note (daily note)  -SL    Mode of Treatment  occupational therapy  -DN  individual therapy;speech-language pathology  -SL  individual therapy;speech-language pathology  -SL    Patient/Family Observations  --  alert, cooperative  -SL  cooperative  -SL    Start Time (Evaluation/Treatment)  --  1300  -SL  1030  -SL    Stop Time (Evaluation/Treatment)  --  1330  -SL  1100  -SL    Recorded by [DN] Reg Mckinney OT [SL] Narcisa Bonner, MS CCC-SLP [SL] Narcisa Bonner, MS CCC-SLP    Row Name 19 0839             Evaluation/Treatment Time and Intent    Subjective Information  no complaints  -LB,LS,LB2      Existing Precautions/Restrictions  fall  -LB,LS,LB2      Document Type  therapy note (daily note)  -LB,LS,LB2      Mode of Treatment  physical therapy  -LB,LS,LB2      Patient/Family Observations  Pt motivated to participate in therapy  (Pended)  298.6 lbs  -LS2       Recorded by [LB,LS,LB2] Eliane Blackburn, PT (r) Naomy Mendenhall, PT Student (t) Eliane Blackburn, PT (c)  [LS2] Naomy Mendenhall, PT Student      Row Name 04/17/19 1533             Safety Awareness/Health Promotion    Additional Documentation  Fall Prevention (Row)  -DN      Recorded by [DN] Reg Mckinney, OT      Row Name 04/17/19 1533 04/17/19 0839          Cognition/Psychosocial- PT/OT    Affect/Mental Status (Cognitive)  WFL  -DN  WFL  -LB,LS,LB2     Orientation Status (Cognition)  oriented x 3  -DN  --     Follows Commands (Cognition)  follows one step commands;50-74% accuracy  -DN  follows one step commands;over 90% accuracy;verbal cues/prompting required  -LB,LS,LB2     Personal Safety Interventions  fall prevention program maintained;gait belt;supervised activity  -DN  fall prevention program maintained;gait belt;muscle strengthening facilitated;nonskid shoes/slippers when out of bed  -LB,LS,LB2     Cognitive Function (Cognitive)  attention deficit;executive function deficit  -DN  --     Attention Deficit (Cognitive)  --  distractible in noisy environment  -LB,LS,LB2     Executive Function Deficit (Cognition)  moderate deficit  -DN  --     Memory Deficit (Cognitive)  moderate deficit  -DN  moderate deficit;working memory  -LB,LS,LB2     Safety Deficit (Cognitive)  mild deficit  -DN  ability to follow commands;impulsivity;judgment;problem solving  -LB,LS,LB2     Recorded by [DN] Reg Mckinney, OT [LB,LS,LB2] Eliane Blackburn, PT (r) Naomy Mendenhall, PT Student (t) Eliane Blackburn, PT (c)     Row Name 04/17/19 0839             Mobility    Advanced Gait Activity  rough/uneven surfaces  -LB,LS,LB2      Additional Documentation  Advanced Gait Activity (Row)  -LB,LS,LB2      Recorded by [LB,LS,LB2] Eliane Blackburn, PT (r) Naomy Mendenhall, PT Student (t) Eliane Blackburn, PT (c)      Row Name 04/17/19 1533             Chair-Bed Transfer    Chair-Bed [place] (Transfers)  contact guard;verbal cues;nonverbal cues (demo/gesture)   -DN      Assistive Device (Chair-Bed Transfers)  wheelchair  -DN      Recorded by [DN] Reg Mckinney, OT      Row Name 04/17/19 0839             Sit-Stand Transfer    Sit-Stand [place] (Transfers)  contact guard  -LB,LS,LB2      Assistive Device (Sit-Stand Transfers)  wheelchair  -LB,LS,LB2      Recorded by [LB,LS,LB2] Eliane Blackburn, PT (r) Naomy Mendenhall, PT Student (t) Eliane Blackburn, PT (c)      Row Name 04/17/19 0839             Stand-Sit Transfer    Stand-Sit [place] (Transfers)  contact guard  -LB,LS,LB2      Assistive Device (Stand-Sit Transfers)  wheelchair  -LB,LS,LB2      Recorded by [LB,LS,LB2] Eliane Blackburn, PT (r) Naomy Mendenhall, PT Student (t) Eliane Blackburn, PT (c)      Row Name 04/17/19 1533             Toilet Transfer    Type (Toilet Transfer)  stand pivot/stand step  -DN      [place] Level (Toilet Transfer)  contact guard;verbal cues;nonverbal cues (demo/gesture)  -DN      Assistive Device (Toilet Transfer)  raised toilet seat;grab bars/safety frame  -DN      Recorded by [BRENDAN] Reg Mckinney, OT      Row Name 04/17/19 1533             Shower Transfer    Type (Shower Transfer)  stand pivot/stand step  -DN      [place] Level (Shower Transfer)  contact guard;nonverbal cues (demo/gesture);verbal cues  -DN      Assistive Device (Shower Transfer)  tub bench;grab bars/tub rail;walker, front-wheeled  -DN      Recorded by [DN] Reg Mckinney, OT      Row Name 04/17/19 0839             Gait/Stairs Assessment/Training    [place] Level (Gait)  verbal cues;contact guard  -LB,LS,LB2      Assistive Device (Gait)  walker, front-wheeled  -LB,LS,LB2      Distance in Feet (Gait)  160 x 2, 50 x 4  -LB,LS,LB2      Pattern (Gait)  step-through  -LB,LS,LB2      Deviations/Abnormal Patterns (Gait)  nancy decreased;gait speed decreased  -LB,LS,LB2      Bilateral Gait Deviations  weight shift ability decreased;heel strike decreased  -LB,LS,LB2      Left Sided Gait Deviations  weight shift ability  decreased  -LB,LS,LB2      Right Sided Gait Deviations  heel strike decreased;knee buckling, right side;knee hyperextension  -LB,LS,LB2      Comment (Gait/Stairs)  Improved knee control noted during ambulation and increased endurance for longer walking duration. No instance of knee hyperextension or buckling   -LB,LS,LB2      Recorded by [LB,LS,LB2] Eliane Blackburn, PT (r) Naomy Mendenhall, PT Student (t) Eliane Blackburn, PT (c)      Row Name 04/17/19 0839             Wheelchair Mobility/Management    Method of Wheelchair Locomotion (Mobility)  jay-bipedal propulsion (left sided)  -LB,LS,LB2      Forward Propulsion [place] (Wheelchair)  independent  -LB,LS,LB2      Steering [place] (Wheelchair)  --  -LB,LS,LB2      Turning [place] (Wheelchair)  supervision  -LB,LS,LB2      Distance Propelled in Feet (Wheelchair)  150  -LB,LS,LB2      Comment, Mobility Activities (Wheelchair)  Pt propelling himself from room to therapy gym. Noted to run into wall on one occasion during R sided turn,otherwise independent with propulsion  -LB,LS,LB2      Recorded by [LB,LS,LB2] Eliane Blackburn, PT (r) Naomy Mendenhall, PT Student (t) Eliane Blackburn, PT (c)      Row Name 04/17/19 0866             Safety Issues, Functional Mobility    Safety Issues Affecting Function (Mobility)  ability to follow commands;insight into deficits/self awareness;judgment;sequencing abilities  -LB,LS,LB2      Impairments Affecting Function (Mobility)  balance;cognition;coordination;motor control;strength  -LB,LS,LB2      Comment, Safety Issues/Impairments (Mobility)  VC needed to slow down at times  -LB,LS,LB2      Recorded by [LB,LS,LB2] Eliane Blackburn, PT (r) Naomy Mendenhall, PT Student (t) Eliane Blackburn, PT (c)      Row Name 04/17/19 0878             Rough/Uneven Surface Gait Skills (Mobility)    [place], Gait on Rough/[place]neven Surface (Mobility)  contact guard;verbal cues  -LB,LS,LB2      Comment, Gait Rough/Uneven Surface (Mobility)  Walked across  gravel/stepping stones to practice navigating with walker--pt has gravel driveway at home. Demonstration given prior for gait sequencing/picking up walker. Pt able to follow instructions and complete safely with CGA x 20 ft  -LB,LS,LB2      Recorded by [LB,LS,LB2] Eliane Blackburn, PT (r) Naomy Mendenhall PT Student (t) Eliane Blackburn, PT (c)      Row Name 04/17/19 1533             Bathing Assessment/Treatment    Bathing [place] Level  bathing skills;supervision;verbal cues;nonverbal cues (demo/gesture)  -DN      Assistive Device (Bathing)  grab bar/tub rail;hand held shower spray hose;tub bench  -DN      Bathing Position  supported sitting;supported standing  -DN      Bathing Setup Assistance  adjust water temperature  -DN      Recorded by [BRENDAN] Reg Mckinney OT      Row Name 04/17/19 1533             Upper Body Dressing Assessment/Treatment    Upper Body Dressing Task  upper body dressing skills;doff;don;pull over garment;verbal cues;nonverbal cues (demo/gesture);supervision  -DN      Upper Body Dressing Position  supported sitting  -DN      Set-up Assistance (Upper Body Dressing)  obtain clothing  -DN      Recorded by [BRENDAN] Reg Mckinney OT      Row Name 04/17/19 1533             Lower Body Dressing Assessment/Treatment    Lower Body Dressing [place] Level  doff;don;pants/bottoms;shoes/slippers;socks;underwear;contact guard assist;verbal cues;nonverbal cues (demo/gesture)  -DN      Lower Body Dressing Position  supported sitting;supported standing  -DN      Lower Body Dressing Setup Assistance  obtain clothing  -DN      Recorded by [BRENDAN] Reg Mckinney OT      Row Name 04/17/19 1533             Grooming Assessment/Treatment    Grooming [place] Level  grooming skills;deodorant application;hair care, combing/brushing;supervision;verbal cues  -DN      Grooming Position  supported sitting  -DN      Grooming Setup Assistance  obtain supplies  -DN      Recorded by [BRENDAN] Reg Mckinney OT      Row Name  04/17/19 1533             Toileting Assessment/Treatment    Toileting [place] Level  toileting skills;adjust/manage clothing;verbal cues;nonverbal cues (demo/gesture);contact guard assist  -DN      Assistive Device Use (Toileting)  raised toilet seat;grab bar/safety frame  -DN      Toileting Position  supported sitting;supported standing  -DN      Recorded by [DN] Reg Mckinney, OT      Row Name 04/17/19 1533             Fine Motor Testing & Training    Comment, Fine Motor Coordination  peg, spacer and washer placement in peg board with RUE with SBA vc for sequencing of steps  -DN      Recorded by [DN] Reg Mckinney OT      Row Name 04/17/19 1533             Vision Assessment/Intervention    Vision Assessment Comment  pt seated at table needed min vc for completion of 2 demental puzzle for visual tracking and shape placement on board  -DN      Recorded by [BRENDAN] Reg Mckinney, OT      Row Name 04/17/19 0839             Pain Assessment    Additional Documentation  Pain Scale: Numbers Pre/Post-Treatment (Group)  -LB,LS,LB2      Recorded by [LB,LS,LB2] Eliane Blackburn, PT (r) Naomy Mendenhall, PT Student (t) Eliane Blackburn, PT (c)      Row Name 04/17/19 1533 04/17/19 0894          Pain Scale: Numbers Pre/Post-Treatment    Pain Scale: Numbers, Pretreatment  0/10 - no pain  -DN  0/10 - no pain  -LB,LS,LB2     Pain Scale: Numbers, Post-Treatment  0/10 - no pain  -DN  0/10 - no pain  -LB,LS,LB2     Recorded by [DN] Reg Mckinney, OT [LB,LS,LB2] Eliane Blackburn, PT (r) Naomy Mendenhall, PT Student (t) Eliane Blackburn, PT (c)     Row Name 04/17/19 0885             Lower Extremity Standing Therapeutic Exercise    Performed, Standing Lower Extremity (Therapeutic Exercise)  mini-squats marching in place  -LB,LS,LB2      Exercise Type, Standing Lower Extremity (Therapeutic Exercise)  AROM (active range of motion)  -LB,LS,LB2      Sets/Reps Detail, Standing Lower Extremity (Therapeutic Exercise)  2 x 12  -LB,LS,LB2      Comment,  Standing Lower Extremity (Therapeutic Exercise)  Bilateral UE support on jay bars. Tactile cues for hip flexion during marching. VC for slow controlled motion  -LB,LS,LB2      Recorded by [LB,LS,LB2] Eliane Blackburn PT (r) Naomy Mendenhall PT Student (t) Eliane Blackburn, PT (c)      Row Name 04/17/19 0839             Lower Extremity Seated Therapeutic Exercise    Performed, Seated Lower Extremity (Therapeutic Exercise)  hip flexion/extension;LAQ (long arc quad), knee extension;ankle dorsiflexion/plantarflexion;knee flexion/extension  -LB,LS,LB2      Device, Seated Lower Extremity (Therapeutic Exercise)  elastic bands/tubing red therband  -LB,LS,LB2      Sets/Reps Detail, Seated Lower Extremity (Therapeutic Exercise)  1 x 12  -LB,LS,LB2      Recorded by [LB,LS,LB2] Eliane Blackburn, PT (r) Naomy Mendenhall PT Student (t) Eliane Blackburn, PT (c)      Row Name 04/17/19 1533 04/17/19 0839          Positioning and Restraints    Pre-Treatment Position  sitting in chair/recliner  -DN  sitting in chair/recliner  -LB,LS,LB2     Post Treatment Position  wheelchair  -DN  wheelchair  -LB,LS,LB2     In Bed  sitting;call light within reach;encouraged to call for assist;exit alarm on  -DN  --     In Wheelchair  --  sitting;exit alarm on;patient within staff view;with other staff  -LB,LS,LB2     Recorded by [DN] Reg Mckinney, OT [LB,LS,LB2] Eliane Blackburn, PT (r) Naomy Mendenhall PT Student (t) Eliane Blackburn, PT (c)       User Key  (r) = Recorded By, (t) = Taken By, (c) = Cosigned By    Initials Name Effective Dates    Narcisa Urias MS CCC-SLP 06/08/18 -     Reg Bolden, MADAY 06/08/18 -     LB Eliane Blackburn PT 04/03/18 -     Naomy Armas, PT Student 02/04/19 -           Wound 03/26/19 0900 Left chest incision (Active)   Dressing Appearance open to air 4/16/2019 10:02 PM   Closure Liquid skin adhesive 4/16/2019 10:02 PM   Drainage Amount none 4/16/2019 10:02 PM         OT Recommendation and Plan    Anticipated Equipment Needs At  Discharge (OT Eval): commode, 3-in-1, shower chair, tub bench  Planned Therapy Interventions (OT Eval): BADL retraining, cognitive/visual perception retraining, functional balance retraining, neuromuscular control/coordination retraining, strengthening exercise, transfer/mobility retraining            OT IRF GOALS     Row Name 04/17/19 1500 04/09/19 1400          Bathing Goal 1 (OT-IRF)    Activity/Device (Bathing Goal 1, OT-IRF)  bathing skills, all  -DN  bathing skills, all  -DN     [place] Level (Bathing Goal 1, OT-IRF)  supervision required;verbal cues required  -DN  supervision required;verbal cues required  -DN     Time Frame (Bathing Goal 1, OT-IRF)  short term goal (STG)  -DN  short term goal (STG)  -DN     Progress/Outcomes (Bathing Goal 1, OT-IRF)  goal met;goal revised this date  -DN  goal met;goal revised this date  -DN        Bathing Goal 2 (OT-IRF)    Activity/Device (Bathing Goal 2, OT-IRF)  bathing skills, all  -DN  bathing skills, all  -DN     [place] Level (Bathing Goal 2, OT-IRF)  supervision required  -DN  supervision required  -DN     Time Frame (Bathing Goal 2, OT-IRF)  long term goal (LTG)  -DN  long term goal (LTG)  -DN     Progress/Outcomes (Bathing Goal 2, OT-IRF)  goal ongoing  -DN  goal ongoing  -DN        UB Dressing Goal 1 (OT-IRF)    Activity/Device (UB Dressing Goal 1, OT-IRF)  upper body dressing  -DN  upper body dressing  -DN     [place] (UB Dress Goal 1, OT-IRF)  supervision required  -DN  supervision required  -DN     Time Frame (UB Dressing Goal 1, OT-IRF)  short term goal (STG)  -DN  short term goal (STG)  -DN     Progress/Outcomes (UB Dressing Goal 1, OT-IRF)  goal met;goal ongoing  -DN  goal met;goal ongoing  -DN        UB Dressing Goal 2 (OT-IRF)    Activity/Device (UB Dressing Goal 2, OT-IRF)  upper body dressing  -DN  upper body dressing  -DN     [place] (UB Dress Goal 2, OT-IRF)  supervision required  -DN  supervision required  -DN     Time Frame (UB  Dressing Goal 2, OT-IRF)  long term goal (LTG)  -DN  long term goal (LTG)  -DN     Progress/Outcomes (UB Dressing Goal 2, OT-IRF)  goal ongoing;goal met  -DN  goal ongoing;goal met  -DN        LB Dressing Goal 1 (OT-IRF)    Activity/Device (LB Dressing Goal 1, OT-IRF)  lower body dressing  -DN  lower body dressing  -DN     [place] (LB Dressing Goal 1, OT-IRF)  supervision required  -DN  minimum assist (75% or more patient effort);verbal cues required;tactile cues required  -DN     Time Frame (LB Dressing Goal 1, OT-IRF)  short term goal (STG)  -DN  short term goal (STG)  -DN     Progress/Outcomes (LB Dressing Goal 1, OT-IRF)  goal revised this date  -DN  goal met;goal ongoing  -DN        LB Dressing Goal 2 (OT-IRF)    Activity/Device (LB Dressing Goal 2, OT-IRF)  lower body dressing  -DN  lower body dressing  -DN     [place] (LB Dressing Goal 2, OT-IRF)  verbal cues required;supervision required  -DN  verbal cues required;tactile cues required;contact guard assist  -DN     Time Frame (LB Dressing Goal 2, OT-IRF)  long term goal (LTG)  -DN  long term goal (LTG)  -DN     Progress/Outcomes (LB Dressing Goal 2, OT-IRF)  --  goal revised this date  -DN        Grooming Goal 1 (OT-IRF)    Activity/Device (Grooming Goal 1, OT-IRF)  grooming skills, all  -DN  grooming skills, all  -DN     [place] (Grooming Goal 1, OT-IRF)  supervision required  -DN  supervision required  -DN     Time Frame (Grooming Goal 1, OT-IRF)  short term goal (STG)  -DN  short term goal (STG)  -DN     Progress/Outcomes (Grooming Goal 1, OT-IRF)  goal ongoing  -DN  goal ongoing  -DN        Grooming Goal 2 (OT-IRF)    Activity/Device (Grooming Goal 2, OT-IRF)  grooming skills, all  -DN  grooming skills, all  -DN     [place] (Grooming Goal 2, OT-IRF)  supervision required  -DN  supervision required  -DN     Time Frame (Grooming Goal 2, OT-IRF)  long term goal (LTG)  -DN  long term goal (LTG)  -DN     Progress/Outcomes (Grooming Goal 2,  OT-IRF)  goal revised this date  -DN  goal revised this date  -DN        Toileting Goal 1 (OT-IRF)    Activity/Device (Toileting Goal 1, OT-IRF)  toileting skills, all  -DN  toileting skills, all  -DN     [place] Level (Toileting Goal 1, OT-IRF)  supervision required  -DN  minimum assist (75% or more patient effort);verbal cues required;tactile cues required  -DN     Time Frame (Toileting Goal 1, OT-IRF)  short term goal (STG)  -DN  short term goal (STG)  -DN     Progress/Outcomes (Toileting Goal 1, OT-IRF)  goal met;goal revised this date  -DN  goal met;goal revised this date  -DN        Toileting Goal 2 (OT-IRF)    Activity/Device (Toileting Goal 2, OT-IRF)  toileting skills, all  -DN  toileting skills, all  -DN     [place] Level (Toileting Goal 2, OT-IRF)  supervision required  -DN  contact guard assist;verbal cues required;tactile cues required  -DN     Time Frame (Toileting Goal 2, OT-IRF)  long term goal (LTG)  -DN  long term goal (LTG)  -DN     Progress/Outcomes (Toileting Goal 2, OT-IRF)  goal revised this date  -DN  goal ongoing  -DN        Self-Feeding Goal 1 (OT-IRF)    Activity/Device (Self-Feeding Goal 1, OT-IRF)  self-feeding skills, all  -DN  self-feeding skills, all  -DN     [place] (Self-Feeding Goal 1, OT-IRF)  supervision required  -DN  supervision required  -DN     Time Frame (Self-Feeding Goal 1, OT-IRF)  short term goal (STG)  -DN  short term goal (STG)  -DN     Progress/Outcomes 1 (Self-Feeding Goal, OT-IRF)  goal met;goal ongoing  -DN  goal met;goal ongoing  -DN        Self-Feeding Goal 2 (OT-IRF)    Activity/Device (Self-Feeding Goal 2, OT-IRF)  self-feeding skills, all  -DN  self-feeding skills, all  -DN     [place] (Self-Feeding Goal 2, OT-IRF)  supervision required  -DN  supervision required  -DN     Time Frame (Self-Feeding Goal 2, OT-IRF)  long term goal (LTG)  -DN  long term goal (LTG)  -DN     Progress/Outcomes (Self-Feeding Goal 2, OT-IRF)  goal met;goal ongoing   -DN  goal met;goal ongoing  -DN        Caregiver Training Goal 2 (OT-IRF)    Caregiver Training Goal 2 (OT-IRF)  pt caregiver to be independent with any assist pt needs with adls, transfers and HEP for d/c home  -DN  pt caregiver to be independent with any assist pt needs with adls, transfers and HEP for d/c home  -DN     Time Frame (Caregiver Training Goal 2, OT-IRF)  long term goal (LTG)  -DN  long term goal (LTG)  -DN     Progress/Outcomes (Caregiver Training Goal 2, OT-IRF)  goal ongoing  -DN  goal ongoing  -DN       User Key  (r) = Recorded By, (t) = Taken By, (c) = Cosigned By    Initials Name Provider Type    Reg Bolden OT Occupational Therapist          Occupational Therapy Education     Title: PT OT SLP Therapies (In Progress)     Topic: Occupational Therapy (Done)     Point: ADL training (Done)     Description: Instruct learner(s) on proper safety adaptation and remediation techniques during self care or transfers.   Instruct in proper use of assistive devices.    Learning Progress Summary           Patient Acceptance, E,TB,D, VU,NR by DN at 4/15/2019 12:04 PM    Comment:  pt presents with carryover of adapitive adls, functional transfers, and overall strength with min vc for safety    Acceptance, E,TB,D, VU,NR by DN at 4/9/2019  2:25 PM    Comment:  jay adls, adaptive visual scanning, transfer training    Acceptance, E,TB,D, VU,NR by DN at 4/8/2019 12:16 PM    Comment:  transfer training, jay skills with adls, safety,etc    Acceptance, E,TB,D, VU,NR by DN at 4/4/2019  3:19 PM    Comment:  theraputty exercises, jay adls and transfer trainning    Acceptance, E,TB,D, NR by DN at 3/26/2019 12:06 PM    Comment:  jay adls and commode/shower transfers, still max vc for all due to cognition and visual impairments    Acceptance, E,TB,D, VU,NR by DN at 3/25/2019  5:23 PM    Comment:  OT role in rehab, transfer traning, jay adls                   Point: Home exercise program (Done)     Description:  Instruct learner(s) on appropriate technique for monitoring, assisting and/or progressing therapeutic exercises/activities.    Learning Progress Summary           Patient Acceptance, E,TB,D, VU,NR by DN at 4/15/2019 12:04 PM    Comment:  pt presents with carryover of adapitive adls, functional transfers, and overall strength with min vc for safety    Acceptance, E,TB,D, VU,NR by DN at 4/9/2019  2:25 PM    Comment:  jay adls, adaptive visual scanning, transfer training    Acceptance, E,TB,D, VU,NR by DN at 4/8/2019 12:16 PM    Comment:  transfer training, jay skills with adls, safety,etc    Acceptance, E,TB,D, VU,NR by DN at 4/4/2019  3:19 PM    Comment:  theraputty exercises, jay adls and transfer trainning    Acceptance, E,TB,D, NR by DN at 3/26/2019 12:06 PM    Comment:  jay adls and commode/shower transfers, still max vc for all due to cognition and visual impairments    Acceptance, E,TB,D, VU,NR by DN at 3/25/2019  5:23 PM    Comment:  OT role in rehab, transfer traning, jay adls                   Point: Precautions (Done)     Description: Instruct learner(s) on prescribed precautions during self-care and functional transfers.    Learning Progress Summary           Patient Acceptance, E,TB,D, VU,NR by DN at 4/15/2019 12:04 PM    Comment:  pt presents with carryover of adapitive adls, functional transfers, and overall strength with min vc for safety    Acceptance, E,TB,D, VU,NR by DN at 4/9/2019  2:25 PM    Comment:  jay adls, adaptive visual scanning, transfer training    Acceptance, E,TB,D, VU,NR by DN at 4/8/2019 12:16 PM    Comment:  transfer training, jay skills with adls, safety,etc    Acceptance, E,TB,D, VU,NR by DN at 4/4/2019  3:19 PM    Comment:  theraputty exercises, jay adls and transfer trainning    Acceptance, E,TB,D, NR by DN at 3/26/2019 12:06 PM    Comment:  jay adls and commode/shower transfers, still max vc for all due to cognition and visual impairments    Acceptance, E,TB,D, VU,NR by  DN at 3/25/2019  5:23 PM    Comment:  OT role in rehab, transfer traning, jay adls                   Point: Body mechanics (Done)     Description: Instruct learner(s) on proper positioning and spine alignment during self-care, functional mobility activities and/or exercises.    Learning Progress Summary           Patient Acceptance, E,TB,D, VU,NR by DN at 4/15/2019 12:04 PM    Comment:  pt presents with carryover of adapitive adls, functional transfers, and overall strength with min vc for safety    Acceptance, E,TB,D, VU,NR by DN at 4/9/2019  2:25 PM    Comment:  jay adls, adaptive visual scanning, transfer training    Acceptance, E,TB,D, VU,NR by DN at 4/8/2019 12:16 PM    Comment:  transfer training, jay skills with adls, safety,etc    Acceptance, E,TB,D, VU,NR by DN at 4/4/2019  3:19 PM    Comment:  theraputty exercises, jay adls and transfer trainning    Acceptance, E,TB,D, NR by DN at 3/26/2019 12:06 PM    Comment:  jay adls and commode/shower transfers, still max vc for all due to cognition and visual impairments    Acceptance, E,TB,D, VU,NR by DN at 3/25/2019  5:23 PM    Comment:  OT role in rehab, transfer traning, jay adls                               User Key     Initials Effective Dates Name Provider Type Discipline    DN 06/08/18 -  Reg Mckinney OT Occupational Therapist OT                       Time Calculation:     Time Calculation- OT     Row Name 04/17/19 1545 04/17/19 0900          Time Calculation- OT    OT Start Time  1230  -DN  0900  -DN     OT Stop Time  1300  -DN  0930  -DN     OT Time Calculation (min)  30 min  -DN  30 min  -DN     OT Received On  04/17/19  -DN  04/17/19  -DN       User Key  (r) = Recorded By, (t) = Taken By, (c) = Cosigned By    Initials Name Provider Type    Reg Bolden OT Occupational Therapist          Therapy Charges for Today     Code Description Service Date Service Provider Modifiers Qty    96407914303 HC OT CARE PLAN EA 15 MIN 4/16/2019 Hank  Reg OT GO 3    20906721549 HC OT NEUROMUSC RE EDUCATION EA 15 MIN 4/17/2019 Reg Mckinney OT GO 2    46571863891 HC OT SELF CARE/MGMT/TRAIN EA 15 MIN 4/17/2019 Reg Mckinney OT GO 2                   Reg Mckinney OT  4/17/2019

## 2019-04-18 LAB
GLUCOSE BLDC GLUCOMTR-MCNC: 109 MG/DL (ref 70–130)
GLUCOSE BLDC GLUCOMTR-MCNC: 82 MG/DL (ref 70–130)
GLUCOSE BLDC GLUCOMTR-MCNC: 92 MG/DL (ref 70–130)
GLUCOSE BLDC GLUCOMTR-MCNC: 99 MG/DL (ref 70–130)

## 2019-04-18 PROCEDURE — 97535 SELF CARE MNGMENT TRAINING: CPT

## 2019-04-18 PROCEDURE — 97112 NEUROMUSCULAR REEDUCATION: CPT

## 2019-04-18 PROCEDURE — 82962 GLUCOSE BLOOD TEST: CPT

## 2019-04-18 PROCEDURE — 63710000001 INSULIN GLARGINE PER 5 UNITS: Performed by: INTERNAL MEDICINE

## 2019-04-18 PROCEDURE — G0515 COGNITIVE SKILLS DEVELOPMENT: HCPCS

## 2019-04-18 RX ADMIN — LISINOPRIL 20 MG: 20 TABLET ORAL at 20:46

## 2019-04-18 RX ADMIN — AMLODIPINE BESYLATE 5 MG: 5 TABLET ORAL at 08:41

## 2019-04-18 RX ADMIN — METFORMIN HYDROCHLORIDE 500 MG: 500 TABLET, EXTENDED RELEASE ORAL at 08:41

## 2019-04-18 RX ADMIN — METFORMIN HYDROCHLORIDE 500 MG: 500 TABLET, EXTENDED RELEASE ORAL at 16:49

## 2019-04-18 RX ADMIN — CLOPIDOGREL 75 MG: 75 TABLET, FILM COATED ORAL at 08:41

## 2019-04-18 RX ADMIN — INSULIN GLARGINE 32 UNITS: 100 INJECTION, SOLUTION SUBCUTANEOUS at 22:25

## 2019-04-18 RX ADMIN — PAROXETINE HYDROCHLORIDE 10 MG: 10 TABLET, FILM COATED ORAL at 08:41

## 2019-04-18 RX ADMIN — ACETAMINOPHEN 650 MG: 325 TABLET, FILM COATED ORAL at 20:46

## 2019-04-18 RX ADMIN — LISINOPRIL 20 MG: 20 TABLET ORAL at 08:41

## 2019-04-18 RX ADMIN — Medication 1 TABLET: at 08:41

## 2019-04-18 RX ADMIN — OXYCODONE AND ACETAMINOPHEN 1 TABLET: 5; 325 TABLET ORAL at 14:43

## 2019-04-18 RX ADMIN — ASPIRIN 325 MG: 325 TABLET, COATED ORAL at 08:41

## 2019-04-18 RX ADMIN — ATENOLOL 25 MG: 25 TABLET ORAL at 20:46

## 2019-04-18 RX ADMIN — ATENOLOL 25 MG: 25 TABLET ORAL at 08:41

## 2019-04-18 RX ADMIN — ATORVASTATIN CALCIUM 80 MG: 80 TABLET, FILM COATED ORAL at 20:46

## 2019-04-18 NOTE — PROGRESS NOTES
Inpatient Rehabilitation Functional Measures Assessment    Functional Measures  VANITA Eating:  [facility] Grooming: [facility] Bathing:  [facility] Upper Body Dressing:  [facility] Lower Body Dressing:  [facility] Toileting:  [facility] Bladder Management  Level of Assistance:  [place]  Frequency/Number of Accidents this Shift:  [facility] Bowel Management  Level of Assistance: [place]  Frequency/Number of Accidents this Shift: [facility] Bed/Chair/Wheelchair Transfer:  [facility] Toilet Transfer:  [facility] Tub/Shower Transfer:  [place]    Previously Documented Mode of Locomotion at Discharge: Field  VANITA Expected Mode of Locomotion at Discharge: [facility] Walk/Wheelchair:  [facility] Stairs:  [facility] Comprehension:  Auditory comprehension is the usual mode. Comprehension  Score = 6, Modified [place].  Patient comprehends complex/abstract  information in their primary language, requiring:  [facility] Expression:  Vocal expression is the usual mode. Expression Score = 6,  Modified Independent.  Patient expresses complex/abstract information in their  primary language, requiring:  [facility] Social Interaction:  Social Interaction Score = 6, Modified Independent.  Patient is modified independent for social interaction, requiring:  [facility] Problem Solving:  Activity was not observed.  VANITA Memory:  Memory Score = 6, Modified [place].  Patient is modified  independent for memory, requiring:    Therapy Mode Minutes  Occupational Therapy: [facility]  Physical Therapy: [facility]  Speech Language Pathology:  [facility]    Signed by: Rosa Isela Fleming RN

## 2019-04-18 NOTE — PROGRESS NOTES
Inpatient Rehabilitation Functional Measures Assessment    Functional Measures  VANITA Eating:  [facility] Grooming: [facility] Bathing:  [facility] Upper Body Dressing:  [facility] Lower Body Dressing:  [facility] Toileting:  [facility] Bladder Management  Level of Assistance:  [place]  Frequency/Number of Accidents this Shift:  [facility] Bowel Management  Level of Assistance: [place]  Frequency/Number of Accidents this Shift: [facility] Bed/Chair/Wheelchair Transfer:  [facility] Toilet Transfer:  [facility] Tub/Shower Transfer:  [place]    Previously Documented Mode of Locomotion at Discharge: Field  VANITA Expected Mode of Locomotion at Discharge: [facility] Walk/Wheelchair:  [facility] Stairs:  [facility] Comprehension:  Auditory comprehension is the usual mode. Patient does not  comprehend complex/abstract information in their primary language without  assistance from a helper. Comprehension Score = 5, Supervision. Patient  comprehends basic daily needs or ideas greater than 90% of the time. Patient  requires stand by/rare prompting. Patient requires the following assistive  device(s): Glasses.  VANITA Expression:  Vocal expression is the usual mode. Patient does not express  complex/abstract information in their primary language without a helper.  Expression Score = 5, Stand By Prompting. Patient expresses basic daily needs or  ideas without prompting. No assistive devices were required.  [facility]ANITA Social Interaction:  Social Interaction Score = 6, Modified Independent.  Patient is modified independent for social interaction, requiring: Medications.    [facility]ANITA Problem Solving:  Activity was not observed.  [facility]ANITA Memory:  Memory Score = 4, Minimal Prompting. Patient recognizes and  remembers 75-90% of the time. Patient requires minimal/occasional prompting for  memory for the following:    Therapy Mode Minutes  Occupational Therapy: [facility]  Physical Therapy: [place]  Speech Language Pathology:  [facility]    Signed by:  Fina Torres RN

## 2019-04-18 NOTE — THERAPY TREATMENT NOTE
Inpatient Rehabilitation - Physical Therapy Treatment Note  [facility]     Patient Name: Nayan Ryan  : 1965  MRN: [id number]    Today's Date: 2019                 Admit Date: 3/24/2019      Visit Dx:    No diagnosis found.    Patient Active Problem List   Diagnosis   • Acute ischemic left PCA stroke (CMS/HCC)   • Status post placement of implantable loop recorder   • HTN (hypertension)   • Diabetes mellitus (CMS/HCC)   • Hyperlipidemia   • Morbid obesity (CMS/HCC)   • Anxiety disorder   • Migraine   • H/O hernia repair   • Abdominal wall abscess   • Back pain   • Stroke (cerebrum) (CMS/HCC)   • Vitamin D deficiency   • History of fatty infiltration of liver       Therapy Treatment    IRF Treatment Summary     Row Name 19 1306 19 1205 19 0845       Evaluation/Treatment Time and Intent    Subjective Information  no complaints  -SL  no complaints  -DN  no complaints  -LB,LS,LB2    Existing Precautions/Restrictions  fall  -SL  fall  -DN  fall  -LB,LS,LB2    Document Type  therapy note (daily note)  -SL  therapy note (daily note)  -DN  therapy note (daily note)  -LB,LS,LB2    Mode of Treatment  individual therapy;speech-language pathology  -SL  occupational therapy  -DN  physical therapy  -LB,LS,LB2    Patient/Family Observations  COOPERATIVE  -SL  willing to work  -DN  Pt sitting in w/c ready to participate with therapy  -LB,LS,LB2    Start Time (Evaluation/Treatment)  1300  -SL  --  --    Stop Time (Evaluation/Treatment)  1330  -SL  --  --    Recorded by [SL] Narcisa Bonner MS CCC-SLP [DN] Reg Mckinney, OT [LB,LS,LB2] Eliane Blackburn, PT (r) Naomy Mendenhall, ZAIDA Student (t) Eliane Blackburn, PT (c)    Row Name 19 0800             Evaluation/Treatment Time and Intent    Subjective Information  no complaints  -SL      Existing Precautions/Restrictions  fall  -SL      Document Type  therapy note (daily note)  -      Mode of Treatment  individual therapy;speech-language pathology  -       Patient/Family Observations  cooperative  -      Start Time (Evaluation/Treatment)  0800  -SL      Stop Time (Evaluation/Treatment)  0830  -SL      Recorded by [SL] Narcisa Bonner MS CCC-SLP      Row Name 04/18/19 08             Cognition/Psychosocial- PT/OT    Affect/Mental Status (Cognitive)  WFL  -LB,LS,LB2      Follows Commands (Cognition)  follows one step commands;verbal cues/prompting required  -LB,LS,LB2      Personal Safety Interventions  fall prevention program maintained;gait belt;muscle strengthening facilitated;nonskid shoes/slippers when out of bed  -LB,LS,LB2      Attention Deficit (Cognitive)  distractible in noisy environment  -LB,LS,LB2      Memory Deficit (Cognitive)  moderate deficit  -LB,LS,LB2      Safety Deficit (Cognitive)  impulsivity;insight into deficits/self awareness;problem solving  -LB,LS,LB2      Recorded by [LB,LS,LB2] Eliane Blackburn, PT (r) Naomy Mendenhall, PT Student (t) Eliane Blackburn, PT (c)      Row Name 04/18/19 [facility]             Mobility    Advanced Gait Activity  sloped surfaces  -LB,LS,LB2      Recorded by [LB,LS,LB2] Eliane Blackburn, PT (r) Naomy Mendenhall, PT Student (t) Eliane Blackburn, PT (c)      Row Name 04/18/19 [facility]             Bed Mobility Assessment/Treatment    Supine-Sit [place] (Bed Mobility)  supervision  -LB,LS,LB2      Bed Mobility, Safety Issues  decreased use of arms for pushing/pulling;decreased use of legs for bridging/pushing  -LB,LS,LB2      Recorded by [LB,LS,LB2] Eliane Blackburn, PT (r) Naomy Mendenhall, PT Student (t) Eliane Blackburn, PT (c)      Row Name 04/18/19 0845             Bed-Chair Transfer    Bed-Chair [place] (Transfers)  contact guard  -LB,LS,LB2      Assistive Device (Bed-Chair Transfers)  wheelchair  -LB,LS,LB2      Recorded by [LB,LS,LB2] Eliane Blackburn B, PT (r) Naomy Mendenhall, PT Student (t) Eliane Blackburn, PT (c)      Row Name 04/18/19 08             Chair-Bed Transfer    Chair-Bed [place] (Transfers)  contact guard  -LB,LS,LB2       Assistive Device (Chair-Bed Transfers)  wheelchair  -LB,LS,LB2      Recorded by [LB,LS,LB2] Eliane Blackburn, PT (r) Namoy Mendenhall, PT Student (t) Eliane Blackburn, PT (c)      Row Name 04/18/19 0845             Sit-Stand Transfer    Sit-Stand [place] (Transfers)  contact guard;stand by assist  -LB,LS,LB2      Assistive Device (Sit-Stand Transfers)  wheelchair  -LB,LS,LB2      Recorded by [LB,LS,LB2] Eliane Blackburn, PT (r) Naomy Mendenhall, PT Student (t) Eliane Blackburn, PT (c)      Row Name 04/18/19 0845             Stand-Sit Transfer    Stand-Sit [place] (Transfers)  contact guard;stand by assist  -LB,LS,LB2      Assistive Device (Stand-Sit Transfers)  wheelchair  -LB,LS,LB2      Recorded by [LB,LS,LB2] Eliane Blackburn, PT (r) Naomy Mendenhall, PT Student (t) Eliane Blackburn, PT (c)      Row Name 04/18/19 1205             Toilet Transfer    Type (Toilet Transfer)  stand pivot/stand step  -DN      [place] Level (Toilet Transfer)  verbal cues;nonverbal cues (demo/gesture);contact guard  -DN      Assistive Device (Toilet Transfer)  grab bars/safety frame;walker, front-wheeled  -DN      Recorded by [DN] Reg Mckinney, OT      Row Name 04/18/19 1205             Shower Transfer    Type (Shower Transfer)  stand pivot/stand step  -DN      [place] Level (Shower Transfer)  contact guard;nonverbal cues (demo/gesture);verbal cues  -DN      Assistive Device (Shower Transfer)  grab bars/tub rail;walker, front-wheeled;tub bench  -DN      Recorded by [BRENDAN] Reg Mckinney, OT      Row Name 04/18/19 0845             Gait/Stairs Assessment/Training    [place] Level (Gait)  verbal cues;contact guard  -LB,LS,LB2      Assistive Device (Gait)  walker, front-wheeled  -LB,LS,LB2      Distance in Feet (Gait)  170 x 2, 160, 50 x 2  -LB,LS,LB2      Pattern (Gait)  step-through  -LB,LS,LB2      Deviations/Abnormal Patterns (Gait)  nancy decreased  -LB,LS,LB2      Bilateral Gait Deviations  weight shift ability decreased;heel  strike decreased  -LB,LS,LB2      Left Sided Gait Deviations  weight shift ability decreased  -LB,LS,LB2      Right Sided Gait Deviations  heel strike decreased;knee hyperextension decreased knee stability  -LB3      [place] Level (Stairs)  verbal cues;contact guard  -LB,LS,LB2      Handrail Location (Stairs)  both sides  -LB,LS,LB2      Number of Steps (Stairs)  4  -LB,LS,LB2      Ascending Technique (Stairs)  step-to-step  -LB,LS,LB2      Descending Technique (Stairs)  step-to-step  -LB,LS,LB2      Stairs, Safety Issues  sequencing ability decreased;balance decreased during turns  -LB,LS,LB2      Stairs, Impairments  strength decreased;impaired balance;motor control impaired  -LB,LS,LB2      Comment (Gait/Stairs)  Pt wife provided CGA for ambulation x 160 ft and stair navigation. Pt required one cue to remember proper step sequencing this date.   -LB,LS,LB2      Recorded by [LB,LS,LB2] Eliane Blackburn, PT (r) Naomy Mendenhall, PT Student (t) Eliane Blackburn, PT (c)  [LB3] Eliane Blackburn, PT      Row Name 04/18/19 0867             Safety Issues, Functional Mobility    Safety Issues Affecting Function (Mobility)  ability to follow commands;insight into deficits/self awareness;problem solving;judgment  -LB,LS,LB2      Impairments Affecting Function (Mobility)  balance;cognition;coordination;motor control;strength  -LB,LS,LB2      Recorded by [LB,LS,LB2] Eliane Blackburn, PT (r) Naomy Mendenhall, PT Student (t) Eliane Blackburn, PT (c)      Row Name 04/18/19 0853             Sloped Surface Gait Skills (Mobility)    [place], Gait On Sloped Surface (Mobility)  verbal cues;contact guard  -LB,LS,LB2      Comment, Gait On Sloped Surface (Mobility)  Went up/down ramp with Rwx, verbal cues to slow down and take his time. No LOB or buckling of R knee  Practied again in afternoon session w/ pt wife  -LB,LS,LB2      Recorded by [LB,LS,LB2] Eliane Blackburn, PT (r) Naomy Mendenhall, PT Student (t) Eliane Blackburn PT (c)      Row Name  04/18/19 1205             Bathing Assessment/Treatment    Bathing [place] Level  bathing skills;supervision;verbal cues  -DN      Assistive Device (Bathing)  grab bar/tub rail;hand held shower spray hose;tub bench  -DN      Bathing Position  supported sitting  -DN      Bathing Setup Assistance  adjust water temperature  -DN      Recorded by [DN] Reg Mckinney, OT      Row Name 04/18/19 1205             Upper Body Dressing Assessment/Treatment    Upper Body Dressing Task  upper body dressing skills;set up assistance;pull over garment  -DN      Upper Body Dressing Position  supported sitting  -DN      Set-up Assistance (Upper Body Dressing)  obtain clothing  -DN      Recorded by [DN] Reg Mckinney, OT      Row Name 04/18/19 1205             Lower Body Dressing Assessment/Treatment    Lower Body Dressing [place] Level  doff;pants/bottoms;don;shoes/slippers;socks;underwear;supervision;verbal cues;nonverbal cues (demo/gesture)  -DN      Lower Body Dressing Position  supported sitting;supported standing  -DN      Lower Body Dressing Setup Assistance  obtain clothing  -DN      Comment (Lower Body Dressing)  vc for jay dressing technique  -DN      Recorded by [DN] Reg Mckinney, OT      Row Name 04/18/19 1205             Grooming Assessment/Treatment    Grooming [place] Level  grooming skills;deodorant application;hair care, combing/brushing;supervision  -DN      Grooming Position  supported sitting  -DN      Grooming Setup Assistance  open containers  -DN      Recorded by [DN] Reg Mckinney, OT      Row Name 04/18/19 1205             Toileting Assessment/Treatment    Toileting [place] Level  toileting skills;adjust/manage clothing;perform perineal hygiene;supervision;verbal cues  -DN      Assistive Device Use (Toileting)  grab bar/safety frame;raised toilet seat  -DN      Toileting Position  supported sitting;supported standing  -DN      Toileting Setup Assistance  change pad/brief  -DN       Recorded by [DN] Reg Mckinney, OT      Row Name 04/18/19 0845             Pain Assessment    Additional Documentation  Pain Scale: Numbers Pre/Post-Treatment (Group)  -LB,LS,LB2      Recorded by [LB,LS,LB2] Eliane Blackburn, PT (r) Naomy Mendenhall, PT Student (t) Eliane Blackburn, PT (c)      Row Name 04/18/19 1205 04/18/19 0845          Pain Scale: Numbers Pre/Post-Treatment    Pain Scale: Numbers, Pretreatment  0/10 - no pain  -DN  0/10 - no pain  -LB,LS,LB2     Pain Scale: Numbers, Post-Treatment  0/10 - no pain  -DN  0/10 - no pain  -LB,LS,LB2     Recorded by [DN] Reg Mckinney, OT [LB,LS,LB2] Eliane Blackburn, PT (r) Naomy Mendenhall, PT Student (t) Eliane Blackburn, PT (c)     Row Name 04/18/19 0845             Dynamic Balance Activity    Therapeutic Training Performed (Dynamic Balance)  side stepping marching in place  -LB,LS,LB2      Support Needed for Balance (Dynamic Balance Training)  other (see comments) jay bar  -LB,LS,LB2      Comment (Dynamic Balance Training)  2 x 10 reps with focus on full range of hip flexion on RLE  -LB,LS,LB2      Recorded by [LB,LS,LB2] Eliane Blackburn, PT (r) Naomy Mendenhall, PT Student (t) Eliane Blackburn, PT (c)      Row Name 04/18/19 0845             Lower Extremity Standing Therapeutic Exercise    Performed, Standing Lower Extremity (Therapeutic Exercise)  mini-squats  -LB,LS,LB2      Exercise Type, Standing Lower Extremity (Therapeutic Exercise)  AROM (active range of motion)  -LB,LS,LB2      Sets/Reps Detail, Standing Lower Extremity (Therapeutic Exercise)  2 x 10  -LB,LS,LB2      Comment, Standing Lower Extremity (Therapeutic Exercise)  improved form noted this date  -LB,LS,LB2      Recorded by [LB,LS,LB2] Eliane Blackburn, PT (r) Naomy Mendenhall, PT Student (t) Eliane Blackburn, PT (c)      Row Name 04/18/19 0845             Lower Extremity Seated Therapeutic Exercise    Performed, Seated Lower Extremity (Therapeutic Exercise)  hip flexion/extension;LAQ (long arc quad), knee extension;ankle  dorsiflexion/plantarflexion  -LB,LS,LB2      Device, Seated Lower Extremity (Therapeutic Exercise)  free weights, cuff 1.5 lb ankle weight  -LB,LS,LB2      Exercise Type, Seated Lower Extremity (Therapeutic Exercise)  resistive exercise  -LB,LS,LB2      Sets/Reps Detail, Seated Lower Extremity (Therapeutic Exercise)  2 x 12  -LB,LS,LB2      Comment, Seated Lower Extremity (Therapeutic Exercise)  cues for slow controlled motio. Hip flexion most difficult for pt  -LB,LS,LB2      Recorded by [LB,LS,LB2] Eliane Blackburn, PT (r) Naomy Mendenhall, PT Student (t) Eliane Blackburn, PT (c)      Row Name 04/18/19 1205 04/18/19 0845          Positioning and Restraints    Pre-Treatment Position  sitting in chair/recliner  -DN  sitting in chair/recliner  -LB,LS,LB2     Post Treatment Position  wheelchair  -DN  bed  -LB,LS,LB2     In Bed  sitting;with other staff  -DN  supine;call light within reach;encouraged to call for assist;exit alarm on;with family/caregiver  -LB,LS,LB2     In Wheelchair  --  --  -LB,LS,LB2     Recorded by [DN] Reg Mckinney, OT [LB,LS,LB2] Eliane Blackburn, PT (r) Naomy Mendenhall, PT Student (t) Eliane Blackburn, PT (c)       User Key  (r) = Recorded By, (t) = Taken By, (c) = Cosigned By    Initials Name Effective Dates    Narcisa Urias, MS CCC-SLP 06/08/18 -     Reg Bolden OT 06/08/18 -     LB Eliane Blackburn PT 04/03/18 -     Naomy Armas, PT Student 02/04/19 -         Wound 03/26/19 0900 Left chest incision (Active)   Dressing Appearance open to air 4/18/2019  8:00 AM   Closure Liquid skin adhesive 4/18/2019  8:00 AM   Base clean;dry 4/18/2019  8:00 AM   Drainage Amount none 4/17/2019  9:08 PM     Physical Therapy Education     Title: PT OT SLP Therapies (In Progress)     Topic: Physical Therapy (In Progress)     Point: Mobility training (Done)     Learning Progress Summary           Patient Acceptance, E,TB, VU,NR by KENZIE at 4/18/2019  8:46 AM    Comment:  Educated on proper navigation of ramp     Acceptance, E,TB, VU,NR by LS at 4/17/2019  8:39 AM    Comment:  discussed weight loss and educated on how increase exercise can contribute to this and decreased stroke risk    Acceptance, E,TB,D, VU,DU by RENNY at 4/16/2019  2:52 PM    Comment:  Transfers, ambulation, steps and car transfer    Acceptance, E,TB, VU,NR by LS at 4/15/2019  9:50 AM    Comment:  Educated on safer/more stable gait with Rwx. Discussed proper stair navigation and sequencing.    Acceptance, E,D, VU,DU,NR by JK at 4/13/2019 12:04 PM    Acceptance, E,TB, VU,NR by LS at 4/12/2019  9:51 AM    Comment:  Continued discussion regarding progress with knee control and walking    Acceptance, E,TB, VU,NR by LS at 4/11/2019  9:53 AM    Comment:  Continued discussion/answering pt questions about why R knee is weak. Educated on proper mechanics for stair navigation    Acceptance, E,TB, VU,NR by LS at 4/10/2019  9:43 AM    Comment:  Continued discussion regarding safety and slowing down during mobility. Educated on proper use of cane again this date    Acceptance, E,TB, VU,NR by LS at 4/9/2019  8:33 AM    Comment:  Continued discussion regarding how stroke has affected his R side. Educated on use of cane    Acceptance, E,TB, VU,NR by LS at 4/8/2019  8:54 AM    Comment:  Educated on why R leg is weak and how the stroke is affecting his knee control    Acceptance, E,TB, VU,NR by LS at 4/5/2019 11:30 AM    Comment:  Discussed purpose of strengthening exercises. Educated on repeated practice of walking and other exercises to gradually build strength and endurance.    Acceptance, E, NR by  at 4/4/2019  9:52 AM    Acceptance, E, NR by  at 4/3/2019 10:16 AM    Acceptance, E, NR by  at 4/2/2019  3:18 PM    Acceptance, E, NR by  at 4/1/2019 10:32 AM    Acceptance, E,TB,D, NR by ELISSA at 3/30/2019 11:44 AM    Acceptance, E,TB,D, NR by EE at 3/29/2019  2:58 PM    Acceptance, E,TB,D, NR by EE at 3/28/2019 11:38 AM    Ranjit, LEANDRO RUIZ D, NR by EE at 3/27/2019 10:05  AM    Acceptance, E,TB,D, NR by EE at 3/26/2019  9:48 AM    Acceptance, E,TB,D, NR by EE at 3/25/2019  3:09 PM   Family Acceptance, E,TB,D, VU,DU by LB at 4/16/2019  2:52 PM    Comment:  Transfers, ambulation, steps and car transfer                   Point: Home exercise program (In Progress)     Learning Progress Summary           Patient Acceptance, E, NR by  at 4/4/2019  9:52 AM    Acceptance, E, NR by  at 4/3/2019 10:16 AM    Acceptance, E, NR by  at 4/2/2019  3:18 PM    Acceptance, E, NR by  at 4/1/2019 10:32 AM    Acceptance, E,TB,D, NR by EE at 3/29/2019  2:58 PM    Acceptance, E,TB,D, NR by EE at 3/28/2019 11:38 AM    Acceptance, E,TB,D, NR by EE at 3/27/2019 10:05 AM    Acceptance, E,TB,D, NR by EE at 3/26/2019  9:48 AM    Acceptance, E,TB,D, NR by EE at 3/25/2019  3:09 PM                   Point: Body mechanics (Done)     Learning Progress Summary           Patient Acceptance, E, VU by  at 4/16/2019  3:26 PM    Comment:  family conf w/pt and multiple family members- discussed cognitive deficits - memory, reasoning, attn, math, and impulsivity- rec: supervision for med management/finances, [facility] ST at D/C    Acceptance, E, NR by  at 4/4/2019  9:52 AM    Acceptance, E, NR by  at 4/3/2019 10:16 AM    Acceptance, E, NR by  at 4/2/2019  3:18 PM    Acceptance, E, NR by  at 4/1/2019 10:32 AM    Acceptance, E,TB,D, NR by EE at 3/29/2019  2:58 PM    Acceptance, E,TB,D, NR by  at 3/28/2019 11:38 AM    Acceptance, E,TB,D, NR by EE at 3/27/2019 10:05 AM    Acceptance, E,TB,D, NR by EE at 3/26/2019  9:48 AM    Acceptance, E,TB,D, NR by  at 3/25/2019  3:09 PM   Family Acceptance, E, VU by ZULY at 4/16/2019  3:26 PM    Comment:  family conf w/pt and multiple family members- discussed cognitive deficits - memory, reasoning, attn, math, and impulsivity- rec: supervision for med management/finances, cont ST at D/C                   Point: Precautions (Done)     Learning Progress Summary           Patient  Acceptance, E,TB, VU,NR by LS at 4/18/2019  8:46 AM    Comment:  Educated on proper navigation of ramp    Acceptance, E,TB, VU,NR by LS at 4/17/2019  8:39 AM    Comment:  discussed weight loss and educated on how increase exercise can contribute to this and decreased stroke risk    Acceptance, E,TB, VU,NR by LS at 4/16/2019  9:45 AM    Comment:  Continued education on proper car transfer technique    Acceptance, E,TB, VU,NR by LS at 4/15/2019  9:50 AM    Comment:  Educated on safer/more stable gait with Rwx. Discussed proper stair navigation and sequencing.    Acceptance, E,TB, VU,NR by LS at 4/12/2019  9:51 AM    Comment:  Continued discussion regarding progress with knee control and walking    Acceptance, E,TB, VU,NR by LS at 4/11/2019  9:53 AM    Comment:  Continued discussion/answering pt questions about why R knee is weak. Educated on proper mechanics for stair navigation    Acceptance, E,TB, VU,NR by LS at 4/10/2019  9:43 AM    Comment:  Continued discussion regarding safety and slowing down during mobility. Educated on proper use of cane again this date    Acceptance, E,TB, VU,NR by LS at 4/9/2019  8:33 AM    Comment:  Continued discussion regarding how stroke has affected his R side. Educated on use of cane    Acceptance, E,TB, VU,NR by LS at 4/8/2019  8:54 AM    Comment:  Educated on why R leg is weak and how the stroke is affecting his knee control    Acceptance, E,TB, VU,NR by LS at 4/5/2019 11:30 AM    Comment:  Discussed purpose of strengthening exercises. Educated on repeated practice of walking and other exercises to gradually build strength and endurance.    Acceptance, E, NR by  at 4/4/2019  9:52 AM    Acceptance, E, NR by  at 4/3/2019 10:16 AM    Acceptance, E, NR by  at 4/2/2019  3:18 PM    Acceptance, E, NR by  at 4/1/2019 10:32 AM    Acceptance, E,TB,D, NR by  at 3/29/2019  2:58 PM    Acceptance, E,TB,D, NR by EE at 3/28/2019 11:38 AM    Acceptance, E,TB,D, NR by  at 3/27/2019 10:05  AM    Acceptance, E,TB,D, NR by EE at 3/26/2019  9:48 AM    Acceptance, E,TB,D, NR by EE at 3/25/2019  3:09 PM                               User Key     Initials Effective Dates Name Provider Type Discipline    SL 06/08/18 -  Narcisa Bonner, MS CCC-SLP Speech and Language Pathologist SLP    ELISSA 06/08/18 -  Donna Inman, PT Physical Therapist PT    LB 04/03/18 -  Eliane Blackburn, PT Physical Therapist PT    LH 04/03/18 -  Clau Ayon, PT Physical Therapist PT    EE 04/03/18 -  Ariadne Garcia, PT Physical Therapist PT    JK 04/03/18 -  Nicole Lawton, PT Physical Therapist PT    LS 02/04/19 -  Naomy Mnedenhall, PT Student PT Student                   PT Recommendation and Plan                        Time Calculation:     PT Charges     Row Name 04/18/19 1414 04/18/19 0844          Time Calculation    Start Time  1400  -LB (r) LS (t) LB (c)  0835  -LB (r) LS (t) LB (c)     Stop Time  1435  -LB (r) LS (t) LB (c)  0900  -LB (r) LS (t) LB (c)     Time Calculation (min)  35 min  -LB (r) LS (t)  25 min  -LB (r) LS (t)     PT Received On  04/18/19  -LB (r) LS (t) LB (c)  04/18/19  -LB (r) LS (t) LB (c)       User Key  (r) = Recorded By, (t) = Taken By, (c) = Cosigned By    Initials Name Provider Type    LB Eliane Blackburn, PT Physical Therapist    LS Naomy Mendenhall, PT Student PT Student          Therapy Charges for Today     Code Description Service Date Service Provider Modifiers Qty    68021491952 HC PT NEUROMUSC RE EDUCATION EA 15 MIN 4/17/2019 Naomy Mendenhall, PT Student GP 4    16367519861 HC PT THER SUPP EA 15 MIN 4/17/2019 Naomy Mendenhall, PT Student GP 2    02345612536 HC PT NEUROMUSC RE EDUCATION EA 15 MIN 4/18/2019 Naomy Mendenhall, PT Student GP 4    81675917409 HC PT THER SUPP EA 15 MIN 4/18/2019 Naomy Mendenhall, PT Student GP 1                   Naomy Mendenhall PT Student  4/18/2019

## 2019-04-18 NOTE — PROGRESS NOTES
Case Management  Inpatient Rehabilitation Team Conference    Conference Date/Time: 4/16/2019 8:55:52 AM    Team Conference Attendees:  Dr. Familia Madrid, Pharmacist  Germania Sampson, SARKISW  Eliane Blackburn, PT  Reg Mckinney, OT  Narcisa Bonner, SLP  Narcisa Case, CTRS  Keerthi Putnam RD, LD  Brayan Haji, RN  Camilo Albright, RN    Demographics            Age: 53Y            Gender: Male    Admission Date: 3/24/2019 3:37:00 PM  Rehabilitation Diagnosis:  CVA  Past Medical History: HTN, HLD; hernia repair; migraines; IDDM; anxiety back  pain      Plan of Care  Anticipated Discharge Date/Estimated Length of Stay: ELOS: 2 weeks  Anticipated Discharge Destination: Community discharge with assistance  Discharge Plan : Family conference Tuesday, 4/16/2019 @ 1:00.  Medical Necessity Expected Level Rationale: CGA  Intensity and Duration: an average of 3 hours/5 days per week  Medical Supervision and 24 Hour Rehab Nursing: x  Physical Therapy: x  PT Intensity/Duration: 60 minutes/day, 5 days/week  Occupational Therapy: x  OT Intensity/Duration: 60 minutes/day, 5 days/week  Speech and Language Therapy: x  SLP Intensity/Duration: 60 minutes/day, 5 days/week  Social Work: x  Therapeutic Recreation: x  Psychology: x  Updated (if changes indicated)    Anticipated Discharge Date/Estimated Length of Stay:   ELOS: DC 4/23    Based on the patient's medical and functional status, their prognosis and  expected level of functional improvement is: CGA      Interdisciplinary Problem/Goals/Status    All Rehab Problems:  Body Systems    [RN] Endocrine(Active)  Current Status(04/14/2019): Patient at risk for altered blood sugars related to  recent health change.  Weekly Goal(04/21/2019): Patient will be compliant with accuchecks, diet, and  insulin coverage.  Discharge Goal: Patient will be independent with blood sugar management and be  compliant with treatment.        Cognition    [ST] Executive Functions(Active)  Current Status(04/15/2019):  Moderate cognitive deficits: MOD IMPAIRED EXEC FXN,  DECREASED INTEGRATION OF MULTIPLE PIECES OF INFO, impaired attn to detail, mod  impaired math , memory;  impulsivity noted. Improving with 4-5 step verbal  sequencing of ADL's and visual memory.  Weekly Goal(04/22/2019): Attend to tasks and recall details of daily activities  Discharge Goal: Improved cognitive skills        Communication    [ST] Expression(Active)  Current Status(04/15/2019): Mild anomic aphasia; word finding and comprehension  deficits. Improving in spontaneous and confrontational expression and  comprehension  Weekly Goal(04/15/2019): improve word retrieval with min phonemic cues and  follow commands with min assist  Discharge Goal: Improve receptive and expressive language skills        Mobility    [OT] Toilet Transfers(Active)  Current Status(04/15/2019): MIn/CGA Rwx vc for safety  Weekly Goal(04/23/2019): CGA RWX for safety  Discharge Goal: CGA RWX vc for safety    [OT] Tub/Shower Transfers(Active)  Current Status(04/15/2019): CGA SPS Min vc for safety  Weekly Goal(04/23/2019): CGA RWX vc  Discharge Goal: CGA RWX vc    [PT] Walk(Active)  Current Status(04/08/2019): 80 ft CGA- Min A of 2 with Rwx, 20 ft CGA x 1 w/ Rwx  Weekly Goal(04/15/2019): Rwx to BR CGA  Discharge Goal: 80' CGA of 1 w/ Rwx    [PT] Bed/Chair/Wheelchair(Active)  Current Status(04/15/2019): CGA  Weekly Goal(04/19/2019): CGA-SBA  Discharge Goal: CGA- SBA    [PT] Bed Mobility(Active)  Current Status(04/15/2019): Supervision  Weekly Goal(04/19/2019): Supervision  Discharge Goal: Supervision    [PT] Wheelchair(Active)  Current Status(04/15/2019): 150 ft Mod I  Weekly Goal(04/19/2019): 150ft Mod I  Discharge Goal: 150ft Mod I    [PT] Stairs(Active)  Current Status(04/15/2019): 4 w/ HR CGA-Min A x 2, max VCs  Weekly Goal(04/19/2019): PT only  Discharge Goal: 4 w/ HR CGA        Psychosocial    [RN] Coping/Adjustment(Active)  Current Status(04/14/2019): Patient at risk for  ineffective coping related to  recent change in health status and hospitalization.  Weekly Goal(04/21/2019): Patient will verbalize feelings and ask questions if he  has them.  Discharge Goal: Patient will develop coping skills to accomodate his health  changes.        Safety    [RN] Potential for Injury(Active)  Current Status(04/14/2019): Patient at risk for falls related to impaired vision  and impaired mobility.  Weekly Goal(04/21/2019): Patient will be free of falls on rehab and will be  compliant with call light use.  Discharge Goal: No falls while here on rehab. Pt family aware of fall/safety in  the home setting.        Self Care    [OT] Bathing(Active)  Current Status(04/15/2019): CGA vc for safety,sequencing  Weekly Goal(04/23/2019): SBA vc for safety  Discharge Goal: SBA    [OT] Dressing (Lower)(Active)  Current Status(04/15/2019): Min vc for jay tech,foot placement  Weekly Goal(04/23/2019): CGA vc  Discharge Goal: CGA    [OT] Dressing (Upper)(Active)  Current Status(04/15/2019): SBA with min cues  Weekly Goal(04/23/2019): SBA  Discharge Goal: SBA    [OT] Eating(Active)  Current Status(04/15/2019): SBA  Weekly Goal(04/23/2019): SBA  Discharge Goal: SBA    [OT] Grooming(Active)  Current Status(04/15/2019): SBA vc seated  Weekly Goal(04/23/2019): SBA  Discharge Goal: SBA    [OT] Toileting(Active)  Current Status(04/15/2019): CGA VC for safe technique  Weekly Goal(04/23/2019): CGA vc  Discharge Goal: SBA vc        Sphincter Control    [RN] Bladder Management(Active)  Current Status(04/14/2019): Patient is continent of bladder.  Weekly Goal(04/21/2019): Patient will remain continent.  Discharge Goal: Patient will remain continent.    [RN] Bowel Management(Active)  Current Status(04/14/2019): Patient is continent of bowels.  Weekly Goal(04/21/2019): Patient will remain continent.  Discharge Goal: Patient will remain continent.        Swallow Function    [ST] Swallowing(Active)  Current Status(04/15/2019): On  reg/thin; Mild dysphagia. Suspect mistiming of  swallow and question poss asp/pen. Pt needs verbal cues for small sips/bites and  NO talking with foodl/liquid in mouth. Monitoring for diet dilma and possible need  for VFSS if clinically warranted.  Weekly Goal(04/22/2019): Tolerate diet; Recall swallow strategies (no talking;  small sip/bite)  Discharge Goal: Tolerate least restrictive diet        Comments: 3/26: RLE strength poor; inconsistent when answering questions re:  number of steps in home, ages of children, etc.; impulsive, very poor safety  awareness, NSG not noting any unsafe behavior at night; right visual  deficit/neglect; needing step by step cues; BNE ordered;    4/2: needs constant cues for sequencing;    4/9: to trial quad cane this AM; impulsive; some cognitive improvement noted;    4/16: needs a lot of cues for sequencing; poor carryover; impulsive at times;  family conf today;    Signed by: Camilo Albright RN    Physician CoSigned By: Familia James 04/18/2019 08:25:54

## 2019-04-18 NOTE — THERAPY TREATMENT NOTE
Inpatient Rehabilitation - Speech Language Pathology Treatment Note    [facility]       Patient Name: Nayan Ryan  : 1965  MRN: [id number]    Today's Date: 2019           Admit Date: 3/24/2019      Visit Dx:      No diagnosis found.    Patient Active Problem List   Diagnosis   • Acute ischemic left PCA stroke (CMS/HCC)   • Status post placement of implantable loop recorder   • HTN (hypertension)   • Diabetes mellitus (CMS/HCC)   • Hyperlipidemia   • Morbid obesity (CMS/HCC)   • Anxiety disorder   • Migraine   • H/O hernia repair   • Abdominal wall abscess   • Back pain   • Stroke (cerebrum) (CMS/HCC)   • Vitamin D deficiency   • History of fatty infiltration of liver          Therapy Treatment    Evaluation/Coping    Evaluation/Treatment Time and Intent  Subjective Information: no complaints (19 1306 : Narcisa Bonner MS CCC-SLP)  Existing Precautions/Restrictions: fall (19 1306 : Narcisa Bonner MS CCC-SLP)  Document Type: therapy note (daily note) (19 1306 : Narcisa Bonner MS CCC-SLP)  Mode of Treatment: individual therapy, speech-language pathology (19 1306 : Narcisa Bonner, MS CCC-SLP)  Patient/Family Observations: COOPERATIVE (19 1306 : Narcisa Bonner MS CCC-SLP)  Start Time (Evaluation/Treatment): 1300 (19 1306 : Narcisa Bonner, MS CCC-SLP)  Stop Time (Evaluation/Treatment): 1330 (19 1306 : Narcisa Bonner MS CCC-SLP)    Vitals/Pain/Safety         Cognition/Communication         Oral Motor/Eating         Mobility/Basic Activities/Instrumental Activities/Motor/Modality                   ROM/MMT                   Sensory/Myotome/Dermatome/Edema               Posture/Balance/Special Tests/Exercise/Transportation/Sexual Function                   Orthotics/Residual Limb/Prosthetic Management              Outcome Summary         EDUCATION    The patient has been educated in the following areas:     Cognitive Impairment Communication Impairment Dysphagia (Swallowing Impairment).    SLP  Recommendation and Plan                                                          SLP GOALS     Row Name 04/18/19 1300 04/18/19 0800 04/17/19 1300       Word Retrieval Skills Goal 1 (SLP)    Progress (Word Retrieval Skills Goal 1, SLP)  80%;independently (over 90% accuracy) familiar terms- 2 responses- phrase completions  -SL  --  --       Attention Goal 1 (SLP)    Progress (Attention Goal 1, SLP)  --  70%;with minimal cues (75-90%) letter altering task- rewriting words w/changes  -SL  --       Organizational Skills Goal 1 (SLP)    Progress (Thought Organization Skills Goal 1, SLP)  --  90%;independently (over 90% accuracy) unscrambling words given category  -SL  90%;independently (over 90% accuracy) category matrixes  -SL       Functional Math Skills Goal 1 (SLP)    Progress (Functional Math Skills Goal 1, SLP)  --  --  80%;with minimal cues (75-90%) calculating denominataion amts  -SL    Comment (Functional Math Skills Goal 1, SLP)  --  --  line guide assisted w/visual scanning across row of amts when adding money  -SL       Executive Functional Skills Goal 1 (SLP)    Progress (Executive Function Skills Goal 1, SLP)  with moderate cues (50-74%) meal planning task w/mulitple restrictions  -SL  --  70%;independently (over 90% accuracy) scheduling task ( restaurant workers)  -SL    Comment (Executive Function Skills Goal 1, SLP)  mod cues needed for attn to details during meal planning task w/4 restrictions  -SL  --  min cues- multiple restraints- scheduling task  -SL    Row Name 04/17/19 1000 04/16/19 1500 04/16/19 1000       Memory Skills Goal 1 (SLP)    Progress (Memory Skills Goal 1, SLP)  50%;with minimal cues (75-90%) reading recall- short stories- multi paragraph  -SL  60%;independently (over 90% accuracy) visual recall- 12 items- grouping strategy  -SL  --       Reasoning Goal 1 (SLP)    Progress (Reasoning Goal 1, SLP)  --  70%;80%;independently (over 90% accuracy)  1 and 2 factor figural sequencing  -SL  --        Functional Problem Solving Skills Goal 1 (SLP)    Progress (Problem Solving Goal 1, SLP)  70%;80%;with minimal cues (75-90%) sequencing of 6 sentences for stories  -SL  --  80%;with minimal cues (75-90%) 2 step written directives  -SL    Comment (Problem Solving Goal 1, SLP)  --  --  sequencing 6 sentences for stories- 50%   -SL       Functional Math Skills Goal 1 (SLP)    Progress (Functional Math Skills Goal 1, SLP)  --  60%;70%;independently (over 90% accuracy) simple time calculations related to water park hours  -SL  60%;independently (over 90% accuracy) time calculations- airline flight times  -    Row Name 04/15/19 1400             Memory Skills Goal 1 (SLP)    Progress (Memory Skills Goal 1, SLP)  80%;independently (over 90% accuracy) visual recall- 4 numbers- spatial components- boxed info  -      Progress/Outcomes (Memory Skills Goal 1, SLP)  goal ongoing  -SL      Comment (Memory Skills Goal 1, SLP)  name- picture associations- 60%  -SL         Organizational Skills Goal 1 (SLP)    Progress (Thought Organization Skills Goal 1, SLP)  60%;independently (over 90% accuracy) adding item to lists- abstract categories  -SL      Progress/Outcomes (Thought Organization Skills Goal 1, SLP)  goal ongoing  -SL         Reasoning Goal 1 (SLP)    Progress (Reasoning Goal 1, SLP)  70%;with minimal cues (75-90%);with moderate cues (50-74%) unscrambling L-R- words in category  -SL      Progress/Outcomes (Reasoning Goal 1, SLP)  goal ongoing  -SL        User Key  (r) = Recorded By, (t) = Taken By, (c) = Cosigned By    Initials Name Provider Type    Narcisa Urias MS CCC-SLP Speech and Language Pathologist                  Time Calculation:       Time Calculation- SLP     Row Name 04/18/19 1331 04/18/19 0826          Time Calculation- [facility]    SLP Start Time  1300  -SL  0800  -[facility] Stop Time  1330  -SL  0830  -[facility] Time Calculation (min)  30 min  -SL  30 min  -       User Key  (r) = Recorded By, (t) =  Taken By, (c) = Cosigned By    Initials Name Provider Type    Narcisa Urias, MS CCC-SLP Speech and Language Pathologist            Therapy Charges for Today     Code Description Service Date Service Provider Modifiers Qty    37131519820 HC ST DEV OF COGN SKILLS EACH 15 MIN 4/17/2019 Narcisa Bonner, MS CCC-SLP  2    95346165387 HC ST DEV OF COGN SKILLS EACH 15 MIN 4/17/2019 Narcisa Bonner MS CCC-SLP  2    92032231560 HC ST DEV OF COGN SKILLS EACH 15 MIN 4/18/2019 Narcisa Bonner MS CCC-SLP  2    32600383457 HC ST DEV OF COGN SKILLS EACH 15 MIN 4/18/2019 Narcisa Bonner MS CCC-SLP  2                           Narcisa Bonner MS CCC-SLP  4/18/2019

## 2019-04-18 NOTE — THERAPY TREATMENT NOTE
Inpatient Rehabilitation - Occupational Therapy Treatment Note    [facility]     Patient Name: Nayan Ryan  : 1965  MRN: [id number]    Today's Date: 2019                 Admit Date: 3/24/2019      Visit Dx:  No diagnosis found.    Patient Active Problem List   Diagnosis   • Acute ischemic left PCA stroke (CMS/HCC)   • Status post placement of implantable loop recorder   • HTN (hypertension)   • Diabetes mellitus (CMS/HCC)   • Hyperlipidemia   • Morbid obesity (CMS/HCC)   • Anxiety disorder   • Migraine   • H/O hernia repair   • Abdominal wall abscess   • Back pain   • Stroke (cerebrum) (CMS/HCC)   • Vitamin D deficiency   • History of fatty infiltration of liver         Therapy Treatment    IRF Treatment Summary     Row Name 19 1205 19 0845 19 0800       Evaluation/Treatment Time and Intent    Subjective Information  no complaints  -DN  no complaints  (Pended)   -LS  no complaints  -SL    Existing Precautions/Restrictions  fall  -DN  fall  (Pended)   -LS  fall  -SL    Document Type  therapy note (daily note)  -DN  therapy note (daily note)  (Pended)   -LS  therapy note (daily note)  -    Mode of Treatment  occupational therapy  -DN  physical therapy  (Pended)   -LS  individual therapy;speech-language pathology  -SL    Patient/Family Observations  willing to work  -DN  Pt sitting in w/c ready to participate with therapy  (Pended)   -  cooperative  -SL    Start Time (Evaluation/Treatment)  --  --  0800  -SL    Stop Time (Evaluation/Treatment)  --  --  0830  -SL    Recorded by [DN] Reg Mckinney, OT [LS] Naomy Mendenhall, PT Student [SL] Narcisa Bonner MS CCC-SLP    Row Name 19 0845             Cognition/Psychosocial- PT/OT    Affect/Mental Status (Cognitive)  WFL  (Pended)   -LS      Follows Commands (Cognition)  follows one step commands;verbal cues/prompting required  (Pended)   -LS      Personal Safety Interventions  fall prevention program maintained;gait belt;muscle  strengthening facilitated;nonskid shoes/slippers when out of bed  (Pended)   -LS      Attention Deficit (Cognitive)  distractible in noisy environment  (Pended)   -LS      Memory Deficit (Cognitive)  moderate deficit  (Pended)   -LS      Safety Deficit (Cognitive)  impulsivity;insight into deficits/self awareness;problem solving  (Pended)   -LS      Recorded by [LS] Naomy Mendenhall, PT Student      Row Name 04/18/19 0845             Mobility    Advanced Gait Activity  sloped surfaces  (Pended)   -LS      Recorded by [LS] Naomy Mendenhall, PT Student      Row Name 04/18/19 0845             Sit-Stand Transfer    Sit-Stand [place] (Transfers)  contact guard;stand by assist  (Pended)   -LS      Assistive Device (Sit-Stand Transfers)  wheelchair  (Pended)   -LS      Recorded by [LS] Naomy Mendenhall, PT Student      Row Name 04/18/19 0845             Stand-Sit Transfer    Stand-Sit [place] (Transfers)  contact guard;stand by assist  (Pended)   -LS      Assistive Device (Stand-Sit Transfers)  wheelchair  (Pended)   -LS      Recorded by [LS] Naomy Mendenhall, PT Student      Row Name 04/18/19 1205             Toilet Transfer    Type (Toilet Transfer)  stand pivot/stand step  -DN      [place] Level (Toilet Transfer)  verbal cues;nonverbal cues (demo/gesture);contact guard  -DN      Assistive Device (Toilet Transfer)  grab bars/safety frame;walker, front-wheeled  -DN      Recorded by [DN] Reg Mckinney, OT      Row Name 04/18/19 1208             Shower Transfer    Type (Shower Transfer)  stand pivot/stand step  -DN      [place] Level (Shower Transfer)  contact guard;nonverbal cues (demo/gesture);verbal cues  -DN      Assistive Device (Shower Transfer)  grab bars/tub rail;walker, front-wheeled;tub bench  -DN      Recorded by [DN] Reg Mckinney, OT      Row Name 04/18/19 0862             Gait/Stairs Assessment/Training    [place] Level (Gait)  verbal cues;contact guard  (Pended)   -LS      Assistive Device  (Gait)  walker, front-wheeled  (Pended)   -LS      Distance in Feet (Gait)  170 x 2  (Pended)   -LS      Pattern (Gait)  step-through  (Pended)   -LS      Deviations/Abnormal Patterns (Gait)  nancy decreased  (Pended)   -LS      Bilateral Gait Deviations  weight shift ability decreased;heel strike decreased  (Pended)   -LS      Left Sided Gait Deviations  weight shift ability decreased  (Pended)   -LS      Right Sided Gait Deviations  heel strike decreased;knee buckling, right side;knee hyperextension  (Pended)   -LS      Recorded by [LS] Naomy Mendenhall, PT Student      Row Name 04/18/19 8583             Safety Issues, Functional Mobility    Safety Issues Affecting Function (Mobility)  ability to follow commands;insight into deficits/self awareness;problem solving;judgment  (Pended)   -LS      Impairments Affecting Function (Mobility)  balance;cognition;coordination;motor control;strength  (Pended)   -LS      Recorded by [LS] Naomy Mendenhall, PT Student      Row Name 04/18/19 0824             Sloped Surface Gait Skills (Mobility)    [place], Gait On Sloped Surface (Mobility)  verbal cues;contact guard  (Pended)   -LS      Comment, Gait On Sloped Surface (Mobility)  Went up/down ramp with Rwx, verbal cues to slow down and take his time. No LOB or buckling of R knee   (Pended)   -LS      Recorded by [LS] Naomy Mendenhall, PT Student      Row Name 04/18/19 120             Bathing Assessment/Treatment    Bathing [place] Level  bathing skills;supervision;verbal cues  -DN      Assistive Device (Bathing)  grab bar/tub rail;hand held shower spray hose;tub bench  -DN      Bathing Position  supported sitting  -DN      Bathing Setup Assistance  adjust water temperature  -DN      Recorded by [DN] Reg Mckinney OT      Row Name 04/18/19 1200             Upper Body Dressing Assessment/Treatment    Upper Body Dressing Task  upper body dressing skills;set up assistance;pull over garment  -DN      Upper Body Dressing  Position  supported sitting  -DN      Set-up Assistance (Upper Body Dressing)  obtain clothing  -DN      Recorded by [DN] Reg Mckinney, OT      Row Name 04/18/19 1205             Lower Body Dressing Assessment/Treatment    Lower Body Dressing [place] Level  doff;pants/bottoms;don;shoes/slippers;socks;underwear;supervision;verbal cues;nonverbal cues (demo/gesture)  -DN      Lower Body Dressing Position  supported sitting;supported standing  -DN      Lower Body Dressing Setup Assistance  obtain clothing  -DN      Comment (Lower Body Dressing)  vc for jay dressing technique  -DN      Recorded by [DN] Reg Mckinney, OT      Row Name 04/18/19 1205             Grooming Assessment/Treatment    Grooming [place] Level  grooming skills;deodorant application;hair care, combing/brushing;supervision  -DN      Grooming Position  supported sitting  -DN      Grooming Setup Assistance  open containers  -DN      Recorded by [BRENDAN] Reg Mckinney, OT      Row Name 04/18/19 1205             Toileting Assessment/Treatment    Toileting [place] Level  toileting skills;adjust/manage clothing;perform perineal hygiene;supervision;verbal cues  -DN      Assistive Device Use (Toileting)  grab bar/safety frame;raised toilet seat  -DN      Toileting Position  supported sitting;supported standing  -DN      Toileting Setup Assistance  change pad/brief  -DN      Recorded by [DN] Reg Mckinney, OT      Row Name 04/18/19 0855             Pain Assessment    Additional Documentation  Pain Scale: Numbers Pre/Post-Treatment (Group)  (Pended)   -LS      Recorded by [LS] Naomy Mendenhall, PT Student      Row Name 04/18/19 1205 04/18/19 0890          Pain Scale: Numbers Pre/Post-Treatment    Pain Scale: Numbers, Pretreatment  0/10 - no pain  -DN  0/10 - no pain  (Pended)   -LS     Pain Scale: Numbers, Post-Treatment  0/10 - no pain  -DN  0/10 - no pain  (Pended)   -LS     Recorded by [DN] Reg Mckinney, OT [LS] Naomy Mendenhall, PT Student      Row Name 04/18/19 0845             Lower Extremity Seated Therapeutic Exercise    Performed, Seated Lower Extremity (Therapeutic Exercise)  hip flexion/extension;LAQ (long arc quad), knee extension;ankle dorsiflexion/plantarflexion  (Pended)   -LS      Device, Seated Lower Extremity (Therapeutic Exercise)  free weights, cuff  (Pended)  1.5 lb ankle weight  -LS      Exercise Type, Seated Lower Extremity (Therapeutic Exercise)  resistive exercise  (Pended)   -LS      Sets/Reps Detail, Seated Lower Extremity (Therapeutic Exercise)  2 x 12  (Pended)   -LS      Comment, Seated Lower Extremity (Therapeutic Exercise)  cues for slow controlled motio. Hip flexion most difficult for pt  (Pended)   -LS      Recorded by [LS] Naomy Mendenhall, PT Student      Row Name 04/18/19 1205 04/18/19 0845          Positioning and Restraints    Pre-Treatment Position  sitting in chair/recliner  -DN  sitting in chair/recliner  (Pended)   -LS     Post Treatment Position  wheelchair  -DN  wheelchair  (Pended)   -LS     In Bed  sitting;with other staff  -DN  --     In Wheelchair  --  sitting;exit alarm on;with OT  (Pended)   -LS     Recorded by [DN] Reg Mckinney, OT [LS] Naomy Mendenhall, PT Student       User Key  (r) = Recorded By, (t) = Taken By, (c) = Cosigned By    Initials Name Effective Dates    SL Narcisa Bonner, MS CCC-SLP 06/08/18 -     Reg Bolden, OT 06/08/18 -     Naomy Armas, PT Student 02/04/19 -           Wound 03/26/19 0900 Left chest incision (Active)   Dressing Appearance open to air 4/18/2019  8:00 AM   Closure Liquid skin adhesive 4/18/2019  8:00 AM   Base clean;dry 4/18/2019  8:00 AM   Drainage Amount none 4/17/2019  9:08 PM         OT Recommendation and Plan    Anticipated Equipment Needs At Discharge (OT Eval): commode, 3-in-1, shower chair, tub bench  Planned Therapy Interventions (OT Eval): BADL retraining, cognitive/visual perception retraining, functional balance retraining, neuromuscular control/coordination  retraining, strengthening exercise, transfer/mobility retraining            OT IRF GOALS     Row Name 04/17/19 1500 04/09/19 1400          Bathing Goal 1 (OT-IRF)    Activity/Device (Bathing Goal 1, OT-IRF)  bathing skills, all  -DN  bathing skills, all  -DN     [place] Level (Bathing Goal 1, OT-IRF)  supervision required;verbal cues required  -DN  supervision required;verbal cues required  -DN     Time Frame (Bathing Goal 1, OT-IRF)  short term goal (STG)  -DN  short term goal (STG)  -DN     Progress/Outcomes (Bathing Goal 1, OT-IRF)  goal met;goal revised this date  -DN  goal met;goal revised this date  -DN        Bathing Goal 2 (OT-IRF)    Activity/Device (Bathing Goal 2, OT-IRF)  bathing skills, all  -DN  bathing skills, all  -DN     [place] Level (Bathing Goal 2, OT-IRF)  supervision required  -DN  supervision required  -DN     Time Frame (Bathing Goal 2, OT-IRF)  long term goal (LTG)  -DN  long term goal (LTG)  -DN     Progress/Outcomes (Bathing Goal 2, OT-IRF)  goal ongoing  -DN  goal ongoing  -DN        UB Dressing Goal 1 (OT-IRF)    Activity/Device (UB Dressing Goal 1, OT-IRF)  upper body dressing  -DN  upper body dressing  -DN     [place] (UB Dress Goal 1, OT-IRF)  supervision required  -DN  supervision required  -DN     Time Frame (UB Dressing Goal 1, OT-IRF)  short term goal (STG)  -DN  short term goal (STG)  -DN     Progress/Outcomes (UB Dressing Goal 1, OT-IRF)  goal met;goal ongoing  -DN  goal met;goal ongoing  -DN        UB Dressing Goal 2 (OT-IRF)    Activity/Device (UB Dressing Goal 2, OT-IRF)  upper body dressing  -DN  upper body dressing  -DN     [place] (UB Dress Goal 2, OT-IRF)  supervision required  -DN  supervision required  -DN     Time Frame (UB Dressing Goal 2, OT-IRF)  long term goal (LTG)  -DN  long term goal (LTG)  -DN     Progress/Outcomes (UB Dressing Goal 2, OT-IRF)  goal ongoing;goal met  -DN  goal ongoing;goal met  -DN        LB Dressing Goal 1 (OT-IRF)     Activity/Device (LB Dressing Goal 1, OT-IRF)  lower body dressing  -DN  lower body dressing  -DN     [place] (LB Dressing Goal 1, OT-IRF)  supervision required  -DN  minimum assist (75% or more patient effort);verbal cues required;tactile cues required  -DN     Time Frame (LB Dressing Goal 1, OT-IRF)  short term goal (STG)  -DN  short term goal (STG)  -DN     Progress/Outcomes (LB Dressing Goal 1, OT-IRF)  goal revised this date  -DN  goal met;goal ongoing  -DN        LB Dressing Goal 2 (OT-IRF)    Activity/Device (LB Dressing Goal 2, OT-IRF)  lower body dressing  -DN  lower body dressing  -DN     [place] (LB Dressing Goal 2, OT-IRF)  verbal cues required;supervision required  -DN  verbal cues required;tactile cues required;contact guard assist  -DN     Time Frame (LB Dressing Goal 2, OT-IRF)  long term goal (LTG)  -DN  long term goal (LTG)  -DN     Progress/Outcomes (LB Dressing Goal 2, OT-IRF)  --  goal revised this date  -DN        Grooming Goal 1 (OT-IRF)    Activity/Device (Grooming Goal 1, OT-IRF)  grooming skills, all  -DN  grooming skills, all  -DN     [place] (Grooming Goal 1, OT-IRF)  supervision required  -DN  supervision required  -DN     Time Frame (Grooming Goal 1, OT-IRF)  short term goal (STG)  -DN  short term goal (STG)  -DN     Progress/Outcomes (Grooming Goal 1, OT-IRF)  goal ongoing  -DN  goal ongoing  -DN        Grooming Goal 2 (OT-IRF)    Activity/Device (Grooming Goal 2, OT-IRF)  grooming skills, all  -DN  grooming skills, all  -DN     [place] (Grooming Goal 2, OT-IRF)  supervision required  -DN  supervision required  -DN     Time Frame (Grooming Goal 2, OT-IRF)  long term goal (LTG)  -DN  long term goal (LTG)  -DN     Progress/Outcomes (Grooming Goal 2, OT-IRF)  goal revised this date  -DN  goal revised this date  -DN        Toileting Goal 1 (OT-IRF)    Activity/Device (Toileting Goal 1, OT-IRF)  toileting skills, all  -DN  toileting skills, all  -DN     [place] Level  (Toileting Goal 1, OT-IRF)  supervision required  -DN  minimum assist (75% or more patient effort);verbal cues required;tactile cues required  -DN     Time Frame (Toileting Goal 1, OT-IRF)  short term goal (STG)  -DN  short term goal (STG)  -DN     Progress/Outcomes (Toileting Goal 1, OT-IRF)  goal met;goal revised this date  -DN  goal met;goal revised this date  -DN        Toileting Goal 2 (OT-IRF)    Activity/Device (Toileting Goal 2, OT-IRF)  toileting skills, all  -DN  toileting skills, all  -DN     [place] Level (Toileting Goal 2, OT-IRF)  supervision required  -DN  contact guard assist;verbal cues required;tactile cues required  -DN     Time Frame (Toileting Goal 2, OT-IRF)  long term goal (LTG)  -DN  long term goal (LTG)  -DN     Progress/Outcomes (Toileting Goal 2, OT-IRF)  goal revised this date  -DN  goal ongoing  -DN        Self-Feeding Goal 1 (OT-IRF)    Activity/Device (Self-Feeding Goal 1, OT-IRF)  self-feeding skills, all  -DN  self-feeding skills, all  -DN     [place] (Self-Feeding Goal 1, OT-IRF)  supervision required  -DN  supervision required  -DN     Time Frame (Self-Feeding Goal 1, OT-IRF)  short term goal (STG)  -DN  short term goal (STG)  -DN     Progress/Outcomes 1 (Self-Feeding Goal, OT-IRF)  goal met;goal ongoing  -DN  goal met;goal ongoing  -DN        Self-Feeding Goal 2 (OT-IRF)    Activity/Device (Self-Feeding Goal 2, OT-IRF)  self-feeding skills, all  -DN  self-feeding skills, all  -DN     [place] (Self-Feeding Goal 2, OT-IRF)  supervision required  -DN  supervision required  -DN     Time Frame (Self-Feeding Goal 2, OT-IRF)  long term goal (LTG)  -DN  long term goal (LTG)  -DN     Progress/Outcomes (Self-Feeding Goal 2, OT-IRF)  goal met;goal ongoing  -DN  goal met;goal ongoing  -DN        Caregiver Training Goal 2 (OT-IRF)    Caregiver Training Goal 2 (OT-IRF)  pt caregiver to be independent with any assist pt needs with adls, transfers and HEP for d/c home  -DN  pt  caregiver to be independent with any assist pt needs with adls, transfers and HEP for d/c home  -DN     Time Frame (Caregiver Training Goal 2, OT-IRF)  long term goal (LTG)  -DN  long term goal (LTG)  -DN     Progress/Outcomes (Caregiver Training Goal 2, OT-IRF)  goal ongoing  -DN  goal ongoing  -DN       User Key  (r) = Recorded By, (t) = Taken By, (c) = Cosigned By    Initials Name Provider Type    Reg Bolden OT Occupational Therapist          Occupational Therapy Education     Title: PT OT SLP Therapies (In Progress)     Topic: Occupational Therapy (Done)     Point: ADL training (Done)     Description: Instruct learner(s) on proper safety adaptation and remediation techniques during self care or transfers.   Instruct in proper use of assistive devices.    Learning Progress Summary           Patient Acceptance, E,TB,D, VU by DN at 4/18/2019 12:19 PM    Comment:  tub transfers to tub bench at RWX level    Acceptance, E,TB,D, VU,NR by DN at 4/15/2019 12:04 PM    Comment:  pt presents with carryover of adapitive adls, functional transfers, and overall strength with min vc for safety    Acceptance, E,TB,D, VU,NR by DN at 4/9/2019  2:25 PM    Comment:  jay adls, adaptive visual scanning, transfer training    Acceptance, E,TB,D, VU,NR by DN at 4/8/2019 12:16 PM    Comment:  transfer training, jay skills with adls, safety,etc    Acceptance, E,TB,D, VU,NR by DN at 4/4/2019  3:19 PM    Comment:  theraputty exercises, jay adls and transfer trainning    Acceptance, E,TB,D, NR by DN at 3/26/2019 12:06 PM    Comment:  jay adls and commode/shower transfers, still max vc for all due to cognition and visual impairments    Acceptance, E,TB,D, VU,NR by DN at 3/25/2019  5:23 PM    Comment:  OT role in rehab, transfer traning, jay adls                   Point: Home exercise program (Done)     Description: Instruct learner(s) on appropriate technique for monitoring, assisting and/or progressing therapeutic  exercises/activities.    Learning Progress Summary           Patient Acceptance, E,TB,D, VU by DN at 4/18/2019 12:19 PM    Comment:  tub transfers to tub bench at RWX level    Acceptance, E,TB,D, VU,NR by DN at 4/15/2019 12:04 PM    Comment:  pt presents with carryover of adapitive adls, functional transfers, and overall strength with min vc for safety    Acceptance, E,TB,D, VU,NR by DN at 4/9/2019  2:25 PM    Comment:  jay adls, adaptive visual scanning, transfer training    Acceptance, E,TB,D, VU,NR by DN at 4/8/2019 12:16 PM    Comment:  transfer training, jay skills with adls, safety,etc    Acceptance, E,TB,D, VU,NR by DN at 4/4/2019  3:19 PM    Comment:  theraputty exercises, jay adls and transfer trainning    Acceptance, E,TB,D, NR by DN at 3/26/2019 12:06 PM    Comment:  jay adls and commode/shower transfers, still max vc for all due to cognition and visual impairments    Acceptance, E,TB,D, VU,NR by DN at 3/25/2019  5:23 PM    Comment:  OT role in rehab, transfer traning, jay adls                   Point: Precautions (Done)     Description: Instruct learner(s) on prescribed precautions during self-care and functional transfers.    Learning Progress Summary           Patient Acceptance, E,TB,D, VU by DN at 4/18/2019 12:19 PM    Comment:  tub transfers to tub bench at RWX level    Acceptance, E,TB,D, VU,NR by DN at 4/15/2019 12:04 PM    Comment:  pt presents with carryover of adapitive adls, functional transfers, and overall strength with min vc for safety    Acceptance, E,TB,D, VU,NR by DN at 4/9/2019  2:25 PM    Comment:  jay adls, adaptive visual scanning, transfer training    Acceptance, E,TB,D, VU,NR by DN at 4/8/2019 12:16 PM    Comment:  transfer training, jay skills with adls, safety,etc    Acceptance, E,TB,D, VU,NR by DN at 4/4/2019  3:19 PM    Comment:  theraputty exercises, jay adls and transfer trainning    Acceptance, E,TB,D, NR by DN at 3/26/2019 12:06 PM    Comment:  jay adls and  commode/shower transfers, still max vc for all due to cognition and visual impairments    Acceptance, E,TB,D, VU,NR by DN at 3/25/2019  5:23 PM    Comment:  OT role in rehab, transfer traning, jay adls                   Point: Body mechanics (Done)     Description: Instruct learner(s) on proper positioning and spine alignment during self-care, functional mobility activities and/or exercises.    Learning Progress Summary           Patient Acceptance, E,TB,D, VU by DN at 4/18/2019 12:19 PM    Comment:  tub transfers to tub bench at RWX level    Acceptance, E,TB,D, VU,NR by DN at 4/15/2019 12:04 PM    Comment:  pt presents with carryover of adapitive adls, functional transfers, and overall strength with min vc for safety    Acceptance, E,TB,D, VU,NR by DN at 4/9/2019  2:25 PM    Comment:  jay adls, adaptive visual scanning, transfer training    Acceptance, E,TB,D, VU,NR by DN at 4/8/2019 12:16 PM    Comment:  transfer training, jay skills with adls, safety,etc    Acceptance, E,TB,D, VU,NR by DN at 4/4/2019  3:19 PM    Comment:  theraputty exercises, jay adls and transfer trainning    Acceptance, E,TB,D, NR by DN at 3/26/2019 12:06 PM    Comment:  jay adls and commode/shower transfers, still max vc for all due to cognition and visual impairments    Acceptance, E,TB,D, VU,NR by DN at 3/25/2019  5:23 PM    Comment:  OT role in rehab, transfer traning, jay adls                               User Key     Initials Effective Dates Name Provider Type Discipline    DN 06/08/18 -  Reg Mckinney OT Occupational Therapist OT                       Time Calculation:     Time Calculation- OT     Row Name 04/18/19 1220             Time Calculation- OT    OT Start Time  0900  -DN      OT Stop Time  1000  -DN      OT Time Calculation (min)  60 min  -DN      OT Received On  04/18/19  -DN        User Key  (r) = Recorded By, (t) = Taken By, (c) = Cosigned By    Initials Name Provider Type    Reg Bolden OT Occupational  Therapist          Therapy Charges for Today     Code Description Service Date Service Provider Modifiers Qty    69827336692 HC OT NEUROMUSC RE EDUCATION EA 15 MIN 4/17/2019 Reg Mckinney, OT GO 2    [id number] HC OT SELF CARE/MGMT/TRAIN EA 15 MIN 4/17/2019 Reg Mckinney, OT GO 2    [id number]  OT SELF CARE/MGMT/TRAIN EA 15 MIN 4/18/2019 Reg Mckinney, OT GO 4                   Reg Mckinney OT  4/18/2019

## 2019-04-18 NOTE — PROGRESS NOTES
Inpatient Rehabilitation Functional Measures Assessment    Functional Measures  VANITA Eating:  Branch  [facility] Grooming: Branch  [facility] Bathing:  Branch  [facility] Upper Body Dressing:  Branch  [facility] Lower Body Dressing:  [facility] Toileting:  [facility] Bladder Management  Level of Assistance:  [place]  Frequency/Number of Accidents this Shift:  [facility] Bowel Management  Level of Assistance: [place]  Frequency/Number of Accidents this Shift: [place]    [facility] Bed/Chair/Wheelchair Transfer:  Bed/chair/wheelchair Transfer Score = 4.  Patient performs 75% or more of effort and minimal assistance (little/incidental  help/lifting of one limb/steadying) for transferring to and from the  bed/chair/wheelchair, requiring: Contact guard. Patient requires the following  assistive device(s): Arm rest.  VANITA Toilet Transfer:  [facility] Tub/Shower Transfer:  [place]    Previously Documented Mode of Locomotion at Discharge: Field  VANITA Expected Mode of Locomotion at Discharge: [facility] Walk/Wheelchair:  WHEELCHAIR OBSERVATION   Activity was not observed.    WALK OBSERVATION   Walk Distance Scale = 3.  Distance walked is greater than 150 feet. Walk Score  = 4.  Patient performs 75% or more of effort and requires minimal assistance.  Incidental help/contact guard/steadying was provided. Patient walked a distance  of  170 feet. Patient requires the following assistive device(s): Rolling  walker.  VANITA Stairs:  Stairs Score = 2.  Incidental assistance with lifting or lowering,  contact guard or steadying was provided. Patient performs 75% or more of effort  and requires minimal contact assistance. Patient negotiated  4 stairs. Patient  requires the following assistive device(s): Handrail(s).    VANITA Comprehension:  Branch  [facility] Expression:  Branch  [facility] Social Interaction:  [facility] Problem Solving:  [facility] Memory:  [place]    Therapy Mode Minutes  Occupational Therapy: [place]  Physical Therapy: Individual: 60 minutes.  Speech Language  Pathology:  [place]    Signed by: Naomy Mendenhall PT Student     - CoSigned By: Eliane Blackburn PT 4/18/2019 3:30:27 PM

## 2019-04-18 NOTE — PROGRESS NOTES
Inpatient Rehabilitation Plan of Care Note    Plan of Care  Care Plan Reviewed - No updates at this time.    Safety    Performed Intervention(s)  Hourly rounding , items within reach  Assistance with out of bed activities  Falls protocol  bed/chair alarm      Psychosocial    Performed Intervention(s)   PRN  Patient to verbalize feelings and concerns  Psychologist PRN      Body Systems    Performed Intervention(s)  Accuchecks ACHS  Medication as ordered  consistent carb diet      Sphincter Control    Performed Intervention(s)  Assistance with urinal  Assistance to bathroom  Incontinence care PRN    Signed by: Rosa Isela Fleming RN

## 2019-04-18 NOTE — PLAN OF CARE
Problem: Stroke (IRF) (Adult)  Goal: Promote Optimal Functional [place]  Outcome: Ongoing (interventions implemented as appropriate)   04/18/19 0150   Stroke (IRF) (Adult)   Promote Optimal Functional [place] demonstrating adequate progress       Problem: Fall Risk (Adult)  Goal: Absence of Fall  Outcome: Ongoing (interventions implemented as appropriate)   04/18/19 0150   Fall Risk (Adult)   Absence of Fall making progress toward outcome       Problem: Diabetes, Type 2 (Adult)  Goal: Signs and Symptoms of Listed Potential Problems Will be Absent, Minimized or Managed (Diabetes, Type 2)  Outcome: Ongoing (interventions implemented as appropriate)   04/18/19 0150   Goal/Outcome Evaluation   Problems Assessed (Type 2 Diabetes) all   Problems Present (Type 2 Diabetes) none       Problem: Skin Injury Risk (Adult)  Goal: Skin Health and Integrity  Outcome: Ongoing (interventions implemented as appropriate)   04/18/19 0150   Skin Injury Risk (Adult)   Skin Health and Integrity making progress toward outcome

## 2019-04-18 NOTE — PROGRESS NOTES
LOS: 25 days   Patient Care Team:  Qasim Asif MD as PCP - General  Qasim Asif MD as PCP - Family Medicine    Chief Complaint:   Left PCA / posterior MCA territories ischemic infarct March 19, 2019  multifocal restricted diffusion centered posteriorly in the corpus callosum left of midline adjacent to the atrium of the lateral ventricle, at the parieto-occipital cortical interface, in the left corona radiata and along the lateral margin of the left thalamus. There also appears to be subtle low ADC signal continuing cranially along the left posterior parietal cortex with possible involvement of the right as well  Right visual field deficit  Impaired cognition/mobility/self care          Subjective     History of Present Illness    Subjective      He continues with better strength.  Tolerates therapies    History taken from: patient    Objective     Vital Signs  Temp:  [97.1 °F (36.2 °C)-98 °F (36.7 °C)] 97.1 °F (36.2 °C)  Heart Rate:  [73-82] 82  Resp:  [16-18] 18  BP: (137-147)/(69-82) 147/82    Objective   MENTAL STATUS -  AWAKE / ALERT  HEENT-  nc/at  LUNGS - CTA, NO WHEEZES, RALES OR RHONCHI  HEART- RRR, NO RUB, MURMUR, OR GALLOP  ABD - NORMOACTIVE BOWEL SOUNDS, SOFT, NT.  Obese.    EXT - NO EDEMA OR CYANOSIS  NEURO -  He is oriented to person and situation.    Takes resistance in the right upper extremity and right lower extremity still decreased motor control in the right upper extremity.  Right visual field cut          Results Review:     I reviewed the patient's new clinical results.   Glucose   Date/Time Value Ref Range Status   04/18/2019 1559 99 70 - 130 mg/dL Final   04/18/2019 1056 109 70 - 130 mg/dL Final   04/18/2019 0703 92 70 - 130 mg/dL Final   04/17/2019 2020 114 70 - 130 mg/dL Final   04/17/2019 1614 106 70 - 130 mg/dL Final   04/17/2019 1113 96 70 - 130 mg/dL Final   04/17/2019 0708 89 70 - 130 mg/dL Final   04/16/2019 2138 98 70 - 130 mg/dL Final           Medication Review:  reviewed    Assessment/Plan       Acute ischemic left PCA stroke (CMS/HCC)    Status post placement of implantable loop recorder    HTN (hypertension)    Diabetes mellitus (CMS/HCC)    Hyperlipidemia    Morbid obesity (CMS/HCC)    Anxiety disorder    Abdominal wall abscess    Stroke (cerebrum) (CMS/HCC)    Vitamin D deficiency    History of fatty infiltration of liver      Assessment & Plan   Left PCA / posterior MCA territories ischemic infarct March 19, 2019  multifocal restricted diffusion centered posteriorly in the corpus callosum left of midline adjacent to the atrium of the lateral ventricle, at the parieto-occipital cortical interface, in the left corona radiata and along the lateral margin of the left thalamus. There also appears to be subtle low ADC signal continuing cranially along the left posterior parietal cortex with possible involvement of the right as well.     HTN- uncontrolled with /130 and 206/108 with ER visit on March 15, 2019 - multi-drug regimen - amlodipine/atenolol/clonidine/lisinopril/dyazide  March 27- holding beds while on IVF hydration as BP low yesterday, concern for perfusion Continues IVF. Recheck BMP in AM. /82 at 13:15 pm  March 28-/86 this AM  DC IVF.  Will resume Lisinopril and atenolol at 1/2 total daily doses initially dividing out bid  Lisinopril 10 mg q 12 hours and atenolol 12.5 mg q 12 hours and adjust meds based on response.  Remains off amlodipine 10 mg daily and Dyazide 37.5/25 and clonidine.  Held Farxiga today as was hydrating with IVF, resume tomorrow.  March 29 - /87 last PM and 175/80 this AM  Will increase atenolol to 25 mg q 12 hours and Lisinopril to 20 mg q 12 hours (both back to previous total daily dose but divided out) and remains off amlodpine 10 mg daily and clonidine 0.1 mg q 12 hours and Dyazide 37.5/25 mg daily.  Per Neruology - Maintain -180, DBP<110.  April 8-blood pressure range 142//88-150s//79.  Norvasc  increased to 5 mg on April 2.  Discussed with patient possibly titrating up on Norvasc further to 10 mg daily if needed but may divide out to 5 mg twice a day for more even control.  We will continue to monitor his blood pressure pattern for now  April 12-continue to follow on present pattern.  Blood pressure was elevated 188 last evening but otherwise typically in target range.  Once further out from stroke, would adjust target range to typical therapeutic range  April 13 and 14 - per notes above - Per Neurology - Maintain -180, DBP<110.  Continue meds as current.     TEAM CONF - April 16- BED SUP. TRANSFERS CTG. 4 STAIRS CTG-MIN.  80 FEET CRG-MIN 2. RW, NO AFO. TURNS TO RIGHT DIFFICULT DUE TO VISION . R VISUAL FIELD CUT. ADLS CTG-SBA. ELASTIC SHOELACES WOULD HELP, OTHERWISE MIN ASSIST TO TIE SHOES.  DOES NOT PAY ATTENTION TO HIS ENVIRONMENT. MIN CUES FOR SEQUENCING.  IMPAIRED MEMORY AND ATTENTION TO DETAIL WITH COGNITIVE TASKS, VISUAL DEFICITS CAN LEAD TO MIS-READ INSTRUCTIONS.  ELOS - ONE WEEK.         Familia James MD  04/18/19  11:11 AM    Time: 10min

## 2019-04-18 NOTE — PLAN OF CARE
Problem: Stroke (IRF) (Adult)  Goal: Promote Optimal Functional [place]  Outcome: Ongoing (interventions implemented as appropriate)      Problem: Fall Risk (Adult)  Goal: Absence of Fall  Outcome: Ongoing (interventions implemented as appropriate)      Problem: Diabetes, Type 2 (Adult)  Goal: Signs and Symptoms of Listed Potential Problems Will be Absent, Minimized or Managed (Diabetes, Type 2)  Outcome: Ongoing (interventions implemented as appropriate)      Problem: Skin Injury Risk (Adult)  Goal: Skin Health and Integrity  Outcome: Ongoing (interventions implemented as appropriate)      Problem: Patient Care Overview  Goal: Plan of Care Review  Outcome: Ongoing (interventions implemented as appropriate)   04/18/19 1635   Patient Care Overview   IRF Plan of Care Review progress ongoing, continue   Progress, Functional Goals demonstrating adequate progress   Coping/Psychosocial   Plan of Care Reviewed With patient   OTHER   Outcome Summary Alert,oriented. Transfers min assist of 1. Weakness on right side. Skin is intact. BS remain good. No unsafe behavior.

## 2019-04-19 LAB
GLUCOSE BLDC GLUCOMTR-MCNC: 117 MG/DL (ref 70–130)
GLUCOSE BLDC GLUCOMTR-MCNC: 91 MG/DL (ref 70–130)
GLUCOSE BLDC GLUCOMTR-MCNC: 96 MG/DL (ref 70–130)
GLUCOSE BLDC GLUCOMTR-MCNC: 98 MG/DL (ref 70–130)

## 2019-04-19 PROCEDURE — 97110 THERAPEUTIC EXERCISES: CPT

## 2019-04-19 PROCEDURE — 97112 NEUROMUSCULAR REEDUCATION: CPT

## 2019-04-19 PROCEDURE — 82962 GLUCOSE BLOOD TEST: CPT

## 2019-04-19 PROCEDURE — G0515 COGNITIVE SKILLS DEVELOPMENT: HCPCS

## 2019-04-19 PROCEDURE — 63710000001 INSULIN GLARGINE PER 5 UNITS: Performed by: INTERNAL MEDICINE

## 2019-04-19 PROCEDURE — 97535 SELF CARE MNGMENT TRAINING: CPT

## 2019-04-19 RX ORDER — ALPRAZOLAM 0.5 MG/1
0.5 TABLET ORAL NIGHTLY PRN
Status: DISCONTINUED | OUTPATIENT
Start: 2019-04-19 | End: 2019-04-23 | Stop reason: HOSPADM

## 2019-04-19 RX ADMIN — AMLODIPINE BESYLATE 5 MG: 5 TABLET ORAL at 07:40

## 2019-04-19 RX ADMIN — ATORVASTATIN CALCIUM 80 MG: 80 TABLET, FILM COATED ORAL at 20:08

## 2019-04-19 RX ADMIN — ATENOLOL 25 MG: 25 TABLET ORAL at 07:40

## 2019-04-19 RX ADMIN — LISINOPRIL 20 MG: 20 TABLET ORAL at 07:41

## 2019-04-19 RX ADMIN — PAROXETINE HYDROCHLORIDE 10 MG: 10 TABLET, FILM COATED ORAL at 07:41

## 2019-04-19 RX ADMIN — ACETAMINOPHEN 650 MG: 325 TABLET, FILM COATED ORAL at 13:04

## 2019-04-19 RX ADMIN — ATENOLOL 25 MG: 25 TABLET ORAL at 20:08

## 2019-04-19 RX ADMIN — METFORMIN HYDROCHLORIDE 500 MG: 500 TABLET, EXTENDED RELEASE ORAL at 17:19

## 2019-04-19 RX ADMIN — ALPRAZOLAM 0.5 MG: 0.5 TABLET ORAL at 22:26

## 2019-04-19 RX ADMIN — LISINOPRIL 20 MG: 20 TABLET ORAL at 20:08

## 2019-04-19 RX ADMIN — Medication 1 TABLET: at 07:41

## 2019-04-19 RX ADMIN — METFORMIN HYDROCHLORIDE 500 MG: 500 TABLET, EXTENDED RELEASE ORAL at 07:41

## 2019-04-19 RX ADMIN — INSULIN GLARGINE 32 UNITS: 100 INJECTION, SOLUTION SUBCUTANEOUS at 21:16

## 2019-04-19 RX ADMIN — CLOPIDOGREL 75 MG: 75 TABLET, FILM COATED ORAL at 07:41

## 2019-04-19 RX ADMIN — ASPIRIN 325 MG: 325 TABLET, COATED ORAL at 07:40

## 2019-04-19 RX ADMIN — OXYCODONE AND ACETAMINOPHEN 1 TABLET: 5; 325 TABLET ORAL at 20:08

## 2019-04-19 RX ADMIN — OXYCODONE AND ACETAMINOPHEN 1 TABLET: 5; 325 TABLET ORAL at 08:08

## 2019-04-19 NOTE — PLAN OF CARE
Problem: Stroke (IRF) (Adult)  Goal: Promote Optimal Functional [place]  Outcome: Ongoing (interventions implemented as appropriate)      Problem: Fall Risk (Adult)  Goal: Absence of Fall  Outcome: Ongoing (interventions implemented as appropriate)      Problem: Diabetes, Type 2 (Adult)  Goal: Signs and Symptoms of Listed Potential Problems Will be Absent, Minimized or Managed (Diabetes, Type 2)  Outcome: Ongoing (interventions implemented as appropriate)      Problem: Skin Injury Risk (Adult)  Goal: Skin Health and Integrity  Outcome: Ongoing (interventions implemented as appropriate)      Problem: Patient Care Overview  Goal: Plan of Care Review  Outcome: Ongoing (interventions implemented as appropriate)   04/19/19 1610   Patient Care Overview   IRF Plan of Care Review progress ongoing, continue   Progress, Functional Goals demonstrating adequate progress   Coping/Psychosocial   Plan of Care Reviewed With patient   OTHER   Outcome Summary Participated in therapies and cooperative with staff. Eats meals in dining room and takes medications whole with water. Assist x1 to wc. Cont B&B. Right side weak. No safety issues noted. Uses call light for assistance.

## 2019-04-19 NOTE — THERAPY TREATMENT NOTE
Inpatient Rehabilitation - Speech Language Pathology Treatment Note    [facility]       Patient Name: Nayan Ryan  : 1965  MRN: [id number]    Today's Date: 2019           Admit Date: 3/24/2019      Visit Dx:      No diagnosis found.    Patient Active Problem List   Diagnosis   • Acute ischemic left PCA stroke (CMS/HCC)   • Status post placement of implantable loop recorder   • HTN (hypertension)   • Diabetes mellitus (CMS/HCC)   • Hyperlipidemia   • Morbid obesity (CMS/HCC)   • Anxiety disorder   • Migraine   • H/O hernia repair   • Abdominal wall abscess   • Back pain   • Stroke (cerebrum) (CMS/HCC)   • Vitamin D deficiency   • History of fatty infiltration of liver          Therapy Treatment    Evaluation/Coping    Evaluation/Treatment Time and Intent  Subjective Information: no complaints (19 1038 : Narcisa Bonner MS CCC-SLP)  Existing Precautions/Restrictions: fall (19 1038 : Narcisa Bonner MS CCC-SLP)  Document Type: therapy note (daily note) (19 1038 : Narcisa Bonner MS CCC-SLP)  Mode of Treatment: individual therapy, speech-language pathology (19 1038 : Narcisa Bonner, MS CCC-SLP)  Patient/Family Observations: cooperative (19 1038 : Narcisa Bonner MS CCC-SLP)  Start Time (Evaluation/Treatment): 1030 (19 1038 : Narcisa Bonner, MS CCC-SLP)  Stop Time (Evaluation/Treatment): 1030 (19 1038 : Narcisa Bonner MS CCC-SLP)    Vitals/Pain/Safety         Cognition/Communication         Oral Motor/Eating         Mobility/Basic Activities/Instrumental Activities/Motor/Modality                   ROM/MMT                   Sensory/Myotome/Dermatome/Edema               Posture/Balance/Special Tests/Exercise/Transportation/Sexual Function                   Orthotics/Residual Limb/Prosthetic Management              Outcome Summary         EDUCATION    The patient has been educated in the following areas:     Cognitive Impairment Dysphagia (Swallowing Impairment).    SLP Recommendation and  Plan                                                          SLP GOALS     Row Name 04/19/19 1000 04/19/19 0800 04/18/19 1300       Word Retrieval Skills Goal 1 (SLP)    Progress (Word Retrieval Skills Goal 1, SLP)  --  80%;independently (over 90% accuracy);100%;with minimal cues (75-90%) naming by letter given descriptions  -SL  80%;independently (over 90% accuracy) familiar terms- 2 responses- phrase completions  -SL       Attention Goal 1 (SLP)    Progress (Attention Goal 1, SLP)  70%;independently (over 90% accuracy) alternating symbol matching task  -SL  --  --       Memory Skills Goal 1 (SLP)    Progress (Memory Skills Goal 1, SLP)  40%;50%;independently (over 90% accuracy) 3 step auditory commands w/spatial components  -SL  100%;independently (over 90% accuracy) name - picture associations  -SL  --    Comment (Memory Skills Goal 1, SLP)  rep of stimulus required  -SL  --  --       Reasoning Goal 1 (SLP)    Progress (Reasoning Goal 1, SLP)  70%;with minimal cues (75-90%) word finding thru letter exclusions  -SL  60%;independently (over 90% accuracy) 20 item grid deductive puzzle  -SL  --    Comment (Reasoning Goal 1, SLP)  --  cues for attn to details in chart and in clues; impaired working memory interfered w/accuracy  -SL  --       Executive Functional Skills Goal 1 (SLP)    Progress (Executive Function Skills Goal 1, SLP)  --  --  with moderate cues (50-74%) meal planning task w/mulitple restrictions  -SL    Comment (Executive Function Skills Goal 1, SLP)  --  --  mod cues needed for attn to details during meal planning task w/4 restrictions  -SL    Row Name 04/18/19 0800 04/17/19 1300 04/17/19 1000       Attention Goal 1 (SLP)    Progress (Attention Goal 1, SLP)  70%;with minimal cues (75-90%) letter altering task- rewriting words w/changes  -SL  --  --       Memory Skills Goal 1 (SLP)    Progress (Memory Skills Goal 1, SLP)  --  --  50%;with minimal cues (75-90%) reading recall- short stories- multi  paragraph  -SL       Organizational Skills Goal 1 (SLP)    Progress (Thought Organization Skills Goal 1, SLP)  90%;independently (over 90% accuracy) unscrambling words given category  -SL  90%;independently (over 90% accuracy) category matrixes  -  --       Functional Problem Solving Skills Goal 1 (SLP)    Progress (Problem Solving Goal 1, SLP)  --  --  70%;80%;with minimal cues (75-90%) sequencing of 6 sentences for stories  -SL       Functional Math Skills Goal 1 (SLP)    Progress (Functional Math Skills Goal 1, SLP)  --  80%;with minimal cues (75-90%) calculating denominataion amts  -  --    Comment (Functional Math Skills Goal 1, SLP)  --  line guide assisted w/visual scanning across row of amts when adding money  -  --       Executive Functional Skills Goal 1 (SLP)    Progress (Executive Function Skills Goal 1, SLP)  --  70%;independently (over 90% accuracy) scheduling task ( restaurant workers)  -  --    Comment (Executive Function Skills Goal 1, SLP)  --  min cues- multiple restraints- scheduling task  -  --    Row Name 04/16/19 1500             Memory Skills Goal 1 (SLP)    Progress (Memory Skills Goal 1, SLP)  60%;independently (over 90% accuracy) visual recall- 12 items- grouping strategy  -SL         Reasoning Goal 1 (SLP)    Progress (Reasoning Goal 1, SLP)  70%;80%;independently (over 90% accuracy)  1 and 2 factor figural sequencing  -SL         Functional Math Skills Goal 1 (SLP)    Progress (Functional Math Skills Goal 1, SLP)  60%;70%;independently (over 90% accuracy) simple time calculations related to water park hours  -        User Key  (r) = Recorded By, (t) = Taken By, (c) = Cosigned By    Initials Name Provider Type    Narcisa Urias MS CCC-SLP Speech and Language Pathologist                  Time Calculation:       Time Calculation- SLP     Row Name 04/19/19 1101 04/19/19 0828          Time Calculation- [facility] Start Time  1030  -SL  0800  -     SLP Stop Time  1100  -SL  0830   -SL     SLP Time Calculation (min)  30 min  -SL  30 min  -SL       User Key  (r) = Recorded By, (t) = Taken By, (c) = Cosigned By    Initials Name Provider Type    Narcisa Urias MS CCC-SLP Speech and Language Pathologist            Therapy Charges for Today     Code Description Service Date Service Provider Modifiers Qty    69682706018 HC ST DEV OF COGN SKILLS EACH 15 MIN 4/18/2019 Narcisa Bonner MS CCC-SLP  2    46656137397 HC ST DEV OF COGN SKILLS EACH 15 MIN 4/18/2019 Narcisa Bonner MS CCC-SLP  2    96859878453 HC ST DEV OF COGN SKILLS EACH 15 MIN 4/19/2019 Narcisa Bonner MS CCC-SLP  2    90147006413 HC ST DEV OF COGN SKILLS EACH 15 MIN 4/19/2019 Narcisa Bonner MS CCC-SLP  2                           Narcisa Bonner MS CCC-SLP  4/19/2019

## 2019-04-19 NOTE — PLAN OF CARE
Problem: Stroke (IRF) (Adult)  Goal: Promote Optimal Functional [place]  Outcome: Ongoing (interventions implemented as appropriate)      Problem: Fall Risk (Adult)  Goal: Absence of Fall  Outcome: Ongoing (interventions implemented as appropriate)      Problem: Diabetes, Type 2 (Adult)  Goal: Signs and Symptoms of Listed Potential Problems Will be Absent, Minimized or Managed (Diabetes, Type 2)  Outcome: Ongoing (interventions implemented as appropriate)      Problem: Skin Injury Risk (Adult)  Goal: Skin Health and Integrity  Outcome: Ongoing (interventions implemented as appropriate)      Problem: Patient Care Overview  Goal: Plan of Care Review  Outcome: Ongoing (interventions implemented as appropriate)   04/18/19 1635 04/19/19 0521   Patient Care Overview   IRF Plan of Care Review --  progress ongoing, continue   Progress, Functional Goals demonstrating adequate progress --    OTHER   Outcome Summary --  Tylenol for R leg pain, able to sleep. No unsafe behavior noted. BG 82, ate HS snack.     Goal: Individualization and Mutuality  Outcome: Ongoing (interventions implemented as appropriate)    Goal: Discharge Needs Assessment  Outcome: Ongoing (interventions implemented as appropriate)    Goal: Coping Plan  Outcome: Ongoing (interventions implemented as appropriate)

## 2019-04-19 NOTE — PROGRESS NOTES
Inpatient Rehabilitation Functional Measures Assessment    Functional Measures  VANITA Eating:  [facility] Grooming: [facility] Bathing:  [facility] Upper Body Dressing:  [facility] Lower Body Dressing:  [facility] Toileting:  [facility] Bladder Management  Level of Assistance:  [place]  Frequency/Number of Accidents this Shift:  [facility] Bowel Management  Level of Assistance: [place]  Frequency/Number of Accidents this Shift: Branch    [facility] Bed/Chair/Wheelchair Transfer:  Activity was not observed.  VANITA Toilet Transfer:  [facility] Tub/Shower Transfer:  [place]    Previously Documented Mode of Locomotion at Discharge: Field  VANITA Expected Mode of Locomotion at Discharge: [facility] Walk/Wheelchair:  WHEELCHAIR OBSERVATION   Activity was not observed.    WALK OBSERVATION   Walk Distance Scale = 3.  Distance walked is greater than 150 feet. Walk Score  = 4.  Patient performs 75% or more of effort and requires minimal assistance.  Incidental help/contact guard/steadying was provided. Patient walked a distance  of  200 feet. Patient requires the following assistive device(s): Rolling  walker.  VANITA Stairs:  Stairs Score = 2.  Incidental assistance with lifting or lowering,  contact guard or steadying was provided. Patient performs 75% or more of effort  and requires minimal contact assistance. Patient negotiated  4 stairs. Patient  requires the following assistive device(s): Handrail(s).    VANITA Comprehension:  [place]  VANITA Expression:  [facility] Social Interaction:  [facility] Problem Solving:  [facility] Memory:  [place]    Therapy Mode Minutes  Occupational Therapy: [place]  Physical Therapy: Individual: 60 minutes.  Speech Language Pathology:  [place]    Signed by: Naomy Mendenhall PT Student     - CoSigned By: Eliane Blackburn PT 4/19/2019 3:13:16 PM

## 2019-04-19 NOTE — THERAPY TREATMENT NOTE
Inpatient Rehabilitation - Occupational Therapy Treatment Note    [facility]     Patient Name: Nayan Ryan  : 1965  MRN: [id number]    Today's Date: 2019                 Admit Date: 3/24/2019      Visit Dx:  No diagnosis found.    Patient Active Problem List   Diagnosis   • Acute ischemic left PCA stroke (CMS/HCC)   • Status post placement of implantable loop recorder   • HTN (hypertension)   • Diabetes mellitus (CMS/HCC)   • Hyperlipidemia   • Morbid obesity (CMS/HCC)   • Anxiety disorder   • Migraine   • H/O hernia repair   • Abdominal wall abscess   • Back pain   • Stroke (cerebrum) (CMS/HCC)   • Vitamin D deficiency   • History of fatty infiltration of liver         Therapy Treatment    IRF Treatment Summary     Row Name 19 1158 19 1038 19 0833       Evaluation/Treatment Time and Intent    Subjective Information  no complaints  -DN  no complaints  -SL  no complaints  (Pended)   -LS    Existing Precautions/Restrictions  fall  -DN  fall  -SL  fall  (Pended)   -LS    Document Type  therapy note (daily note)  -DN  therapy note (daily note)  -SL  therapy note (daily note)  (Pended)   -    Mode of Treatment  occupational therapy  -DN  individual therapy;speech-language pathology  -SL  physical therapy  (Pended)   -LS    Patient/Family Observations  --  cooperative  -SL  Pt arrives in w/c with ST  (Pended)   -    Start Time (Evaluation/Treatment)  --  1030  -SL  --    Stop Time (Evaluation/Treatment)  --  1030  -SL  --    Recorded by [DN] Reg Mckinney, OT [SL] Narcisa Bonner, MS CCC-SLP [LS] Naomy Mendenhall, PT Student    Row Name 19 0800             Evaluation/Treatment Time and Intent    Subjective Information  no complaints  -SL      Existing Precautions/Restrictions  fall  -SL      Document Type  therapy note (daily note)  -SL      Mode of Treatment  individual therapy;speech-language pathology  -SL      Patient/Family Observations  alert, cooperative  -SL      Start Time  (Evaluation/Treatment)  0800  -SL      Stop Time (Evaluation/Treatment)  0830  -SL      Recorded by [SL] Narcisa Bonner MS CCC-SLP      Row Name 04/19/19 1158             Safety Awareness/Health Promotion    Additional Documentation  Fall Prevention (Row)  -DN      Recorded by [DN] Reg Mckinney OT      Row Name 04/19/19 1158 04/19/19 0833          Cognition/Psychosocial- PT/OT    Affect/Mental Status (Cognitive)  WFL  -DN  WFL  (Pended)   -LS     Orientation Status (Cognition)  oriented x 3  -DN  --     Follows Commands (Cognition)  follows one step commands;over 90% accuracy  -DN  follows one step commands;verbal cues/prompting required;repetition of directions required  (Pended)   -LS     Personal Safety Interventions  fall prevention program maintained;gait belt  -DN  fall prevention program maintained;gait belt;muscle strengthening facilitated;nonskid shoes/slippers when out of bed  (Pended)   -LS     Cognitive Function (Cognitive)  attention deficit  -DN  --     Attention Deficit (Cognitive)  distractible in noisy environment  -DN  --     Executive Function Deficit (Cognition)  moderate deficit  -DN  --     Memory Deficit (Cognitive)  moderate deficit  -DN  moderate deficit  (Pended)   -LS     Safety Deficit (Cognitive)  impulsivity;insight into deficits/self awareness  -DN  impulsivity;insight into deficits/self awareness;problem solving  (Pended)   -LS     Recorded by [DN] Reg Mckinney OT [LS] Naomy Mendenhall, PT Student     Row Name 04/19/19 0854             Sit-Stand Transfer    Sit-Stand [place] (Transfers)  contact guard;stand by assist  (Pended)   -LS      Assistive Device (Sit-Stand Transfers)  wheelchair  (Pended)   -LS      Recorded by [LS] Naomy Mendenhall, PT Student      Row Name 04/19/19 0806             Stand-Sit Transfer    Stand-Sit [place] (Transfers)  contact guard;stand by assist  (Pended)   -LS      Assistive Device (Stand-Sit Transfers)  wheelchair  (Pended)   -LS      Recorded  by [LS] Naomy Mendenhall, PT Student      Row Name 04/19/19 1158             Toilet Transfer    Type (Toilet Transfer)  stand pivot/stand step  -DN      [place] Level (Toilet Transfer)  verbal cues;contact guard  -DN      Assistive Device (Toilet Transfer)  walker, front-wheeled;grab bars/safety frame;raised toilet seat  -DN      Recorded by [DN] Reg Mckinney, OT      Row Name 04/19/19 1158             Shower Transfer    Type (Shower Transfer)  stand pivot/stand step  -DN      [place] Level (Shower Transfer)  verbal cues;contact guard  -DN      Assistive Device (Shower Transfer)  walker, front-wheeled;grab bars/tub rail  -DN      Recorded by [DN] Reg Mckinney, OT      Row Name 04/19/19 0810             Gait/Stairs Assessment/Training    [place] Level (Gait)  verbal cues;contact guard  (Pended)   -LS      Assistive Device (Gait)  walker, front-wheeled  (Pended)   -LS      Distance in Feet (Gait)  200 x 2, 50 x 2  (Pended)   -LS      Pattern (Gait)  step-through  (Pended)   -LS      Deviations/Abnormal Patterns (Gait)  nancy decreased  (Pended)   -LS      Bilateral Gait Deviations  weight shift ability decreased;heel strike decreased  (Pended)   -LS      Left Sided Gait Deviations  weight shift ability decreased  (Pended)   -LS      Right Sided Gait Deviations  heel strike decreased  (Pended)  decreased knee stability  -LS      Comment (Gait/Stairs)  Ambulated to sun room and practiced pathfinding on way back to gym. Ambulated with PT providing resistance to L to prompt pt to shift onto R LE. Continued decreased step length on L due to lack of weight shift onto R LE, especially when fatigued.   (Pended)   -LS      Recorded by [LS] Naomy Mendenhall, PT Student      Row Name 04/19/19 9204             Safety Issues, Functional Mobility    Safety Issues Affecting Function (Mobility)  ability to follow commands;insight into deficits/self awareness;problem solving;judgment  (Pended)   -LS      Impairments  Affecting Function (Mobility)  balance;cognition;coordination;motor control;strength  (Pended)   -LS      Recorded by [LS] Naomy Mendenhall, PT Student      Row Name 04/19/19 1158             Bathing Assessment/Treatment    Bathing [place] Level  bathing skills  -DN      Assistive Device (Bathing)  grab bar/tub rail;hand held shower spray hose;tub bench  -DN      Bathing Position  supported sitting;supported standing  -DN      Bathing Setup Assistance  adjust water temperature  -DN      Recorded by [DN] Reg Mckinney, OT      Row Name 04/19/19 1158             Upper Body Dressing Assessment/Treatment    Upper Body Dressing Task  upper body dressing skills;pull over garment;supervision  -DN      Upper Body Dressing Position  supported sitting  -DN      Set-up Assistance (Upper Body Dressing)  obtain clothing  -DN      Recorded by [DN] Reg Mckinney, OT      Row Name 04/19/19 1158             Lower Body Dressing Assessment/Treatment    Lower Body Dressing [place] Level  doff;don;pants/bottoms;shoes/slippers;socks;underwear;supervision;set up;verbal cues  -DN      Lower Body Dressing Position  supported sitting;supported standing  -DN      Lower Body Dressing Setup Assistance  obtain clothing  -DN      Recorded by [DN] Reg Mckinney, OT      Row Name 04/19/19 1158             Grooming Assessment/Treatment    Grooming [place] Level  grooming skills;deodorant application;hair care, combing/brushing;oral care regimen;supervision;verbal cues  -DN      Grooming Position  supported sitting  -DN      Recorded by [DN] Reg Mckinney, OT      Row Name 04/19/19 1158             Toileting Assessment/Treatment    Toileting [place] Level  toileting skills;adjust/manage clothing;perform perineal hygiene  -DN      Assistive Device Use (Toileting)  grab bar/safety frame  -DN      Toileting Position  supported sitting  -DN      Recorded by [DN] Reg Mckinney, OT      Row Name 04/19/19 0833             Pain  Assessment    Additional Documentation  Pain Scale: Numbers Pre/Post-Treatment (Group)  (Pended)   -LS      Recorded by [LS] Naomy Mendenhall, PT Student      Row Name 04/19/19 1158 04/19/19 0824          Pain Scale: Numbers Pre/Post-Treatment    Pain Scale: Numbers, Pretreatment  0/10 - no pain  -DN  0/10 - no pain  (Pended)   -LS     Pain Scale: Numbers, Post-Treatment  0/10 - no pain  -DN  0/10 - no pain  (Pended)   -LS     Recorded by [DN] Reg Mckinney, OT [LS] Naomy Mendenhall, PT Student     Row Name 04/19/19 0879             Sitting Balance Activity    Activities Performed (Sitting, Balance Training)  sit to stand activity with arm support  (Pended)   -LS      Support Needed (Sitting, Balance Training)  CGA  (Pended)   -LS      Comment (Sitting, Balance Training)  Practiced STS from low cushioned chairs, couch, etc. VC to scoot forward and for anterior weight shift  (Pended)   -LS      Recorded by [LS] Naomy Mendenhall, PT Student      Row Name 04/19/19 1334             Dynamic Balance Activity    Therapeutic Training Performed (Dynamic Balance)  --  (Pended)  forward stepping over dalia  -LS      Support Needed for Balance (Dynamic Balance Training)  CGA;minimal external support for balance, 75% patient effort  (Pended)   -LS      Upper Extremity Activity with Device (Dynamic Balance Training)  other (see comments)  (Pended)  jay bar on R + HHA on L  -LS      Comment (Dynamic Balance Training)  2 x 10 reps of stepping over dalia with L LE to emphasize weight shift onto R LE. Pt with fatigue and postural sway noted  (Pended)   -LS      Recorded by [LS] Naomy Mendenhall, PT Student      Row Name 04/19/19 1158 04/19/19 0823          Positioning and Restraints    Pre-Treatment Position  sitting in chair/recliner  -DN  sitting in chair/recliner  (Pended)   -LS     Post Treatment Position  wheelchair  -DN  chair  (Pended)   -LS     In Chair  --  sitting;exit alarm on;with other staff;patient within staff view   (Pended)   -LS     Recorded by [DN] Reg Mckinney, OT [LS] Naomy Mendenhall, PT Student       User Key  (r) = Recorded By, (t) = Taken By, (c) = Cosigned By    Initials Name Effective Dates    SL Kailey Narcisa, MS CCC-SLP 06/08/18 -     DN Reg Mckinney, OT 06/08/18 -     LS Naomy Mendenhall, PT Student 02/04/19 -           Wound 03/26/19 0900 Left chest incision (Active)   Dressing Appearance open to air 4/19/2019  7:20 AM   Closure Liquid skin adhesive 4/19/2019  7:20 AM   Base clean;dry 4/19/2019  7:20 AM   Periwound dry;intact 4/19/2019  7:20 AM   Drainage Amount none 4/19/2019  7:20 AM   Dressing Care, Wound open to air 4/19/2019  7:20 AM         OT Recommendation and Plan    Anticipated Equipment Needs At Discharge (OT Eval): commode, 3-in-1, shower chair, tub bench  Planned Therapy Interventions (OT Eval): BADL retraining, cognitive/visual perception retraining, functional balance retraining, neuromuscular control/coordination retraining, strengthening exercise, transfer/mobility retraining            OT IRF GOALS     Row Name 04/17/19 1500 04/09/19 1400          Bathing Goal 1 (OT-IRF)    Activity/Device (Bathing Goal 1, OT-IRF)  bathing skills, all  -DN  bathing skills, all  -DN     [place] Level (Bathing Goal 1, OT-IRF)  supervision required;verbal cues required  -DN  supervision required;verbal cues required  -DN     Time Frame (Bathing Goal 1, OT-IRF)  short term goal (STG)  -DN  short term goal (STG)  -DN     Progress/Outcomes (Bathing Goal 1, OT-IRF)  goal met;goal revised this date  -DN  goal met;goal revised this date  -DN        Bathing Goal 2 (OT-IRF)    Activity/Device (Bathing Goal 2, OT-IRF)  bathing skills, all  -DN  bathing skills, all  -DN     [place] Level (Bathing Goal 2, OT-IRF)  supervision required  -DN  supervision required  -DN     Time Frame (Bathing Goal 2, OT-IRF)  long term goal (LTG)  -DN  long term goal (LTG)  -DN     Progress/Outcomes (Bathing Goal 2, OT-IRF)  goal ongoing   -DN  goal ongoing  -DN        UB Dressing Goal 1 (OT-IRF)    Activity/Device (UB Dressing Goal 1, OT-IRF)  upper body dressing  -DN  upper body dressing  -DN     [place] (UB Dress Goal 1, OT-IRF)  supervision required  -DN  supervision required  -DN     Time Frame (UB Dressing Goal 1, OT-IRF)  short term goal (STG)  -DN  short term goal (STG)  -DN     Progress/Outcomes (UB Dressing Goal 1, OT-IRF)  goal met;goal ongoing  -DN  goal met;goal ongoing  -DN        UB Dressing Goal 2 (OT-IRF)    Activity/Device (UB Dressing Goal 2, OT-IRF)  upper body dressing  -DN  upper body dressing  -DN     [place] (UB Dress Goal 2, OT-IRF)  supervision required  -DN  supervision required  -DN     Time Frame (UB Dressing Goal 2, OT-IRF)  long term goal (LTG)  -DN  long term goal (LTG)  -DN     Progress/Outcomes (UB Dressing Goal 2, OT-IRF)  goal ongoing;goal met  -DN  goal ongoing;goal met  -DN        LB Dressing Goal 1 (OT-IRF)    Activity/Device (LB Dressing Goal 1, OT-IRF)  lower body dressing  -DN  lower body dressing  -DN     [place] (LB Dressing Goal 1, OT-IRF)  supervision required  -DN  minimum assist (75% or more patient effort);verbal cues required;tactile cues required  -DN     Time Frame (LB Dressing Goal 1, OT-IRF)  short term goal (STG)  -DN  short term goal (STG)  -DN     Progress/Outcomes (LB Dressing Goal 1, OT-IRF)  goal revised this date  -DN  goal met;goal ongoing  -DN        LB Dressing Goal 2 (OT-IRF)    Activity/Device (LB Dressing Goal 2, OT-IRF)  lower body dressing  -DN  lower body dressing  -DN     [place] (LB Dressing Goal 2, OT-IRF)  verbal cues required;supervision required  -DN  verbal cues required;tactile cues required;contact guard assist  -DN     Time Frame (LB Dressing Goal 2, OT-IRF)  long term goal (LTG)  -DN  long term goal (LTG)  -DN     Progress/Outcomes (LB Dressing Goal 2, OT-IRF)  --  goal revised this date  -DN        Grooming Goal 1 (OT-IRF)    Activity/Device (Grooming  Goal 1, OT-IRF)  grooming skills, all  -DN  grooming skills, all  -DN     [place] (Grooming Goal 1, OT-IRF)  supervision required  -DN  supervision required  -DN     Time Frame (Grooming Goal 1, OT-IRF)  short term goal (STG)  -DN  short term goal (STG)  -DN     Progress/Outcomes (Grooming Goal 1, OT-IRF)  goal ongoing  -DN  goal ongoing  -DN        Grooming Goal 2 (OT-IRF)    Activity/Device (Grooming Goal 2, OT-IRF)  grooming skills, all  -DN  grooming skills, all  -DN     [place] (Grooming Goal 2, OT-IRF)  supervision required  -DN  supervision required  -DN     Time Frame (Grooming Goal 2, OT-IRF)  long term goal (LTG)  -DN  long term goal (LTG)  -DN     Progress/Outcomes (Grooming Goal 2, OT-IRF)  goal revised this date  -DN  goal revised this date  -DN        Toileting Goal 1 (OT-IRF)    Activity/Device (Toileting Goal 1, OT-IRF)  toileting skills, all  -DN  toileting skills, all  -DN     [place] Level (Toileting Goal 1, OT-IRF)  supervision required  -DN  minimum assist (75% or more patient effort);verbal cues required;tactile cues required  -DN     Time Frame (Toileting Goal 1, OT-IRF)  short term goal (STG)  -DN  short term goal (STG)  -DN     Progress/Outcomes (Toileting Goal 1, OT-IRF)  goal met;goal revised this date  -DN  goal met;goal revised this date  -DN        Toileting Goal 2 (OT-IRF)    Activity/Device (Toileting Goal 2, OT-IRF)  toileting skills, all  -DN  toileting skills, all  -DN     [place] Level (Toileting Goal 2, OT-IRF)  supervision required  -DN  contact guard assist;verbal cues required;tactile cues required  -DN     Time Frame (Toileting Goal 2, OT-IRF)  long term goal (LTG)  -DN  long term goal (LTG)  -DN     Progress/Outcomes (Toileting Goal 2, OT-IRF)  goal revised this date  -DN  goal ongoing  -DN        Self-Feeding Goal 1 (OT-IRF)    Activity/Device (Self-Feeding Goal 1, OT-IRF)  self-feeding skills, all  -DN  self-feeding skills, all  -DN     [place]  (Self-Feeding Goal 1, OT-IRF)  supervision required  -DN  supervision required  -DN     Time Frame (Self-Feeding Goal 1, OT-IRF)  short term goal (STG)  -DN  short term goal (STG)  -DN     Progress/Outcomes 1 (Self-Feeding Goal, OT-IRF)  goal met;goal ongoing  -DN  goal met;goal ongoing  -DN        Self-Feeding Goal 2 (OT-IRF)    Activity/Device (Self-Feeding Goal 2, OT-IRF)  self-feeding skills, all  -DN  self-feeding skills, all  -DN     [place] (Self-Feeding Goal 2, OT-IRF)  supervision required  -DN  supervision required  -DN     Time Frame (Self-Feeding Goal 2, OT-IRF)  long term goal (LTG)  -DN  long term goal (LTG)  -DN     Progress/Outcomes (Self-Feeding Goal 2, OT-IRF)  goal met;goal ongoing  -DN  goal met;goal ongoing  -DN        Caregiver Training Goal 2 (OT-IRF)    Caregiver Training Goal 2 (OT-IRF)  pt caregiver to be independent with any assist pt needs with adls, transfers and HEP for d/c home  -DN  pt caregiver to be independent with any assist pt needs with adls, transfers and HEP for d/c home  -DN     Time Frame (Caregiver Training Goal 2, OT-IRF)  long term goal (LTG)  -DN  long term goal (LTG)  -DN     Progress/Outcomes (Caregiver Training Goal 2, OT-IRF)  goal ongoing  -DN  goal ongoing  -DN       User Key  (r) = Recorded By, (t) = Taken By, (c) = Cosigned By    Initials Name Provider Type    Reg Bolden OT Occupational Therapist          Occupational Therapy Education     Title: PT OT SLP Therapies (In Progress)     Topic: Occupational Therapy (Done)     Point: ADL training (Done)     Description: Instruct learner(s) on proper safety adaptation and remediation techniques during self care or transfers.   Instruct in proper use of assistive devices.    Learning Progress Summary           Patient Acceptance, E,TB,D, VU by DN at 4/18/2019 12:19 PM    Comment:  tub transfers to tub bench at RWX level    Acceptance, E,TB,D, VU,NR by DN at 4/15/2019 12:04 PM    Comment:  pt presents with  carryover of adapitive adls, functional transfers, and overall strength with min vc for safety    Acceptance, E,TB,D, VU,NR by DN at 4/9/2019  2:25 PM    Comment:  jay adls, adaptive visual scanning, transfer training    Acceptance, E,TB,D, VU,NR by DN at 4/8/2019 12:16 PM    Comment:  transfer training, jay skills with adls, safety,etc    Acceptance, E,TB,D, VU,NR by DN at 4/4/2019  3:19 PM    Comment:  theraputty exercises, jay adls and transfer trainning    Acceptance, E,TB,D, NR by DN at 3/26/2019 12:06 PM    Comment:  jay adls and commode/shower transfers, still max vc for all due to cognition and visual impairments    Acceptance, E,TB,D, VU,NR by DN at 3/25/2019  5:23 PM    Comment:  OT role in rehab, transfer traning, jay adls                   Point: Home exercise program (Done)     Description: Instruct learner(s) on appropriate technique for monitoring, assisting and/or progressing therapeutic exercises/activities.    Learning Progress Summary           Patient Acceptance, E,TB,D, VU by DN at 4/18/2019 12:19 PM    Comment:  tub transfers to tub bench at RWX level    Acceptance, E,TB,D, VU,NR by DN at 4/15/2019 12:04 PM    Comment:  pt presents with carryover of adapitive adls, functional transfers, and overall strength with min vc for safety    Acceptance, E,TB,D, VU,NR by DN at 4/9/2019  2:25 PM    Comment:  jay adls, adaptive visual scanning, transfer training    Acceptance, E,TB,D, VU,NR by DN at 4/8/2019 12:16 PM    Comment:  transfer training, jay skills with adls, safety,etc    Acceptance, E,TB,D, VU,NR by DN at 4/4/2019  3:19 PM    Comment:  theraputty exercises, jay adls and transfer trainning    Acceptance, E,TB,D, NR by DN at 3/26/2019 12:06 PM    Comment:  jay adls and commode/shower transfers, still max vc for all due to cognition and visual impairments    Acceptance, E,TB,D, VU,NR by DN at 3/25/2019  5:23 PM    Comment:  OT role in rehab, transfer traning, jay adls                    Point: Precautions (Done)     Description: Instruct learner(s) on prescribed precautions during self-care and functional transfers.    Learning Progress Summary           Patient Acceptance, E,TB,D, VU by DN at 4/18/2019 12:19 PM    Comment:  tub transfers to tub bench at RWX level    Acceptance, E,TB,D, VU,NR by DN at 4/15/2019 12:04 PM    Comment:  pt presents with carryover of adapitive adls, functional transfers, and overall strength with min vc for safety    Acceptance, E,TB,D, VU,NR by DN at 4/9/2019  2:25 PM    Comment:  jay adls, adaptive visual scanning, transfer training    Acceptance, E,TB,D, VU,NR by DN at 4/8/2019 12:16 PM    Comment:  transfer training, jay skills with adls, safety,etc    Acceptance, E,TB,D, VU,NR by DN at 4/4/2019  3:19 PM    Comment:  theraputty exercises, jay adls and transfer trainning    Acceptance, E,TB,D, NR by DN at 3/26/2019 12:06 PM    Comment:  jay adls and commode/shower transfers, still max vc for all due to cognition and visual impairments    Acceptance, E,TB,D, VU,NR by DN at 3/25/2019  5:23 PM    Comment:  OT role in rehab, transfer traning, jay adls                   Point: Body mechanics (Done)     Description: Instruct learner(s) on proper positioning and spine alignment during self-care, functional mobility activities and/or exercises.    Learning Progress Summary           Patient Acceptance, E,TB,D, VU by DN at 4/18/2019 12:19 PM    Comment:  tub transfers to tub bench at RWX level    Acceptance, E,TB,D, VU,NR by DN at 4/15/2019 12:04 PM    Comment:  pt presents with carryover of adapitive adls, functional transfers, and overall strength with min vc for safety    Acceptance, E,TB,D, VU,NR by DN at 4/9/2019  2:25 PM    Comment:  jay adls, adaptive visual scanning, transfer training    Acceptance, E,TB,D, VU,NR by DN at 4/8/2019 12:16 PM    Comment:  transfer training, jay skills with adls, safety,etc    Acceptance, E,TB,D, VU,NR by DN at 4/4/2019  3:19 PM     Comment:  theraputty exercises, jay adls and transfer trainning    Acceptance, E,TB,D, NR by DN at 3/26/2019 12:06 PM    Comment:  jay adls and commode/shower transfers, still max vc for all due to cognition and visual impairments    Acceptance, E,TB,D, VU,NR by DN at 3/25/2019  5:23 PM    Comment:  OT role in rehab, transfer traning, jay adls                               User Key     Initials Effective Dates Name Provider Type Discipline    DN 06/08/18 -  Reg Mckinney OT Occupational Therapist OT                       Time Calculation:     Time Calculation- OT     Row Name 04/19/19 1210             Time Calculation- OT    OT Start Time  0900  -DN      OT Stop Time  0930  -DN      OT Time Calculation (min)  30 min  -DN      OT Received On  04/19/19  -DN        User Key  (r) = Recorded By, (t) = Taken By, (c) = Cosigned By    Initials Name Provider Type    Reg Bolden OT Occupational Therapist          Therapy Charges for Today     Code Description Service Date Service Provider Modifiers Qty    [id number]  OT SELF CARE/MGMT/TRAIN EA 15 MIN 4/18/2019 Reg Mckinney OT GO 4    [id number] HC OT SELF CARE/MGMT/TRAIN EA 15 MIN 4/19/2019 Reg Mckinney OT GO 2                   Reg Mckinney OT  4/19/2019

## 2019-04-19 NOTE — PROGRESS NOTES
Inpatient Rehabilitation Functional Measures Assessment    Functional Measures  VANITA Eating:  [facility] Grooming: [facility] Bathing:  [facility] Upper Body Dressing:  [facility] Lower Body Dressing:  [facility] Toileting:  [facility] Bladder Management  Level of Assistance:  [place]  Frequency/Number of Accidents this Shift:  [facility] Bowel Management  Level of Assistance: [place]  Frequency/Number of Accidents this Shift: [facility] Bed/Chair/Wheelchair Transfer:  [facility] Toilet Transfer:  [facility] Tub/Shower Transfer:  [place]    Previously Documented Mode of Locomotion at Discharge: Field  VANITA Expected Mode of Locomotion at Discharge: [facility] Walk/Wheelchair:  [facility] Stairs:  [facility] Comprehension:  Auditory comprehension is the usual mode. Comprehension  Score = 7, Independent.  Patient comprehends complex/abstract information in  their primary language.  Patient is completely independent for auditory  comprehension.  There are no activity limitations.  [facility]ANITA Expression:  Vocal expression is the usual mode. Expression Score = 6,  Modified Independent.  Patient expresses complex/abstract information in their  primary language with only mild difficulty with tasks.  [facility]ANITA Social Interaction:  Social Interaction Score = 7, Independent. Patient is  completely independent for social interaction.  There are no activity  limitations.  [facility] Problem Solving:  Patient does not make appropriate decisions in order to  solve complex problems without assistance from a helper. Problem Solving Score =  4, Minimal Direction. Patient makes appropriate decisions in order to solve  routine problems 75-90% of the time. Patient requires minimal/occasional  direction for the following behavior(s): Difficulty completing tasks. Difficulty  with self-monitoring.  VANITA Memory:  Activity was not observed.    Therapy Mode Minutes  Occupational Therapy: [place]  Physical Therapy: [place]  Speech Language Pathology:   [place]    Signed by: Pallavi Tinajero RN

## 2019-04-19 NOTE — PROGRESS NOTES
Inpatient Rehabilitation Functional Measures Assessment and Plan of Care    Plan of Care  Updated Problems/Interventions  Field    Functional Measures  [facility] Eating:  Eating Score = 5. Patient is supervision/set-up for eating,  requiring: Stand by assistance. Pouring liquids. No assistive devices were  required.  [facility] Grooming: [facility] Bathing:  [facility] Upper Body Dressing:  [facility] Lower Body Dressing:  [facility] Toileting:  Toileting Score = 5.  Patient is supervision/set-up for  toileting, requiring: Stand by assistance. Verbal cuing, prompting, or  instructing. Setting out toileting equipment. Patient requires the following  assistive device(s): Grab bar.    [facility] Bladder Management  Level of Assistance:  Bladder Score = 5.  Patient is supervision/set-up for  bladder management, requiring: Stand by assistance. Verbal cuing, prompting, or  instructing. Setting out equipment. Emptying equipment. Patient requires the  following assistive device(s):  Urinal.  Frequency/Number of Accidents this Shift:  Bladder accidents this shift:  0 .  Patient has not had an accident, but used a device/medication this shift  requiring: URINAL .    [facility] Bowel Management  Level of Assistance: [place]  Frequency/Number of Accidents this Shift: [facility] Bed/Chair/Wheelchair Transfer:  Bed/chair/wheelchair Transfer Score = 5.  Patient is supervision/set-up for transferring to and from the  bed/chair/wheelchair, requiring: Stand by assistance. Verbal cuing, prompting,  or instructing. Set up (positioning equipment, lock brakes and/or adjust foot  rest). Patient requires the following assistive device(s): Bed rails. Elevated  head of bed. Arm rest. Seating system of wheelchair.  [facility] Toilet Transfer:  Toilet Transfer Score = 5.  Patient is supervision/set-up  for transferring to and from the toilet/commode, requiring: Stand by assistance.  Verbal cuing, prompting, or instructing. Wheelchair management (brakes,  footrests,  armrests). Set up (positioning equipment, lock brakes and/or adjust  foot rests). Patient requires the following assistive device(s): Grab bars.  VANITA Tub/Shower Transfer:  [place]    Previously Documented Mode of Locomotion at Discharge: Field  VANITA Expected Mode of Locomotion at Discharge: [facility] Walk/Wheelchair:  [facility] Stairs:  [facility] Comprehension:  [facility] Expression:  [facility] Social Interaction:  [facility] Problem Solving:  [facility] Memory:  [place]    Therapy Mode Minutes  Occupational Therapy: [place]  Physical Therapy: [place]  Speech Language Pathology:  [place]    Signed by: YAW Delgado

## 2019-04-19 NOTE — PROGRESS NOTES
Discussed referral to [facility] with Dr James.  Referral made today.   Family teaching for day pass on Sunday completed.

## 2019-04-19 NOTE — PROGRESS NOTES
LOS: 26 days   Patient Care Team:  Qasim Asif MD as PCP - General  Qasim Asif MD as PCP - Family Medicine    Chief Complaint:   Left PCA / posterior MCA territories ischemic infarct March 19, 2019  multifocal restricted diffusion centered posteriorly in the corpus callosum left of midline adjacent to the atrium of the lateral ventricle, at the parieto-occipital cortical interface, in the left corona radiata and along the lateral margin of the left thalamus. There also appears to be subtle low ADC signal continuing cranially along the left posterior parietal cortex with possible involvement of the right as well  Right visual field deficit  Impaired cognition/mobility/self care          Subjective     History of Present Illness    Subjective      He continues with better strength.  Tolerates therapies  Ambulates without dorsiflexion assistance  Gait 200 feet contact-guard with walker.  4stairs contact-guard.  Plan for therapeutic day pass on Sunday.    History taken from: patient    Objective     Vital Signs  Temp:  [97.1 °F (36.2 °C)-98.7 °F (37.1 °C)] 98.7 °F (37.1 °C)  Heart Rate:  [76-82] 82  Resp:  [16-18] 16  BP: (142-156)/(71-82) 156/82    Objective   MENTAL STATUS -  AWAKE / ALERT  HEENT-  nc/at  LUNGS - CTA, NO WHEEZES, RALES OR RHONCHI  HEART- RRR, NO RUB, MURMUR, OR GALLOP  ABD - NORMOACTIVE BOWEL SOUNDS, SOFT, NT.  Obese.    EXT - NO EDEMA OR CYANOSIS  NEURO -  He is oriented to person and situation.    Takes resistance in the right upper extremity and right lower extremity     Right visual field cut          Results Review:     I reviewed the patient's new clinical results.   Glucose   Date/Time Value Ref Range Status   04/19/2019 1610 96 70 - 130 mg/dL Final   04/19/2019 1106 91 70 - 130 mg/dL Final   04/19/2019 0709 98 70 - 130 mg/dL Final   04/18/2019 2102 82 70 - 130 mg/dL Final   04/18/2019 1559 99 70 - 130 mg/dL Final   04/18/2019 1056 109 70 - 130 mg/dL Final   04/18/2019 0703  92 70 - 130 mg/dL Final   04/17/2019 2020 114 70 - 130 mg/dL Final           Medication Review: reviewed    Assessment/Plan       Acute ischemic left PCA stroke (CMS/HCC)    Status post placement of implantable loop recorder    HTN (hypertension)    Diabetes mellitus (CMS/HCC)    Hyperlipidemia    Morbid obesity (CMS/HCC)    Anxiety disorder    Abdominal wall abscess    Stroke (cerebrum) (CMS/HCC)    Vitamin D deficiency    History of fatty infiltration of liver      Assessment & Plan   Left PCA / posterior MCA territories ischemic infarct March 19, 2019  multifocal restricted diffusion centered posteriorly in the corpus callosum left of midline adjacent to the atrium of the lateral ventricle, at the parieto-occipital cortical interface, in the left corona radiata and along the lateral margin of the left thalamus. There also appears to be subtle low ADC signal continuing cranially along the left posterior parietal cortex with possible involvement of the right as well.     HTN- uncontrolled with /130 and 206/108 with ER visit on March 15, 2019 - multi-drug regimen - amlodipine/atenolol/clonidine/lisinopril/dyazide  March 27- holding beds while on IVF hydration as BP low yesterday, concern for perfusion Continues IVF. Recheck BMP in AM. /82 at 13:15 pm  March 28-/86 this AM  DC IVF.  Will resume Lisinopril and atenolol at 1/2 total daily doses initially dividing out bid  Lisinopril 10 mg q 12 hours and atenolol 12.5 mg q 12 hours and adjust meds based on response.  Remains off amlodipine 10 mg daily and Dyazide 37.5/25 and clonidine.  Held Farxiga today as was hydrating with IVF, resume tomorrow.  March 29 - /87 last PM and 175/80 this AM  Will increase atenolol to 25 mg q 12 hours and Lisinopril to 20 mg q 12 hours (both back to previous total daily dose but divided out) and remains off amlodpine 10 mg daily and clonidine 0.1 mg q 12 hours and Dyazide 37.5/25 mg daily.  Per Neruology  - Maintain -180, DBP<110.  April 8-blood pressure range 142//88-150s//79.  Norvasc increased to 5 mg on April 2.  Discussed with patient possibly titrating up on Norvasc further to 10 mg daily if needed but may divide out to 5 mg twice a day for more even control.  We will continue to monitor his blood pressure pattern for now  April 12-continue to follow on present pattern.  Blood pressure was elevated 188 last evening but otherwise typically in target range.  Once further out from stroke, would adjust target range to typical therapeutic range  April 13 and 14 - per notes above - Per Neurology - Maintain -180, DBP<110.  Continue meds as current.     TEAM CONF - April 16- BED SUP. TRANSFERS CTG. 4 STAIRS CTG-MIN.  80 FEET CRG-MIN 2. RW, NO AFO. TURNS TO RIGHT DIFFICULT DUE TO VISION . R VISUAL FIELD CUT. ADLS CTG-SBA. ELASTIC SHOELACES WOULD HELP, OTHERWISE MIN ASSIST TO TIE SHOES.  DOES NOT PAY ATTENTION TO HIS ENVIRONMENT. MIN CUES FOR SEQUENCING.  IMPAIRED MEMORY AND ATTENTION TO DETAIL WITH COGNITIVE TASKS, VISUAL DEFICITS CAN LEAD TO MIS-READ INSTRUCTIONS.  ELOS - ONE WEEK.         Familia James MD  04/19/19  6:24 PM    Time: 10min

## 2019-04-19 NOTE — THERAPY TREATMENT NOTE
Inpatient Rehabilitation - Occupational Therapy Treatment Note    [facility]     Patient Name: Nayan Ryan  : 1965  MRN: [id number]    Today's Date: 2019                 Admit Date: 3/24/2019      Visit Dx:  No diagnosis found.    Patient Active Problem List   Diagnosis   • Acute ischemic left PCA stroke (CMS/HCC)   • Status post placement of implantable loop recorder   • HTN (hypertension)   • Diabetes mellitus (CMS/HCC)   • Hyperlipidemia   • Morbid obesity (CMS/HCC)   • Anxiety disorder   • Migraine   • H/O hernia repair   • Abdominal wall abscess   • Back pain   • Stroke (cerebrum) (CMS/HCC)   • Vitamin D deficiency   • History of fatty infiltration of liver         Therapy Treatment    IRF Treatment Summary     Row Name 19 1601 19 1158 19 1038       Evaluation/Treatment Time and Intent    Subjective Information  no complaints  -DN  no complaints  -DN  no complaints  -SL    Existing Precautions/Restrictions  fall  -DN  fall  -DN  fall  -SL    Document Type  therapy note (daily note)  -DN  therapy note (daily note)  -DN  therapy note (daily note)  -SL    Mode of Treatment  occupational therapy  -DN  occupational therapy  -DN  individual therapy;speech-language pathology  -SL    Patient/Family Observations  --  --  cooperative  -SL    Start Time (Evaluation/Treatment)  --  --  1030  -SL    Stop Time (Evaluation/Treatment)  --  --  1030  -SL    Recorded by [DN] Reg Mckinney OT [DN] Reg Mckinney OT [SL] Narcisa Bonner MS CCC-SLP    Row Name 19 0833 19 0800          Evaluation/Treatment Time and Intent    Subjective Information  no complaints  -LB,LS,LB2  no complaints  -SL     Existing Precautions/Restrictions  fall  -LB,LS,LB2  fall  -SL     Document Type  therapy note (daily note)  -LB,LS,LB2  therapy note (daily note)  -SL     Mode of Treatment  physical therapy  -LB,LS,LB2  individual therapy;speech-language pathology  -SL     Patient/Family Observations  Pt arrives  in w/c with ST  -LB,LS,LB2  alert, cooperative  -SL     Start Time (Evaluation/Treatment)  --  0800  -SL     Stop Time (Evaluation/Treatment)  --  0830  -SL     Recorded by [LB,LS,LB2] Eliane Blackburn, PT (r) Naomy Mendenhall, PT Student (t) Eliane Blackburn, PT (c) [SL] Narcisa Bonner MS CCC-SLP     Row Name 04/19/19 1158             Safety Awareness/Health Promotion    Additional Documentation  Fall Prevention (Row)  -DN      Recorded by [DN] Reg Mckinney, OT      Row Name 04/19/19 1158 04/19/19 0833          Cognition/Psychosocial- PT/OT    Affect/Mental Status (Cognitive)  WFL  -DN  WFL  -LB,LS,LB2     Orientation Status (Cognition)  oriented x 3  -DN  --     Follows Commands (Cognition)  follows one step commands;over 90% accuracy  -DN  follows one step commands;verbal cues/prompting required;repetition of directions required  -LB,LS,LB2     Personal Safety Interventions  fall prevention program maintained;gait belt  -DN  fall prevention program maintained;gait belt;muscle strengthening facilitated;nonskid shoes/slippers when out of bed  -LB,LS,LB2     Cognitive Function (Cognitive)  attention deficit  -DN  --     Attention Deficit (Cognitive)  distractible in noisy environment  -DN  --     Executive Function Deficit (Cognition)  moderate deficit  -DN  --     Memory Deficit (Cognitive)  moderate deficit  -DN  moderate deficit  -LB,LS,LB2     Safety Deficit (Cognitive)  impulsivity;insight into deficits/self awareness  -DN  impulsivity;insight into deficits/self awareness;problem solving  -LB,LS,LB2     Recorded by [DN] Reg Mckinney, OT [LB,LS,LB2] Eliane Blackburn, PT (r) Naomy Mendenhall, PT Student (t) Eliane Blackburn, PT (c)     Row Name 04/19/19 0833             Sit-Stand Transfer    Sit-Stand [place] (Transfers)  contact guard;stand by assist  -LB,LS,LB2      Assistive Device (Sit-Stand Transfers)  wheelchair  -LB,LS,LB2      Recorded by [LB,LS,LB2] Eliane Blackburn, PT (r) Naomy Mendenhall, PT Student (t) Eliane Blackburn  B, PT (c)      Row Name 04/19/19 0833             Stand-Sit Transfer    Stand-Sit [place] (Transfers)  contact guard;stand by assist  -LB,LS,LB2      Assistive Device (Stand-Sit Transfers)  wheelchair  -LB,LS,LB2      Recorded by [LB,LS,LB2] Eliane Blackburn, PT (r) Naomy Mendenhall, PT Student (t) Eliane Blackburn, PT (c)      Row Name 04/19/19 1158             Toilet Transfer    Type (Toilet Transfer)  stand pivot/stand step  -DN      [place] Level (Toilet Transfer)  verbal cues;contact guard  -DN      Assistive Device (Toilet Transfer)  walker, front-wheeled;grab bars/safety frame;raised toilet seat  -DN      Recorded by [DN] Reg Mckinney, OT      Row Name 04/19/19 1158             Shower Transfer    Type (Shower Transfer)  stand pivot/stand step  -DN      [place] Level (Shower Transfer)  verbal cues;contact guard  -DN      Assistive Device (Shower Transfer)  walker, front-wheeled;grab bars/tub rail  -DN      Recorded by [DN] Reg Mckinney, OT      Row Name 04/19/19 0833             Car Transfer    Type (Car Transfer)  stand pivot/stand step  -LB,LS,LB2      [place] Level (Car Transfer)  verbal cues;contact guard;stand by assist  -LB,LS,LB2      Assistive Device (Car Transfer)  wheelchair  -LB,LS,LB2      Recorded by [LB,LS,LB2] Eliane Blackburn, PT (r) Naomy Mendenhall, PT Student (t) Eliane Blackburn, PT (c)      Row Name 04/19/19 0833             Gait/Stairs Assessment/Training    [place] Level (Gait)  verbal cues;contact guard  -LB,LS,LB2      Assistive Device (Gait)  walker, front-wheeled  -LB,LS,LB2      Distance in Feet (Gait)  200 x 2, 50 x 4  -LB,LS,LB2      Pattern (Gait)  step-through  -LB,LS,LB2      Deviations/Abnormal Patterns (Gait)  nancy decreased  -LB,LS,LB2      Bilateral Gait Deviations  weight shift ability decreased;heel strike decreased  -LB,LS,LB2      Left Sided Gait Deviations  weight shift ability decreased  -LB,LS,LB2      Right Sided Gait Deviations  heel strike  decreased decreased knee stability  -LB,LS,LB2      [place] Level (Stairs)  verbal cues;contact guard  -LB,LS,LB2      Handrail Location (Stairs)  both sides  -LB,LS,LB2      Number of Steps (Stairs)  4  -LB,LS,LB2      Ascending Technique (Stairs)  step-to-step  -LB,LS,LB2      Descending Technique (Stairs)  step-to-step  -LB,LS,LB2      Stairs, Safety Issues  balance decreased during turns  -LB,LS,LB2      Comment (Gait/Stairs)  Ambulated to sun room and practiced pathfinding on way back to gym. Ambulated with PT providing resistance to L to prompt pt to shift onto R LE. Continued decreased step length on L due to lack of weight shift onto R LE, especially when fatigued. Pt able to properly sequence for stair navigation with only 1 prompt. Able to verbalize proper pattern this date   -LB,LS,LB2      Recorded by [LB,LS,LB2] Eliane Blackburn, PT (r) Naomy Mendenhall, PT Student (t) Eliane Blackburn, PT (c)      Row Name 04/19/19 0812             Safety Issues, Functional Mobility    Safety Issues Affecting Function (Mobility)  ability to follow commands;insight into deficits/self awareness;problem solving;judgment  -LB,LS,LB2      Impairments Affecting Function (Mobility)  balance;cognition;coordination;motor control;strength  -LB,LS,LB2      Recorded by [LB,LS,LB2] Eliane Blackburn, PT (r) Naomy Mendenhall, PT Student (t) Eliane Blackburn, PT (c)      Row Name 04/19/19 1155             Bathing Assessment/Treatment    Bathing [place] Level  bathing skills  -DN      Assistive Device (Bathing)  grab bar/tub rail;hand held shower spray hose;tub bench  -DN      Bathing Position  supported sitting;supported standing  -DN      Bathing Setup Assistance  adjust water temperature  -DN      Recorded by [DN] Reg Mckinney OT      Row Name 04/19/19 115             Upper Body Dressing Assessment/Treatment    Upper Body Dressing Task  upper body dressing skills;pull over garment;supervision  -DN      Upper Body Dressing Position   supported sitting  -DN      Set-up Assistance (Upper Body Dressing)  obtain clothing  -DN      Recorded by [BRENDAN] Reg Mckinney OT      Row Name 04/19/19 1158             Lower Body Dressing Assessment/Treatment    Lower Body Dressing [place] Level  doff;don;pants/bottoms;shoes/slippers;socks;underwear;supervision;set up;verbal cues  -DN      Lower Body Dressing Position  supported sitting;supported standing  -DN      Lower Body Dressing Setup Assistance  obtain clothing  -DN      Recorded by [BRENDAN] Reg Mckinney OT      Row Name 04/19/19 1158             Grooming Assessment/Treatment    Grooming [place] Level  grooming skills;deodorant application;hair care, combing/brushing;oral care regimen;supervision;verbal cues  -DN      Grooming Position  supported sitting  -DN      Recorded by [BRNEDAN] Reg Mckinney OT      Row Name 04/19/19 1158             Toileting Assessment/Treatment    Toileting [place] Level  toileting skills;adjust/manage clothing;perform perineal hygiene  -DN      Assistive Device Use (Toileting)  grab bar/safety frame  -DN      Toileting Position  supported sitting  -DN      Recorded by [BRENDAN] Reg Mckinney, OT      Row Name 04/19/19 0833             Pain Assessment    Additional Documentation  Pain Scale: Numbers Pre/Post-Treatment (Group)  -LB,LS,LB2      Recorded by [LB,LS,LB2] Eliane Blackburn, PT (r) Naomy Mendenhall, PT Student (t) Eliane Blackburn, PT (c)      Row Name 04/19/19 1601 04/19/19 1158 04/19/19 0833       Pain Scale: Numbers Pre/Post-Treatment    Pain Scale: Numbers, Pretreatment  0/10 - no pain  -DN  0/10 - no pain  -DN  0/10 - no pain  -LB,LS,LB2    Pain Scale: Numbers, Post-Treatment  0/10 - no pain  -DN  0/10 - no pain  -DN  0/10 - no pain  -LB,LS,LB2    Recorded by [BRENDAN] Reg Mckinney, OT [DN] Reg Mckinney, OT [LB,LS,LB2] Eliane Blackburn, PT (r) Naomy Mendenhall, PT Student (t) Eliane Blackburn, PT (c)    Row Name 04/19/19 0881             Sitting Balance Activity     Activities Performed (Sitting, Balance Training)  sit to stand activity with arm support  -LB,LS,LB2      Support Needed (Sitting, Balance Training)  CGA  -LB,LS,LB2      Comment (Sitting, Balance Training)  Practiced STS from low cushioned chairs, couch, etc. VC to scoot forward and for anterior weight shift  -LB,LS,LB2      Recorded by [LB,LS,LB2] Eliane Blackburn, PT (r) Naomy Mendenhall, PT Student (t) Eliane Blackburn, PT (c)      Row Name 04/19/19 0840             Standing Balance Activity    Activities Performed (Standing, Balance Training)  unilateral stance  -LB,LS,LB2      Support Needed for Balance (Standing, Balance Training)  uses both upper extremities for support;CGA  -LB,LS,LB2      Upper Extremity Activity with Device (Standing, Balance Training)  other (see comments) jay bar  -LB,LS,LB2      Comment (Standing, Balance Training)  static stance on R LE 4 x 30 sec to emphasis knee control and weight shift onto R  -LB,LS,LB2      Recorded by [LB,LS,LB2] Eliane Blackburn, PT (r) Naomy Mendenhall, PT Student (t) Eliane Blackburn, PT (c)      Row Name 04/19/19 0832             Dynamic Balance Activity    Therapeutic Training Performed (Dynamic Balance)  -- forward stepping over dalia  -LB,LS,LB2      Support Needed for Balance (Dynamic Balance Training)  CGA;minimal external support for balance, 75% patient effort  -LB,LS,LB2      Upper Extremity Activity with Device (Dynamic Balance Training)  other (see comments) jay bar on R + HHA on L  -LB,LS,LB2      Comment (Dynamic Balance Training)  2 x 10 reps of fwd and lateral stepping over dalia with L LE to emphasize weight shift onto R LE. Pt with fatigue and postural sway noted  -LB,LS,LB2      Recorded by [LB,LS,LB2] Eliane Blackburn, PT (r) Naomy Mendenhall, PT Student (t) Eliane Blackburn, PT (c)      Row Name 04/19/19 1601             Upper Extremity Seated Therapeutic Exercise    Performed, Seated Upper Extremity (Therapeutic Exercise)  shoulder flexion/extension;shoulder  abduction/adduction;digit flexion/extension;wrist flexion/extension;forearm supination/pronation;elbow flexion/extension  -DN      Device, Seated Upper Extremity (Therapeutic Exercise)  restorator/arm bike;free weights, barbell handgripper  -DN      Exercise Type, Seated Upper Extremity (Therapeutic Exercise)  AROM (active range of motion)  -DN      Expected Outcomes, Seated Upper Extremity (Therapeutic Exercise)  improve functional tolerance, self-care activity  -DN      Sets/Reps Detail, Seated Upper Extremity (Therapeutic Exercise)  2# dumbell, handgripper, 2# dowel, standing for 3 minutes with arm bike  -DN      Transfers Skills, Training to Functional Activity, Seated Upper Extremity (Therapeutic Exercise)  beginning to transfer skills to functional activity  -DN      Recorded by [BRENDAN] Reg Mckinney OT      Row Name 04/19/19 1601 04/19/19 1158 04/19/19 0833       Positioning and Restraints    Pre-Treatment Position  sitting in chair/recliner  -DN  sitting in chair/recliner  -DN  sitting in chair/recliner  -LB,LS,LB2    Post Treatment Position  wheelchair  -DN  wheelchair  -DN  chair  -LB,LS,LB2    In Bed  sitting;call light within reach;encouraged to call for assist;exit alarm on  -DN  --  --    In Chair  --  --  sitting;exit alarm on;with other staff;patient within staff view  -LB,LS,LB2    Recorded by [BRENDAN] Reg Mckinney OT [DN] Reg Mckinney OT [LB,LS,LB2] Eliane Blackburn, PT (r) Naomy Mendenhall, PT Student (t) Eliane Blackburn PT (c)      User Key  (r) = Recorded By, (t) = Taken By, (c) = Cosigned By    Initials Name Effective Dates    Narcisa Urias, MS CCC-SLP 06/08/18 -     Reg Bolden OT 06/08/18 -     Eliane Pereira PT 04/03/18 -     Naomy Armas, PT Student 02/04/19 -           Wound 03/26/19 0900 Left chest incision (Active)   Dressing Appearance open to air 4/19/2019  7:20 AM   Closure Liquid skin adhesive 4/19/2019  7:20 AM   Base clean;dry 4/19/2019  7:20 AM   Periwound dry;intact  4/19/2019  7:20 AM   Drainage Amount none 4/19/2019  7:20 AM   Dressing Care, Wound open to air 4/19/2019  7:20 AM         OT Recommendation and Plan    Anticipated Equipment Needs At Discharge (OT Eval): commode, 3-in-1, shower chair, tub bench  Planned Therapy Interventions (OT Eval): BADL retraining, cognitive/visual perception retraining, functional balance retraining, neuromuscular control/coordination retraining, strengthening exercise, transfer/mobility retraining            OT IRF GOALS     Row Name 04/17/19 1500 04/09/19 1400          Bathing Goal 1 (OT-IRF)    Activity/Device (Bathing Goal 1, OT-IRF)  bathing skills, all  -DN  bathing skills, all  -DN     [place] Level (Bathing Goal 1, OT-IRF)  supervision required;verbal cues required  -DN  supervision required;verbal cues required  -DN     Time Frame (Bathing Goal 1, OT-IRF)  short term goal (STG)  -DN  short term goal (STG)  -DN     Progress/Outcomes (Bathing Goal 1, OT-IRF)  goal met;goal revised this date  -DN  goal met;goal revised this date  -DN        Bathing Goal 2 (OT-IRF)    Activity/Device (Bathing Goal 2, OT-IRF)  bathing skills, all  -DN  bathing skills, all  -DN     [place] Level (Bathing Goal 2, OT-IRF)  supervision required  -DN  supervision required  -DN     Time Frame (Bathing Goal 2, OT-IRF)  long term goal (LTG)  -DN  long term goal (LTG)  -DN     Progress/Outcomes (Bathing Goal 2, OT-IRF)  goal ongoing  -DN  goal ongoing  -DN        UB Dressing Goal 1 (OT-IRF)    Activity/Device (UB Dressing Goal 1, OT-IRF)  upper body dressing  -DN  upper body dressing  -DN     [place] (UB Dress Goal 1, OT-IRF)  supervision required  -DN  supervision required  -DN     Time Frame (UB Dressing Goal 1, OT-IRF)  short term goal (STG)  -DN  short term goal (STG)  -DN     Progress/Outcomes (UB Dressing Goal 1, OT-IRF)  goal met;goal ongoing  -DN  goal met;goal ongoing  -DN        UB Dressing Goal 2 (OT-IRF)    Activity/Device (UB Dressing  Goal 2, OT-IRF)  upper body dressing  -DN  upper body dressing  -DN     [place] (UB Dress Goal 2, OT-IRF)  supervision required  -DN  supervision required  -DN     Time Frame (UB Dressing Goal 2, OT-IRF)  long term goal (LTG)  -DN  long term goal (LTG)  -DN     Progress/Outcomes (UB Dressing Goal 2, OT-IRF)  goal ongoing;goal met  -DN  goal ongoing;goal met  -DN        LB Dressing Goal 1 (OT-IRF)    Activity/Device (LB Dressing Goal 1, OT-IRF)  lower body dressing  -DN  lower body dressing  -DN     [place] (LB Dressing Goal 1, OT-IRF)  supervision required  -DN  minimum assist (75% or more patient effort);verbal cues required;tactile cues required  -DN     Time Frame (LB Dressing Goal 1, OT-IRF)  short term goal (STG)  -DN  short term goal (STG)  -DN     Progress/Outcomes (LB Dressing Goal 1, OT-IRF)  goal revised this date  -DN  goal met;goal ongoing  -DN        LB Dressing Goal 2 (OT-IRF)    Activity/Device (LB Dressing Goal 2, OT-IRF)  lower body dressing  -DN  lower body dressing  -DN     [place] (LB Dressing Goal 2, OT-IRF)  verbal cues required;supervision required  -DN  verbal cues required;tactile cues required;contact guard assist  -DN     Time Frame (LB Dressing Goal 2, OT-IRF)  long term goal (LTG)  -DN  long term goal (LTG)  -DN     Progress/Outcomes (LB Dressing Goal 2, OT-IRF)  --  goal revised this date  -DN        Grooming Goal 1 (OT-IRF)    Activity/Device (Grooming Goal 1, OT-IRF)  grooming skills, all  -DN  grooming skills, all  -DN     [place] (Grooming Goal 1, OT-IRF)  supervision required  -DN  supervision required  -DN     Time Frame (Grooming Goal 1, OT-IRF)  short term goal (STG)  -DN  short term goal (STG)  -DN     Progress/Outcomes (Grooming Goal 1, OT-IRF)  goal ongoing  -DN  goal ongoing  -DN        Grooming Goal 2 (OT-IRF)    Activity/Device (Grooming Goal 2, OT-IRF)  grooming skills, all  -DN  grooming skills, all  -DN     [place] (Grooming Goal 2, OT-IRF)   supervision required  -DN  supervision required  -DN     Time Frame (Grooming Goal 2, OT-IRF)  long term goal (LTG)  -DN  long term goal (LTG)  -DN     Progress/Outcomes (Grooming Goal 2, OT-IRF)  goal revised this date  -DN  goal revised this date  -DN        Toileting Goal 1 (OT-IRF)    Activity/Device (Toileting Goal 1, OT-IRF)  toileting skills, all  -DN  toileting skills, all  -DN     [place] Level (Toileting Goal 1, OT-IRF)  supervision required  -DN  minimum assist (75% or more patient effort);verbal cues required;tactile cues required  -DN     Time Frame (Toileting Goal 1, OT-IRF)  short term goal (STG)  -DN  short term goal (STG)  -DN     Progress/Outcomes (Toileting Goal 1, OT-IRF)  goal met;goal revised this date  -DN  goal met;goal revised this date  -DN        Toileting Goal 2 (OT-IRF)    Activity/Device (Toileting Goal 2, OT-IRF)  toileting skills, all  -DN  toileting skills, all  -DN     [place] Level (Toileting Goal 2, OT-IRF)  supervision required  -DN  contact guard assist;verbal cues required;tactile cues required  -DN     Time Frame (Toileting Goal 2, OT-IRF)  long term goal (LTG)  -DN  long term goal (LTG)  -DN     Progress/Outcomes (Toileting Goal 2, OT-IRF)  goal revised this date  -DN  goal ongoing  -DN        Self-Feeding Goal 1 (OT-IRF)    Activity/Device (Self-Feeding Goal 1, OT-IRF)  self-feeding skills, all  -DN  self-feeding skills, all  -DN     [place] (Self-Feeding Goal 1, OT-IRF)  supervision required  -DN  supervision required  -DN     Time Frame (Self-Feeding Goal 1, OT-IRF)  short term goal (STG)  -DN  short term goal (STG)  -DN     Progress/Outcomes 1 (Self-Feeding Goal, OT-IRF)  goal met;goal ongoing  -DN  goal met;goal ongoing  -DN        Self-Feeding Goal 2 (OT-IRF)    Activity/Device (Self-Feeding Goal 2, OT-IRF)  self-feeding skills, all  -DN  self-feeding skills, all  -DN     [place] (Self-Feeding Goal 2, OT-IRF)  supervision required  -DN  supervision  required  -DN     Time Frame (Self-Feeding Goal 2, OT-IRF)  long term goal (LTG)  -DN  long term goal (LTG)  -DN     Progress/Outcomes (Self-Feeding Goal 2, OT-IRF)  goal met;goal ongoing  -DN  goal met;goal ongoing  -DN        Caregiver Training Goal 2 (OT-IRF)    Caregiver Training Goal 2 (OT-IRF)  pt caregiver to be independent with any assist pt needs with adls, transfers and HEP for d/c home  -DN  pt caregiver to be independent with any assist pt needs with adls, transfers and HEP for d/c home  -DN     Time Frame (Caregiver Training Goal 2, OT-IRF)  long term goal (LTG)  -DN  long term goal (LTG)  -DN     Progress/Outcomes (Caregiver Training Goal 2, OT-IRF)  goal ongoing  -DN  goal ongoing  -DN       User Key  (r) = Recorded By, (t) = Taken By, (c) = Cosigned By    Initials Name Provider Type    Reg Bolden OT Occupational Therapist          Occupational Therapy Education     Title: PT OT SLP Therapies (In Progress)     Topic: Occupational Therapy (Done)     Point: ADL training (Done)     Description: Instruct learner(s) on proper safety adaptation and remediation techniques during self care or transfers.   Instruct in proper use of assistive devices.    Learning Progress Summary           Patient Acceptance, E,TB,D, VU by DN at 4/18/2019 12:19 PM    Comment:  tub transfers to tub bench at RWX level    Acceptance, E,TB,D, VU,NR by DN at 4/15/2019 12:04 PM    Comment:  pt presents with carryover of adapitive adls, functional transfers, and overall strength with min vc for safety    Acceptance, E,TB,D, VU,NR by DN at 4/9/2019  2:25 PM    Comment:  jay adls, adaptive visual scanning, transfer training    Acceptance, E,TB,D, VU,NR by DN at 4/8/2019 12:16 PM    Comment:  transfer training, jay skills with adls, safety,etc    Acceptance, E,TB,D, VU,NR by DN at 4/4/2019  3:19 PM    Comment:  theraputty exercises, jay adls and transfer trainning    Acceptance, E,TB,D, NR by DN at 3/26/2019 12:06 PM     Comment:  jay adls and commode/shower transfers, still max vc for all due to cognition and visual impairments    Acceptance, E,TB,D, VU,NR by DN at 3/25/2019  5:23 PM    Comment:  OT role in rehab, transfer traning, jay adls                   Point: Home exercise program (Done)     Description: Instruct learner(s) on appropriate technique for monitoring, assisting and/or progressing therapeutic exercises/activities.    Learning Progress Summary           Patient Acceptance, E,TB,D, VU by DN at 4/18/2019 12:19 PM    Comment:  tub transfers to tub bench at RWX level    Acceptance, E,TB,D, VU,NR by DN at 4/15/2019 12:04 PM    Comment:  pt presents with carryover of adapitive adls, functional transfers, and overall strength with min vc for safety    Acceptance, E,TB,D, VU,NR by DN at 4/9/2019  2:25 PM    Comment:  jay adls, adaptive visual scanning, transfer training    Acceptance, E,TB,D, VU,NR by DN at 4/8/2019 12:16 PM    Comment:  transfer training, jay skills with adls, safety,etc    Acceptance, E,TB,D, VU,NR by DN at 4/4/2019  3:19 PM    Comment:  theraputty exercises, jay adls and transfer trainning    Acceptance, E,TB,D, NR by DN at 3/26/2019 12:06 PM    Comment:  jay adls and commode/shower transfers, still max vc for all due to cognition and visual impairments    Acceptance, E,TB,D, VU,NR by DN at 3/25/2019  5:23 PM    Comment:  OT role in rehab, transfer traning, jay adls                   Point: Precautions (Done)     Description: Instruct learner(s) on prescribed precautions during self-care and functional transfers.    Learning Progress Summary           Patient Acceptance, E,TB,D, VU by DN at 4/18/2019 12:19 PM    Comment:  tub transfers to tub bench at RWX level    Acceptance, E,TB,D, VU,NR by DN at 4/15/2019 12:04 PM    Comment:  pt presents with carryover of adapitive adls, functional transfers, and overall strength with min vc for safety    Acceptance, E,TB,D, VU,NR by DN at 4/9/2019  2:25 PM     Comment:  jay adls, adaptive visual scanning, transfer training    Acceptance, E,TB,D, VU,NR by DN at 4/8/2019 12:16 PM    Comment:  transfer training, jay skills with adls, safety,etc    Acceptance, E,TB,D, VU,NR by DN at 4/4/2019  3:19 PM    Comment:  theraputty exercises, jay adls and transfer trainning    Acceptance, E,TB,D, NR by DN at 3/26/2019 12:06 PM    Comment:  jay adls and commode/shower transfers, still max vc for all due to cognition and visual impairments    Acceptance, E,TB,D, VU,NR by DN at 3/25/2019  5:23 PM    Comment:  OT role in rehab, transfer traning, jay adls                   Point: Body mechanics (Done)     Description: Instruct learner(s) on proper positioning and spine alignment during self-care, functional mobility activities and/or exercises.    Learning Progress Summary           Patient Acceptance, E,TB,D, VU by DN at 4/18/2019 12:19 PM    Comment:  tub transfers to tub bench at RWX level    Acceptance, E,TB,D, VU,NR by DN at 4/15/2019 12:04 PM    Comment:  pt presents with carryover of adapitive adls, functional transfers, and overall strength with min vc for safety    Acceptance, E,TB,D, VU,NR by DN at 4/9/2019  2:25 PM    Comment:  jay adls, adaptive visual scanning, transfer training    Acceptance, E,TB,D, VU,NR by DN at 4/8/2019 12:16 PM    Comment:  transfer training, jay skills with adls, safety,etc    Acceptance, E,TB,D, VU,NR by DN at 4/4/2019  3:19 PM    Comment:  theraputty exercises, jay adls and transfer trainning    Acceptance, E,TB,D, NR by DN at 3/26/2019 12:06 PM    Comment:  jay adls and commode/shower transfers, still max vc for all due to cognition and visual impairments    Acceptance, E,TB,D, VU,NR by DN at 3/25/2019  5:23 PM    Comment:  OT role in rehab, transfer traning, jay adls                               User Key     Initials Effective Dates Name Provider Type Discipline    BRENDAN 06/08/18 -  Reg Mckinney OT Occupational Therapist OT                        Time Calculation:     Time Calculation- OT     Row Name 04/19/19 1602 04/19/19 1210          Time Calculation- OT    OT Start Time  1230  -DN  0900  -DN     OT Stop Time  1300  -DN  0930  -DN     OT Time Calculation (min)  30 min  -DN  30 min  -DN     OT Received On  04/19/19  -DN  04/19/19  -DN       User Key  (r) = Recorded By, (t) = Taken By, (c) = Cosigned By    Initials Name Provider Type    Reg Bolden OT Occupational Therapist          Therapy Charges for Today     Code Description Service Date Service Provider Modifiers Qty    06625775826 HC OT SELF CARE/MGMT/TRAIN EA 15 MIN 4/18/2019 Reg Mckinney OT GO 4    49958515063 HC OT SELF CARE/MGMT/TRAIN EA 15 MIN 4/19/2019 Reg Mckinney OT GO 2    35419475127 HC OT THER PROC EA 15 MIN 4/19/2019 Reg Mckinney OT GO 2                   Reg Mckinney OT  4/19/2019

## 2019-04-19 NOTE — THERAPY TREATMENT NOTE
Inpatient Rehabilitation - Physical Therapy Treatment Note  [facility]     Patient Name: Nayan Ryan  : 1965  MRN: [id number]    Today's Date: 2019                 Admit Date: 3/24/2019      Visit Dx:    No diagnosis found.    Patient Active Problem List   Diagnosis   • Acute ischemic left PCA stroke (CMS/HCC)   • Status post placement of implantable loop recorder   • HTN (hypertension)   • Diabetes mellitus (CMS/HCC)   • Hyperlipidemia   • Morbid obesity (CMS/HCC)   • Anxiety disorder   • Migraine   • H/O hernia repair   • Abdominal wall abscess   • Back pain   • Stroke (cerebrum) (CMS/HCC)   • Vitamin D deficiency   • History of fatty infiltration of liver       Therapy Treatment    IRF Treatment Summary     Row Name 19 1158 19 1038 19 0833       Evaluation/Treatment Time and Intent    Subjective Information  no complaints  -DN  no complaints  -SL  no complaints  -LB,LS,LB2    Existing Precautions/Restrictions  fall  -DN  fall  -SL  fall  -LB,LS,LB2    Document Type  therapy note (daily note)  -DN  therapy note (daily note)  -SL  therapy note (daily note)  -LB,LS,LB2    Mode of Treatment  occupational therapy  -DN  individual therapy;speech-language pathology  -  physical therapy  -LB,LS,LB2    Patient/Family Observations  --  cooperative  -  Pt arrives in w/c with ST  -LB,LS,LB2    Start Time (Evaluation/Treatment)  --  1030  -SL  --    Stop Time (Evaluation/Treatment)  --  1030  -SL  --    Recorded by [DN] Reg Mckinney, OT [SL] Narcisa Bonner MS CCC-SLP [LB,LS,LB2] Eliane Blackburn, PT (r) Naomy Mendenhall PT Student (t) Eliane Blackburn, PT (c)    Row Name 19 0800             Evaluation/Treatment Time and Intent    Subjective Information  no complaints  -SL      Existing Precautions/Restrictions  fall  -SL      Document Type  therapy note (daily note)  -      Mode of Treatment  individual therapy;speech-language pathology  -      Patient/Family Observations  alert,  cooperative  -SL      Start Time (Evaluation/Treatment)  0800  -SL      Stop Time (Evaluation/Treatment)  0830  -SL      Recorded by [SL] Narcisa Bonner MS CCC-SLP      Row Name 04/19/19 1158             Safety Awareness/Health Promotion    Additional Documentation  Fall Prevention (Row)  -DN      Recorded by [DN] Reg Mckinney, OT      Row Name 04/19/19 1158 04/19/19 0833          Cognition/Psychosocial- PT/OT    Affect/Mental Status (Cognitive)  WFL  -DN  WFL  -LB,LS,LB2     Orientation Status (Cognition)  oriented x 3  -DN  --     Follows Commands (Cognition)  follows one step commands;over 90% accuracy  -DN  follows one step commands;verbal cues/prompting required;repetition of directions required  -LB,LS,LB2     Personal Safety Interventions  fall prevention program maintained;gait belt  -DN  fall prevention program maintained;gait belt;muscle strengthening facilitated;nonskid shoes/slippers when out of bed  -LB,LS,LB2     Cognitive Function (Cognitive)  attention deficit  -DN  --     Attention Deficit (Cognitive)  distractible in noisy environment  -DN  --     Executive Function Deficit (Cognition)  moderate deficit  -DN  --     Memory Deficit (Cognitive)  moderate deficit  -DN  moderate deficit  -LB,LS,LB2     Safety Deficit (Cognitive)  impulsivity;insight into deficits/self awareness  -DN  impulsivity;insight into deficits/self awareness;problem solving  -LB,LS,LB2     Recorded by [DN] Reg Mckinney, OT [LB,LS,LB2] Eliane Blackburn, PT (r) Naomy Mendenhall, PT Student (t) Eliane Blackburn, PT (c)     Row Name 04/19/19 0833             Sit-Stand Transfer    Sit-Stand [place] (Transfers)  contact guard;stand by assist  -LB,LS,LB2      Assistive Device (Sit-Stand Transfers)  wheelchair  -LB,LS,LB2      Recorded by [LB,LS,LB2] Eliane Blackburn, PT (r) Naomy Mendenhall, PT Student (t) Eliane Blackburn, PT (c)      Row Name 04/19/19 0833             Stand-Sit Transfer    Stand-Sit [place] (Transfers)  contact  guard;stand by assist  -LB,LS,LB2      Assistive Device (Stand-Sit Transfers)  wheelchair  -LB,LS,LB2      Recorded by [LB,LS,LB2] Eliane Blackburn, PT (r) Naomy Mendenhall, PT Student (t) Eliane Blackburn, PT (c)      Row Name 04/19/19 1158             Toilet Transfer    Type (Toilet Transfer)  stand pivot/stand step  -DN      [place] Level (Toilet Transfer)  verbal cues;contact guard  -DN      Assistive Device (Toilet Transfer)  walker, front-wheeled;grab bars/safety frame;raised toilet seat  -DN      Recorded by [DN] Reg Mckinney, OT      Row Name 04/19/19 1152             Shower Transfer    Type (Shower Transfer)  stand pivot/stand step  -DN      [place] Level (Shower Transfer)  verbal cues;contact guard  -DN      Assistive Device (Shower Transfer)  walker, front-wheeled;grab bars/tub rail  -DN      Recorded by [DN] Reg Mckinney, OT      Row Name 04/19/19 0859             Car Transfer    Type (Car Transfer)  stand pivot/stand step  -LB,LS,LB2      [place] Level (Car Transfer)  verbal cues;contact guard;stand by assist  -LB,LS,LB2      Assistive Device (Car Transfer)  wheelchair  -LB,LS,LB2      Recorded by [LB,LS,LB2] Eliane Blackburn, PT (r) Naomy Mendenhall, PT Student (t) Eliane Blackburn, PT (c)      Row Name 04/19/19 0819             Gait/Stairs Assessment/Training    [place] Level (Gait)  verbal cues;contact guard  -LB,LS,LB2      Assistive Device (Gait)  walker, front-wheeled  -LB,LS,LB2      Distance in Feet (Gait)  200 x 2, 50 x 4  -LB,LS,LB2      Pattern (Gait)  step-through  -LB,LS,LB2      Deviations/Abnormal Patterns (Gait)  nancy decreased  -LB,LS,LB2      Bilateral Gait Deviations  weight shift ability decreased;heel strike decreased  -LB,LS,LB2      Left Sided Gait Deviations  weight shift ability decreased  -LB,LS,LB2      Right Sided Gait Deviations  heel strike decreased decreased knee stability  -LB,LS,LB2      [place] Level (Stairs)  verbal cues;contact guard  -LB,LS,LB2       Handrail Location (Stairs)  both sides  -LB,LS,LB2      Number of Steps (Stairs)  4  -LB,LS,LB2      Ascending Technique (Stairs)  step-to-step  -LB,LS,LB2      Descending Technique (Stairs)  step-to-step  -LB,LS,LB2      Stairs, Safety Issues  balance decreased during turns  -LB,LS,LB2      Comment (Gait/Stairs)  Ambulated to sun room and practiced pathfinding on way back to gym. Ambulated with PT providing resistance to L to prompt pt to shift onto R LE. Continued decreased step length on L due to lack of weight shift onto R LE, especially when fatigued. Pt able to properly sequence for stair navigation with only 1 prompt. Able to verbalize proper pattern this date   -LB,LS,LB2      Recorded by [LB,LS,LB2] Eliane Blackburn, PT (r) Naomy Mendenhall, PT Student (t) Eliane Blackburn PT (c)      Row Name 04/19/19 0833             Safety Issues, Functional Mobility    Safety Issues Affecting Function (Mobility)  ability to follow commands;insight into deficits/self awareness;problem solving;judgment  -LB,LS,LB2      Impairments Affecting Function (Mobility)  balance;cognition;coordination;motor control;strength  -LB,LS,LB2      Recorded by [LB,LS,LB2] Eliane Blackburn, PT (r) Naomy Mendenhall, PT Student (t) Eliane Blackburn, PT (c)      Row Name 04/19/19 1151             Bathing Assessment/Treatment    Bathing [place] Level  bathing skills  -DN      Assistive Device (Bathing)  grab bar/tub rail;hand held shower spray hose;tub bench  -DN      Bathing Position  supported sitting;supported standing  -DN      Bathing Setup Assistance  adjust water temperature  -DN      Recorded by [DN] Reg Mckinney OT      Row Name 04/19/19 1153             Upper Body Dressing Assessment/Treatment    Upper Body Dressing Task  upper body dressing skills;pull over garment;supervision  -DN      Upper Body Dressing Position  supported sitting  -DN      Set-up Assistance (Upper Body Dressing)  obtain clothing  -DN      Recorded by [DN] Hank  MADAY Finney      Row Name 04/19/19 1158             Lower Body Dressing Assessment/Treatment    Lower Body Dressing [place] Level  doff;don;pants/bottoms;shoes/slippers;socks;underwear;supervision;set up;verbal cues  -DN      Lower Body Dressing Position  supported sitting;supported standing  -DN      Lower Body Dressing Setup Assistance  obtain clothing  -DN      Recorded by [DN] Reg Mckinney OT      Row Name 04/19/19 1158             Grooming Assessment/Treatment    Grooming [place] Level  grooming skills;deodorant application;hair care, combing/brushing;oral care regimen;supervision;verbal cues  -DN      Grooming Position  supported sitting  -DN      Recorded by [DN] Reg Mckinney OT      Row Name 04/19/19 1158             Toileting Assessment/Treatment    Toileting [place] Level  toileting skills;adjust/manage clothing;perform perineal hygiene  -DN      Assistive Device Use (Toileting)  grab bar/safety frame  -DN      Toileting Position  supported sitting  -DN      Recorded by [DN] Reg Mckinney, OT      Row Name 04/19/19 0833             Pain Assessment    Additional Documentation  Pain Scale: Numbers Pre/Post-Treatment (Group)  -LB,LS,LB2      Recorded by [LB,LS,LB2] Eliane Blackburn, PT (r) Naomy Mendenhall, PT Student (t) Eliane Blackburn, PT (c)      Row Name 04/19/19 1158 04/19/19 0820          Pain Scale: Numbers Pre/Post-Treatment    Pain Scale: Numbers, Pretreatment  0/10 - no pain  -DN  0/10 - no pain  -LB,LS,LB2     Pain Scale: Numbers, Post-Treatment  0/10 - no pain  -DN  0/10 - no pain  -LB,LS,LB2     Recorded by [DN] Reg Mckinney, OT [LB,LS,LB2] Eliane Blackburn, PT (r) Naomy Mendenhall, PT Student (t) Eliane Blackburn, PT (c)     Row Name 04/19/19 0887             Sitting Balance Activity    Activities Performed (Sitting, Balance Training)  sit to stand activity with arm support  -LB,LS,LB2      Support Needed (Sitting, Balance Training)  CGA  -LB,LS,LB2      Comment (Sitting, Balance  Training)  Practiced STS from low cushioned chairs, couch, etc. VC to scoot forward and for anterior weight shift  -LB,LS,LB2      Recorded by [LB,LS,LB2] Eliane Blackburn PT (r) Naomy Mendenhall PT Student (t) Eliane Blackburn, PT (c)      Row Name 04/19/19 0883             Standing Balance Activity    Activities Performed (Standing, Balance Training)  unilateral stance  -LB,LS,LB2      Support Needed for Balance (Standing, Balance Training)  uses both upper extremities for support;CGA  -LB,LS,LB2      Upper Extremity Activity with Device (Standing, Balance Training)  other (see comments) jay bar  -LB,LS,LB2      Comment (Standing, Balance Training)  static stance on R LE 4 x 30 sec to emphasis knee control and weight shift onto R  -LB,LS,LB2      Recorded by [LB,LS,LB2] Eliane Blackburn, PT (r) Naomy Mendenhall, PT Student (t) Eliane Blackburn PT (c)      Row Name 04/19/19 8387             Dynamic Balance Activity    Therapeutic Training Performed (Dynamic Balance)  -- forward stepping over dalia  -LB,LS,LB2      Support Needed for Balance (Dynamic Balance Training)  CGA;minimal external support for balance, 75% patient effort  -LB,LS,LB2      Upper Extremity Activity with Device (Dynamic Balance Training)  other (see comments) jay bar on R + HHA on L  -LB,LS,LB2      Comment (Dynamic Balance Training)  2 x 10 reps of fwd and lateral stepping over dalia with L LE to emphasize weight shift onto R LE. Pt with fatigue and postural sway noted  -LB,LS,LB2      Recorded by [LB,LS,LB2] Eliane lBackburn, PT (r) Naomy Mendenhall PT Student (t) Eliane Blackburn, PT (c)      Row Name 04/19/19 1158 04/19/19 0802          Positioning and Restraints    Pre-Treatment Position  sitting in chair/recliner  -DN  sitting in chair/recliner  -LB,LS,LB2     Post Treatment Position  wheelchair  -DN  chair  -LB,LS,LB2     In Chair  --  sitting;exit alarm on;with other staff;patient within staff view  -LB,LS,LB2     Recorded by [DN] Reg Mckinney, OT  [LB,LS,LB2] Eliane Blackburn, PT (r) Naomy Mendenhall, PT Student (t) Eliane Blackburn, PT (c)       User Key  (r) = Recorded By, (t) = Taken By, (c) = Cosigned By    Initials Name Effective Dates    SL Narcisa Bonner, MS CCC-SLP 06/08/18 -     Reg Bolden, OT 06/08/18 -     LB Eliane Blackburn, PT 04/03/18 -     Naomy Armas, PT Student 02/04/19 -         Wound 03/26/19 0900 Left chest incision (Active)   Dressing Appearance open to air 4/19/2019  7:20 AM   Closure Liquid skin adhesive 4/19/2019  7:20 AM   Base clean;dry 4/19/2019  7:20 AM   Periwound dry;intact 4/19/2019  7:20 AM   Drainage Amount none 4/19/2019  7:20 AM   Dressing Care, Wound open to air 4/19/2019  7:20 AM     Physical Therapy Education     Title: PT OT SLP Therapies (In Progress)     Topic: Physical Therapy (In Progress)     Point: Mobility training (Done)     Learning Progress Summary           Patient Acceptance, E,TB, VU,NR by LS at 4/19/2019  9:06 AM    Comment:  Educated on safe strategies for getting up from low chair, couch, etc. Discussed need to shift weight more to the R so that he can take a normal step length on the L.    Acceptance, E,TB, VU,NR by KENZIE at 4/18/2019  8:46 AM    Comment:  Educated on proper navigation of ramp    Acceptance, E,TB, VU,NR by KENZIE at 4/17/2019  8:39 AM    Comment:  discussed weight loss and educated on how increase exercise can contribute to this and decreased stroke risk    Acceptance, E,TB,D, VU,DU by RENNY at 4/16/2019  2:52 PM    Comment:  Transfers, ambulation, steps and car transfer    Acceptance, E,TB, VU,NR by KENZIE at 4/15/2019  9:50 AM    Comment:  Educated on safer/more stable gait with Rwx. Discussed proper stair navigation and sequencing.    Acceptance, E,D, VU,DU,NR by ODELL at 4/13/2019 12:04 PM    Acceptance, E,TB, VU,NR by KENZIE at 4/12/2019  9:51 AM    Comment:  Continued discussion regarding progress with knee control and walking    Acceptance, E,TB, VU,NR by KENZIE at 4/11/2019  9:53 AM    Comment:  Continued  discussion/answering pt questions about why R knee is weak. Educated on proper mechanics for stair navigation    Acceptance, E,TB, VU,NR by  at 4/10/2019  9:43 AM    Comment:  Continued discussion regarding safety and slowing down during mobility. Educated on proper use of cane again this date    Acceptance, E,TB, VU,NR by  at 4/9/2019  8:33 AM    Comment:  Continued discussion regarding how stroke has affected his R side. Educated on use of cane    Acceptance, E,TB, VU,NR by  at 4/8/2019  8:54 AM    Comment:  Educated on why R leg is weak and how the stroke is affecting his knee control    Acceptance, E,TB, VU,NR by  at 4/5/2019 11:30 AM    Comment:  Discussed purpose of strengthening exercises. Educated on repeated practice of walking and other exercises to gradually build strength and endurance.    Acceptance, E, NR by  at 4/4/2019  9:52 AM    Acceptance, E, NR by  at 4/3/2019 10:16 AM    Acceptance, E, NR by  at 4/2/2019  3:18 PM    Acceptance, E, NR by  at 4/1/2019 10:32 AM    Acceptance, E,TB,D, NR by  at 3/30/2019 11:44 AM    Acceptance, E,TB,D, NR by  at 3/29/2019  2:58 PM    Acceptance, E,TB,D, NR by  at 3/28/2019 11:38 AM    Acceptance, E,TB,D, NR by  at 3/27/2019 10:05 AM    Acceptance, E,TB,D, NR by  at 3/26/2019  9:48 AM    Acceptance, E,TB,D, NR by EE at 3/25/2019  3:09 PM   Family Acceptance, E,TB,D, VU,DU by  at 4/16/2019  2:52 PM    Comment:  Transfers, ambulation, steps and car transfer                   Point: Home exercise program (In Progress)     Learning Progress Summary           Patient Acceptance, E, NR by  at 4/4/2019  9:52 AM    Acceptance, E, NR by  at 4/3/2019 10:16 AM    Acceptance, E, NR by  at 4/2/2019  3:18 PM    Acceptance, E, NR by  at 4/1/2019 10:32 AM    Acceptance, E,TB,D, NR by  at 3/29/2019  2:58 PM    Acceptance, E,TB,D, NR by EE at 3/28/2019 11:38 AM    Acceptance, E,TB,D, NR by EE at 3/27/2019 10:05 AM    Acceptance, E,TB,D, NR by EE  at 3/26/2019  9:48 AM    Acceptance, E,TB,D, NR by  at 3/25/2019  3:09 PM                   Point: Body mechanics (Done)     Learning Progress Summary           Patient Acceptance, E,TB, VU,NR by  at 4/19/2019  9:06 AM    Comment:  Educated on safe strategies for getting up from low chair, couch, etc. Discussed need to shift weight more to the R so that he can take a normal step length on the L.    Acceptance, E, VU by  at 4/16/2019  3:26 PM    Comment:  family conf w/pt and multiple family members- discussed cognitive deficits - memory, reasoning, attn, math, and impulsivity- rec: supervision for med management/finances, cont ST at D/C    Acceptance, E, NR by  at 4/4/2019  9:52 AM    Acceptance, E, NR by  at 4/3/2019 10:16 AM    Acceptance, E, NR by  at 4/2/2019  3:18 PM    Acceptance, E, NR by  at 4/1/2019 10:32 AM    Acceptance, E,TB,D, NR by  at 3/29/2019  2:58 PM    Acceptance, E,TB,D, NR by  at 3/28/2019 11:38 AM    Acceptance, E,TB,D, NR by  at 3/27/2019 10:05 AM    Acceptance, E,TB,D, NR by  at 3/26/2019  9:48 AM    Acceptance, E,TB,D, NR by  at 3/25/2019  3:09 PM   Family Acceptance, E, VU by  at 4/16/2019  3:26 PM    Comment:  family conf w/pt and multiple family members- discussed cognitive deficits - memory, reasoning, attn, math, and impulsivity- rec: supervision for med management/finances, cont ST at D/C                   Point: Precautions (Done)     Learning Progress Summary           Patient Acceptance, E,TB, VU,NR by  at 4/19/2019  9:06 AM    Comment:  Educated on safe strategies for getting up from low chair, couch, etc. Discussed need to shift weight more to the R so that he can take a normal step length on the L.    Acceptance, E,TB, VU,NR by  at 4/18/2019  8:46 AM    Comment:  Educated on proper navigation of ramp    Acceptance, E,TB, VU,NR by  at 4/17/2019  8:39 AM    Comment:  discussed weight loss and educated on how increase exercise can contribute to this  and decreased stroke risk    Acceptance, E,TB, VU,NR by LS at 4/16/2019  9:45 AM    Comment:  Continued education on proper car transfer technique    Acceptance, E,TB, VU,NR by LS at 4/15/2019  9:50 AM    Comment:  Educated on safer/more stable gait with Rwx. Discussed proper stair navigation and sequencing.    Acceptance, E,TB, VU,NR by LS at 4/12/2019  9:51 AM    Comment:  Continued discussion regarding progress with knee control and walking    Acceptance, E,TB, VU,NR by LS at 4/11/2019  9:53 AM    Comment:  Continued discussion/answering pt questions about why R knee is weak. Educated on proper mechanics for stair navigation    Acceptance, E,TB, VU,NR by LS at 4/10/2019  9:43 AM    Comment:  Continued discussion regarding safety and slowing down during mobility. Educated on proper use of cane again this date    Acceptance, E,TB, VU,NR by LS at 4/9/2019  8:33 AM    Comment:  Continued discussion regarding how stroke has affected his R side. Educated on use of cane    Acceptance, E,TB, VU,NR by LS at 4/8/2019  8:54 AM    Comment:  Educated on why R leg is weak and how the stroke is affecting his knee control    Acceptance, E,TB, VU,NR by LS at 4/5/2019 11:30 AM    Comment:  Discussed purpose of strengthening exercises. Educated on repeated practice of walking and other exercises to gradually build strength and endurance.    Acceptance, E, NR by  at 4/4/2019  9:52 AM    Acceptance, E, NR by  at 4/3/2019 10:16 AM    Acceptance, E, NR by  at 4/2/2019  3:18 PM    Acceptance, E, NR by  at 4/1/2019 10:32 AM    Acceptance, E,TB,D, NR by  at 3/29/2019  2:58 PM    Acceptance, E,TB,D, NR by  at 3/28/2019 11:38 AM    Acceptance, E,TB,D, NR by  at 3/27/2019 10:05 AM    Acceptance, E,TB,D, NR by  at 3/26/2019  9:48 AM    Acceptance, E,TB,D, NR by  at 3/25/2019  3:09 PM                               User Key     Initials Effective Dates Name Provider Type Discipline     06/08/18 -  Narcisa Bonner, MS CCC-SLP  Speech and Language Pathologist SLP    ELISSA 06/08/18 -  Donna Inman, PT Physical Therapist PT    LB 04/03/18 -  Eliane Blackburn, PT Physical Therapist PT     04/03/18 -  Clau Ayon, PT Physical Therapist PT    EE 04/03/18 -  Ariadne Garcia, PT Physical Therapist PT    JK 04/03/18 -  Nicole Lawton, PT Physical Therapist PT    LS 02/04/19 -  Naomy Mendenhall, PT Student PT Student                   PT Recommendation and Plan                        Time Calculation:     PT Charges     Row Name 04/19/19 1412 04/19/19 0833          Time Calculation    Start Time  1400  -LB (r) LS (t) LB (c)  0830  -LB (r) LS (t) LB (c)     Stop Time  1430  -LB (r) LS (t) LB (c)  0900  -LB (r) LS (t) LB (c)     Time Calculation (min)  30 min  -LB (r) LS (t)  30 min  -LB (r) LS (t)     PT Received On  04/19/19  -LB (r) LS (t) LB (c)  04/19/19  -LB (r) LS (t) LB (c)     PT Goal Re-Cert Due Date  --  --  -LB (r) LS (t) LB (c)       User Key  (r) = Recorded By, (t) = Taken By, (c) = Cosigned By    Initials Name Provider Type    LB Eliane Blackburn, PT Physical Therapist    LS Naomy Mendenhall, PT Student PT Student          Therapy Charges for Today     Code Description Service Date Service Provider Modifiers Qty    47145307227 HC PT NEUROMUSC RE EDUCATION EA 15 MIN 4/18/2019 Naomy Mendenhall, PT Student GP 4    35457387004 HC PT THER SUPP EA 15 MIN 4/18/2019 Naomy Mendenhall, PT Student GP 1    38250366788 HC PT NEUROMUSC RE EDUCATION EA 15 MIN 4/19/2019 Naomy Mendenhall, PT Student GP 4    07526100607 HC PT THER SUPP EA 15 MIN 4/19/2019 Naomy Mendenhall, PT Student GP 1                   Naomy Mendenhall, PT Student  4/19/2019

## 2019-04-19 NOTE — PROGRESS NOTES
Inpatient Rehabilitation Plan of Care Note    Plan of Care  Care Plan Reviewed - No updates at this time.    Safety    Performed Intervention(s)  Hourly rounding , items within reach  Assistance with out of bed activities  Falls protocol  bed/chair alarm      Psychosocial    Performed Intervention(s)   PRN  Patient to verbalize feelings and concerns  Psychologist PRN      Body Systems    Performed Intervention(s)  Accuchecks ACHS  Medication as ordered  consistent carb diet      Sphincter Control    Performed Intervention(s)  Assistance with urinal  Assistance to bathroom  Incontinence care PRN    Signed by: Pallavi Tinajero RN

## 2019-04-20 LAB
GLUCOSE BLDC GLUCOMTR-MCNC: 105 MG/DL (ref 70–130)
GLUCOSE BLDC GLUCOMTR-MCNC: 190 MG/DL (ref 70–130)
GLUCOSE BLDC GLUCOMTR-MCNC: 82 MG/DL (ref 70–130)
GLUCOSE BLDC GLUCOMTR-MCNC: 96 MG/DL (ref 70–130)

## 2019-04-20 PROCEDURE — 99232 SBSQ HOSP IP/OBS MODERATE 35: CPT | Performed by: INTERNAL MEDICINE

## 2019-04-20 PROCEDURE — 63710000001 INSULIN LISPRO (HUMAN) PER 5 UNITS: Performed by: PHYSICAL MEDICINE & REHABILITATION

## 2019-04-20 PROCEDURE — 63710000001 INSULIN GLARGINE PER 5 UNITS: Performed by: INTERNAL MEDICINE

## 2019-04-20 PROCEDURE — 97110 THERAPEUTIC EXERCISES: CPT

## 2019-04-20 PROCEDURE — 82962 GLUCOSE BLOOD TEST: CPT

## 2019-04-20 RX ADMIN — ATORVASTATIN CALCIUM 80 MG: 80 TABLET, FILM COATED ORAL at 20:58

## 2019-04-20 RX ADMIN — ATENOLOL 25 MG: 25 TABLET ORAL at 20:58

## 2019-04-20 RX ADMIN — ALPRAZOLAM 0.5 MG: 0.5 TABLET ORAL at 23:41

## 2019-04-20 RX ADMIN — LISINOPRIL 20 MG: 20 TABLET ORAL at 20:58

## 2019-04-20 RX ADMIN — OXYCODONE AND ACETAMINOPHEN 1 TABLET: 5; 325 TABLET ORAL at 09:58

## 2019-04-20 RX ADMIN — AMLODIPINE BESYLATE 5 MG: 5 TABLET ORAL at 09:54

## 2019-04-20 RX ADMIN — METFORMIN HYDROCHLORIDE 500 MG: 500 TABLET, EXTENDED RELEASE ORAL at 09:53

## 2019-04-20 RX ADMIN — ASPIRIN 325 MG: 325 TABLET, COATED ORAL at 09:53

## 2019-04-20 RX ADMIN — Medication 1 TABLET: at 09:53

## 2019-04-20 RX ADMIN — INSULIN GLARGINE 32 UNITS: 100 INJECTION, SOLUTION SUBCUTANEOUS at 20:56

## 2019-04-20 RX ADMIN — METFORMIN HYDROCHLORIDE 500 MG: 500 TABLET, EXTENDED RELEASE ORAL at 17:22

## 2019-04-20 RX ADMIN — ATENOLOL 25 MG: 25 TABLET ORAL at 09:53

## 2019-04-20 RX ADMIN — PAROXETINE HYDROCHLORIDE 10 MG: 10 TABLET, FILM COATED ORAL at 09:53

## 2019-04-20 RX ADMIN — INSULIN LISPRO 2 UNITS: 100 INJECTION, SOLUTION INTRAVENOUS; SUBCUTANEOUS at 17:20

## 2019-04-20 RX ADMIN — OXYCODONE AND ACETAMINOPHEN 1 TABLET: 5; 325 TABLET ORAL at 20:58

## 2019-04-20 RX ADMIN — LISINOPRIL 20 MG: 20 TABLET ORAL at 09:53

## 2019-04-20 RX ADMIN — CLOPIDOGREL 75 MG: 75 TABLET, FILM COATED ORAL at 09:53

## 2019-04-20 NOTE — PLAN OF CARE
Problem: Stroke (IRF) (Adult)  Goal: Promote Optimal Functional [place]  Outcome: Ongoing (interventions implemented as appropriate)      Problem: Fall Risk (Adult)  Goal: Absence of Fall  Outcome: Ongoing (interventions implemented as appropriate)      Problem: Diabetes, Type 2 (Adult)  Goal: Signs and Symptoms of Listed Potential Problems Will be Absent, Minimized or Managed (Diabetes, Type 2)  Outcome: Ongoing (interventions implemented as appropriate)      Problem: Skin Injury Risk (Adult)  Goal: Skin Health and Integrity  Outcome: Ongoing (interventions implemented as appropriate)      Problem: Patient Care Overview  Goal: Plan of Care Review  Outcome: Ongoing (interventions implemented as appropriate)   04/20/19 0109 04/20/19 1528   Patient Care Overview   IRF Plan of Care Review progress ongoing, continue --    OTHER   Outcome Summary --  Pt has been up in WC most of the shift. Pt going on pass in the am. Percocet for right leg pain.      Goal: Individualization and Mutuality  Outcome: Ongoing (interventions implemented as appropriate)    Goal: Discharge Needs Assessment  Outcome: Ongoing (interventions implemented as appropriate)    Goal: Home Safety Plan  Outcome: Ongoing (interventions implemented as appropriate)    Goal: Coping Plan  Outcome: Ongoing (interventions implemented as appropriate)    Goal: Community Reintegration Plan  Outcome: Ongoing (interventions implemented as appropriate)

## 2019-04-20 NOTE — THERAPY TREATMENT NOTE
"Inpatient Rehabilitation - Physical Therapy Treatment Note  [facility]     Patient Name: Nayan Ryan  : 1965  MRN: [id number]    Today's Date: 2019                 Admit Date: 3/24/2019      Visit Dx:    No diagnosis found.    Patient Active Problem List   Diagnosis   • Acute ischemic left PCA stroke (CMS/HCC)   • Status post placement of implantable loop recorder   • HTN (hypertension)   • Diabetes mellitus (CMS/HCC)   • Hyperlipidemia   • Morbid obesity (CMS/HCC)   • Anxiety disorder   • Migraine   • H/O hernia repair   • Abdominal wall abscess   • Back pain   • Stroke (cerebrum) (CMS/HCC)   • Vitamin D deficiency   • History of fatty infiltration of liver       Therapy Treatment    IRF Treatment Summary     Row Name 19 1330             Evaluation/Treatment Time and Intent    Subjective Information  no complaints \"My leg feels like normal.\"   -LB      Existing Precautions/Restrictions  fall  -LB      Document Type  therapy note (daily note)  -LB      Mode of Treatment  physical therapy  -LB      Patient/Family Observations  eager to ambulate as R LE feeling \"normal\"  -LB      Recorded by [LB] Clau Richards, PT      Row Name 19 1330             Cognition/Psychosocial- PT/OT    Affect/Mental Status (Cognitive)  WFL  -LB      Orientation Status (Cognition)  oriented x 3  -LB      Follows Commands (Cognition)  follows one step commands;over 90% accuracy  -LB      Personal Safety Interventions  fall prevention program maintained  -LB      Cognitive Function (Cognitive)  attention deficit  -LB      Safety Deficit (Cognitive)  insight into deficits/self awareness  -LB      Recorded by [LB] Clau Richards PT      Row Name 19 1330             Bed Mobility Assessment/Treatment    Comment (Bed Mobility)  up in w/c   -LB      Recorded by [LB] Clau Richards PT      Row Name 19 1330             Sit-Stand Transfer    Sit-Stand [place] (Transfers)  contact guard  -LB      Assistive " Device (Sit-Stand Transfers)  walker, front-wheeled  -LB      Recorded by [LB] Clau Richards, PT      Row Name 04/20/19 1330             Stand-Sit Transfer    Stand-Sit [place] (Transfers)  contact guard  -LB      Assistive Device (Stand-Sit Transfers)  walker, front-wheeled  -LB      Recorded by [LB] Clau Richards, PT      Row Name 04/20/19 1330             Gait/Stairs Assessment/Training    [place] Level (Gait)  contact guard;verbal cues  -LB      Assistive Device (Gait)  walker, front-wheeled  -LB      Distance in Feet (Gait)  160' x 4  -LB      Pattern (Gait)  step-through  -LB      Deviations/Abnormal Patterns (Gait)  nancy decreased  -LB      Bilateral Gait Deviations  weight shift ability decreased  -LB      Left Sided Gait Deviations  weight shift ability decreased  -LB      Right Sided Gait Deviations  heel strike decreased slight knee buckling near end of session  -LB      [place] Level (Stairs)  not tested  -LB      Comment (Gait/Stairs)  PT checked pt's walker for appropriate height and noted glide tips on posterior feet.  -LB      Recorded by [LB] Clau Richards, PT      Row Name 04/20/19 7520             Pain Scale: Numbers Pre/Post-Treatment    Pain Scale: Numbers, Pretreatment  0/10 - no pain  -LB      Pain Scale: Numbers, Post-Treatment  0/10 - no pain  -LB      Recorded by [LB] Clau Richards, PT      Row Name 04/20/19 1330             Lower Extremity Seated Therapeutic Exercise    Performed, Seated Lower Extremity (Therapeutic Exercise)  LAQ (long arc quad), knee extension;ankle dorsiflexion/plantarflexion R LE   -LB      Sets/Reps Detail, Seated Lower Extremity (Therapeutic Exercise)  2/10  -LB      Recorded by [LB] Clau Richards, PT      Row Name 04/20/19 1330             Positioning and Restraints    Pre-Treatment Position  sitting in chair/recliner  -LB      Post Treatment Position  wheelchair  -LB      In Wheelchair  notified nsg;call light within reach;encouraged to call  for assist;exit alarm on;with family/caregiver  -LB      Recorded by [LB] Clau Richards, PT        User Key  (r) = Recorded By, (t) = Taken By, (c) = Cosigned By    Initials Name Effective Dates    LB Clau Richards, PT 06/08/18 -         Wound 03/26/19 0900 Left chest incision (Active)   Dressing Appearance open to air 4/20/2019  9:26 AM   Closure Liquid skin adhesive 4/19/2019  8:10 PM   Base clean;dry 4/19/2019  8:10 PM   Periwound dry;intact 4/20/2019  9:26 AM   Drainage Amount none 4/19/2019  8:10 PM   Dressing Care, Wound open to air 4/19/2019  8:10 PM     Physical Therapy Education     Title: PT OT SLP Therapies (In Progress)     Topic: Physical Therapy (In Progress)     Point: Mobility training (Done)     Learning Progress Summary           Patient Acceptance, E, VU by LB at 4/20/2019  5:32 PM    Comment:  Stressed to pt and wife the importance of use of the walker and having CGA.    Acceptance, E,TB, VU,NR by LS at 4/19/2019  9:06 AM    Comment:  Educated on safe strategies for getting up from low chair, couch, etc. Discussed need to shift weight more to the R so that he can take a normal step length on the L.    Acceptance, E,TB, VU,NR by LS at 4/18/2019  8:46 AM    Comment:  Educated on proper navigation of ramp    Acceptance, E,TB, VU,NR by LS at 4/17/2019  8:39 AM    Comment:  discussed weight loss and educated on how increase exercise can contribute to this and decreased stroke risk    Acceptance, E,TB,D, VU,DU by LB1 at 4/16/2019  2:52 PM    Comment:  Transfers, ambulation, steps and car transfer    Acceptance, E,TB, VU,NR by LS at 4/15/2019  9:50 AM    Comment:  Educated on safer/more stable gait with Rwx. Discussed proper stair navigation and sequencing.    Acceptance, E,D, VU,DU,NR by ODELL at 4/13/2019 12:04 PM    Acceptance, E,TB, VU,NR by LS at 4/12/2019  9:51 AM    Comment:  Continued discussion regarding progress with knee control and walking    Acceptance, E,TB, VU,NR by LS at 4/11/2019  9:53  AM    Comment:  Continued discussion/answering pt questions about why R knee is weak. Educated on proper mechanics for stair navigation    Acceptance, E,TB, VU,NR by  at 4/10/2019  9:43 AM    Comment:  Continued discussion regarding safety and slowing down during mobility. Educated on proper use of cane again this date    Acceptance, E,TB, VU,NR by  at 4/9/2019  8:33 AM    Comment:  Continued discussion regarding how stroke has affected his R side. Educated on use of cane    Acceptance, E,TB, VU,NR by  at 4/8/2019  8:54 AM    Comment:  Educated on why R leg is weak and how the stroke is affecting his knee control    Acceptance, E,TB, VU,NR by  at 4/5/2019 11:30 AM    Comment:  Discussed purpose of strengthening exercises. Educated on repeated practice of walking and other exercises to gradually build strength and endurance.    Acceptance, E, NR by  at 4/4/2019  9:52 AM    Acceptance, E, NR by  at 4/3/2019 10:16 AM    Acceptance, E, NR by  at 4/2/2019  3:18 PM    Acceptance, E, NR by  at 4/1/2019 10:32 AM    Acceptance, E,TB,D, NR by  at 3/30/2019 11:44 AM    Acceptance, E,TB,D, NR by  at 3/29/2019  2:58 PM    Acceptance, E,TB,D, NR by  at 3/28/2019 11:38 AM    Acceptance, E,TB,D, NR by  at 3/27/2019 10:05 AM    Acceptance, E,TB,D, NR by  at 3/26/2019  9:48 AM    Acceptance, E,TB,D, NR by  at 3/25/2019  3:09 PM   Family Acceptance, E, VU by  at 4/20/2019  5:32 PM    Comment:  Stressed to pt and wife the importance of use of the walker and having CGA.    Acceptance, E,TB,D, VU,DU by LB1 at 4/16/2019  2:52 PM    Comment:  Transfers, ambulation, steps and car transfer                   Point: Home exercise program (In Progress)     Learning Progress Summary           Patient Acceptance, E, NR by  at 4/4/2019  9:52 AM    Acceptance, E, NR by  at 4/3/2019 10:16 AM    Acceptance, E, NR by  at 4/2/2019  3:18 PM    AcceptanceSARA, NR by  at 4/1/2019 10:32 AM    AcceptanceSARA,FLAVIO REEVES, NR by   at 3/29/2019  2:58 PM    Acceptance, E,TB,D, NR by  at 3/28/2019 11:38 AM    Acceptance, E,TB,D, NR by  at 3/27/2019 10:05 AM    Acceptance, E,TB,D, NR by  at 3/26/2019  9:48 AM    Acceptance, E,TB,D, NR by  at 3/25/2019  3:09 PM                   Point: Body mechanics (Done)     Learning Progress Summary           Patient Acceptance, E,TB, VU,NR by  at 4/19/2019  9:06 AM    Comment:  Educated on safe strategies for getting up from low chair, couch, etc. Discussed need to shift weight more to the R so that he can take a normal step length on the L.    Acceptance, E, VU by  at 4/16/2019  3:26 PM    Comment:  family conf w/pt and multiple family members- discussed cognitive deficits - memory, reasoning, attn, math, and impulsivity- rec: supervision for med management/finances, cont ST at D/C    Acceptance, E, NR by  at 4/4/2019  9:52 AM    Acceptance, E, NR by  at 4/3/2019 10:16 AM    Acceptance, E, NR by  at 4/2/2019  3:18 PM    Acceptance, E, NR by  at 4/1/2019 10:32 AM    Acceptance, E,TB,D, NR by  at 3/29/2019  2:58 PM    Acceptance, E,TB,D, NR by  at 3/28/2019 11:38 AM    Acceptance, E,TB,D, NR by  at 3/27/2019 10:05 AM    Acceptance, E,TB,D, NR by  at 3/26/2019  9:48 AM    Acceptance, E,TB,D, NR by  at 3/25/2019  3:09 PM   Family Acceptance, E, VU by  at 4/16/2019  3:26 PM    Comment:  family conf w/pt and multiple family members- discussed cognitive deficits - memory, reasoning, attn, math, and impulsivity- rec: supervision for med management/finances, cont ST at D/C                   Point: Precautions (Done)     Learning Progress Summary           Patient Acceptance, E,TB, VU,NR by  at 4/19/2019  9:06 AM    Comment:  Educated on safe strategies for getting up from low chair, couch, etc. Discussed need to shift weight more to the R so that he can take a normal step length on the L.    Acceptance, E,TB, VU,NR by KENZIE at 4/18/2019  8:46 AM    Comment:  Educated on proper  navigation of ramp    Acceptance, E,TB, VU,NR by LS at 4/17/2019  8:39 AM    Comment:  discussed weight loss and educated on how increase exercise can contribute to this and decreased stroke risk    Acceptance, E,TB, VU,NR by LS at 4/16/2019  9:45 AM    Comment:  Continued education on proper car transfer technique    Acceptance, E,TB, VU,NR by LS at 4/15/2019  9:50 AM    Comment:  Educated on safer/more stable gait with Rwx. Discussed proper stair navigation and sequencing.    Acceptance, E,TB, VU,NR by LS at 4/12/2019  9:51 AM    Comment:  Continued discussion regarding progress with knee control and walking    Acceptance, E,TB, VU,NR by LS at 4/11/2019  9:53 AM    Comment:  Continued discussion/answering pt questions about why R knee is weak. Educated on proper mechanics for stair navigation    Acceptance, E,TB, VU,NR by LS at 4/10/2019  9:43 AM    Comment:  Continued discussion regarding safety and slowing down during mobility. Educated on proper use of cane again this date    Acceptance, E,TB, VU,NR by LS at 4/9/2019  8:33 AM    Comment:  Continued discussion regarding how stroke has affected his R side. Educated on use of cane    Acceptance, E,TB, VU,NR by LS at 4/8/2019  8:54 AM    Comment:  Educated on why R leg is weak and how the stroke is affecting his knee control    Acceptance, E,TB, VU,NR by LS at 4/5/2019 11:30 AM    Comment:  Discussed purpose of strengthening exercises. Educated on repeated practice of walking and other exercises to gradually build strength and endurance.    Acceptance, E, NR by  at 4/4/2019  9:52 AM    Acceptance, E, NR by  at 4/3/2019 10:16 AM    Acceptance, E, NR by  at 4/2/2019  3:18 PM    Acceptance, E, NR by  at 4/1/2019 10:32 AM    Acceptance, E,TB,D, NR by  at 3/29/2019  2:58 PM    Acceptance, E,TB,D, NR by  at 3/28/2019 11:38 AM    Acceptance, E,TB,D, NR by  at 3/27/2019 10:05 AM    Acceptance, E,TB,D, NR by  at 3/26/2019  9:48 AM    Ranjit, SARA,LEANDRO,D, NR  by EE at 3/25/2019  3:09 PM                               User Key     Initials Effective Dates Name Provider Type Discipline    LB 06/08/18 -  Clau Richards, PT Physical Therapist PT    SL 06/08/18 -  Narcisa Bonner, MS CCC-SLP Speech and Language Pathologist SLP    ELISSA 06/08/18 -  Donna Inman, PT Physical Therapist PT    LB1 04/03/18 -  Eliane Blackburn, PT Physical Therapist PT    LH 04/03/18 -  Clau Ayon, PT Physical Therapist PT    EE 04/03/18 -  Ariadne Garcia, PT Physical Therapist PT    JK 04/03/18 -  Nicole [place], PT Physical Therapist PT    LS 02/04/19 -  Naomy Mendenhall, PT Student PT Student                   PT Recommendation and Plan                        Time Calculation:     PT Charges     Row Name 04/20/19 1733             Time Calculation    Start Time  1330  -LB      Stop Time  1400  -LB      Time Calculation (min)  30 min  -LB      PT Received On  04/20/19  -LB      PT - Next Appointment  04/22/19  -LB      PT Goal Re-Cert Due Date  04/22/19  -LB        User Key  (r) = Recorded By, (t) = Taken By, (c) = Cosigned By    Initials Name Provider Type    LB Clau Richards, PT Physical Therapist          Therapy Charges for Today     Code Description Service Date Service Provider Modifiers Qty    40171784225 HC PT THER PROC EA 15 MIN 4/20/2019 Clau Richards, PT GP 2                   Clau Richards, PT  4/20/2019

## 2019-04-20 NOTE — PROGRESS NOTES
LOS: 27 days   Patient Care Team:  Qasim Asif MD as PCP - General  Qasim Asif MD as PCP - Family Medicine    Chief Complaint:   Left PCA / posterior MCA territories ischemic infarct March 19, 2019  multifocal restricted diffusion centered posteriorly in the corpus callosum left of midline adjacent to the atrium of the lateral ventricle, at the parieto-occipital cortical interface, in the left corona radiata and along the lateral margin of the left thalamus. There also appears to be subtle low ADC signal continuing cranially along the left posterior parietal cortex with possible involvement of the right as well  Right visual field deficit  Impaired cognition/mobility/self care          Subjective     History of Present Illness    Subjective   Strength on the right side unchanged.       History taken from: patient    Objective     Vital Signs  Temp:  [98 °F (36.7 °C)-98.7 °F (37.1 °C)] 98 °F (36.7 °C)  Heart Rate:  [76-82] 80  Resp:  [16-18] 18  BP: (144-160)/(72-82) 147/72    Objective   MENTAL STATUS -  AWAKE / ALERT  HEENT-  nc/at  LUNGS - CTA, NO WHEEZES, RALES OR RHONCHI  HEART- RRR, NO RUB, MURMUR, OR GALLOP  ABD - NORMOACTIVE BOWEL SOUNDS, SOFT, NT.  Obese.    EXT - NO EDEMA OR CYANOSIS  NEURO -  He is oriented to person and situation.    Takes resistance in the right upper extremity and right lower extremity     Right visual field cut          Results Review:     I reviewed the patient's new clinical results.   Glucose   Date/Time Value Ref Range Status   04/20/2019 1135 82 70 - 130 mg/dL Final   04/20/2019 0720 96 70 - 130 mg/dL Final   04/19/2019 2033 117 70 - 130 mg/dL Final   04/19/2019 1610 96 70 - 130 mg/dL Final   04/19/2019 1106 91 70 - 130 mg/dL Final   04/19/2019 0709 98 70 - 130 mg/dL Final   04/18/2019 2102 82 70 - 130 mg/dL Final   04/18/2019 1559 99 70 - 130 mg/dL Final           Medication Review: reviewed    Assessment/Plan       Acute ischemic left PCA stroke (CMS/HCC)     Status post placement of implantable loop recorder    HTN (hypertension)    Diabetes mellitus (CMS/HCC)    Hyperlipidemia    Morbid obesity (CMS/HCC)    Anxiety disorder    Abdominal wall abscess    Stroke (cerebrum) (CMS/HCC)    Vitamin D deficiency    History of fatty infiltration of liver      Assessment & Plan   Left PCA / posterior MCA territories ischemic infarct March 19, 2019  multifocal restricted diffusion centered posteriorly in the corpus callosum left of midline adjacent to the atrium of the lateral ventricle, at the parieto-occipital cortical interface, in the left corona radiata and along the lateral margin of the left thalamus. There also appears to be subtle low ADC signal continuing cranially along the left posterior parietal cortex with possible involvement of the right as well.     HTN- uncontrolled with /130 and 206/108 with ER visit on March 15, 2019 - multi-drug regimen - amlodipine/atenolol/clonidine/lisinopril/dyazide  March 27- holding beds while on IVF hydration as BP low yesterday, concern for perfusion Continues IVF. Recheck BMP in AM. /82 at 13:15 pm  March 28-/86 this AM  DC IVF.  Will resume Lisinopril and atenolol at 1/2 total daily doses initially dividing out bid  Lisinopril 10 mg q 12 hours and atenolol 12.5 mg q 12 hours and adjust meds based on response.  Remains off amlodipine 10 mg daily and Dyazide 37.5/25 and clonidine.  Held Farxiga today as was hydrating with IVF, resume tomorrow.  March 29 - /87 last PM and 175/80 this AM  Will increase atenolol to 25 mg q 12 hours and Lisinopril to 20 mg q 12 hours (both back to previous total daily dose but divided out) and remains off amlodpine 10 mg daily and clonidine 0.1 mg q 12 hours and Dyazide 37.5/25 mg daily.  Per Neruology - Maintain -180, DBP<110.  April 8-blood pressure range 142//88-150s//79.  Norvasc increased to 5 mg on April 2.  Discussed with patient possibly titrating up on  Norvasc further to 10 mg daily if needed but may divide out to 5 mg twice a day for more even control.  We will continue to monitor his blood pressure pattern for now  April 12-continue to follow on present pattern.  Blood pressure was elevated 188 last evening but otherwise typically in target range.  Once further out from stroke, would adjust target range to typical therapeutic range  April 13 and 14 - per notes above - Per Neurology - Maintain -180, DBP<110.  Continue meds as current.     TEAM CONF - April 16- BED SUP. TRANSFERS CTG. 4 STAIRS CTG-MIN.  80 FEET CRG-MIN 2. RW, NO AFO. TURNS TO RIGHT DIFFICULT DUE TO VISION . R VISUAL FIELD CUT. ADLS CTG-SBA. ELASTIC SHOELACES WOULD HELP, OTHERWISE MIN ASSIST TO TIE SHOES.  DOES NOT PAY ATTENTION TO HIS ENVIRONMENT. MIN CUES FOR SEQUENCING.  IMPAIRED MEMORY AND ATTENTION TO DETAIL WITH COGNITIVE TASKS, VISUAL DEFICITS CAN LEAD TO MIS-READ INSTRUCTIONS.  ELOS - ONE WEEK.         Familia James MD  04/20/19  12:31 PM    Time: 10min

## 2019-04-20 NOTE — PLAN OF CARE
Problem: Stroke (IRF) (Adult)  Goal: Promote Optimal Functional [place]  Outcome: Ongoing (interventions implemented as appropriate)      Problem: Fall Risk (Adult)  Goal: Absence of Fall  Outcome: Ongoing (interventions implemented as appropriate)      Problem: Diabetes, Type 2 (Adult)  Goal: Signs and Symptoms of Listed Potential Problems Will be Absent, Minimized or Managed (Diabetes, Type 2)  Outcome: Ongoing (interventions implemented as appropriate)      Problem: Skin Injury Risk (Adult)  Goal: Skin Health and Integrity  Outcome: Ongoing (interventions implemented as appropriate)      Problem: Patient Care Overview  Goal: Plan of Care Review  Outcome: Ongoing (interventions implemented as appropriate)   04/20/19 0109   Patient Care Overview   IRF Plan of Care Review progress ongoing, continue   Progress, Functional Goals demonstrating adequate progress   Coping/Psychosocial   Plan of Care Reviewed With patient   OTHER   Outcome Summary Patient is cooperative. PRN pain medication given for HA with positive result. Using the call light for assistance. No safety issues observed.

## 2019-04-20 NOTE — PROGRESS NOTES
Inpatient Rehabilitation Plan of Care Note    Plan of Care  Care Plan Reviewed - No updates at this time.    Safety    Performed Intervention(s)  Hourly rounding , items within reach  Assistance with out of bed activities  Falls protocol  bed/chair alarm      Psychosocial    Performed Intervention(s)   PRN  Patient to verbalize feelings and concerns  Psychologist PRN      Body Systems    Performed Intervention(s)  Accuchecks ACHS  Medication as ordered  consistent carb diet      Sphincter Control    Performed Intervention(s)  Assistance with urinal  Assistance to bathroom  Incontinence care PRN    Signed by: Ana Thomas RN

## 2019-04-20 NOTE — PROGRESS NOTES
Inpatient Rehabilitation Functional Measures Assessment    Functional Measures  VANITA Eating:  Branch  [facility] Grooming: Branch  [facility] Bathing:  Branch  [facility] Upper Body Dressing:  Branch  [facility] Lower Body Dressing:  [facility] Toileting:  [facility] Bladder Management  Level of Assistance:  [place]  Frequency/Number of Accidents this Shift:  [facility] Bowel Management  Level of Assistance: [place]  Frequency/Number of Accidents this Shift: [facility] Bed/Chair/Wheelchair Transfer:  Activity was not observed.  VANITA Toilet Transfer:  [facility] Tub/Shower Transfer:  [place]    Previously Documented Mode of Locomotion at Discharge: Field  VANITA Expected Mode of Locomotion at Discharge: [facility] Walk/Wheelchair:  WHEELCHAIR OBSERVATION   Activity was not observed.    WALK OBSERVATION   Walk Distance Scale = 3.  Distance walked is greater than 150 feet. Walk Score  = 4.  Patient performs 75% or more of effort and requires minimal assistance.  Incidental help/contact guard/steadying was provided. Patient walked a distance  of  160 feet. Patient requires the following assistive device(s): Rolling  walker.  VANITA Stairs:  Activity was not observed.    VANITA Comprehension:  [facility] Expression:  [facility] Social Interaction:  [facility] Problem Solving:  [facility] Memory:  [place]    Therapy Mode Minutes  Occupational Therapy: Branch  Physical Therapy: Individual: 30 minutes.  Speech Language Pathology:  Branch    Signed by: Clau Richards PT

## 2019-04-20 NOTE — PROGRESS NOTES
Inpatient Rehabilitation Plan of Care Note    Plan of Care  Care Plan Reviewed - No updates at this time.    Safety    Performed Intervention(s)  Assistance with out of bed activities  Falls protocol  bed/chair alarm      Sphincter Control    Performed Intervention(s)  Assistance with urinal  Assistance to bathroom  Incontinence care PRN    Signed by: Michael Khan RN

## 2019-04-20 NOTE — PROGRESS NOTES
53 y.o.   LOS: 27 days   Patient Care Team:  Qasim Asif MD as PCP - General  Qasim Asif MD as PCP - Family Medicine    Chief Complaint: Blood sugar control    No chief complaint on file.      Subjective   Patient's blood sugars have been decently controlled.  He has been eating well and participating in the physical therapy.    Interval History:    Review of Systems:   Review of Systems   Constitutional: Positive for appetite change and fatigue.   Eyes: Negative for visual disturbance.   Respiratory: Negative for shortness of breath.    Cardiovascular: Negative for palpitations.   Gastrointestinal: Negative for nausea and vomiting.   Endocrine: Negative for polydipsia and polyuria.   Musculoskeletal: Positive for arthralgias.   Skin: Negative for pallor.   Neurological: Positive for weakness and numbness.   Psychiatric/Behavioral: Negative for agitation.     Objective     Vital Signs   Temp:  [98 °F (36.7 °C)-98.7 °F (37.1 °C)] 98.5 °F (36.9 °C)  Heart Rate:  [76-81] 81  Resp:  [18] 18  BP: (134-160)/(72-77) 134/77    Physical Exam:  Physical Exam   Constitutional: He is oriented to person, place, and time. He appears well-nourished.   obese   HENT:   Head: Normocephalic and atraumatic.   Wide neck   Eyes: Conjunctivae and EOM are normal.   Neck: Normal range of motion. Neck supple. Carotid bruit is not present. No thyromegaly present.   Acanthosis nigricans   Cardiovascular: Normal rate and normal heart sounds.   Pulmonary/Chest: Effort normal and breath sounds normal. No stridor. No respiratory distress.   Abdominal: Soft. Bowel sounds are normal. He exhibits no distension. There is no tenderness.   Central obesity   Musculoskeletal: He exhibits no edema or tenderness.   Neurological: He is alert and oriented to person, place, and time. He exhibits abnormal muscle tone.   Skin: Skin is warm and dry.   Psychiatric: He has a normal mood and affect. His behavior is normal.   Vitals  reviewed.  Results Review:     I reviewed the patient's new clinical results and summarized them in subjective and in plan.      No results found for: GLUCOSE  Lab Results (last 24 hours)     Procedure Component Value Units Date/Time    POC Glucose Once [[id number]]  (Abnormal) Collected:  04/20/19 1632    Specimen:  Blood Updated:  04/20/19 1633     Glucose 190 mg/dL     POC Glucose Once [[id number]]  (Normal) Collected:  04/20/19 1135    Specimen:  Blood Updated:  04/20/19 1138     Glucose 82 mg/dL     POC Glucose Once [[id number]]  (Normal) Collected:  04/20/19 0720    Specimen:  Blood Updated:  04/20/19 0721     Glucose 96 mg/dL     POC Glucose Once [[id number]]  (Normal) Collected:  04/19/19 2033    Specimen:  Blood Updated:  04/19/19 2034     Glucose 117 mg/dL         Imaging Results (last 24 hours)     ** No results found for the last 24 hours. **          Medication Review: done      Current Facility-Administered Medications:   •  acetaminophen (TYLENOL) tablet 650 mg, 650 mg, Oral, Q6H PRN, Familia James MD, 650 mg at 04/19/19 1304  •  ALPRAZolam (XANAX) tablet 0.5 mg, 0.5 mg, Oral, Nightly PRN, Familia James MD, 0.5 mg at 04/19/19 2226  •  amLODIPine (NORVASC) tablet 5 mg, 5 mg, Oral, Q24H, Santo Noonan MD, 5 mg at 04/20/19 0954  •  aspirin EC tablet 325 mg, 325 mg, Oral, Daily, Nitin Sheppard MD, 325 mg at 04/20/19 0953  •  atenolol (TENORMIN) tablet 25 mg, 25 mg, Oral, Q12H, Familia James MD, 25 mg at 04/20/19 0953  •  atorvastatin (LIPITOR) tablet 80 mg, 80 mg, Oral, Nightly, Familia James MD, 80 mg at 04/19/19 2008  •  bisacodyl (DULCOLAX) suppository 10 mg, 10 mg, Rectal, Daily PRN, Familia James MD  •  clopidogrel (PLAVIX) tablet 75 mg, 75 mg, Oral, Daily, Familia James MD, 75 mg at 04/20/19 0953  •  Dapagliflozin Propanediol tablet 10 mg, 10 mg, Oral, Daily, Familia James MD, 10 mg at 04/20/19 0954  •  dextrose (D50W) 25 g/  50mL Intravenous Solution 25 g, 25 g, Intravenous, Q15 Min PRN, Familia James MD  •  dextrose (GLUTOSE) oral gel 15 g, 15 g, Oral, Q15 Min PRN, Familia James MD  •  Dulaglutide solution pen-injector 1.5 mg, 1.5 mg, Subcutaneous, Weekly, Jordan Burgess MD, 1.5 mg at 04/18/19 0904  •  folic acid-vit B6-vit B12 (FOLGARD) tablet 1 tablet, 1 tablet, Oral, Daily, Familia James MD, 1 tablet at 04/20/19 0953  •  glucagon (human recombinant) (GLUCAGEN DIAGNOSTIC) injection 1 mg, 1 mg, Subcutaneous, PRN, Familia James MD  •  insulin glargine (LANTUS) injection 32 Units, 32 Units, Subcutaneous, Nightly, Alexey Romano MD, 32 Units at 04/19/19 2116  •  insulin lispro (humaLOG) injection 0-9 Units, 0-9 Units, Subcutaneous, 4x Daily With Meals & Nightly, Familia James MD, 2 Units at 03/28/19 1156  •  lisinopril (PRINIVIL,ZESTRIL) tablet 20 mg, 20 mg, Oral, Q12H, Familia James MD, 20 mg at 04/20/19 0953  •  metFORMIN ER (GLUCOPHAGE-XR) 24 hr tablet 500 mg, 500 mg, Oral, BID With Meals, Jordan Burgess MD, 500 mg at 04/20/19 0953  •  oxyCODONE-acetaminophen (PERCOCET) 5-325 MG per tablet 1 tablet, 1 tablet, Oral, Q12H PRN, Familia James MD, 1 tablet at 04/20/19 0958  •  PARoxetine (PAXIL) tablet 10 mg, 10 mg, Oral, Daily, Familia James MD, 10 mg at 04/20/19 0953  •  sodium chloride 0.9 % flush 5 mL, 5 mL, Intravenous, PRN, Familia James MD, 5 mL at 03/26/19 1727  •  vitamin D (ERGOCALCIFEROL) capsule 50,000 Units, 50,000 Units, Oral, Weekly, Familia James MD, 50,000 Units at 04/15/19 0922    Assessment/Plan     Active Hospital Problems    Diagnosis  POA   • **Acute ischemic left PCA stroke (CMS/HCC) [I63.532]  Unknown   • Vitamin D deficiency [E55.9]  Yes   • History of fatty infiltration of liver [Z87.19]  Not Applicable   • HTN (hypertension) [I10]  Unknown   • Diabetes mellitus (CMS/HCC) [E11.9]  Unknown   • Hyperlipidemia  "[E78.5]  Unknown   • Morbid obesity (CMS/HCC) [E66.01]  Unknown   • Anxiety disorder [F41.9]  Unknown   • Stroke (cerebrum) (CMS/[facility]) [I63.9]  Yes   • Status post placement of implantable loop recorder [Z95.818]  Unknown   • Abdominal wall abscess [L02.211]  Unknown      Resolved Hospital Problems   No resolved problems to display.     Type 2 diabetes mellitus-uncontrolled  Continue Farxiga 10 mg oral daily  Continue Trulicity 1.5 mg subcutaneous weekly  Continue Lantus 32 units at bedtime  Continue Humalog sliding scale 3 times daily before meals and at bedtime  Continue metformin 500 mg twice daily.    Hyperlipidemia  Continue Lipitor 80 mg oral daily.      Cameron Scott MD.  04/20/19  4:49 PM      EMR Dragon / transcription disclaimer:    \"Dictated utilizing Dragon dictation\".   "

## 2019-04-20 NOTE — PROGRESS NOTES
Inpatient Rehabilitation Functional Measures Assessment    Functional Measures  VANITA Eating:  [facility] Grooming: [facility] Bathing:  [facility] Upper Body Dressing:  [facility] Lower Body Dressing:  [facility] Toileting:  [facility] Bladder Management  Level of Assistance:  [place]  Frequency/Number of Accidents this Shift:  [facility] Bowel Management  Level of Assistance: [place]  Frequency/Number of Accidents this Shift: [facility] Bed/Chair/Wheelchair Transfer:  [facility] Toilet Transfer:  [facility] Tub/Shower Transfer:  [place]    Previously Documented Mode of Locomotion at Discharge: Field  VANITA Expected Mode of Locomotion at Discharge: [facility] Walk/Wheelchair:  [facility] Stairs:  [facility] Comprehension:  Auditory comprehension is the usual mode. Comprehension  Score = 7, Independent.  Patient comprehends complex/abstract information in  their primary language.  Patient is completely independent for auditory  comprehension.  There are no activity limitations.  VANITA Expression:  Vocal expression is the usual mode. Expression Score = 7,  Independent.  Patient expresses complex/abstract information in their primary  language.  Patient is completely independent for vocal expression.  There are no  activity limitations.  VANITA Social Interaction:  Social Interaction Score = 7, Independent. Patient is  completely independent for social interaction.  There are no activity  limitations.  VANITA Problem Solving:  Problem Solving Score = 7, Independent.  Patient makes  appropriate decisions in order to solve complex problems.  Patient is completely  independent for problem solving.  There are no activity limitations.  VANITA Memory:  Memory Score = 7, Independent.  Patient is completely independent  for memory.  There are no activity limitations.    Therapy Mode Minutes  Occupational Therapy: Branch  Physical Therapy: Branch  Speech Language Pathology:  Branch    Signed by: Michael Khan RN

## 2019-04-21 LAB
GLUCOSE BLDC GLUCOMTR-MCNC: 146 MG/DL (ref 70–130)
GLUCOSE BLDC GLUCOMTR-MCNC: 99 MG/DL (ref 70–130)

## 2019-04-21 PROCEDURE — 82962 GLUCOSE BLOOD TEST: CPT

## 2019-04-21 PROCEDURE — 63710000001 INSULIN GLARGINE PER 5 UNITS: Performed by: INTERNAL MEDICINE

## 2019-04-21 RX ADMIN — METFORMIN HYDROCHLORIDE 500 MG: 500 TABLET, EXTENDED RELEASE ORAL at 17:53

## 2019-04-21 RX ADMIN — ATENOLOL 25 MG: 25 TABLET ORAL at 08:38

## 2019-04-21 RX ADMIN — CLOPIDOGREL 75 MG: 75 TABLET, FILM COATED ORAL at 08:38

## 2019-04-21 RX ADMIN — OXYCODONE AND ACETAMINOPHEN 1 TABLET: 5; 325 TABLET ORAL at 21:18

## 2019-04-21 RX ADMIN — METFORMIN HYDROCHLORIDE 500 MG: 500 TABLET, EXTENDED RELEASE ORAL at 08:38

## 2019-04-21 RX ADMIN — ATENOLOL 25 MG: 25 TABLET ORAL at 21:05

## 2019-04-21 RX ADMIN — LISINOPRIL 20 MG: 20 TABLET ORAL at 08:38

## 2019-04-21 RX ADMIN — OXYCODONE AND ACETAMINOPHEN 1 TABLET: 5; 325 TABLET ORAL at 08:38

## 2019-04-21 RX ADMIN — INSULIN GLARGINE 32 UNITS: 100 INJECTION, SOLUTION SUBCUTANEOUS at 21:05

## 2019-04-21 RX ADMIN — ATORVASTATIN CALCIUM 80 MG: 80 TABLET, FILM COATED ORAL at 21:05

## 2019-04-21 RX ADMIN — LISINOPRIL 20 MG: 20 TABLET ORAL at 21:05

## 2019-04-21 RX ADMIN — Medication 1 TABLET: at 08:38

## 2019-04-21 RX ADMIN — ASPIRIN 325 MG: 325 TABLET, COATED ORAL at 08:38

## 2019-04-21 RX ADMIN — PAROXETINE HYDROCHLORIDE 10 MG: 10 TABLET, FILM COATED ORAL at 08:38

## 2019-04-21 RX ADMIN — AMLODIPINE BESYLATE 5 MG: 5 TABLET ORAL at 08:38

## 2019-04-21 NOTE — SIGNIFICANT NOTE
Left on home pass at 10:45 am with wife and dgt.  Pt. took wheelchair and walker with him. Safety precautions reviewed with patient and wife.  To return around 6:00 pm. NAVDEEP Navas RN

## 2019-04-21 NOTE — PROGRESS NOTES
Inpatient Rehabilitation Plan of Care Note    Plan of Care  Care Plan Reviewed - Updates as Follows    Body Systems    [RN] Endocrine(Active)  Current Status(04/21/2019): Patient at risk for altered blood sugars related to  recent health change.  Weekly Goal(04/28/2019): Patient will be compliant with accuchecks, diet, and  insulin coverage.  Discharge Goal: Patient will be independent with blood sugar management and be  compliant with treatment.    Performed Intervention(s)  Accuchecks ACHS  Medication as ordered  consistent carb diet      Psychosocial    [RN] Coping/Adjustment(Active)  Current Status(04/21/2019): Patient at risk for ineffective coping related to  recent change in health status and hospitalization.  Weekly Goal(04/28/2019): Patient will verbalize feelings and ask questions if he  has them.  Discharge Goal: Patient will develop coping skills to accomodate his health  changes.    Performed Intervention(s)   PRN  Patient to verbalize feelings and concerns  Psychologist PRN      Safety    [RN] Potential for Injury(Active)  Current Status(04/21/2019): Patient at risk for falls related to impaired vision  and impaired mobility.  Weekly Goal(04/28/2019): Patient will be free of falls on rehab and will be  compliant with call light use.  Discharge Goal: No falls while here on rehab. Pt family aware of fall/safety in  the home setting.    Performed Intervention(s)  Hourly rounding , items within reach  Assistance with out of bed activities  Falls protocol  bed/chair alarm      Sphincter Control    [RN] Bladder Management(Active)  Current Status(04/21/2019): Patient is continent of bladder.  Weekly Goal(04/28/2019): Patient will remain continent.  Discharge Goal: Patient will remain continent.    [RN] Bowel Management(Active)  Current Status(04/21/2019): Patient is continent of bowels.  Weekly Goal(04/28/2019): Patient will remain continent.  Discharge Goal: Patient will remain  continent.    Performed Intervention(s)  Assistance with urinal  Assistance to bathroom  Incontinence care PRN    Signed by: Lindsay Navas RN

## 2019-04-21 NOTE — PLAN OF CARE
Problem: Fall Risk (Adult)  Goal: Absence of Fall  Outcome: Ongoing (interventions implemented as appropriate)   04/21/19 0051   Fall Risk (Adult)   Absence of Fall making progress toward outcome

## 2019-04-21 NOTE — PROGRESS NOTES
Inpatient Rehabilitation Functional Measures Assessment    Functional Measures  VANITA Eating:  [facility] Grooming: [facility] Bathing:  [facility] Upper Body Dressing:  [facility] Lower Body Dressing:  [facility] Toileting:  [facility] Bladder Management  Level of Assistance:  [place]  Frequency/Number of Accidents this Shift:  [facility] Bowel Management  Level of Assistance: [place]  Frequency/Number of Accidents this Shift: [facility] Bed/Chair/Wheelchair Transfer:  [facility] Toilet Transfer:  [facility] Tub/Shower Transfer:  [place]    Previously Documented Mode of Locomotion at Discharge: Field  VANITA Expected Mode of Locomotion at Discharge: [facility] Walk/Wheelchair:  [facility] Stairs:  [facility] Comprehension:  Auditory comprehension is the usual mode. Patient does not  comprehend complex/abstract information in their primary language without  assistance from a helper. Comprehension Score = 5, Supervision. Patient  comprehends basic daily needs or ideas greater than 90% of the time. Patient  requires stand by/rare prompting. Patient requires the following assistive  device(s): Glasses.  VANITA Expression:  Vocal expression is the usual mode. Expression Score = 6,  Modified Independent.  Patient expresses complex/abstract information in their  primary language with only mild difficulty with tasks.  VANITA Social Interaction:  Social Interaction Score = 6, Modified Independent.  Patient is modified independent for social interaction, requiring: Requires  additional time. Medications.  VANITA Problem Solving:  Patient does not make appropriate decisions in order to  solve complex problems without assistance from a helper. Problem Solving Score =  5, Supervision.  Patient makes appropriate decisions in order to solve routine  problems with directing only under stressful or unfamiliar conditions, but no  more than 10% of the time, for the following behavior(s): Difficulty weighing  alternatives/making  choices. Exhibits poor planning. Poor judgment.  VANITA Memory:  Memory Score = 5, Supervision.  Patient recognizes and remembers  with prompting only under stressful or unfamiliar conditions, but no more than  10% of the time, for the following behavior(s): Inability to recognize people or  remember instructions/directions requiring short-term recall (minutes, hours,  days). Limited recall of daily routine.    Therapy Mode Minutes  Occupational Therapy: Branch  Physical Therapy: Branch  Speech Language Pathology:  Branch    Signed by: Lindsay Navas RN

## 2019-04-21 NOTE — PLAN OF CARE
Problem: Stroke (IRF) (Adult)  Goal: Promote Optimal Functional [place]  Outcome: Ongoing (interventions implemented as appropriate)      Problem: Fall Risk (Adult)  Goal: Absence of Fall  Outcome: Ongoing (interventions implemented as appropriate)      Problem: Diabetes, Type 2 (Adult)  Goal: Signs and Symptoms of Listed Potential Problems Will be Absent, Minimized or Managed (Diabetes, Type 2)  Outcome: Ongoing (interventions implemented as appropriate)      Problem: Skin Injury Risk (Adult)  Goal: Skin Health and Integrity  Outcome: Ongoing (interventions implemented as appropriate)      Problem: Patient Care Overview  Goal: Plan of Care Review  Outcome: Ongoing (interventions implemented as appropriate)   04/21/19 1530   Patient Care Overview   IRF Plan of Care Review progress ongoing, continue   Progress, Functional Goals demonstrating adequate progress   OTHER   Outcome Summary Pt. left for day pass at 1045. Plans to return at 6:00 pm. Ate breakfast in dining room. Had a shower with assistance of nursing assistant. No safety issues noted.

## 2019-04-21 NOTE — PROGRESS NOTES
Inpatient Rehabilitation Functional Measures Assessment    Functional Measures  VANITA Eating:  [facility] Grooming: [facility] Bathing:  [facility] Upper Body Dressing:  [facility] Lower Body Dressing:  [facility] Toileting:  [facility] Bladder Management  Level of Assistance:  [place]  Frequency/Number of Accidents this Shift:  [facility] Bowel Management  Level of Assistance: [place]  Frequency/Number of Accidents this Shift: [facility] Bed/Chair/Wheelchair Transfer:  [facility] Toilet Transfer:  [facility] Tub/Shower Transfer:  [place]    Previously Documented Mode of Locomotion at Discharge: Field  VANITA Expected Mode of Locomotion at Discharge: [facility] Walk/Wheelchair:  [facility] Stairs:  [facility] Comprehension:  Auditory comprehension is the usual mode. Comprehension  Score = 6, Modified [place].  Patient comprehends complex/abstract  information in their primary language, requiring:  [facility] Expression:  Vocal expression is the usual mode. Expression Score = 6,  Modified Independent.  Patient expresses complex/abstract information in their  primary language, requiring:  [facility] Social Interaction:  Social Interaction Score = 6, Modified Independent.  Patient is modified independent for social interaction, requiring:  [facility] Problem Solving:  Activity was not observed.  VANITA Memory:  Memory Score = 6, Modified [place].  Patient is modified  independent for memory, requiring:    Therapy Mode Minutes  Occupational Therapy: Branch  Physical Therapy: Branch  Speech Language Pathology:  Branch    Signed by: Rosa Isela Fleming RN
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
## 2019-04-22 LAB
ALBUMIN SERPL-MCNC: 3.9 G/DL (ref 3.5–5.2)
ALBUMIN/GLOB SERPL: 1.4 G/DL
ALP SERPL-CCNC: 96 U/L (ref 39–117)
ALT SERPL W P-5'-P-CCNC: 27 U/L (ref 1–41)
ANION GAP SERPL CALCULATED.3IONS-SCNC: 11.6 MMOL/L
AST SERPL-CCNC: 19 U/L (ref 1–40)
BASOPHILS # BLD AUTO: 0.05 10*3/MM3 (ref 0–0.2)
BASOPHILS NFR BLD AUTO: 0.7 % (ref 0–1.5)
BILIRUB SERPL-MCNC: 0.3 MG/DL (ref 0.2–1.2)
BUN BLD-MCNC: 12 MG/DL (ref 6–20)
BUN/CREAT SERPL: 18.2 (ref 7–25)
CALCIUM SPEC-SCNC: 8.7 MG/DL (ref 8.6–10.5)
CHLORIDE SERPL-SCNC: 102 MMOL/L (ref 98–107)
CO2 SERPL-SCNC: 26.4 MMOL/L (ref 22–29)
CREAT BLD-MCNC: 0.66 MG/DL (ref 0.76–1.27)
DEPRECATED RDW RBC AUTO: 43.5 FL (ref 37–54)
EOSINOPHIL # BLD AUTO: 0.28 10*3/MM3 (ref 0–0.4)
EOSINOPHIL NFR BLD AUTO: 4.2 % (ref 0.3–6.2)
ERYTHROCYTE [DISTWIDTH] IN BLOOD BY AUTOMATED COUNT: 14.1 % (ref 12.3–15.4)
GFR SERPL CREATININE-BSD FRML MDRD: 126 ML/MIN/1.73
GLOBULIN UR ELPH-MCNC: 2.7 GM/DL
GLUCOSE BLD-MCNC: 130 MG/DL (ref 65–99)
GLUCOSE BLDC GLUCOMTR-MCNC: 101 MG/DL (ref 70–130)
GLUCOSE BLDC GLUCOMTR-MCNC: 105 MG/DL (ref 70–130)
GLUCOSE BLDC GLUCOMTR-MCNC: 109 MG/DL (ref 70–130)
GLUCOSE BLDC GLUCOMTR-MCNC: 125 MG/DL (ref 70–130)
HCT VFR BLD AUTO: 42.9 % (ref 37.5–51)
HGB BLD-MCNC: 14 G/DL (ref 13–17.7)
IMM GRANULOCYTES # BLD AUTO: 0.02 10*3/MM3 (ref 0–0.05)
IMM GRANULOCYTES NFR BLD AUTO: 0.3 % (ref 0–0.5)
LYMPHOCYTES # BLD AUTO: 1.9 10*3/MM3 (ref 0.7–3.1)
LYMPHOCYTES NFR BLD AUTO: 28.3 % (ref 19.6–45.3)
MCH RBC QN AUTO: 27.9 PG (ref 26.6–33)
MCHC RBC AUTO-ENTMCNC: 32.6 G/DL (ref 31.5–35.7)
MCV RBC AUTO: 85.6 FL (ref 79–97)
MONOCYTES # BLD AUTO: 0.58 10*3/MM3 (ref 0.1–0.9)
MONOCYTES NFR BLD AUTO: 8.6 % (ref 5–12)
NEUTROPHILS # BLD AUTO: 3.89 10*3/MM3 (ref 1.7–7)
NEUTROPHILS NFR BLD AUTO: 57.9 % (ref 42.7–76)
NRBC BLD AUTO-RTO: 0 /100 WBC (ref 0–0.2)
PLATELET # BLD AUTO: 305 10*3/MM3 (ref 140–450)
PMV BLD AUTO: 9.8 FL (ref 6–12)
POTASSIUM BLD-SCNC: 4 MMOL/L (ref 3.5–5.2)
PROT SERPL-MCNC: 6.6 G/DL (ref 6–8.5)
RBC # BLD AUTO: 5.01 10*6/MM3 (ref 4.14–5.8)
SODIUM BLD-SCNC: 140 MMOL/L (ref 136–145)
WBC NRBC COR # BLD: 6.72 10*3/MM3 (ref 3.4–10.8)

## 2019-04-22 PROCEDURE — G0515 COGNITIVE SKILLS DEVELOPMENT: HCPCS

## 2019-04-22 PROCEDURE — 97110 THERAPEUTIC EXERCISES: CPT

## 2019-04-22 PROCEDURE — 82962 GLUCOSE BLOOD TEST: CPT

## 2019-04-22 PROCEDURE — 80053 COMPREHEN METABOLIC PANEL: CPT | Performed by: PHYSICAL MEDICINE & REHABILITATION

## 2019-04-22 PROCEDURE — 97112 NEUROMUSCULAR REEDUCATION: CPT

## 2019-04-22 PROCEDURE — 63710000001 INSULIN GLARGINE PER 5 UNITS: Performed by: INTERNAL MEDICINE

## 2019-04-22 PROCEDURE — 97535 SELF CARE MNGMENT TRAINING: CPT

## 2019-04-22 PROCEDURE — 85025 COMPLETE CBC W/AUTO DIFF WBC: CPT | Performed by: PHYSICAL MEDICINE & REHABILITATION

## 2019-04-22 RX ADMIN — ATENOLOL 25 MG: 25 TABLET ORAL at 21:07

## 2019-04-22 RX ADMIN — ALPRAZOLAM 0.5 MG: 0.5 TABLET ORAL at 22:31

## 2019-04-22 RX ADMIN — AMLODIPINE BESYLATE 5 MG: 5 TABLET ORAL at 08:48

## 2019-04-22 RX ADMIN — ATORVASTATIN CALCIUM 80 MG: 80 TABLET, FILM COATED ORAL at 21:07

## 2019-04-22 RX ADMIN — ATENOLOL 25 MG: 25 TABLET ORAL at 08:49

## 2019-04-22 RX ADMIN — PAROXETINE HYDROCHLORIDE 10 MG: 10 TABLET, FILM COATED ORAL at 08:49

## 2019-04-22 RX ADMIN — ASPIRIN 325 MG: 325 TABLET, COATED ORAL at 08:48

## 2019-04-22 RX ADMIN — CLOPIDOGREL 75 MG: 75 TABLET, FILM COATED ORAL at 08:49

## 2019-04-22 RX ADMIN — Medication 1 TABLET: at 08:49

## 2019-04-22 RX ADMIN — ERGOCALCIFEROL 50000 UNITS: 1.25 CAPSULE ORAL at 08:49

## 2019-04-22 RX ADMIN — OXYCODONE AND ACETAMINOPHEN 1 TABLET: 5; 325 TABLET ORAL at 18:50

## 2019-04-22 RX ADMIN — INSULIN GLARGINE 32 UNITS: 100 INJECTION, SOLUTION SUBCUTANEOUS at 21:07

## 2019-04-22 RX ADMIN — LISINOPRIL 20 MG: 20 TABLET ORAL at 21:07

## 2019-04-22 RX ADMIN — METFORMIN HYDROCHLORIDE 500 MG: 500 TABLET, EXTENDED RELEASE ORAL at 08:49

## 2019-04-22 RX ADMIN — METFORMIN HYDROCHLORIDE 500 MG: 500 TABLET, EXTENDED RELEASE ORAL at 16:56

## 2019-04-22 RX ADMIN — LISINOPRIL 20 MG: 20 TABLET ORAL at 08:48

## 2019-04-22 NOTE — PROGRESS NOTES
Inpatient Rehabilitation Plan of Care Note    Plan of Care  Branch    Field    Signed by: Hannah Gomez RN

## 2019-04-22 NOTE — PROGRESS NOTES
Inpatient Rehabilitation Functional Measures Assessment    Functional Measures  VANITA Eating:  [facility] Grooming: [facility] Bathing:  [facility] Upper Body Dressing:  [facility] Lower Body Dressing:  [facility] Toileting:  [facility] Bladder Management  Level of Assistance:  [place]  Frequency/Number of Accidents this Shift:  [facility] Bowel Management  Level of Assistance: [place]  Frequency/Number of Accidents this Shift: [facility] Bed/Chair/Wheelchair Transfer:  [facility] Toilet Transfer:  [facility] Tub/Shower Transfer:  [place]    Previously Documented Mode of Locomotion at Discharge: Field  VANITA Expected Mode of Locomotion at Discharge: [facility] Walk/Wheelchair:  [facility] Stairs:  [facility] Comprehension:  Auditory comprehension is the usual mode. Comprehension  Score = 6, Modified [place].  Patient comprehends complex/abstract  information in their primary language, requiring: Additional time.  VANITA Expression:  Vocal expression is the usual mode. Expression Score = 6,  Modified Independent.  Patient expresses complex/abstract information in their  primary language, requiring: Additional time.  VANITA Social Interaction:  Social Interaction Score = 7, Independent. Patient is  completely independent for social interaction.  There are no activity  limitations.  VANITA Problem Solving:  Activity was not observed.  VANITA Memory:  Memory Score = 6, Modified [place].  Patient is modified  independent for memory, requiring: Requires additional time.    Therapy Mode Minutes  Occupational Therapy: Branch  Physical Therapy: Branch  Speech Language Pathology:  [place]    Signed by: Doreen Aguilar RN

## 2019-04-22 NOTE — PROGRESS NOTES
Inpatient Rehabilitation Functional Measures Assessment    Functional Measures  VANITA Eating:  [facility] Grooming: [facility] Bathing:  [facility] Upper Body Dressing:  [facility] Lower Body Dressing:  [facility] Toileting:  [facility] Bladder Management  Level of Assistance:  [place]  Frequency/Number of Accidents this Shift:  [facility] Bowel Management  Level of Assistance: [place]  Frequency/Number of Accidents this Shift: Branch    [facility] Bed/Chair/Wheelchair Transfer:  Activity was not observed.  VANITA Toilet Transfer:  [facility] Tub/Shower Transfer:  [place]    Previously Documented Mode of Locomotion at Discharge: Field  VANITA Expected Mode of Locomotion at Discharge: [facility] Walk/Wheelchair:  WHEELCHAIR OBSERVATION   Activity was not observed.    WALK OBSERVATION   Walk Distance Scale = 3.  Distance walked is greater than 150 feet. Walk Score  = 4.  Patient performs 75% or more of effort and requires minimal assistance.  Incidental help/contact guard/steadying was provided. Patient walked a distance  of  160 feet. Patient requires the following assistive device(s): Rolling  walker.  VANITA Stairs:  Stairs Score = 2.  Incidental assistance with lifting or lowering,  contact guard or steadying was provided. Patient performs 75% or more of effort  and requires minimal contact assistance. Patient negotiated  4 stairs. Patient  requires the following assistive device(s): Handrail(s).    VANITA Comprehension:  [facility] Expression:  [facility] Social Interaction:  [facility] Problem Solving:  [facility] Memory:  [place]    Therapy Mode Minutes  Occupational Therapy: [place]  Physical Therapy: Individual: 60 minutes.  Speech Language Pathology:  [place]    Signed by: Naomy Mendenhall PT Student     - CoSigned By: Radha Ordaz PT 4/22/2019 3:27:20 PM

## 2019-04-22 NOTE — PLAN OF CARE
Problem: Stroke (IRF) (Adult)  Goal: Promote Optimal Functional [place]  Outcome: Ongoing (interventions implemented as appropriate)   04/22/19 1458   Stroke (IRF) (Adult)   Promote Optimal Functional [place] demonstrating adequate progress       Problem: Fall Risk (Adult)  Goal: Absence of Fall  Outcome: Ongoing (interventions implemented as appropriate)   04/22/19 1458   Fall Risk (Adult)   Absence of Fall making progress toward outcome       Problem: Diabetes, Type 2 (Adult)  Goal: Signs and Symptoms of Listed Potential Problems Will be Absent, Minimized or Managed (Diabetes, Type 2)  Outcome: Ongoing (interventions implemented as appropriate)   04/22/19 1458   Goal/Outcome Evaluation   Problems Assessed (Type 2 Diabetes) all   Problems Present (Type 2 Diabetes) situational response       Problem: Skin Injury Risk (Adult)  Goal: Skin Health and Integrity  Outcome: Ongoing (interventions implemented as appropriate)   04/22/19 1458   Skin Injury Risk (Adult)   Skin Health and Integrity making progress toward outcome       Problem: Patient Care Overview  Goal: Plan of Care Review  Outcome: Ongoing (interventions implemented as appropriate)   04/22/19 1458   Patient Care Overview   IRF Plan of Care Review progress ongoing, continue   Progress, Functional Goals demonstrating adequate progress   Coping/Psychosocial   Plan of Care Reviewed With patient   OTHER   Outcome Summary Patient is doing ok today, looking forward to discharge vimal, no complaints of pain or distress noted this shift.      Goal: Coping Plan  Outcome: Ongoing (interventions implemented as appropriate)   04/22/19 1458   Coping Plan   Demonstration of Effective Coping Strategies demonstrating adequate progress
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
## 2019-04-22 NOTE — PROGRESS NOTES
Inpatient Rehabilitation Functional Measures Assessment and Plan of Care    Plan of Care  Updated Problems/Interventions  Mobility    [OT] Toilet Transfers(Active)  Current Status(04/22/2019): CGA Rwx vc for safety  Weekly Goal(04/23/2019): CGA RWX for safety  Discharge Goal: CGA RWX vc for safety    [OT] Tub/Shower Transfers(Active)  Current Status(04/22/2019): CGA SPS Min vc for safety  Weekly Goal(04/23/2019): CGA RWX vc  Discharge Goal: CGA RWX vc        Self Care    [OT] Bathing(Active)  Current Status(04/22/2019): SBA vc for safety,sequencing  Weekly Goal(04/23/2019): SBA vc for safety  Discharge Goal: SBA    [OT] Dressing (Lower)(Active)  Current Status(04/22/2019): SBA vc for jay tech,foot placement  Weekly Goal(04/23/2019): SBA vc  Discharge Goal: sba    [OT] Dressing (Upper)(Active)  Current Status(04/22/2019): SBA with min cues  Weekly Goal(04/23/2019): SBA  Discharge Goal: SBA    [OT] Eating(Active)  Current Status(04/22/2019): setup  Weekly Goal(04/23/2019): Setup  Discharge Goal: Setup    [OT] Grooming(Active)  Current Status(04/22/2019): SBA vc seated  Weekly Goal(04/23/2019): SBA  Discharge Goal: SBA    [OT] Toileting(Active)  Current Status(04/22/2019): SBAVC for safe technique  Weekly Goal(04/23/2019): SBA  Discharge Goal: SBA vc    Functional Measures  VANITA Eating:  Eating Score = 5. Patient is supervision/set-up for eating,  requiring: Stand by assistance. No assistive devices were required.  VANITA Grooming: Grooming Score = 5. Patient is supervision/set-up for grooming,  requiring: Stand by assistance. No assistive devices were required.  VANITA Bathing:  Patient bathed in tub. Bathing Score = 5.  Patient is  supervision/set-up for bathing, requiring: Verbal cuing, prompting, or  instructing. Patient requires the following assistive device(s): Grab bar/arm  rest to maintain balance. Hand held shower.  VANITA Upper Body Dressing:  Upper Body Dressing Score = 5. Patient is supervision  for upper body  dressing, requiring: Verbal cuing, prompting, or instructing.  Gathering/setting out clothes. No assistive devices were required.  [facility] Lower Body Dressing:  Lower Body Dressing Score = 5. Patient is  supervision/set-up for lower body dressing, requiring: Verbal cuing, prompting,  or instructing. Gathering/setting out clothes. No assistive devices were  required.  VANITA Toileting:  Toileting Score = 5.  Patient is supervision/set-up for  toileting, requiring: Verbal cuing, prompting, or instructing. Patient requires  the following assistive device(s): Adaptive device to maintain balance.    [facility] Bladder Management  Level of Assistance:  [place]  Frequency/Number of Accidents this Shift:  [facility] Bowel Management  Level of Assistance: [place]  Frequency/Number of Accidents this Shift: [facility] Bed/Chair/Wheelchair Transfer:  [facility] Toilet Transfer:  Toilet Transfer Score = 4.  Patient performs 75% or more  of effort and minimal assistance (little/incidental help/steadying) for  transferring to and from the toilet/commode, requiring: Contact guard. Patient  requires the following assistive device(s): Walker. Bedside Commode.  VANITA Tub/Shower Transfer:  Tub Transfer Score = 4.  Patient performs 75% or more  of effort and minimal assistance (little/incidental help/lifting of one  limb/steadying) for transferring to and from the tub, requiring: Contact guard.  Patient requires the following assistive device(s): Walker. Tub bench.    Previously Documented Mode of Locomotion at Discharge: Field  VANITA Expected Mode of Locomotion at Discharge: [facility] Walk/Wheelchair:  [facility] Stairs:  [facility] Comprehension:  [facility] Expression:  [facility] Social Interaction:  [facility] Problem Solving:  [facility] Memory:  Branch    Therapy Mode Minutes  Occupational Therapy: Individual: 60 minutes.  Physical Therapy: Branch  Speech Language Pathology:  Branch    Signed by: Reg Mckinney OTR/SOFIA

## 2019-04-22 NOTE — THERAPY TREATMENT NOTE
Inpatient Rehabilitation - Speech Language Pathology Treatment Note    [facility]       Patient Name: Nayan Ryan  : 1965  MRN: [id number]    Today's Date: 2019           Admit Date: 3/24/2019      Visit Dx:      No diagnosis found.    Patient Active Problem List   Diagnosis   • Acute ischemic left PCA stroke (CMS/HCC)   • Status post placement of implantable loop recorder   • HTN (hypertension)   • Diabetes mellitus (CMS/HCC)   • Hyperlipidemia   • Morbid obesity (CMS/HCC)   • Anxiety disorder   • Migraine   • H/O hernia repair   • Abdominal wall abscess   • Back pain   • Stroke (cerebrum) (CMS/HCC)   • Vitamin D deficiency   • History of fatty infiltration of liver          Therapy Treatment    Evaluation/Coping    Evaluation/Treatment Time and Intent  Subjective Information: no complaints (19 1038 : Narcisa Bonner MS CCC-SLP)  Existing Precautions/Restrictions: fall (19 1038 : Narcisa Bonner MS CCC-SLP)  Document Type: therapy note (daily note) (19 1038 : Narcisa Bonner MS CCC-SLP)  Mode of Treatment: individual therapy, speech-language pathology (19 1038 : Narcisa Bonner, MS CCC-SLP)  Patient/Family Observations: alert (19 1038 : Narcisa Bonner MS CCC-SLP)  Start Time (Evaluation/Treatment): 1000 (19 1038 : Narcisa Bonner, MS CCC-SLP)  Stop Time (Evaluation/Treatment): 1030 (19 1038 : Narcisa Bonner MS CCC-SLP)    Vitals/Pain/Safety         Cognition/Communication         Oral Motor/Eating         Mobility/Basic Activities/Instrumental Activities/Motor/Modality                   ROM/MMT                   Sensory/Myotome/Dermatome/Edema               Posture/Balance/Special Tests/Exercise/Transportation/Sexual Function                   Orthotics/Residual Limb/Prosthetic Management              Outcome Summary         EDUCATION    The patient has been educated in the following areas:     Cognitive Impairment.    SLP Recommendation and Plan                                                           SLP GOALS     Row Name 04/22/19 1000 04/22/19 0800          Word Retrieval Skills Goal 1 (SLP)    Progress (Word Retrieval Skills Goal 1, SLP)  70%;independently (over 90% accuracy);with minimal cues (75-90%) naming by letter restriction ( initial letter)  -SL  80%;independently (over 90% accuracy);with minimal cues (75-90%) divergent naming w/initial letter restrictions  -SL        Attention Goal 1 (SLP)    Progress (Attention Goal 1, SLP)  50%;with moderate cues (50-74%) trail w/symbols rep different calculations  -SL  --        Organizational Skills Goal 1 (SLP)    Progress (Thought Organization Skills Goal 1, SLP)  90%;independently (over 90% accuracy) semantic sorting task  -SL  --        Reasoning Goal 1 (SLP)    Progress (Reasoning Goal 1, SLP)  70%;independently (over 90% accuracy) hexagon shape puzzles  -SL  --        Functional Math Skills Goal 1 (SLP)    Progress (Functional Math Skills Goal 1, SLP)  --  40%;independently (over 90% accuracy);with minimal cues (75-90%) money management- determining denomination amounts  -SL     Progress/Outcomes (Functional Math Skills Goal 1, SLP)  --  goal ongoing  -       User Key  (r) = Recorded By, (t) = Taken By, (c) = Cosigned By    Initials Name Provider Type    Narcisa Urias MS CCC-SLP Speech and Language Pathologist                  Time Calculation:       Time Calculation- SLP     Row Name 04/22/19 1101 04/22/19 0829          Time Calculation- [facility]    SLP Start Time  1030  -SL  0800  -     SLP Stop Time  1100  -SL  0830  -[facility] Time Calculation (min)  30 min  -SL  30 min  -       User Key  (r) = Recorded By, (t) = Taken By, (c) = Cosigned By    Initials Name Provider Type    Narcisa Urias MS CCC-SLP Speech and Language Pathologist            Therapy Charges for Today     Code Description Service Date Service Provider Modifiers Qty    [id number]  ST DEV OF COGN SKILLS EACH 15 MIN 4/22/2019 Narcisa Bonner MS CCC-SLP  2    [id number]  HC ST DEV OF COGN SKILLS EACH 15 MIN 4/22/2019 Narcisa Bonner, MS CCC-SLP  2                           Narcisa Bonner, MS CCC-SLP  4/22/2019

## 2019-04-22 NOTE — PROGRESS NOTES
Inpatient Rehabilitation Functional Measures Assessment    Functional Measures  VANITA Eating:  [facility] Grooming: [facility] Bathing:  [facility] Upper Body Dressing:  [facility] Lower Body Dressing:  [facility] Toileting:  [facility] Bladder Management  Level of Assistance:  [place]  Frequency/Number of Accidents this Shift:  [facility] Bowel Management  Level of Assistance: [place]  Frequency/Number of Accidents this Shift: [facility] Bed/Chair/Wheelchair Transfer:  [facility] Toilet Transfer:  [facility] Tub/Shower Transfer:  [place]    Previously Documented Mode of Locomotion at Discharge: Field  VANITA Expected Mode of Locomotion at Discharge: [facility] Walk/Wheelchair:  [facility] Stairs:  [facility] Comprehension:  Auditory comprehension is the usual mode. Patient does not  comprehend complex/abstract information in their primary language without  assistance from a helper. Comprehension Score = 5, Supervision. Patient  comprehends basic daily needs or ideas greater than 90% of the time. Patient  requires stand by/rare prompting. No assistive devices were required.  VANITA Expression:  Vocal expression is the usual mode. Expression Score = 6,  Modified Independent.  Patient expresses complex/abstract information in their  primary language with only mild difficulty with tasks.  VANITA Social Interaction:  Social Interaction Score = 6, Modified Independent.  Patient is modified independent for social interaction, requiring: Medications.    VANITA Problem Solving:  Patient does not make appropriate decisions in order to  solve complex problems without assistance from a helper. Problem Solving Score =  4, Minimal Direction. Patient makes appropriate decisions in order to solve  routine problems 75-90% of the time. Patient requires minimal/occasional  direction for the following behavior(s): Difficulty weighing alternatives/making  choices. Inability to follow multi-step commands.  VANITA Memory:   Branch    Therapy Mode Minutes  Occupational Therapy: Branch  Physical Therapy: Branch  Speech Language Pathology:  Branch    Signed by: Hannah Gomez RN

## 2019-04-22 NOTE — PROGRESS NOTES
Inpatient Rehabilitation Plan of Care Note    Plan of Care  Care Plan Reviewed - No updates at this time.    Sphincter Control    Performed Intervention(s)  Incontinence care PRN    Signed by: Doreen Aguilar RN

## 2019-04-22 NOTE — THERAPY DISCHARGE NOTE
Inpatient Rehabilitation - Occupational Therapy Treatment Note/Discharge  [facility]     Patient Name: Nayan Ryan  : 1965  MRN: [id number]  Today's Date: 2019               Admit Date: 3/24/2019    Visit Dx:   No diagnosis found.  Patient Active Problem List   Diagnosis   • Acute ischemic left PCA stroke (CMS/HCC)   • Status post placement of implantable loop recorder   • HTN (hypertension)   • Diabetes mellitus (CMS/HCC)   • Hyperlipidemia   • Morbid obesity (CMS/HCC)   • Anxiety disorder   • Migraine   • H/O hernia repair   • Abdominal wall abscess   • Back pain   • Stroke (cerebrum) (CMS/HCC)   • Vitamin D deficiency   • History of fatty infiltration of liver       Therapy Treatment  IRF Treatment Summary     Row Name 19 1611 19 1038 19 1017       Evaluation/Treatment Time and Intent    Subjective Information  no complaints  -DN  no complaints  -SL  no complaints  (Pended)   -    Existing Precautions/Restrictions  fall  -DN  fall  -SL  fall  (Pended)   -    Document Type  therapy note (daily note)  -DN  therapy note (daily note)  -  discharge treatment  (Pended)   -    Mode of Treatment  occupational therapy  -DN  individual therapy;speech-language pathology  -SL  physical therapy  (Pended)   -    Patient/Family Observations  willing to work  -DN  alert  -  Pt sitting in w/c ready for therapy. States home pass went great on   (Pended)   -    Start Time (Evaluation/Treatment)  --  1000  -SL  --    Stop Time (Evaluation/Treatment)  --  1030  -SL  --    Recorded by [DN] Reg Mckinney, OT [SL] Narcisa Bonner, MS CCC-SLP [LS] Naomy Mendenhall, PT Student    Row Name 19 0800             Evaluation/Treatment Time and Intent    Subjective Information  no complaints  -SL      Existing Precautions/Restrictions  fall  -SL      Document Type  therapy note (daily note)  -      Mode of Treatment  individual therapy;speech-language pathology  -      Patient/Family  Observations  cooperative  -SL      Start Time (Evaluation/Treatment)  0800  -SL      Stop Time (Evaluation/Treatment)  0830  -SL      Recorded by [SL] Narcisa Bonner MS CCC-SLP      Row Name 04/22/19 1017             Cognition/Psychosocial- PT/OT    Affect/Mental Status (Cognitive)  WFL  (Pended)   -LS      Follows Commands (Cognition)  follows one step commands;over 90% accuracy;follows two step commands  (Pended)   -LS      Personal Safety Interventions  fall prevention program maintained;gait belt;muscle strengthening facilitated;nonskid shoes/slippers when out of bed  (Pended)   -LS      Memory Deficit (Cognitive)  mild deficit;moderate deficit  (Pended)   -LS      Safety Deficit (Cognitive)  insight into deficits/self awareness;judgment;problem solving  (Pended)   -LS      Recorded by [LS] Naomy Mendenhall, PT Student      Row Name 04/22/19 1017             Sit-Stand Transfer    Sit-Stand [place] (Transfers)  stand by assist  (Pended)   -LS      Assistive Device (Sit-Stand Transfers)  walker, front-wheeled  (Pended)   -LS      Recorded by [LS] Naomy Mendenhall, PT Student      Row Name 04/22/19 1017             Stand-Sit Transfer    Stand-Sit [place] (Transfers)  stand by assist  (Pended)   -LS      Assistive Device (Stand-Sit Transfers)  walker, front-wheeled  (Pended)   -LS      Recorded by [LS] Naomy Mendenhall, PT Student      Row Name 04/22/19 1611             Toilet Transfer    Type (Toilet Transfer)  stand pivot/stand step  -DN      [place] Level (Toilet Transfer)  contact guard  -DN      Assistive Device (Toilet Transfer)  walker, front-wheeled  -DN      Recorded by [DN] Reg Mckinney, OT      Row Name 04/22/19 1611             Bathtub Transfer    Type (Bathtub Transfer)  stand pivot/stand step  -DN      [place] Level (Bathtub Transfer)  contact guard;verbal cues  -DN      Assistive Device (Bathtub Transfer)  tub bench;grab bars/tub rail  -DN      Recorded by [DN] Reg Mckinney, OT       Row Name 04/22/19 1017             Gait/Stairs Assessment/Training    [place] Level (Gait)  contact guard;stand by assist  (Pended)   -LS      Assistive Device (Gait)  walker, front-wheeled  (Pended)   -LS      Distance in Feet (Gait)  160 x 2, 50 x 2  (Pended)   -LS      Pattern (Gait)  step-through  (Pended)   -LS      [place] Level (Stairs)  contact guard  (Pended)   -LS      Handrail Location (Stairs)  both sides  (Pended)   -LS      Number of Steps (Stairs)  4  (Pended)   -LS      Ascending Technique (Stairs)  step-to-step  (Pended)   -LS      Descending Technique (Stairs)  step-to-step  (Pended)   -LS      Stairs, Safety Issues  balance decreased during turns  (Pended)   -LS      Stairs, Impairments  strength decreased;impaired balance;motor control impaired  (Pended)   -LS      Comment (Gait/Stairs)  No cues needed for proper stair navigation this date  (Pended)   -LS      Recorded by [LS] Naomy Mendenhall, PT Student      Row Name 04/22/19 1017             Safety Issues, Functional Mobility    Safety Issues Affecting Function (Mobility)  problem solving;insight into deficits/self awareness  (Pended)   -LS      Impairments Affecting Function (Mobility)  balance;cognition;coordination;motor control;strength  (Pended)   -LS      Recorded by [LS] Naomy Mendenhall, PT Student      Row Name 04/22/19 1611             Bathing Assessment/Treatment    Bathing [place] Level  bathing skills;supervision;verbal cues;nonverbal cues (demo/gesture)  -DN      Assistive Device (Bathing)  tub bench  -DN      Bathing Position  supported sitting;supported standing  -DN      Recorded by [DN] Reg Mckinney OT      Row Name 04/22/19 1611             Upper Body Dressing Assessment/Treatment    Upper Body Dressing Task  upper body dressing skills;doff;don;pull over garment;supervision;set up assistance;verbal cues  -DN      Upper Body Dressing Position  supported sitting  -DN      Set-up Assistance (Upper Body Dressing)   obtain clothing  -DN      Recorded by [DN] Reg Mckinney, OT      Row Name 04/22/19 1611             Lower Body Dressing Assessment/Treatment    Lower Body Dressing [place] Level  doff;don;pants/bottoms;shoes/slippers;socks;underwear;supervision;set up;verbal cues  -DN      Lower Body Dressing Position  supported sitting;supported standing  -DN      Lower Body Dressing Setup Assistance  obtain clothing  -DN      Recorded by [BRENDAN] Reg Mckinney, OT      Row Name 04/22/19 1611             Grooming Assessment/Treatment    Grooming [place] Level  grooming skills;deodorant application;hair care, combing/brushing;oral care regimen;supervision;set up;verbal cues  -DN      Grooming Position  supported sitting  -DN      Grooming Setup Assistance  obtain supplies  -DN      Recorded by [BRENDAN] Reg Mckinney, OT      Row Name 04/22/19 1611             Toileting Assessment/Treatment    Toileting [place] Level  toileting skills;adjust/manage clothing;perform perineal hygiene;supervision;verbal cues;nonverbal cues (demo/gesture)  -DN      Assistive Device Use (Toileting)  grab bar/safety frame;raised toilet seat  -DN      Toileting Position  supported sitting;supported standing  -DN      Recorded by [BRENDAN] Reg Mckinney, OT      Row Name 04/22/19 1611             Self-Feeding Assessment/Treatment    [place] Level (Self-Feeding)  set up  -DN      Recorded by [BRENDAN] Reg Mckinney, OT      Row Name 04/22/19 1611             Hand  Strength Testing    Right Hand, Setting 2 (Dynamometer Testing)  52  -DN      Left Hand, Setting 2 (Dynamometer Testing)  91  -DN      Right Hand: Tip (Pincer) Pinch Strength (Pinch Dynamometer Testing)  12  -DN      Right Hand: Lateral (Key) Pinch Strength (Pinch Dynamometer Testing)  15  -DN      Left Hand: Tip (Pincer) Pinch Strength (Pinch Dynamometer Testing)  12  -DN      Left Hand: Lateral (Key) Pinch Strength (Pinch Dynamometer Testing)  18  -DN      Recorded by [BRENDAN] Hank  Reg, OT      Row Name 04/22/19 1611             Fine Motor Testing & Training    Results, 9 Hole Peg Test of Fine Motor Coordination-Right  38  -DN      Results, 9 Hole Peg Test of Fine Motor Coordination-Left  27  -DN      Results, Box N Block Test-Right  41  -DN      Results, Box N Block Test-Left  63  -DN      Recorded by [DN] Reg Mckinney, OT      Row Name 04/22/19 1611             Sensory    Sensory General Assessment  no sensation deficits identified  -DN      Recorded by [DN] Reg Mckinney, OT      Row Name 04/22/19 1611             Vision Assessment/Intervention    Visual Impairment/Limitations  visual/perceptual impairments present  -DN      Visual Field Deficit  homonymous hemianopsia, right  -DN      Visual Processing Deficit  jay-inattention/neglect, right  -DN      Recorded by [DN] Reg Mckinney, OT      Row Name 04/22/19 1017             Pain Assessment    Additional Documentation  Pain Scale: Numbers Pre/Post-Treatment (Group)  (Pended)   -LS      Recorded by [LS] Naomy Mendenhall, PT Student      Row Name 04/22/19 1611 04/22/19 1017          Pain Scale: Numbers Pre/Post-Treatment    Pain Scale: Numbers, Pretreatment  0/10 - no pain  -DN  0/10 - no pain  (Pended)   -LS     Pain Scale: Numbers, Post-Treatment  0/10 - no pain  -DN  0/10 - no pain  (Pended)   -LS     Recorded by [DN] Reg Mckinney, OT [LS] Naomy Mendenhall, PT Student     Row Name 04/22/19 1017             Lower Extremity Seated Therapeutic Exercise    Performed, Seated Lower Extremity (Therapeutic Exercise)  hip flexion/extension;hip abduction/adduction;knee flexion/extension;LAQ (long arc quad), knee extension;ankle dorsiflexion/plantarflexion  (Pended)  ankle eversion  -LS      Device, Seated Lower Extremity (Therapeutic Exercise)  elastic bands/tubing  (Pended)  blue theraband  -LS      Exercise Type, Seated Lower Extremity (Therapeutic Exercise)  resistive exercise  (Pended)   -LS      Sets/Reps Detail, Seated Lower Extremity  (Therapeutic Exercise)  2 x 12  (Pended)   -LS      Comment, Seated Lower Extremity (Therapeutic Exercise)  Pt able to read HEP and perform exercises with minimal cueing needed for initial set  (Pended)   -LS      Recorded by [LS] Naomy Mendenhall, PT Student      Row Name 04/22/19 1611 04/22/19 1017          Positioning and Restraints    Pre-Treatment Position  sitting in chair/recliner  -DN  sitting in chair/recliner  (Pended)   -LS     Post Treatment Position  wheelchair  -DN  wheelchair  (Pended)   -LS     In Bed  sitting;call light within reach;encouraged to call for assist;exit alarm on  -DN  --     In Wheelchair  --  sitting;exit alarm on;with other staff;patient within staff view  (Pended)   -LS     Recorded by [DN] Reg Mckinney OT [LS] Naomy Mendenhall, PT Student       User Key  (r) = Recorded By, (t) = Taken By, (c) = Cosigned By    Initials Name Effective Dates    SL Narcisa Bonner, MS LONGORIA-SLP 06/08/18 -     Reg Bolden OT 06/08/18 -     Naomy Armas, PT Student 02/04/19 -           Wound 03/26/19 0900 Left chest incision (Active)   Dressing Appearance open to air 4/22/2019  8:45 AM   Closure Liquid skin adhesive 4/22/2019  8:45 AM   Base dry;clean 4/22/2019  8:45 AM   Periwound dry;intact 4/21/2019  9:15 PM   Drainage Amount none 4/21/2019  9:15 PM   Dressing Care, Wound open to air 4/21/2019  9:15 PM         OT IRF GOALS     Row Name 04/22/19 1600 04/17/19 1500 04/09/19 1400       Bathing Goal 1 (OT-IRF)    Activity/Device (Bathing Goal 1, OT-IRF)  bathing skills, all  -DN  bathing skills, all  -DN  bathing skills, all  -DN    [place] Level (Bathing Goal 1, OT-IRF)  supervision required;verbal cues required  -DN  supervision required;verbal cues required  -DN  supervision required;verbal cues required  -DN    Time Frame (Bathing Goal 1, OT-IRF)  short term goal (STG)  -DN  short term goal (STG)  -DN  short term goal (STG)  -DN    Progress/Outcomes (Bathing Goal 1, OT-IRF)  goal met  -DN   goal met;goal revised this date  -DN  goal met;goal revised this date  -DN       Bathing Goal 2 (OT-IRF)    Activity/Device (Bathing Goal 2, OT-IRF)  bathing skills, all  -DN  bathing skills, all  -DN  bathing skills, all  -DN    [place] Level (Bathing Goal 2, OT-IRF)  supervision required  -DN  supervision required  -DN  supervision required  -DN    Time Frame (Bathing Goal 2, OT-IRF)  long term goal (LTG)  -DN  long term goal (LTG)  -DN  long term goal (LTG)  -DN    Progress/Outcomes (Bathing Goal 2, OT-IRF)  goal met  -DN  goal ongoing  -DN  goal ongoing  -DN       UB Dressing Goal 1 (OT-IRF)    Activity/Device (UB Dressing Goal 1, OT-IRF)  upper body dressing  -DN  upper body dressing  -DN  upper body dressing  -DN    [place] (UB Dress Goal 1, OT-IRF)  supervision required  -DN  supervision required  -DN  supervision required  -DN    Time Frame (UB Dressing Goal 1, OT-IRF)  short term goal (STG)  -DN  short term goal (STG)  -DN  short term goal (STG)  -DN    Progress/Outcomes (UB Dressing Goal 1, OT-IRF)  goal met  -DN  goal met;goal ongoing  -DN  goal met;goal ongoing  -DN       UB Dressing Goal 2 (OT-IRF)    Activity/Device (UB Dressing Goal 2, OT-IRF)  upper body dressing  -DN  upper body dressing  -DN  upper body dressing  -DN    [place] (UB Dress Goal 2, OT-IRF)  supervision required  -DN  supervision required  -DN  supervision required  -DN    Time Frame (UB Dressing Goal 2, OT-IRF)  long term goal (LTG)  -DN  long term goal (LTG)  -DN  long term goal (LTG)  -DN    Progress/Outcomes (UB Dressing Goal 2, OT-IRF)  goal met  -DN  goal ongoing;goal met  -DN  goal ongoing;goal met  -DN       LB Dressing Goal 1 (OT-IRF)    Activity/Device (LB Dressing Goal 1, OT-IRF)  lower body dressing  -DN  lower body dressing  -DN  lower body dressing  -DN    [place] (LB Dressing Goal 1, OT-IRF)  supervision required  -DN  supervision required  -DN  minimum assist (75% or more patient effort);verbal cues  required;tactile cues required  -DN    Time Frame (LB Dressing Goal 1, OT-IRF)  short term goal (STG)  -DN  short term goal (STG)  -DN  short term goal (STG)  -DN    Progress/Outcomes (LB Dressing Goal 1, OT-IRF)  goal met  -DN  goal revised this date  -DN  goal met;goal ongoing  -DN       LB Dressing Goal 2 (OT-IRF)    Activity/Device (LB Dressing Goal 2, OT-IRF)  lower body dressing  -DN  lower body dressing  -DN  lower body dressing  -DN    [place] (LB Dressing Goal 2, OT-IRF)  verbal cues required;supervision required  -DN  verbal cues required;supervision required  -DN  verbal cues required;tactile cues required;contact guard assist  -DN    Time Frame (LB Dressing Goal 2, OT-IRF)  long term goal (LTG)  -DN  long term goal (LTG)  -DN  long term goal (LTG)  -DN    Progress/Outcomes (LB Dressing Goal 2, OT-IRF)  goal met  -DN  --  goal revised this date  -DN       Grooming Goal 1 (OT-IRF)    Activity/Device (Grooming Goal 1, OT-IRF)  grooming skills, all  -DN  grooming skills, all  -DN  grooming skills, all  -DN    [place] (Grooming Goal 1, OT-IRF)  supervision required  -DN  supervision required  -DN  supervision required  -DN    Time Frame (Grooming Goal 1, OT-IRF)  short term goal (STG)  -DN  short term goal (STG)  -DN  short term goal (STG)  -DN    Progress/Outcomes (Grooming Goal 1, OT-IRF)  goal met  -DN  goal ongoing  -DN  goal ongoing  -DN       Grooming Goal 2 (OT-IRF)    Activity/Device (Grooming Goal 2, OT-IRF)  grooming skills, all  -DN  grooming skills, all  -DN  grooming skills, all  -DN    [place] (Grooming Goal 2, OT-IRF)  supervision required  -DN  supervision required  -DN  supervision required  -DN    Time Frame (Grooming Goal 2, OT-IRF)  long term goal (LTG)  -DN  long term goal (LTG)  -DN  long term goal (LTG)  -DN    Progress/Outcomes (Grooming Goal 2, OT-IRF)  goal met  -DN  goal revised this date  -DN  goal revised this date  -DN       Toileting Goal 1 (OT-IRF)     Activity/Device (Toileting Goal 1, OT-IRF)  toileting skills, all  -DN  toileting skills, all  -DN  toileting skills, all  -DN    [place] Level (Toileting Goal 1, OT-IRF)  supervision required  -DN  supervision required  -DN  minimum assist (75% or more patient effort);verbal cues required;tactile cues required  -DN    Time Frame (Toileting Goal 1, OT-IRF)  short term goal (STG)  -DN  short term goal (STG)  -DN  short term goal (STG)  -DN    Progress/Outcomes (Toileting Goal 1, OT-IRF)  goal met  -DN  goal met;goal revised this date  -DN  goal met;goal revised this date  -DN       Toileting Goal 2 (OT-IRF)    Activity/Device (Toileting Goal 2, OT-IRF)  toileting skills, all  -DN  toileting skills, all  -DN  toileting skills, all  -DN    [place] Level (Toileting Goal 2, OT-IRF)  supervision required  -DN  supervision required  -DN  contact guard assist;verbal cues required;tactile cues required  -DN    Time Frame (Toileting Goal 2, OT-IRF)  long term goal (LTG)  -DN  long term goal (LTG)  -DN  long term goal (LTG)  -DN    Progress/Outcomes (Toileting Goal 2, OT-IRF)  goal met  -DN  goal revised this date  -DN  goal ongoing  -DN       Self-Feeding Goal 1 (OT-IRF)    Activity/Device (Self-Feeding Goal 1, OT-IRF)  self-feeding skills, all  -DN  self-feeding skills, all  -DN  self-feeding skills, all  -DN    [place] (Self-Feeding Goal 1, OT-IRF)  supervision required  -DN  supervision required  -DN  supervision required  -DN    Time Frame (Self-Feeding Goal 1, OT-IRF)  short term goal (STG)  -DN  short term goal (STG)  -DN  short term goal (STG)  -DN    Progress/Outcomes 1 (Self-Feeding Goal, OT-IRF)  goal met  -DN  goal met;goal ongoing  -DN  goal met;goal ongoing  -DN       Self-Feeding Goal 2 (OT-IRF)    Activity/Device (Self-Feeding Goal 2, OT-IRF)  self-feeding skills, all  -DN  self-feeding skills, all  -DN  self-feeding skills, all  -DN    [place] (Self-Feeding Goal 2, OT-IRF)  supervision  required  -DN  supervision required  -DN  supervision required  -DN    Time Frame (Self-Feeding Goal 2, OT-IRF)  long term goal (LTG)  -DN  long term goal (LTG)  -DN  long term goal (LTG)  -DN    Progress/Outcomes (Self-Feeding Goal 2, OT-IRF)  goal met  -DN  goal met;goal ongoing  -DN  goal met;goal ongoing  -DN       Caregiver Training Goal 2 (OT-IRF)    Caregiver Training Goal 2 (OT-IRF)  pt caregiver to be independent with any assist pt needs with adls, transfers and HEP for d/c home  -DN  pt caregiver to be independent with any assist pt needs with adls, transfers and HEP for d/c home  -DN  pt caregiver to be independent with any assist pt needs with adls, transfers and HEP for d/c home  -DN    Time Frame (Caregiver Training Goal 2, OT-IRF)  long term goal (LTG)  -DN  long term goal (LTG)  -DN  long term goal (LTG)  -DN    Progress/Outcomes (Caregiver Training Goal 2, OT-IRF)  goal not met  -DN  goal ongoing  -DN  goal ongoing  -DN      User Key  (r) = Recorded By, (t) = Taken By, (c) = Cosigned By    Initials Name Provider Type    Reg Bolden OT Occupational Therapist          Occupational Therapy Education     Title: PT OT SLP Therapies (Done)     Topic: Occupational Therapy (Done)     Point: ADL training (Done)     Description: Instruct learner(s) on proper safety adaptation and remediation techniques during self care or transfers.   Instruct in proper use of assistive devices.    Learning Progress Summary           Patient Acceptance, E,TB,D, VU by DN at 4/18/2019 12:19 PM    Comment:  tub transfers to tub bench at RW[place] level    Acceptance, E,TB,D, VU,NR by DN at 4/15/2019 12:04 PM    Comment:  pt presents with carryover of adapitive adls, functional transfers, and overall strength with min vc for safety    Acceptance, E,TB,D, VU,NR by DN at 4/9/2019  2:25 PM    Comment:  jay adls, adaptive visual scanning, transfer training    Acceptance, E,TB,D, VU,NR by DN at 4/8/2019 12:16 PM    Comment:   transfer training, jay skills with adls, safety,etc    Acceptance, E,TB,D, VU,NR by DN at 4/4/2019  3:19 PM    Comment:  theraputty exercises, jay adls and transfer trainning    Acceptance, E,TB,D, NR by DN at 3/26/2019 12:06 PM    Comment:  jay adls and commode/shower transfers, still max vc for all due to cognition and visual impairments    Acceptance, E,TB,D, VU,NR by DN at 3/25/2019  5:23 PM    Comment:  OT role in rehab, transfer traning, jay adls                   Point: Home exercise program (Done)     Description: Instruct learner(s) on appropriate technique for monitoring, assisting and/or progressing therapeutic exercises/activities.    Learning Progress Summary           Patient Acceptance, E,TB,D, VU by DN at 4/18/2019 12:19 PM    Comment:  tub transfers to tub bench at [place] level    Acceptance, E,TB,D, VU,NR by DN at 4/15/2019 12:04 PM    Comment:  pt presents with carryover of adapitive adls, functional transfers, and overall strength with min vc for safety    Acceptance, E,TB,D, VU,NR by DN at 4/9/2019  2:25 PM    Comment:  jay adls, adaptive visual scanning, transfer training    Acceptance, E,TB,D, VU,NR by DN at 4/8/2019 12:16 PM    Comment:  transfer training, jay skills with adls, safety,etc    Acceptance, E,TB,D, VU,NR by DN at 4/4/2019  3:19 PM    Comment:  theraputty exercises, jay adls and transfer trainning    Acceptance, E,TB,D, NR by DN at 3/26/2019 12:06 PM    Comment:  jay adls and commode/shower transfers, still max vc for all due to cognition and visual impairments    Acceptance, E,TB,D, VU,NR by DN at 3/25/2019  5:23 PM    Comment:  OT role in rehab, transfer traning, jay adls                   Point: Precautions (Done)     Description: Instruct learner(s) on prescribed precautions during self-care and functional transfers.    Learning Progress Summary           Patient Acceptance, E,TB,D, VU by DN at 4/18/2019 12:19 PM    Comment:  tub transfers to tub bench at RWX level     Acceptance, E,TB,D, VU,NR by DN at 4/15/2019 12:04 PM    Comment:  pt presents with carryover of adapitive adls, functional transfers, and overall strength with min vc for safety    Acceptance, E,TB,D, VU,NR by DN at 4/9/2019  2:25 PM    Comment:  jay adls, adaptive visual scanning, transfer training    Acceptance, E,TB,D, VU,NR by DN at 4/8/2019 12:16 PM    Comment:  transfer training, jay skills with adls, safety,etc    Acceptance, E,TB,D, VU,NR by DN at 4/4/2019  3:19 PM    Comment:  theraputty exercises, jay adls and transfer trainning    Acceptance, E,TB,D, NR by DN at 3/26/2019 12:06 PM    Comment:  jay adls and commode/shower transfers, still max vc for all due to cognition and visual impairments    Acceptance, E,TB,D, VU,NR by DN at 3/25/2019  5:23 PM    Comment:  OT role in rehab, transfer traning, jay adls                   Point: Body mechanics (Done)     Description: Instruct learner(s) on proper positioning and spine alignment during self-care, functional mobility activities and/or exercises.    Learning Progress Summary           Patient Acceptance, E,TB,D, VU by DN at 4/18/2019 12:19 PM    Comment:  tub transfers to tub bench at [place] level    Acceptance, E,TB,D, VU,NR by DN at 4/15/2019 12:04 PM    Comment:  pt presents with carryover of adapitive adls, functional transfers, and overall strength with min vc for safety    Acceptance, E,TB,D, VU,NR by DN at 4/9/2019  2:25 PM    Comment:  jay adls, adaptive visual scanning, transfer training    Acceptance, E,TB,D, VU,NR by DN at 4/8/2019 12:16 PM    Comment:  transfer training, jay skills with adls, safety,etc    Acceptance, E,TB,D, VU,NR by DN at 4/4/2019  3:19 PM    Comment:  theraputty exercises, jay adls and transfer trainning    Acceptance, E,TB,D, NR by DN at 3/26/2019 12:06 PM    Comment:  jay adls and commode/shower transfers, still max vc for all due to cognition and visual impairments    Acceptance, E,TB,D, VU,NR by DN at 3/25/2019   5:23 PM    Comment:  OT role in rehab, transfer traning, jay adls                               User Key     Initials Effective Dates Name Provider Type Discipline    BRENDAN 06/08/18 -  Reg Mckinney OT Occupational Therapist OT                  OT Recommendation and Plan  Anticipated Equipment Needs At Discharge (OT Eval): commode, 3-in-1, shower chair, tub bench  Anticipated Discharge Disposition (OT): home, home with OP services  Planned Therapy Interventions (OT Eval): BADL retraining, cognitive/visual perception retraining, functional balance retraining, neuromuscular control/coordination retraining, strengthening exercise, transfer/mobility retraining               Time Calculation:    Time Calculation- OT     Row Name 04/22/19 1623 04/22/19 0900          Time Calculation- OT    OT Start Time  1300  -DN  0900  -DN     OT Stop Time  1330  -DN  0930  -DN     OT Time Calculation (min)  30 min  -DN  30 min  -DN     OT Received On  04/22/19  -DN  04/22/19  -DN       User Key  (r) = Recorded By, (t) = Taken By, (c) = Cosigned By    Initials Name Provider Type    Reg Bolden OT Occupational Therapist          Therapy Charges for Today     Code Description Service Date Service Provider Modifiers Qty    44130921381 HC OT SELF CARE/MGMT/TRAIN EA 15 MIN 4/22/2019 Reg Mckinney OT GO 2    06697486743 HC OT THER PROC EA 15 MIN 4/22/2019 Reg Mckinney OT GO 2               OT Discharge Summary  Anticipated Discharge Disposition (OT): home, home with OP services  Reason for Discharge: Discharge from facility  Outcomes Achieved: Patient able to partially acheive established goals  Discharge Destination: Home with outpatient services    Reg Mckinney OT  4/22/2019

## 2019-04-22 NOTE — THERAPY DISCHARGE NOTE
Inpatient Rehabilitation - Physical Therapy Progress Note/Discharge  [facility]     Patient Name: Nayan Ryan  : 1965  MRN: [id number]  Today's Date: 2019             Admit Date: 3/24/2019    Visit Dx:  No diagnosis found.  Patient Active Problem List   Diagnosis   • Acute ischemic left PCA stroke (CMS/HCC)   • Status post placement of implantable loop recorder   • HTN (hypertension)   • Diabetes mellitus (CMS/HCC)   • Hyperlipidemia   • Morbid obesity (CMS/HCC)   • Anxiety disorder   • Migraine   • H/O hernia repair   • Abdominal wall abscess   • Back pain   • Stroke (cerebrum) (CMS/HCC)   • Vitamin D deficiency   • History of fatty infiltration of liver       Physical Therapy Education     Title: PT OT SLP Therapies (Done)     Topic: Physical Therapy (Done)     Point: Mobility training (Done)     Learning Progress Summary           Patient Acceptance, E,TB, VU,DU by LS at 2019 10:26 AM    Comment:  Discussed pass and how things went at home. Educated on home exercise program and pt able to demonstrate correct performance    Acceptance, E, VU by LB at 2019  5:32 PM    Comment:  Stressed to pt and wife the importance of use of the walker and having CGA.    Acceptance, E,TB, VU,NR by LS at 2019  9:06 AM    Comment:  Educated on safe strategies for getting up from low chair, couch, etc. Discussed need to shift weight more to the R so that he can take a normal step length on the L.    Acceptance, E,TB, VU,NR by LS at 2019  8:46 AM    Comment:  Educated on proper navigation of ramp    Acceptance, E,TB, VU,NR by LS at 2019  8:39 AM    Comment:  discussed weight loss and educated on how increase exercise can contribute to this and decreased stroke risk    Acceptance, E,TB,D, VU,DU by LB1 at 2019  2:52 PM    Comment:  Transfers, ambulation, steps and car transfer    Acceptance, E,TB, VU,NR by LS at 4/15/2019  9:50 AM    Comment:  Educated on safer/more stable gait with Rwx.  Discussed proper stair navigation and sequencing.    Acceptance, E,D, VU,DU,NR by ODELL at 4/13/2019 12:04 PM    Acceptance, E,TB, VU,NR by LS at 4/12/2019  9:51 AM    Comment:  Continued discussion regarding progress with knee control and walking    Acceptance, E,TB, VU,NR by LS at 4/11/2019  9:53 AM    Comment:  Continued discussion/answering pt questions about why R knee is weak. Educated on proper mechanics for stair navigation    Acceptance, E,TB, VU,NR by LS at 4/10/2019  9:43 AM    Comment:  Continued discussion regarding safety and slowing down during mobility. Educated on proper use of cane again this date    Acceptance, E,TB, VU,NR by LS at 4/9/2019  8:33 AM    Comment:  Continued discussion regarding how stroke has affected his R side. Educated on use of cane    Acceptance, E,TB, VU,NR by LS at 4/8/2019  8:54 AM    Comment:  Educated on why R leg is weak and how the stroke is affecting his knee control    Acceptance, E,TB, VU,NR by LS at 4/5/2019 11:30 AM    Comment:  Discussed purpose of strengthening exercises. Educated on repeated practice of walking and other exercises to gradually build strength and endurance.    Acceptance, E, NR by  at 4/4/2019  9:52 AM    Acceptance, E, NR by  at 4/3/2019 10:16 AM    Acceptance, E, NR by  at 4/2/2019  3:18 PM    Acceptance, E, NR by  at 4/1/2019 10:32 AM    Acceptance, E,TB,D, NR by  at 3/30/2019 11:44 AM    Acceptance, E,TB,D, NR by  at 3/29/2019  2:58 PM    Acceptance, E,TB,D, NR by  at 3/28/2019 11:38 AM    Acceptance, E,TB,D, NR by  at 3/27/2019 10:05 AM    Acceptance, E,TB,D, NR by  at 3/26/2019  9:48 AM    Acceptance, E,TB,D, NR by  at 3/25/2019  3:09 PM   Family Acceptance, E, VU by  at 4/20/2019  5:32 PM    Comment:  Stressed to pt and wife the importance of use of the walker and having CGA.    Acceptance, E,TB,D, VU,DU by LB1 at 4/16/2019  2:52 PM    Comment:  Transfers, ambulation, steps and car transfer                   Point: Home  exercise program (Done)     Learning Progress Summary           Patient Acceptance, E,TB, VU,DU by  at 4/22/2019 10:26 AM    Comment:  Discussed pass and how things went at home. Educated on home exercise program and pt able to demonstrate correct performance    Acceptance, E, NR by  at 4/4/2019  9:52 AM    Acceptance, E, NR by  at 4/3/2019 10:16 AM    Acceptance, E, NR by  at 4/2/2019  3:18 PM    Acceptance, E, NR by  at 4/1/2019 10:32 AM    Acceptance, E,TB,D, NR by  at 3/29/2019  2:58 PM    Acceptance, E,TB,D, NR by EE at 3/28/2019 11:38 AM    Acceptance, E,TB,D, NR by  at 3/27/2019 10:05 AM    Acceptance, E,TB,D, NR by  at 3/26/2019  9:48 AM    Acceptance, E,TB,D, NR by  at 3/25/2019  3:09 PM                   Point: Body mechanics (Done)     Learning Progress Summary           Patient Acceptance, E,TB, VU,NR by  at 4/19/2019  9:06 AM    Comment:  Educated on safe strategies for getting up from low chair, couch, etc. Discussed need to shift weight more to the R so that he can take a normal step length on the L.    Acceptance, E, VU by  at 4/16/2019  3:26 PM    Comment:  family conf w/pt and multiple family members- discussed cognitive deficits - memory, reasoning, attn, math, and impulsivity- rec: supervision for med management/finances, cont ST at D/C    Acceptance, E, NR by  at 4/4/2019  9:52 AM    Acceptance, E, NR by  at 4/3/2019 10:16 AM    Acceptance, E, NR by  at 4/2/2019  3:18 PM    Acceptance, E, NR by  at 4/1/2019 10:32 AM    Acceptance, E,TB,D, NR by  at 3/29/2019  2:58 PM    Acceptance, E,TB,D, NR by  at 3/28/2019 11:38 AM    Acceptance, E,TB,D, NR by  at 3/27/2019 10:05 AM    Acceptance, E,TB,D, NR by SHANICE at 3/26/2019  9:48 AM    AcceptanceSARA,TB,D, NR by SHANICE at 3/25/2019  3:09 PM   Family AcceptanceSARA, VU by ZULY at 4/16/2019  3:26 PM    Comment:  family conf w/pt and multiple family members- discussed cognitive deficits - memory, reasoning, attn, math, and  impulsivity- rec: supervision for med management/finances, cont ST at D/C                   Point: Precautions (Done)     Learning Progress Summary           Patient Acceptance, E,TB, VU,DU by LS at 4/22/2019 10:26 AM    Comment:  Discussed pass and how things went at home. Educated on home exercise program and pt able to demonstrate correct performance    Acceptance, E,TB, VU,NR by LS at 4/19/2019  9:06 AM    Comment:  Educated on safe strategies for getting up from low chair, couch, etc. Discussed need to shift weight more to the R so that he can take a normal step length on the L.    Acceptance, E,TB, VU,NR by LS at 4/18/2019  8:46 AM    Comment:  Educated on proper navigation of ramp    Acceptance, E,TB, VU,NR by LS at 4/17/2019  8:39 AM    Comment:  discussed weight loss and educated on how increase exercise can contribute to this and decreased stroke risk    Acceptance, E,TB, VU,NR by LS at 4/16/2019  9:45 AM    Comment:  Continued education on proper car transfer technique    Acceptance, E,TB, VU,NR by LS at 4/15/2019  9:50 AM    Comment:  Educated on safer/more stable gait with Rwx. Discussed proper stair navigation and sequencing.    Acceptance, E,TB, VU,NR by LS at 4/12/2019  9:51 AM    Comment:  Continued discussion regarding progress with knee control and walking    Acceptance, E,TB, VU,NR by LS at 4/11/2019  9:53 AM    Comment:  Continued discussion/answering pt questions about why R knee is weak. Educated on proper mechanics for stair navigation    Acceptance, E,TB, VU,NR by LS at 4/10/2019  9:43 AM    Comment:  Continued discussion regarding safety and slowing down during mobility. Educated on proper use of cane again this date    Acceptance, E,TB, VU,NR by LS at 4/9/2019  8:33 AM    Comment:  Continued discussion regarding how stroke has affected his R side. Educated on use of cane    Acceptance, E,TB, VU,NR by LS at 4/8/2019  8:54 AM    Comment:  Educated on why R leg is weak and how the stroke is  affecting his knee control    Acceptance, E,TB, VU,NR by  at 4/5/2019 11:30 AM    Comment:  Discussed purpose of strengthening exercises. Educated on repeated practice of walking and other exercises to gradually build strength and endurance.    Acceptance, E, NR by  at 4/4/2019  9:52 AM    Acceptance, E, NR by  at 4/3/2019 10:16 AM    Acceptance, E, NR by  at 4/2/2019  3:18 PM    Acceptance, E, NR by  at 4/1/2019 10:32 AM    Acceptance, E,TB,D, NR by  at 3/29/2019  2:58 PM    Acceptance, E,TB,D, NR by EE at 3/28/2019 11:38 AM    Acceptance, E,TB,D, NR by EE at 3/27/2019 10:05 AM    Acceptance, E,TB,D, NR by EE at 3/26/2019  9:48 AM    Acceptance, E,TB,D, NR by EE at 3/25/2019  3:09 PM                               User Key     Initials Effective Dates Name Provider Type Discipline    LB 06/08/18 -  Clau Richards, PT Physical Therapist PT    SL 06/08/18 -  Narcisa Bonner, MS CCC-SLP Speech and Language Pathologist SLP    ELISSA 06/08/18 -  Donna Inman, PT Physical Therapist PT    LB1 04/03/18 -  Eliane Blackburn, PT Physical Therapist PT     04/03/18 -  Clau Ayon, PT Physical Therapist PT    EE 04/03/18 -  Ariadne Garcia, PT Physical Therapist PT    JK 04/03/18 -  Nicole Lawton, PT Physical Therapist PT    LS 02/04/19 -  Naomy Mendenhall, PT Student PT Student               PT IRF GOALS     Row Name 04/22/19 1511             Bed Mobility Goal 1 (PT-IRF)    Activity/Assistive Device (Bed Mobility Goal 1, PT-IRF)  sit to supine/supine to sit  (Pended)   -LS      [place] Level (Bed Mobility Goal 1, PT-IRF)  minimum assist (75% or more patient effort)  (Pended)   -LS      Time Frame (Bed Mobility Goal 1, PT-IRF)  1 week;short term goal (STG)  (Pended)   -LS      Progress/Outcomes (Bed Mobility Goal 1, PT-IRF)  goal met  (Pended)   -LS         Bed Mobility Goal 2 (PT-IRF)    Activity/Assistive Device (Bed Mobility Goal 2, PT-IRF)  sit to supine/supine to sit;rolling to left;rolling to right  (Pended)    -LS      [place] Level (Bed Mobility Goal 2, PT-IRF)  supervision required  (Pended)   -LS      Time Frame (Bed Mobility Goal 2, PT-IRF)  4 weeks;long term goal (LTG)  (Pended)   -LS      Progress/Outcomes (Bed Mobility Goal 2, PT-IRF)  goal met  (Pended)   -LS         Transfer Goal 1 (PT-IRF)    Activity/Assistive Device (Transfer Goal 1, PT-IRF)  bed-to-chair/chair-to-bed  (Pended)   -LS      [place] Level (Transfer Goal 1, PT-IRF)  moderate assist (50-74% patient effort)  (Pended)   -LS      Time Frame (Transfer Goal 1, PT-IRF)  1 week;short term goal (STG)  (Pended)   -LS      Progress/Outcomes (Transfer Goal 1, PT-IRF)  goal met  (Pended)   -LS         Transfer Goal 2 (PT-IRF)    Activity/Assistive Device (Transfer Goal 2, PT-IRF)  sit-to-stand/stand-to-sit;bed-to-chair/chair-to-bed  (Pended)   -LS      [place] Level (Transfer Goal 2, PT-IRF)  contact guard assist  (Pended)   -LS      Time Frame (Transfer Goal 2, PT-IRF)  4 weeks;long term goal (LTG)  (Pended)   -LS      Progress/Outcomes (Transfer Goal 2, PT-IRF)  goal met  (Pended)   -LS         Transfer Goal 3 (PT-IRF)    Activity/Assistive Device (Transfer Goal 3, PT-IRF)  car transfer  (Pended)   -LS      [place] Level (Transfer Goal 3, PT-IRF)  contact guard assist  (Pended)   -LS      Time Frame (Transfer Goal 3, PT-IRF)  4 weeks;long term goal (LTG)  (Pended)   -LS      Progress/Outcomes (Transfer Goal 3, PT-IRF)  goal met  (Pended)   -LS         Gait/Walking Locomotion Goal 1 (PT-IRF)    Activity/Assistive Device (Gait/Walking Locomotion Goal 1, PT-IRF)  gait (walking locomotion)  (Pended)   -LS      Gait/Walking Locomotion Distance Goal 1 (PT-IRF)  50  (Pended)   -LS      [place] Level (Gait/Walking Locomotion Goal 1, PT-IRF)  minimum assist (75% or more patient effort)  (Pended)   -LS      Time Frame (Gait/Walking Locomotion Goal 1, PT-IRF)  1 week;short term goal (STG)  (Pended)   -LS      Progress/Outcomes (Gait/Walking  Locomotion Goal 1, PT-IRF)  goal met  (Pended)   -LS         Gait/Walking Locomotion Goal 2 (PT-IRF)    Activity/Assistive Device (Gait/Walking Locomotion Goal 2, PT-IRF)  gait (walking locomotion)  (Pended)   -LS      Gait/Walking Locomotion Distance Goal 2 (PT-IRF)  80  (Pended)   -LS      [place] Level (Gait/Walking Locomotion Goal 2, PT-IRF)  contact guard assist  (Pended)   -LS      Time Frame (Gait/Walking Locomotion Goal 2, PT-IRF)  4 weeks;long term goal (LTG)  (Pended)   -LS      Progress/Outcomes (Gait/Walking Locomotion Goal 2, PT-IRF)  goal met  (Pended)   -LS         Wheelchair Locomotion Goal 1 (PT-IRF)    Activity (Wheelchair Locomotion Goal 1, PT-IRF)  forward propulsion;steering;turning  (Pended)   -LS      [place] Level (Wheelchair Locomotion Goal 1, PT-IRF)  supervision required  (Pended)   -LS      Distance Goal 1 (Wheelchair Locomotion, PT-IRF)  160  (Pended)   -LS      Time Frame (Wheelchair Locomotion Goal 1, PT-IRF)  1 week;short term goal (STG)  (Pended)   -LS      Progress/Outcomes (Wheelchair Locomotion Goal 1, PT-IRF)  goal met  (Pended)   -LS         Stairs Goal 1 (PT-IRF)    Activity/Assistive Device (Stairs Goal 1, PT-IRF)  ascending stairs;descending stairs  (Pended)   -LS      Number of Stairs (Stairs Goal 1, PT-IRF)  2  (Pended)   -LS      [place] Level (Stairs Goal 1, PT-IRF)  minimum assist (75% or more patient effort)  (Pended)   -LS      Time Frame (Stairs Goal 1, PT-IRF)  4 weeks;long term goal (LTG)  (Pended)   -LS      Progress/Outcomes (Stairs Goal 1, PT-IRF)  goal met  (Pended)   -LS        User Key  (r) = Recorded By, (t) = Taken By, (c) = Cosigned By    Initials Name Provider Type    Naomy Armas, PT Student PT Student          Therapy Treatment    IRF Treatment Summary     Row Name 04/22/19 1038 04/22/19 1017 04/22/19 0800       Evaluation/Treatment Time and Intent    Subjective Information  no complaints  -SL  no complaints  (Pended)   -LS  no  complaints  -    Existing Precautions/Restrictions  fall  -  fall  (Pended)   -  fall  -    Document Type  therapy note (daily note)  -  discharge treatment  (Pended)   -  therapy note (daily note)  -    Mode of Treatment  individual therapy;speech-language pathology  -  physical therapy  (Pended)   -  individual therapy;speech-language pathology  -    Patient/Family Observations  alert  -  Pt sitting in w/c ready for therapy. States home pass went great on sunday  (Pended)   -  cooperative  -    Start Time (Evaluation/Treatment)  1000  -SL  --  0800  -SL    Stop Time (Evaluation/Treatment)  1030  -SL  --  0830  -SL    Recorded by [SL] Narcisa Bonner MS CCC-SLP [LS] Naomy Mendenhall, PT Student [] Narcisa Bonner MS CCC-SLP    Row Name 04/22/19 1017             Cognition/Psychosocial- PT/OT    Affect/Mental Status (Cognitive)  WFL  (Pended)   -LS      Follows Commands (Cognition)  follows one step commands;over 90% accuracy;follows two step commands  (Pended)   -      Personal Safety Interventions  fall prevention program maintained;gait belt;muscle strengthening facilitated;nonskid shoes/slippers when out of bed  (Pended)   -      Memory Deficit (Cognitive)  mild deficit;moderate deficit  (Pended)   -      Safety Deficit (Cognitive)  insight into deficits/self awareness;judgment;problem solving  (Pended)   -      Recorded by [LS] Naomy Mendenhall, PT Student      Row Name 04/22/19 1017             Sit-Stand Transfer    Sit-Stand [place] (Transfers)  stand by assist  (Pended)   -      Assistive Device (Sit-Stand Transfers)  walker, front-wheeled  (Pended)   -LS      Recorded by [LS] Naomy Mendenhall, PT Student      Row Name 04/22/19 1017             Stand-Sit Transfer    Stand-Sit [place] (Transfers)  stand by assist  (Pended)   -      Assistive Device (Stand-Sit Transfers)  walker, front-wheeled  (Pended)   -      Recorded by [LS] Naomy Mendenhall, PT Student      Row Name  04/22/19 1017             Gait/Stairs Assessment/Training    [place] Level (Gait)  contact guard;stand by assist  (Pended)   -LS      Assistive Device (Gait)  walker, front-wheeled  (Pended)   -LS      Distance in Feet (Gait)  160 x 2, 50 x 2  (Pended)   -LS      Pattern (Gait)  step-through  (Pended)   -LS      [place] Level (Stairs)  contact guard  (Pended)   -LS      Handrail Location (Stairs)  both sides  (Pended)   -LS      Number of Steps (Stairs)  4  (Pended)   -LS      Ascending Technique (Stairs)  step-to-step  (Pended)   -LS      Descending Technique (Stairs)  step-to-step  (Pended)   -LS      Stairs, Safety Issues  balance decreased during turns  (Pended)   -LS      Stairs, Impairments  strength decreased;impaired balance;motor control impaired  (Pended)   -LS      Comment (Gait/Stairs)  No cues needed for proper stair navigation this date  (Pended)   -LS      Recorded by [LS] Naomy Mendenhall, PT Student      Row Name 04/22/19 1017             Safety Issues, Functional Mobility    Safety Issues Affecting Function (Mobility)  problem solving;insight into deficits/self awareness  (Pended)   -LS      Impairments Affecting Function (Mobility)  balance;cognition;coordination;motor control;strength  (Pended)   -LS      Recorded by [LS] Naomy Mendenhall, PT [employer]      Row Name 04/22/19 1017             Pain Assessment    Additional Documentation  Pain Scale: Numbers Pre/Post-Treatment (Group)  (Pended)   -LS      Recorded by [LS] Naomy Mendenhall, PT [employer]      Row Name 04/22/19 1017             Pain Scale: Numbers Pre/Post-Treatment    Pain Scale: Numbers, Pretreatment  0/10 - no pain  (Pended)   -LS      Pain Scale: Numbers, Post-Treatment  0/10 - no pain  (Pended)   -LS      Recorded by [LS] Naomy Mendenhall, PT Student      Row Name 04/22/19 1017             Lower Extremity Seated Therapeutic Exercise    Performed, Seated Lower Extremity (Therapeutic Exercise)  hip flexion/extension;hip  abduction/adduction;knee flexion/extension;LAQ (long arc quad), knee extension;ankle dorsiflexion/plantarflexion  (Pended)  ankle eversion  -LS      Device, Seated Lower Extremity (Therapeutic Exercise)  elastic bands/tubing  (Pended)  blue theraband  -LS      Exercise Type, Seated Lower Extremity (Therapeutic Exercise)  resistive exercise  (Pended)   -LS      Sets/Reps Detail, Seated Lower Extremity (Therapeutic Exercise)  2 x 12  (Pended)   -LS      Comment, Seated Lower Extremity (Therapeutic Exercise)  Pt able to read HEP and perform exercises with minimal cueing needed for initial set  (Pended)   -LS      Recorded by [LS] Naomy Mendenhall, PT Student      Row Name 04/22/19 1017             Positioning and Restraints    Pre-Treatment Position  sitting in chair/recliner  (Pended)   -LS      Post Treatment Position  wheelchair  (Pended)   -LS      In Wheelchair  sitting;exit alarm on;with other staff;patient within staff view  (Pended)   -LS      Recorded by [LS] Naomy Mendenhall, PT Student        User Key  (r) = Recorded By, (t) = Taken By, (c) = Cosigned By    Initials Name Effective Dates    SL Narcisa Bonner, MS CCC-SLP 06/08/18 -     LS Naomy Mendenhall, PT Student 02/04/19 -           PT Recommendation and Plan  Planned Therapy Interventions (PT Eval): balance training, bed mobility training, gait training, home exercise program, neuromuscular re-education, patient/family education, ROM (range of motion), stair training, strengthening, stretching, transfer training, wheelchair management/propulsion training            Time Calculation:   PT Charges     Row Name 04/22/19 1510 04/22/19 1017          Time Calculation    Start Time  1430  (Pended)   -LS  1000  (Pended)   -LS     Stop Time  1500  (Pended)   -LS  1030  (Pended)   -LS     Time Calculation (min)  30 min  (Pended)   -LS  30 min  (Pended)   -LS     PT Received On  04/22/19  (Pended)   -LS  04/22/19  (Pended)   -LS       User Key  (r) = Recorded By, (t) = Taken  By, (c) = Cosigned By    Initials Name Provider Type     Naomy Mendenhall, PT Student PT Student          Therapy Charges for Today     Code Description Service Date Service Provider Modifiers Qty    52813883084  PT NEUROMUSC RE EDUCATION EA 15 MIN 4/22/2019 Naomy Mendenhall, PT Student GP 4    [id number]  PT THER SUPP EA 15 MIN 4/22/2019 Naomy Mendenhall, PT Student GP 1               PT Discharge Summary  Outcomes Achieved: (P) Able to achieve all goals within established timeline  Discharge Destination: (P) Home with assist, Home with outpatient services    Naomy Mendenhall, PT Student  4/22/2019

## 2019-04-22 NOTE — PLAN OF CARE
Problem: Stroke (IRF) (Adult)  Goal: Promote Optimal Functional [place]  Outcome: Ongoing (interventions implemented as appropriate)      Problem: Fall Risk (Adult)  Goal: Absence of Fall  Outcome: Ongoing (interventions implemented as appropriate)      Problem: Diabetes, Type 2 (Adult)  Goal: Signs and Symptoms of Listed Potential Problems Will be Absent, Minimized or Managed (Diabetes, Type 2)  Outcome: Ongoing (interventions implemented as appropriate)      Problem: Skin Injury Risk (Adult)  Goal: Skin Health and Integrity  Outcome: Ongoing (interventions implemented as appropriate)      Problem: Patient Care Overview  Goal: Plan of Care Review  Outcome: Ongoing (interventions implemented as appropriate)   04/22/19 0333   Patient Care Overview   IRF Plan of Care Review progress ongoing, continue   Progress, Functional Goals demonstrating adequate progress   Coping/Psychosocial   Plan of Care Reviewed With patient   OTHER   Outcome Summary Patient is calm and cooperative. PRN pain medication given for RLE pain with positive result. Using the call light for assistance.

## 2019-04-22 NOTE — PROGRESS NOTES
Inpatient Rehabilitation Plan of Care Note    Plan of Care  Updated Problems/Interventions  Mobility    [PT] Walk(Active)  Current Status(04/22/2019): 160 ft CGA w/ Rwx  Weekly Goal(04/23/2019): Rwx to BR CGA  Discharge Goal: 160 ft CGA w/ Rwx    [PT] Bed/Chair/Wheelchair(Active)  Current Status(04/22/2019): SBA  Weekly Goal(04/23/2019): SBA  Discharge Goal: SBA    [PT] Bed Mobility(Active)  Current Status(04/22/2019): Supervision  Weekly Goal(04/23/2019): Supervision  Discharge Goal: Supervision    [PT] Wheelchair(Active)  Current Status(04/22/2019): 150 ft Mod I  Weekly Goal(04/23/2019): 150ft Mod I  Discharge Goal: 150ft Mod I    [PT] Stairs(Active)  Current Status(04/22/2019): 4 w/ HR CGA  Weekly Goal(04/23/2019): PT only  Discharge Goal: 4 w/ HR CGA    Signed by: Naomy Mendenhall PT Student     - CoSigned By: Radha Ordaz PT 4/22/2019 3:26:42 PM

## 2019-04-22 NOTE — PROGRESS NOTES
SECTION GG      Self Care Performance Discharge:   Oral Hygiene: [place] provides verbal cues and/or touching/steadying and/or  contact guard assistance as patient completes activity.   Toileting Hygiene: : [place] provides verbal cues and/or touching/steadying  and/or contact guard assistance as patient completes activity.   Shower/Bathe Self: [place] provides verbal cues and/or touching/steadying and/or  contact guard assistance as patient completes activity.   Upper Body Dressing: [place] provides verbal cues and/or touching/steadying  and/or contact guard assistance as patient completes activity.   Lower Body Dressing: [place] provides verbal cues and/or touching/steadying  and/or contact guard assistance as patient completes activity.   Putting On/Taking Off Footwear: [place] provides verbal cues and/or  touching/steadying and/or contact guard assistance as patient completes  activity.    Mobility Toilet Transfer Discharge: [place] provides verbal cues or  touching/steadying assistance as patient completes activity.    Signed by: Reg Mckinney OTR/SOFIA

## 2019-04-22 NOTE — PROGRESS NOTES
Inpatient Rehabilitation Functional Measures Assessment and Plan of Care    Plan of Care  Updated Problems/Interventions  Cognition    [ST] Executive Functions(Active)  Current Status(04/22/2019): Moderate cognitive deficits: MOD IMPAIRED EXEC FXN,  DECREASED INTEGRATION OF MULTIPLE PIECES OF INFO, impaired attn to detail, mod  impaired math , memory;  impulsivity noted. Improving with 4-5 step verbal  sequencing of ADL's and visual memory.  Weekly Goal(04/27/2019): Attend to tasks and recall details of daily activities  Discharge Goal: Improved cognitive skills        Communication    [ST] Expression(Active)  Current Status(04/22/2019): mild word retrieval deficits- improving  Weekly Goal(04/27/2019): name items needed for adl tasks indep  Discharge Goal: Improve receptive and expressive language skills    Functional Measures  [facility] Eating:  [facility] Grooming: [facility] Bathing:  [facility] Upper Body Dressing:  [facility] Lower Body Dressing:  [facility] Toileting:  [facility] Bladder Management  Level of Assistance:  [place]  Frequency/Number of Accidents this Shift:  [facility] Bowel Management  Level of Assistance: [place]  Frequency/Number of Accidents this Shift: [facility] Bed/Chair/Wheelchair Transfer:  [facility] Toilet Transfer:  [facility] Tub/Shower Transfer:  [place]    Previously Documented Mode of Locomotion at Discharge: Field  VANITA Expected Mode of Locomotion at Discharge: [facility] Walk/Wheelchair:  [facility] Stairs:  [facility] Comprehension:  [facility] Expression:  [facility] Social Interaction:  [facility] Problem Solving:  [facility] Memory:  [place]    Therapy Mode Minutes  Occupational Therapy: [place]  Physical Therapy: [place]  Speech Language Pathology:  Individual: 60 minutes.    Signed by: Narcisa Bonner, SLP

## 2019-04-22 NOTE — PROGRESS NOTES
Pt to discharge tomorrow to his home with 24 hour assistance from his wife and adult children. He is to begin outpatient therapies at the [facility] on Wednesday, May 1, 2019.    Pt has a tub bench and BSC. He is to receive a rolling walker. Wife reports that day pass on Sunday went very well.

## 2019-04-22 NOTE — THERAPY DISCHARGE NOTE
Inpatient Rehabilitation - Speech Language Pathology Discharge Summary  [facility]       Patient Name: Nayan Ryan  : 1965  MRN: [id number]    Today's Date: 2019                   Admit Date: 3/24/2019      SLP Recommendation and Plan  The patient has made steady progress towards tx goals. He does continue to display deficits in executive function, memory, attention, math complex reasoning, and mod complex word retrieval. It is recommended that tx continue at D/C, and that he receive supervision at home initially for finances, med management, safety issues.    Visit Dx:  No diagnosis found.      Time Calculation- SLP     Row Name 19 1101 19 0829          Time Calculation- SLP    SLP Start Time  1030  -SL  0800  -SL     SLP Stop Time  1100  -SL  0830  -SL     SLP Time Calculation (min)  30 min  -SL  30 min  -SL       User Key  (r) = Recorded By, (t) = Taken By, (c) = Cosigned By    Initials Name Provider Type    Narcisa Urias MS CCC-SLP Speech and Language Pathologist            SLP GOALS     Row Name 19 1000 19 0800          Word Retrieval Skills Goal 1 (SLP)    Progress (Word Retrieval Skills Goal 1, SLP)  70%;independently (over 90% accuracy);with minimal cues (75-90%) naming by letter restriction ( initial letter)  -SL  80%;independently (over 90% accuracy);with minimal cues (75-90%) divergent naming w/initial letter restrictions  -SL        Attention Goal 1 (SLP)    Progress (Attention Goal 1, SLP)  50%;with moderate cues (50-74%) trail w/symbols rep different calculations  -SL  --        Organizational Skills Goal 1 (SLP)    Progress (Thought Organization Skills Goal 1, SLP)  90%;independently (over 90% accuracy) semantic sorting task  -SL  --        Reasoning Goal 1 (SLP)    Progress (Reasoning Goal 1, SLP)  70%;independently (over 90% accuracy) hexagon shape puzzles  -SL  --        Functional Math Skills Goal 1 (SLP)    Progress (Functional Math Skills Goal 1, SLP)  --   40%;independently (over 90% accuracy);with minimal cues (75-90%) money management- determining denomination amounts  -SL     Progress/Outcomes (Functional Math Skills Goal 1, SLP)  --  goal ongoing  -SL       User Key  (r) = Recorded By, (t) = Taken By, (c) = Cosigned By    Initials Name Provider Type    Narcisa Urias MS CCC-SLP Speech and Language Pathologist            Therapy Charges for Today     Code Description Service Date Service Provider Modifiers Qty    78149879516 HC ST DEV OF COGN SKILLS EACH 15 MIN 4/22/2019 Narcisa Bonner MS CCC-SLP  2    [id number] HC ST DEV OF COGN SKILLS EACH 15 MIN 4/22/2019 Narcisa Bonner MS CCC-SLP  2            SLP Discharge Summary  Anticipated Dischage Disposition: home with assist  Reason for Discharge: discharge from this facility  Progress Toward Achieving Short/long Term Goals: all goals met within established timelines  Discharge Destination: home w/ assist      MS CHARLES Ellison  4/22/2019

## 2019-04-22 NOTE — PROGRESS NOTES
SECTION GG      Mobility Performance Discharge:     Roll Left and Right: Patient completed the activities by him/herself with no  assistance from a helper.   Sit to Lying: Patient completed the activities by him/herself with no  assistance from a helper.   Lying to Sitting on Side of Bed: Patient completed the activities by  him/herself with no assistance from a helper.   Sit to Stand: [place] provides verbal cues and/or touching/steadying and/or  contact guard assistance as patient completes activity. Assistance may be  provided throughout the activity or intermittently.   Chair/Bed to Chair Transfer: [place] provides verbal cues and/or  touching/steadying and/or contact guard assistance as patient completes  activity. Assistance may be provided throughout the activity or intermittently.   Car Transfer: [place] provides verbal cues and/or touching/steadying and/or  contact guard assistance as patient completes activity. Assistance may be  provided throughout the activity or intermittently.   Walk 10 Feet:   [place] sets up or cleans up; patient completes activity. [place]  assists only prior to or following the activity.  Walk 50 Feet with 2 Turns:   [place] provides verbal cues and/or  touching/steadying and/or contact guard assistance as patient completes  activity. Assistance may be provided throughout the activity or intermittently.  Walk 150 Feet:   [place] provides verbal cues and/or touching/steadying and/or  contact guard assistance as patient completes activity. Assistance may be  provided throughout the activity or intermittently.  Walking 10 Feet on Uneven Surfaces:   [place] does less than half the effort.  [place] lifts, holds or supports trunk or limbs but provides less than half the  effort.  1 Step Over Curb or Up/Down Stair:   [place] does less than half the effort.  [place] lifts, holds or supports trunk or limbs but provides less than half the  effort.  4 Steps Up and Down, With/Without Rail:   [place]  provides verbal cues and/or  touching/steadying and/or contact guard assistance as patient completes  activity. Assistance may be provided throughout the activity or intermittently.  12 Steps Up and Down, With/Without Rail:   Not applicable.  Picking up an Object:   [place] provides verbal cues and/or touching/steadying  and/or contact guard assistance as patient completes activity. Assistance may be  provided throughout the activity or intermittently. Uses Wheelchair and/or  Scooter: No    Signed by: Naomy Mendenhall PT Student     - CoSigned By: Radha Ordaz PT 4/22/2019 3:27:03 PM

## 2019-04-23 VITALS
HEIGHT: 72 IN | WEIGHT: 298.6 LBS | SYSTOLIC BLOOD PRESSURE: 160 MMHG | HEART RATE: 84 BPM | BODY MASS INDEX: 40.44 KG/M2 | DIASTOLIC BLOOD PRESSURE: 69 MMHG | RESPIRATION RATE: 18 BRPM | OXYGEN SATURATION: 99 % | TEMPERATURE: 97.8 F

## 2019-04-23 LAB
GLUCOSE BLDC GLUCOMTR-MCNC: 109 MG/DL (ref 70–130)
GLUCOSE BLDC GLUCOMTR-MCNC: 90 MG/DL (ref 70–130)

## 2019-04-23 PROCEDURE — 82962 GLUCOSE BLOOD TEST: CPT

## 2019-04-23 RX ORDER — ALPRAZOLAM 1 MG/1
0.5 TABLET ORAL 3 TIMES DAILY PRN
Start: 2019-04-23

## 2019-04-23 RX ORDER — PAROXETINE 10 MG/1
10 TABLET, FILM COATED ORAL DAILY
Qty: 30 TABLET | Refills: 3 | Status: SHIPPED | OUTPATIENT
Start: 2019-04-24

## 2019-04-23 RX ORDER — AMLODIPINE BESYLATE 5 MG/1
5 TABLET ORAL
Qty: 30 TABLET | Refills: 1 | Status: SHIPPED | OUTPATIENT
Start: 2019-04-24

## 2019-04-23 RX ORDER — CLOPIDOGREL BISULFATE 75 MG/1
75 TABLET ORAL DAILY
Qty: 30 TABLET | Refills: 3 | Status: SHIPPED | OUTPATIENT
Start: 2019-04-24

## 2019-04-23 RX ORDER — ERGOCALCIFEROL 1.25 MG/1
50000 CAPSULE ORAL WEEKLY
Qty: 3 CAPSULE | Refills: 0 | Status: SHIPPED | OUTPATIENT
Start: 2019-04-29 | End: 2019-05-14

## 2019-04-23 RX ORDER — ACETAMINOPHEN 325 MG/1
650 TABLET ORAL EVERY 6 HOURS PRN
Start: 2019-04-23

## 2019-04-23 RX ORDER — ATORVASTATIN CALCIUM 80 MG/1
80 TABLET, FILM COATED ORAL NIGHTLY
Qty: 30 TABLET | Refills: 1 | Status: SHIPPED | OUTPATIENT
Start: 2019-04-23

## 2019-04-23 RX ORDER — ATENOLOL 25 MG/1
25 TABLET ORAL EVERY 12 HOURS SCHEDULED
Qty: 60 TABLET | Refills: 1 | Status: SHIPPED | OUTPATIENT
Start: 2019-04-23

## 2019-04-23 RX ORDER — INSULIN GLARGINE 100 [IU]/ML
32 INJECTION, SOLUTION SUBCUTANEOUS NIGHTLY
Qty: 10 ML | Refills: 1 | Status: SHIPPED | OUTPATIENT
Start: 2019-04-23 | End: 2019-05-01

## 2019-04-23 RX ORDER — LISINOPRIL 20 MG/1
20 TABLET ORAL EVERY 12 HOURS SCHEDULED
Qty: 60 TABLET | Refills: 1 | Status: SHIPPED | OUTPATIENT
Start: 2019-04-23

## 2019-04-23 RX ADMIN — ASPIRIN 325 MG: 325 TABLET, COATED ORAL at 09:24

## 2019-04-23 RX ADMIN — AMLODIPINE BESYLATE 5 MG: 5 TABLET ORAL at 09:24

## 2019-04-23 RX ADMIN — METFORMIN HYDROCHLORIDE 500 MG: 500 TABLET, EXTENDED RELEASE ORAL at 07:24

## 2019-04-23 RX ADMIN — PAROXETINE HYDROCHLORIDE 10 MG: 10 TABLET, FILM COATED ORAL at 09:23

## 2019-04-23 RX ADMIN — LISINOPRIL 20 MG: 20 TABLET ORAL at 09:23

## 2019-04-23 RX ADMIN — ATENOLOL 25 MG: 25 TABLET ORAL at 09:23

## 2019-04-23 RX ADMIN — CLOPIDOGREL 75 MG: 75 TABLET, FILM COATED ORAL at 09:23

## 2019-04-23 RX ADMIN — OXYCODONE AND ACETAMINOPHEN 1 TABLET: 5; 325 TABLET ORAL at 06:26

## 2019-04-23 RX ADMIN — Medication 1 TABLET: at 09:23

## 2019-04-23 NOTE — PLAN OF CARE
Problem: Stroke (IRF) (Adult)  Goal: Promote Optimal Functional [place]  Outcome: Outcome(s) achieved Date Met: 04/23/19      Problem: Fall Risk (Adult)  Goal: Absence of Fall  Outcome: Outcome(s) achieved Date Met: 04/23/19      Problem: Diabetes, Type 2 (Adult)  Goal: Signs and Symptoms of Listed Potential Problems Will be Absent, Minimized or Managed (Diabetes, Type 2)  Outcome: Outcome(s) achieved Date Met: 04/23/19      Problem: Skin Injury Risk (Adult)  Goal: Skin Health and Integrity  Outcome: Outcome(s) achieved Date Met: 04/23/19      Problem: Patient Care Overview  Goal: Plan of Care Review  Outcome: Outcome(s) achieved Date Met: 04/23/19 04/22/19 1458 04/23/19 1043   Patient Care Overview   IRF Plan of Care Review progress ongoing, continue --    OTHER   Outcome Summary --  Pt going home today.     Goal: Individualization and Mutuality  Outcome: Outcome(s) achieved Date Met: 04/23/19    Goal: Discharge Needs Assessment  Outcome: Outcome(s) achieved Date Met: 04/23/19    Goal: Home Safety Plan  Outcome: Outcome(s) achieved Date Met: 04/23/19    Goal: Coping Plan  Outcome: Outcome(s) achieved Date Met: 04/23/19    Goal: Community Reintegration Plan  Outcome: Outcome(s) achieved Date Met: 04/23/19

## 2019-04-23 NOTE — PROGRESS NOTES
Inpatient Rehabilitation Functional Measures Assessment    Functional Measures  VANITA Eating:  [facility] Grooming: [facility] Bathing:  [facility] Upper Body Dressing:  [facility] Lower Body Dressing:  [facility] Toileting:  [facility] Bladder Management  Level of Assistance:  [place]  Frequency/Number of Accidents this Shift:  [facility] Bowel Management  Level of Assistance: [place]  Frequency/Number of Accidents this Shift: [facility] Bed/Chair/Wheelchair Transfer:  [facility] Toilet Transfer:  [facility] Tub/Shower Transfer:  [place]    Previously Documented Mode of Locomotion at Discharge: Field  VANITA Expected Mode of Locomotion at Discharge: [facility] Walk/Wheelchair:  [facility] Stairs:  [facility] Comprehension:  Auditory comprehension is the usual mode. Comprehension  Score = 7, Independent.  Patient comprehends complex/abstract information in  their primary language.  Patient is completely independent for auditory  comprehension.  There are no activity limitations.  VANITA Expression:  Vocal expression is the usual mode. Expression Score = 6,  Modified Independent.  Patient expresses complex/abstract information in their  primary language with only mild difficulty with tasks.  VANITA Social Interaction:  Social Interaction Score = 6, Modified Independent.  Patient is modified independent for social interaction, requiring: Medications.    VANITA Problem Solving:  Patient does not make appropriate decisions in order to  solve complex problems without assistance from a helper. Problem Solving Score =  4, Minimal Direction. Patient makes appropriate decisions in order to solve  routine problems 75-90% of the time. Patient requires minimal/occasional  direction for the following behavior(s): Difficulty completing tasks. Difficulty  with self-monitoring. Decreased awareness of performance.  [facility] Memory:  Activity was not observed.    Therapy Mode Minutes  Occupational Therapy: [place]  Physical Therapy:  [place]  Speech Language Pathology:  [place]    Signed by: Pallavi Tinajero RN

## 2019-04-23 NOTE — PLAN OF CARE
Problem: Stroke (IRF) (Adult)  Goal: Promote Optimal Functional [place]  Outcome: Ongoing (interventions implemented as appropriate)      Problem: Fall Risk (Adult)  Goal: Absence of Fall  Outcome: Ongoing (interventions implemented as appropriate)      Problem: Diabetes, Type 2 (Adult)  Goal: Signs and Symptoms of Listed Potential Problems Will be Absent, Minimized or Managed (Diabetes, Type 2)  Outcome: Ongoing (interventions implemented as appropriate)      Problem: Skin Injury Risk (Adult)  Goal: Skin Health and Integrity  Outcome: Ongoing (interventions implemented as appropriate)      Problem: Patient Care Overview  Goal: Plan of Care Review  Outcome: Ongoing (interventions implemented as appropriate)   04/22/19 1458 04/22/19 2115 04/23/19 0519   Patient Care Overview   IRF Plan of Care Review progress ongoing, continue --  --    Progress, Functional Goals demonstrating adequate progress --  --    Coping/Psychosocial   Plan of Care Reviewed With --  patient --    OTHER   Outcome Summary --  --  Pt states pain med helping his R leg pain. Xanax at HS, sleeping well. No unsafe behavior noted. Meds with thin liquids. Voids per urinal.      Goal: Individualization and Mutuality  Outcome: Ongoing (interventions implemented as appropriate)    Goal: Discharge Needs Assessment  Outcome: Ongoing (interventions implemented as appropriate)    Goal: Coping Plan  Outcome: Ongoing (interventions implemented as appropriate)

## 2019-04-23 NOTE — PROGRESS NOTES
SECTION GG    Eating Performance Discharge: Patient completed the activities by him/herself  with no assistance from a helper.    Signed by: Ana Thomas RN

## 2019-04-23 NOTE — PROGRESS NOTES
Inpatient Rehabilitation Functional Measures Assessment    Functional Measures  VANITA Eating:  [facility] Grooming: [facility] Bathing:  [facility] Upper Body Dressing:  [facility] Lower Body Dressing:  [facility] Toileting:  [facility] Bladder Management  Level of Assistance:  [place]  Frequency/Number of Accidents this Shift:  [facility] Bowel Management  Level of Assistance: [place]  Frequency/Number of Accidents this Shift: [facility] Bed/Chair/Wheelchair Transfer:  [facility] Toilet Transfer:  [facility] Tub/Shower Transfer:  [place]    Previously Documented Mode of Locomotion at Discharge: Field  VANITA Expected Mode of Locomotion at Discharge: [facility] Walk/Wheelchair:  [facility] Stairs:  [facility] Comprehension:  Auditory comprehension is the usual mode. Comprehension  Score = 7, Independent.  Patient comprehends complex/abstract information in  their primary language.  Patient is completely independent for auditory  comprehension.  There are no activity limitations.  VANITA Expression:  Vocal expression is the usual mode. Expression Score = 7,  Independent.  Patient expresses complex/abstract information in their primary  language.  Patient is completely independent for vocal expression.  There are no  activity limitations.  VANITA Social Interaction:  Social Interaction Score = 7, Independent. Patient is  completely independent for social interaction.  There are no activity  limitations.  [place]ANITA Problem Solving:  Activity was not observed.  VANITA Memory:  Memory Score = 6, Modified [place].  Patient is modified  independent for memory, requiring:    Therapy Mode Minutes  Occupational Therapy: [place]  Physical Therapy: [place]  Speech Language Pathology:  [place]    Signed by: Ana Thomas RN

## 2019-04-23 NOTE — THERAPY TREATMENT NOTE
Inpatient Rehabilitation - Occupational Therapy Treatment Note    [facility]     Patient Name: Nayan Ryan  : 1965  MRN: [id number]    Today's Date: 2019                 Admit Date: 3/24/2019      Visit Dx:    ICD-10-CM ICD-9-CM   1. Acute ischemic left PCA stroke (CMS/HCC) I63.532 434.91   2. Hypertension, unspecified type I10 401.9   3. Morbid obesity (CMS/HCC) E66.01 278.01       Patient Active Problem List   Diagnosis   • Acute ischemic left PCA stroke (CMS/HCC)   • Status post placement of implantable loop recorder   • HTN (hypertension)   • Diabetes mellitus (CMS/HCC)   • Hyperlipidemia   • Morbid obesity (CMS/HCC)   • Anxiety disorder   • Migraine   • H/O hernia repair   • Abdominal wall abscess   • Back pain   • Stroke (cerebrum) (CMS/HCC)   • Vitamin D deficiency   • History of fatty infiltration of liver         Therapy Treatment        Wound 19 0900 Left chest incision (Active)   Dressing Appearance open to air 2019  7:19 AM   Closure Liquid skin adhesive 2019  7:19 AM   Periwound dry;intact 2019  7:19 AM   Drainage Amount none 2019  9:15 PM         OT Recommendation and Plan    Anticipated Equipment Needs At Discharge (OT Eval): commode, 3-in-1, shower chair, tub bench  Anticipated Discharge Disposition (OT): home, home with OP services  Planned Therapy Interventions (OT Eval): BADL retraining, cognitive/visual perception retraining, functional balance retraining, neuromuscular control/coordination retraining, strengthening exercise, transfer/mobility retraining            OT IRF GOALS     Row Name 19 1600 19 1500          Bathing Goal 1 (OT-IRF)    Activity/Device (Bathing Goal 1, OT-IRF)  bathing skills, all  -DN  bathing skills, all  -DN     [place] Level (Bathing Goal 1, OT-IRF)  supervision required;verbal cues required  -DN  supervision required;verbal cues required  -DN     Time Frame (Bathing Goal 1, OT-IRF)  short term goal (STG)  -DN   short term goal (STG)  -DN     Progress/Outcomes (Bathing Goal 1, OT-IRF)  goal met  -DN  goal met;goal revised this date  -DN        Bathing Goal 2 (OT-IRF)    Activity/Device (Bathing Goal 2, OT-IRF)  bathing skills, all  -DN  bathing skills, all  -DN     [place] Level (Bathing Goal 2, OT-IRF)  supervision required  -DN  supervision required  -DN     Time Frame (Bathing Goal 2, OT-IRF)  long term goal (LTG)  -DN  long term goal (LTG)  -DN     Progress/Outcomes (Bathing Goal 2, OT-IRF)  goal met  -DN  goal ongoing  -DN        UB Dressing Goal 1 (OT-IRF)    Activity/Device (UB Dressing Goal 1, OT-IRF)  upper body dressing  -DN  upper body dressing  -DN     [place] (UB Dress Goal 1, OT-IRF)  supervision required  -DN  supervision required  -DN     Time Frame (UB Dressing Goal 1, OT-IRF)  short term goal (STG)  -DN  short term goal (STG)  -DN     Progress/Outcomes (UB Dressing Goal 1, OT-IRF)  goal met  -DN  goal met;goal ongoing  -DN        UB Dressing Goal 2 (OT-IRF)    Activity/Device (UB Dressing Goal 2, OT-IRF)  upper body dressing  -DN  upper body dressing  -DN     [place] (UB Dress Goal 2, OT-IRF)  supervision required  -DN  supervision required  -DN     Time Frame (UB Dressing Goal 2, OT-IRF)  long term goal (LTG)  -DN  long term goal (LTG)  -DN     Progress/Outcomes (UB Dressing Goal 2, OT-IRF)  goal met  -DN  goal ongoing;goal met  -DN        LB Dressing Goal 1 (OT-IRF)    Activity/Device (LB Dressing Goal 1, OT-IRF)  lower body dressing  -DN  lower body dressing  -DN     [place] (LB Dressing Goal 1, OT-IRF)  supervision required  -DN  supervision required  -DN     Time Frame (LB Dressing Goal 1, OT-IRF)  short term goal (STG)  -DN  short term goal (STG)  -DN     Progress/Outcomes (LB Dressing Goal 1, OT-IRF)  goal met  -DN  goal revised this date  -DN        LB Dressing Goal 2 (OT-IRF)    Activity/Device (LB Dressing Goal 2, OT-IRF)  lower body dressing  -DN  lower body dressing  -DN      [place] (LB Dressing Goal 2, OT-IRF)  verbal cues required;supervision required  -DN  verbal cues required;supervision required  -DN     Time Frame (LB Dressing Goal 2, OT-IRF)  long term goal (LTG)  -DN  long term goal (LTG)  -DN     Progress/Outcomes (LB Dressing Goal 2, OT-IRF)  goal met  -DN  --        Grooming Goal 1 (OT-IRF)    Activity/Device (Grooming Goal 1, OT-IRF)  grooming skills, all  -DN  grooming skills, all  -DN     [place] (Grooming Goal 1, OT-IRF)  supervision required  -DN  supervision required  -DN     Time Frame (Grooming Goal 1, OT-IRF)  short term goal (STG)  -DN  short term goal (STG)  -DN     Progress/Outcomes (Grooming Goal 1, OT-IRF)  goal met  -DN  goal ongoing  -DN        Grooming Goal 2 (OT-IRF)    Activity/Device (Grooming Goal 2, OT-IRF)  grooming skills, all  -DN  grooming skills, all  -DN     [place] (Grooming Goal 2, OT-IRF)  supervision required  -DN  supervision required  -DN     Time Frame (Grooming Goal 2, OT-IRF)  long term goal (LTG)  -DN  long term goal (LTG)  -DN     Progress/Outcomes (Grooming Goal 2, OT-IRF)  goal met  -DN  goal revised this date  -DN        Toileting Goal 1 (OT-IRF)    Activity/Device (Toileting Goal 1, OT-IRF)  toileting skills, all  -DN  toileting skills, all  -DN     [place] Level (Toileting Goal 1, OT-IRF)  supervision required  -DN  supervision required  -DN     Time Frame (Toileting Goal 1, OT-IRF)  short term goal (STG)  -DN  short term goal (STG)  -DN     Progress/Outcomes (Toileting Goal 1, OT-IRF)  goal met  -DN  goal met;goal revised this date  -DN        Toileting Goal 2 (OT-IRF)    Activity/Device (Toileting Goal 2, OT-IRF)  toileting skills, all  -DN  toileting skills, all  -DN     [place] Level (Toileting Goal 2, OT-IRF)  supervision required  -DN  supervision required  -DN     Time Frame (Toileting Goal 2, OT-IRF)  long term goal (LTG)  -DN  long term goal (LTG)  -DN     Progress/Outcomes (Toileting Goal 2,  OT-IRF)  goal met  -DN  goal revised this date  -DN        Self-Feeding Goal 1 (OT-IRF)    Activity/Device (Self-Feeding Goal 1, OT-IRF)  self-feeding skills, all  -DN  self-feeding skills, all  -DN     [place] (Self-Feeding Goal 1, OT-IRF)  supervision required  -DN  supervision required  -DN     Time Frame (Self-Feeding Goal 1, OT-IRF)  short term goal (STG)  -DN  short term goal (STG)  -DN     Progress/Outcomes 1 (Self-Feeding Goal, OT-IRF)  goal met  -DN  goal met;goal ongoing  -DN        Self-Feeding Goal 2 (OT-IRF)    Activity/Device (Self-Feeding Goal 2, OT-IRF)  self-feeding skills, all  -DN  self-feeding skills, all  -DN     [place] (Self-Feeding Goal 2, OT-IRF)  supervision required  -DN  supervision required  -DN     Time Frame (Self-Feeding Goal 2, OT-IRF)  long term goal (LTG)  -DN  long term goal (LTG)  -DN     Progress/Outcomes (Self-Feeding Goal 2, OT-IRF)  goal met  -DN  goal met;goal ongoing  -DN        Caregiver Training Goal 2 (OT-IRF)    Caregiver Training Goal 2 (OT-IRF)  pt caregiver to be independent with any assist pt needs with adls, transfers and HEP for d/c home  -DN  pt caregiver to be independent with any assist pt needs with adls, transfers and HEP for d/c home  -DN     Time Frame (Caregiver Training Goal 2, OT-IRF)  long term goal (LTG)  -DN  long term goal (LTG)  -DN     Progress/Outcomes (Caregiver Training Goal 2, OT-IRF)  goal not met  -DN  goal ongoing  -DN       User Key  (r) = Recorded By, (t) = Taken By, (c) = Cosigned By    Initials Name Provider Type    Reg Bolden OT Occupational Therapist          Occupational Therapy Education     Title: PT OT SLP Therapies (Resolved)     Topic: Occupational Therapy (Resolved)     Point: ADL training (Resolved)     Description: Instruct learner(s) on proper safety adaptation and remediation techniques during self care or transfers.   Instruct in proper use of assistive devices.    Learning Progress Summary            Patient Acceptance, E,TB,D, VU by DN at 4/18/2019 12:19 PM    Comment:  tub transfers to tub bench at RWX level    Acceptance, E,TB,D, VU,NR by DN at 4/15/2019 12:04 PM    Comment:  pt presents with carryover of adapitive adls, functional transfers, and overall strength with min vc for safety    Acceptance, E,TB,D, VU,NR by DN at 4/9/2019  2:25 PM    Comment:  jay adls, adaptive visual scanning, transfer training    Acceptance, E,TB,D, VU,NR by DN at 4/8/2019 12:16 PM    Comment:  transfer training, jay skills with adls, safety,etc    Acceptance, E,TB,D, VU,NR by DN at 4/4/2019  3:19 PM    Comment:  theraputty exercises, jay adls and transfer trainning    Acceptance, E,TB,D, NR by DN at 3/26/2019 12:06 PM    Comment:  jay adls and commode/shower transfers, still max vc for all due to cognition and visual impairments    Acceptance, E,TB,D, VU,NR by DN at 3/25/2019  5:23 PM    Comment:  OT role in rehab, transfer traning, jay adls                   Point: Home exercise program (Resolved)     Description: Instruct learner(s) on appropriate technique for monitoring, assisting and/or progressing therapeutic exercises/activities.    Learning Progress Summary           Patient Acceptance, E,TB,D, VU by DN at 4/18/2019 12:19 PM    Comment:  tub transfers to tub bench at RWX level    Acceptance, E,TB,D, VU,NR by DN at 4/15/2019 12:04 PM    Comment:  pt presents with carryover of adapitive adls, functional transfers, and overall strength with min vc for safety    Acceptance, E,TB,D, VU,NR by DN at 4/9/2019  2:25 PM    Comment:  jay adls, adaptive visual scanning, transfer training    Acceptance, E,TB,D, VU,NR by DN at 4/8/2019 12:16 PM    Comment:  transfer training, jay skills with adls, safety,etc    Acceptance, E,TB,D, VU,NR by DN at 4/4/2019  3:19 PM    Comment:  theraputty exercises, jay adls and transfer trainning    Acceptance, E,TB,D, NR by DN at 3/26/2019 12:06 PM    Comment:  jay adls and commode/shower  transfers, still max vc for all due to cognition and visual impairments    Acceptance, E,TB,D, VU,NR by DN at 3/25/2019  5:23 PM    Comment:  OT role in rehab, transfer traning, jay adls                   Point: Precautions (Resolved)     Description: Instruct learner(s) on prescribed precautions during self-care and functional transfers.    Learning Progress Summary           Patient Acceptance, E,TB,D, VU by DN at 4/18/2019 12:19 PM    Comment:  tub transfers to tub bench at RWX level    Acceptance, E,TB,D, VU,NR by DN at 4/15/2019 12:04 PM    Comment:  pt presents with carryover of adapitive adls, functional transfers, and overall strength with min vc for safety    Acceptance, E,TB,D, VU,NR by DN at 4/9/2019  2:25 PM    Comment:  jay adls, adaptive visual scanning, transfer training    Acceptance, E,TB,D, VU,NR by DN at 4/8/2019 12:16 PM    Comment:  transfer training, jay skills with adls, safety,etc    Acceptance, E,TB,D, VU,NR by DN at 4/4/2019  3:19 PM    Comment:  theraputty exercises, jay adls and transfer trainning    Acceptance, E,TB,D, NR by DN at 3/26/2019 12:06 PM    Comment:  jay adls and commode/shower transfers, still max vc for all due to cognition and visual impairments    Acceptance, E,TB,D, VU,NR by DN at 3/25/2019  5:23 PM    Comment:  OT role in rehab, transfer traning, jay adls                   Point: Body mechanics (Resolved)     Description: Instruct learner(s) on proper positioning and spine alignment during self-care, functional mobility activities and/or exercises.    Learning Progress Summary           Patient Acceptance, E,TB,D, VU by DN at 4/18/2019 12:19 PM    Comment:  tub transfers to tub bench at RWX level    Acceptance, E,TB,D, VU,NR by DN at 4/15/2019 12:04 PM    Comment:  pt presents with carryover of adapitive adls, functional transfers, and overall strength with min vc for safety    Acceptance, E,TB,D, VU,NR by DN at 4/9/2019  2:25 PM    Comment:  jay adls, adaptive  visual scanning, transfer training    Acceptance, E,TB,D, VU,NR by DN at 4/8/2019 12:16 PM    Comment:  transfer training, jay skills with adls, safety,etc    Acceptance, E,TB,D, VU,NR by DN at 4/4/2019  3:19 PM    Comment:  theraputty exercises, jay adls and transfer trainning    Acceptance, E,TB,D, NR by DN at 3/26/2019 12:06 PM    Comment:  jay adls and commode/shower transfers, still max vc for all due to cognition and visual impairments    Acceptance, E,TB,D, VU,NR by DN at 3/25/2019  5:23 PM    Comment:  OT role in rehab, transfer traning, jay adls                               User Key     Initials Effective Dates Name Provider Type Discipline    DN 06/08/18 -  Reg Mckinney OT Occupational Therapist OT                       Time Calculation:     Time Calculation- OT     Row Name 04/23/19 1224             Time Calculation- OT    OT Non-Billable Time (min)  15 min team rounds  -DN        User Key  (r) = Recorded By, (t) = Taken By, (c) = Cosigned By    Initials Name Provider Type    Reg Bolden OT Occupational Therapist          Therapy Charges for Today     Code Description Service Date Service Provider Modifiers Qty    71837702234 HC OT SELF CARE/MGMT/TRAIN EA 15 MIN 4/22/2019 Reg Mckinney OT GO 2    46464881090 HC OT THER PROC EA 15 MIN 4/22/2019 Reg Mckinney OT GO 2    18866199473 HC OT CARE PLAN EA 15 MIN 4/23/2019 Reg Mckinney OT GO 1                   Reg Mckinney OT  4/23/2019

## 2019-04-23 NOTE — PROGRESS NOTES
Inpatient Rehabilitation Plan of Care Note    Plan of Care  Care Plan Reviewed - No updates at this time.    Safety    Performed Intervention(s)  Assistance with out of bed activities  Falls protocol  bed/chair alarm      Sphincter Control    Performed Intervention(s)  Assistance with urinal  Assistance to bathroom  Incontinence care PRN    Signed by: Pallavi Tinajero RN

## 2019-04-23 NOTE — PROGRESS NOTES
Adult Nutrition  Assessment/PES    Patient Name:  Nayan Ryan  YOB: 1965  MRN: [id number]  Admit Date:  3/24/2019    Assessment Date:  4/23/2019    Reason for Assessment     Row Name 04/23/19 0928          Reason for Assessment    Reason For Assessment  follow-up protocol         Nutrition/Diet History     Row Name 04/23/19 0928          Nutrition/Diet History    Typical Food/Fluid Intake  Intake & glucoses remains good/stable. Being dc to home today.            Labs/Tests/Procedures/Meds     Row Name 04/23/19 0929          Labs/Procedures/Meds    Lab Results Reviewed  reviewed        Diagnostic Tests/Procedures    Diagnostic Test/Procedure Reviewed  reviewed        Medications    Pertinent Medications Reviewed  reviewed         Physical Findings     Row Name 04/23/19 0929          Physical Findings    Overall Physical Appearance  obese           Nutrition Prescription Ordered     Row Name 04/23/19 0929          Nutrition Prescription PO    Common Modifiers  Consistent Carbohydrate;Cardiac         Evaluation of Received Nutrient/Fluid Intake     Row Name 04/23/19 0929          PO Evaluation    Number of Days PO Intake Evaluated  3 days     % PO Intake  100%           Problem/Interventions:      Nutrition Intervention     Row Name 04/23/19 0932          Nutrition Intervention    RD/Tech Action  Follow Tx progress           Education/Evaluation     Row Name 04/23/19 0932          Monitor/Evaluation    Monitor  Per protocol           Electronically signed by:  Keerthi Putnam RD  04/23/19 9:32 AM

## 2019-04-23 NOTE — PROGRESS NOTES
Case Management  Inpatient Rehabilitation Team Conference    Conference Date/Time: 4/23/2019 8:02:33 AM    Team Conference Attendees:  Dr. Familia Real, Mercedes Madrid, Pharmacist  Germania Sampson, SARKISW  Eliane Blackburn, PT  Reg Mckinney, OT  Narcisa Bonner, SLP  Narcisa Case, CTRS  Keerthi Putnam RD, LD  Camilo Albright, RN  Ana Thomas, RN   Naomy Mendenhall, PT Student    Demographics            Age: 53Y            Gender: Male    Admission Date: 3/24/2019 3:37:00 PM  Rehabilitation Diagnosis:  CVA  Past Medical History: HTN, HLD; hernia repair; migraines; IDDM; anxiety back  pain      Plan of Care  Anticipated Discharge Date/Estimated Length of Stay: ELOS: DC 4/23  Anticipated Discharge Destination: Community discharge with assistance  Discharge Plan : Family conference Tuesday, 4/16/2019 @ 1:00.  Medical Necessity Expected Level Rationale: CGA  Intensity and Duration: an average of 3 hours/5 days per week  Medical Supervision and 24 Hour Rehab Nursing: x  Physical Therapy: x  PT Intensity/Duration: 60 minutes/day, 5 days/week  Occupational Therapy: x  OT Intensity/Duration: 60 minutes/day, 5 days/week  Speech and Language Therapy: x  SLP Intensity/Duration: 60 minutes/day, 5 days/week  Social Work: x  Therapeutic Recreation: x  Psychology: x  Updated (if changes indicated)    Anticipated Discharge Date/Estimated Length of Stay:   ELOS: DC 4/23    Based on the patient's medical and functional status, their prognosis and  expected level of functional improvement is: CGA/SBA      Interdisciplinary Problem/Goals/Status    All Rehab Problems:  Body Systems    [RN] Endocrine(Active)  Current Status(04/21/2019): Patient at risk for altered blood sugars related to  recent health change.  Weekly Goal(04/28/2019): Patient will be compliant with accuchecks, diet, and  insulin coverage.  Discharge Goal: Patient will be independent with blood sugar management and be  compliant with treatment.        Cognition    [ST]  Executive Functions(Active)  Current Status(04/22/2019): Moderate cognitive deficits: MOD IMPAIRED EXEC FXN,  DECREASED INTEGRATION OF MULTIPLE PIECES OF INFO, impaired attn to detail, mod  impaired math , memory;  impulsivity noted. Improving with 4-5 step verbal  sequencing of ADL's and visual memory.  Weekly Goal(04/27/2019): Attend to tasks and recall details of daily activities  Discharge Goal: Improved cognitive skills        Communication    [ST] Expression(Active)  Current Status(04/22/2019): mild word retrieval deficits- improving  Weekly Goal(04/27/2019): name items needed for adl tasks indep  Discharge Goal: Improve receptive and expressive language skills        Mobility    [OT] Toilet Transfers(Active)  Current Status(04/22/2019): CGA Rwx vc for safety  Weekly Goal(04/23/2019): CGA RWX for safety  Discharge Goal: CGA RWX vc for safety    [OT] Tub/Shower Transfers(Active)  Current Status(04/22/2019): CGA SPS Min vc for safety  Weekly Goal(04/23/2019): CGA RWX vc  Discharge Goal: CGA RWX vc    [PT] Walk(Active)  Current Status(04/22/2019): 160 ft CGA w/ Rwx  Weekly Goal(04/23/2019): Rwx to BR CGA  Discharge Goal: 160 ft CGA w/ Rwx    [PT] Bed/Chair/Wheelchair(Active)  Current Status(04/22/2019): SBA  Weekly Goal(04/23/2019): SBA  Discharge Goal: SBA    [PT] Bed Mobility(Active)  Current Status(04/22/2019): Supervision  Weekly Goal(04/23/2019): Supervision  Discharge Goal: Supervision    [PT] Wheelchair(Active)  Current Status(04/22/2019): 150 ft Mod I  Weekly Goal(04/23/2019): 150ft Mod I  Discharge Goal: 150ft Mod I    [PT] Stairs(Active)  Current Status(04/22/2019): 4 w/ HR CGA  Weekly Goal(04/23/2019): PT only  Discharge Goal: 4 w/ HR CGA        Psychosocial    [RN] Coping/Adjustment(Active)  Current Status(04/21/2019): Patient at risk for ineffective coping related to  recent change in health status and hospitalization.  Weekly Goal(04/28/2019): Patient will verbalize feelings and ask questions if  he  has them.  Discharge Goal: Patient will develop coping skills to accomodate his health  changes.        Safety    [RN] Potential for Injury(Active)  Current Status(04/21/2019): Patient at risk for falls related to impaired vision  and impaired mobility.  Weekly Goal(04/28/2019): Patient will be free of falls on rehab and will be  compliant with call light use.  Discharge Goal: No falls while here on rehab. Pt family aware of fall/safety in  the home setting.        Self Care    [OT] Bathing(Active)  Current Status(04/22/2019): SBA vc for safety,sequencing  Weekly Goal(04/23/2019): SBA vc for safety  Discharge Goal: SBA    [OT] Dressing (Lower)(Active)  Current Status(04/22/2019): SBA vc for jay tech,foot placement  Weekly Goal(04/23/2019): SBA vc  Discharge Goal: sba    [OT] Dressing (Upper)(Active)  Current Status(04/22/2019): SBA with min cues  Weekly Goal(04/23/2019): SBA  Discharge Goal: SBA    [OT] Eating(Active)  Current Status(04/22/2019): setup  Weekly Goal(04/23/2019): Setup  Discharge Goal: Setup    [OT] Grooming(Active)  Current Status(04/22/2019): SBA vc seated  Weekly Goal(04/23/2019): SBA  Discharge Goal: SBA    [OT] Toileting(Active)  Current Status(04/22/2019): SBAVC for safe technique  Weekly Goal(04/23/2019): SBA  Discharge Goal: SBA vc        Sphincter Control    [RN] Bladder Management(Active)  Current Status(04/21/2019): Patient is continent of bladder.  Weekly Goal(04/28/2019): Patient will remain continent.  Discharge Goal: Patient will remain continent.    [RN] Bowel Management(Active)  Current Status(04/21/2019): Patient is continent of bowels.  Weekly Goal(04/28/2019): Patient will remain continent.  Discharge Goal: Patient will remain continent.        Swallow Function    [ST] Swallowing(Active)  Current Status(04/15/2019): On reg/thin; Mild dysphagia. Suspect mistiming of  swallow and question poss asp/pen. Pt needs verbal cues for small sips/bites and  NO talking with foodl/liquid  in mouth. Monitoring for diet dilma and possible need  for VFSS if clinically warranted.  Weekly Goal(04/22/2019): Tolerate diet; Recall swallow strategies (no talking;  small sip/bite)  Discharge Goal: Tolerate least restrictive diet        Comments: 3/26: RLE strength poor; inconsistent when answering questions re:  number of steps in home, ages of children, etc.; impulsive, very poor safety  awareness, NSG not noting any unsafe behavior at night; right visual  deficit/neglect; needing step by step cues; BNE ordered;    4/2: needs constant cues for sequencing;    4/9: to trial quad cane this AM; impulsive; some cognitive improvement noted;    4/16: needs a lot of cues for sequencing; poor carryover; impulsive at times;  family conf today;    4/23: needing fewer cues for sequencing; impulsivity improved;    Signed by: Camilo Albright RN    Physician CoSigned By: Familia James 04/23/2019 09:11:09

## 2019-04-23 NOTE — DISCHARGE INSTRUCTIONS
No driving. No alcohol.    When completes Vitamin D2 ergocalciferol 50,000 units in three weeks, then start Vitamin D 3 cholecalciferol 1,000 units twice a day.
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
## 2019-04-23 NOTE — PROGRESS NOTES
Inpatient Rehabilitation Plan of Care Note    Plan of Care  Care Plan Reviewed - No updates at this time.    Body Systems    [RN] Endocrine(Resolved)  Current Status(04/21/2019): Patient at risk for altered blood sugars related to  recent health change.  Weekly Goal(04/28/2019): Patient will be compliant with accuchecks, diet, and  insulin coverage.  Discharge Goal: Patient will be independent with blood sugar management and be  compliant with treatment.        Psychosocial    [RN] Coping/Adjustment(Resolved)  Current Status(04/21/2019): Patient at risk for ineffective coping related to  recent change in health status and hospitalization.  Weekly Goal(04/28/2019): Patient will verbalize feelings and ask questions if he  has them.  Discharge Goal: Patient will develop coping skills to accomodate his health  changes.        Safety    [RN] Potential for Injury(Resolved)  Current Status(04/21/2019): Patient at risk for falls related to impaired vision  and impaired mobility.  Weekly Goal(04/28/2019): Patient will be free of falls on rehab and will be  compliant with call light use.  Discharge Goal: No falls while here on rehab. Pt family aware of fall/safety in  the home setting.        Sphincter Control    [RN] Bladder Management(Resolved)  Current Status(04/21/2019): Patient is continent of bladder.  Weekly Goal(04/28/2019): Patient will remain continent.  Discharge Goal: Patient will remain continent.    [RN] Bowel Management(Resolved)  Current Status(04/21/2019): Patient is continent of bowels.  Weekly Goal(04/28/2019): Patient will remain continent.  Discharge Goal: Patient will remain continent.    Signed by: Ana Thomas RN

## 2019-05-06 ENCOUNTER — APPOINTMENT (OUTPATIENT)
Dept: SLEEP MEDICINE | Facility: HOSPITAL | Age: 54
End: 2019-05-06

## 2019-05-21 ENCOUNTER — OFFICE VISIT (OUTPATIENT)
Dept: SLEEP MEDICINE | Facility: HOSPITAL | Age: 54
End: 2019-05-21

## 2019-05-21 VITALS
HEIGHT: 72 IN | SYSTOLIC BLOOD PRESSURE: 159 MMHG | OXYGEN SATURATION: 97 % | BODY MASS INDEX: 41.04 KG/M2 | HEART RATE: 86 BPM | DIASTOLIC BLOOD PRESSURE: 78 MMHG | WEIGHT: 303 LBS

## 2019-05-21 DIAGNOSIS — G47.33 OSA (OBSTRUCTIVE SLEEP APNEA): Primary | ICD-10-CM

## 2019-05-21 PROCEDURE — G0463 HOSPITAL OUTPT CLINIC VISIT: HCPCS

## 2019-05-21 NOTE — PROGRESS NOTES
[facility]- Sleep Disorders Center      Patient Care Team:  Qasim Asif MD as PCP - General  Qasim Asif MD as PCP - Family Medicine    Referring Provider: Familia James, *    Chief complaint:   Establish care for sleep apnea    History of present illness:    Subjective   This is a 53-year-old male patient with history of hypertension and recent ischemic stroke in 2019.    Patient was hospitalized at [facility] for left PCA/posterior MCA territory ischemic infarct with occlusion of the left PCA artery.  He has residual right hemiparesis and visual impairement after the stroke.    He reported a diagnosis of sleep apnea about 6 to 7 years ago.  He does not recall through which clinic he had his sleep test but remember that he is done at [facility].  He stated that his sleep apnea was severe and he needed high pressure.  Apparently he used the CPAP for about 5 years but then he dropped it and it broke and he stopped using it and never sought medical attention.  The test was originally done due to his excessive headache, usually occurring in the morning when he wakes up.    He currently has symptoms of snoring.  He occasionally has apnea according to his wife.  However, he continues to wake up with headache in the morning.    Sleep schedule:  -Bedtime: 11:30 PM  -Sleep latency: 30 min  -Wake up time: 6 AM , does feel refreshed  -Nocturnal awakenin times because of nocturia.  No difficulties going back to sleep.  -Perceived sleep hours: 6+      ESS: Total score: 8     He is currently in rehab at [facility] and he has lost about 20 pounds since he started rehab after the stroke.  He cannot exercise on his own due to recent stroke and weakness.    Review of Systems  Constitutional: No fever or chills. No changes in appetite.   ENMT: No sinus congestion, postnasal drip, hoarsness  Cardiovascular: No chest pain, palpitation or legs  swelling.    Respiratory: No dyspnea, cough or wheezing.  Gastrointestinal: No constipation, diarrhea, abdominal pain or acid reflux  Neurology: No headache, weakness, numbness but dizziness.   Musculoskeletal: No joints pain, stiffness or swelling.   Psychiatry: Reported anxiety but denies depression.  Hem/Lymphatic: No swollen glands or easy bruising.  Integumentary: No rash.  Endocrinology: No cold or warm intolerance but reported excessive thirst.   Urinary: No dysuria, bloody urine but frequent urination.     History  Past Medical History:   Diagnosis Date   • Abdominal wall abscess 3/1/2014   • Acute ischemic left PCA stroke (CMS/HCC) 3/19/2019   • Anxiety disorder 3/24/2019   • Back pain 3/24/2019   • Diabetes mellitus (CMS/[facility]) 3/24/2019   • H/O hernia repair 3/24/2019   • HTN (hypertension) 3/24/2019   • Hyperlipidemia 3/24/2019   • Migraine 3/24/2019   • Morbid obesity (CMS/[facility]) 3/24/2019   • Status post placement of implantable loop recorder 3/22/2019   ,   Past Surgical History:   Procedure Laterality Date   • HERNIA REPAIR     ,   Family History   Problem Relation Age of Onset   • Heart disease Father    • Kidney disease Father    • Hypertension Sister    ,   Social History     Tobacco Use   • Smoking status: Never Smoker   • Smokeless tobacco: Never Used   Substance Use Topics   • Alcohol use: Yes     Comment: social, beer   • Drug use: No    and Allergies:  Patient has no known allergies.    Medications:    Current Outpatient Medications:   •  acetaminophen (TYLENOL) 325 MG tablet, Take 2 tablets by mouth Every 6 (Six) Hours As Needed for Mild Pain ., Disp: , Rfl:   •  ALPRAZolam (XANAX) 1 MG tablet, Take 0.5 tablets by mouth 3 (Three) Times a Day As Needed for Anxiety., Disp: , Rfl:   •  amLODIPine (NORVASC) 5 MG tablet, Take 1 tablet by mouth Daily., Disp: 30 tablet, Rfl: 1  •  aspirin  MG EC tablet, Take 1 tablet by mouth Daily., Disp: 30 tablet, Rfl: 3  •  atenolol (TENORMIN) 25 MG tablet,  "Take 1 tablet by mouth Every 12 (Twelve) Hours., Disp: 60 tablet, Rfl: 1  •  atorvastatin (LIPITOR) 80 MG tablet, Take 1 tablet by mouth Every Night., Disp: 30 tablet, Rfl: 1  •  clopidogrel (PLAVIX) 75 MG tablet, Take 1 tablet by mouth Daily., Disp: 30 tablet, Rfl: 3  •  dapagliflozin (FARXIGA) 5 MG tablet tablet, Take 5 mg by mouth Daily., Disp: , Rfl:   •  Dulaglutide 1.5 MG/0.5ML solution pen-injector, Inject 1.5 mg under the skin into the appropriate area as directed 1 (One) Time Per Week., Disp: 2 mL, Rfl: 1  •  folic acid-vit B6-vit B12 (FOLGARD) 2.2-25-1 MG tablet tablet, Take 1 tablet by mouth Daily., Disp: 30 tablet, Rfl: 3  •  insulin degludec (TRESIBA FLEXTOUCH) 100 UNIT/ML solution pen-injector injection, Inject 32 Units under the skin into the appropriate area as directed every night at bedtime., Disp: 5 pen, Rfl: 3  •  lisinopril (PRINIVIL,ZESTRIL) 20 MG tablet, Take 1 tablet by mouth Every 12 (Twelve) Hours., Disp: 60 tablet, Rfl: 1  •  metFORMIN (GLUCOPHAGE) 500 MG tablet, Take 500 mg by mouth 2 (Two) Times a Day With Meals., Disp: , Rfl:   •  oxyCODONE-acetaminophen (PERCOCET) 7.5-325 MG per tablet, Take 1 tablet by mouth Every 6 (Six) Hours As Needed., Disp: , Rfl:   •  PARoxetine (PAXIL) 10 MG tablet, Take 1 tablet by mouth Daily., Disp: 30 tablet, Rfl: 3      Objective   Vital Signs:  Vitals:    05/21/19 1427   BP: 159/78   Pulse: 86   SpO2: 97%   Weight: (!) 137 kg (303 lb)   Height: 181.6 cm (71.5\")     Body mass index is 41.67 kg/m².  Neck Circumference: 18.5 inches     Physical Exam:  Neck Circumference: 18.5 inches     Constitutional: Not in acute distress.  Eyes: Injected conjunctiva, EOMI.  ENMT: Herrera score 3. Mallampati score 3. No oral thrush. Tonsils grade 1. Narrow distance in between the posterior pharyngeal pillars (<20 %). Large tongue.  Neck: Large. No thyromegaly.  Trachea midline  Heart: Regular rhythm and rate, no murmur  Lungs: Good and equal air entry bilaterally. No " crackles or wheezing.         Abdomen: Obese.  Soft.  No tenderness.  Positive bowel sounds  Extremities: No cyanosis, clubbing or pitting edema. Moves all extremities.  Neuro: Conscious, alert, oriented x3.  Gait is abnormal with unsteadiness and obvious dysmotility on the right side.  Psych: Appropriate mood and affect.    Integumentary: No rash  Lymphatic: No palpable cervical or supraclavicular lymph nodes.    Diagnostic data/data reviewed:  -Echo at [place] dated 3/20/2019: EF 57%; mildly dilated right ventricle    -DC summary by Dr. James,     Assessment   1. Obstructive sleep apnea of unknown severity  2. Recent ischemic stroke,  left PCA/posterior MCA territory   3. Morbid obesity (BMI = 41)  4. Essential hypertension    Plan:  Check a split-night polysomnography.  Patient is not a good candidate for home sleep apnea testing due to recent stroke and inability to use the equipment properly.  In addition he is morbidly obese with concerns of lack of accuracy of the effort belts.  I explained the pathophysiology of sleep apnea the patient, testing and therapy.  He is agreeable with CPAP therapy again.    I discussed the association between sleep apnea and cardiovascular disease including stroke and the beneficial effect of sleep apnea therapy in reducing the risk of cardiovascular events.    Continue current blood pressure medications.  BP remains slightly elevated which may reflect untreated sleep apnea.    Counseled for weight loss and encouraged to continue exercise and count his calories.        Julio Brown MD  05/21/19  3:01 PM    This note was dictated utilizing Dragon dictation

## 2019-05-22 ENCOUNTER — TRANSCRIBE ORDERS (OUTPATIENT)
Dept: ADMINISTRATIVE | Facility: HOSPITAL | Age: 54
End: 2019-05-22

## 2019-05-22 ENCOUNTER — HOSPITAL ENCOUNTER (OUTPATIENT)
Dept: CARDIOLOGY | Facility: HOSPITAL | Age: 54
Discharge: HOME | End: 2019-05-22
Admitting: PHYSICAL MEDICINE & REHABILITATION

## 2019-05-22 DIAGNOSIS — R60.9 SWELLING: ICD-10-CM

## 2019-05-22 DIAGNOSIS — R52 PAIN: ICD-10-CM

## 2019-05-22 DIAGNOSIS — R60.9 SWELLING: Primary | ICD-10-CM

## 2019-05-22 LAB
BH CV LOW VAS RIGHT LESSER SAPH VESSEL: 1
BH CV LOWER VASCULAR LEFT COMMON FEMORAL AUGMENT: NORMAL
BH CV LOWER VASCULAR LEFT COMMON FEMORAL COMPETENT: NORMAL
BH CV LOWER VASCULAR LEFT COMMON FEMORAL COMPRESS: NORMAL
BH CV LOWER VASCULAR LEFT COMMON FEMORAL PHASIC: NORMAL
BH CV LOWER VASCULAR LEFT COMMON FEMORAL SPONT: NORMAL
BH CV LOWER VASCULAR RIGHT COMMON FEMORAL AUGMENT: NORMAL
BH CV LOWER VASCULAR RIGHT COMMON FEMORAL COMPETENT: NORMAL
BH CV LOWER VASCULAR RIGHT COMMON FEMORAL COMPRESS: NORMAL
BH CV LOWER VASCULAR RIGHT COMMON FEMORAL PHASIC: NORMAL
BH CV LOWER VASCULAR RIGHT COMMON FEMORAL SPONT: NORMAL
BH CV LOWER VASCULAR RIGHT DISTAL FEMORAL COMPRESS: NORMAL
BH CV LOWER VASCULAR RIGHT GASTRONEMIUS COMPRESS: NORMAL
BH CV LOWER VASCULAR RIGHT GREATER SAPH AK COMPRESS: NORMAL
BH CV LOWER VASCULAR RIGHT GREATER SAPH BK COMPRESS: NORMAL
BH CV LOWER VASCULAR RIGHT LESSER SAPH COMPRESS: NORMAL
BH CV LOWER VASCULAR RIGHT LESSER SAPH THROMBUS: NORMAL
BH CV LOWER VASCULAR RIGHT MID FEMORAL AUGMENT: NORMAL
BH CV LOWER VASCULAR RIGHT MID FEMORAL COMPETENT: NORMAL
BH CV LOWER VASCULAR RIGHT MID FEMORAL COMPRESS: NORMAL
BH CV LOWER VASCULAR RIGHT MID FEMORAL PHASIC: NORMAL
BH CV LOWER VASCULAR RIGHT MID FEMORAL SPONT: NORMAL
BH CV LOWER VASCULAR RIGHT PERONEAL COMPRESS: NORMAL
BH CV LOWER VASCULAR RIGHT POPLITEAL AUGMENT: NORMAL
BH CV LOWER VASCULAR RIGHT POPLITEAL COMPETENT: NORMAL
BH CV LOWER VASCULAR RIGHT POPLITEAL COMPRESS: NORMAL
BH CV LOWER VASCULAR RIGHT POPLITEAL PHASIC: NORMAL
BH CV LOWER VASCULAR RIGHT POPLITEAL SPONT: NORMAL
BH CV LOWER VASCULAR RIGHT POSTERIOR TIBIAL COMPRESS: NORMAL
BH CV LOWER VASCULAR RIGHT PROXIMAL FEMORAL COMPRESS: NORMAL
BH CV LOWER VASCULAR RIGHT SAPHENOFEMORAL JUNCTION AUGMENT: NORMAL
BH CV LOWER VASCULAR RIGHT SAPHENOFEMORAL JUNCTION COMPETENT: NORMAL
BH CV LOWER VASCULAR RIGHT SAPHENOFEMORAL JUNCTION COMPRESS: NORMAL
BH CV LOWER VASCULAR RIGHT SAPHENOFEMORAL JUNCTION PHASIC: NORMAL
BH CV LOWER VASCULAR RIGHT SAPHENOFEMORAL JUNCTION SPONT: NORMAL

## 2019-05-22 PROCEDURE — 93971 EXTREMITY STUDY: CPT

## 2019-05-22 NOTE — PROGRESS NOTES
Right lower venous doppler complete and preliminary is negative for dvt to DR REBOLLEDO , pt was released

## 2019-07-12 ENCOUNTER — HOSPITAL ENCOUNTER (OUTPATIENT)
Dept: SLEEP MEDICINE | Facility: HOSPITAL | Age: 54
Discharge: HOME | End: 2019-07-12
Admitting: INTERNAL MEDICINE

## 2019-07-12 DIAGNOSIS — G47.33 OSA (OBSTRUCTIVE SLEEP APNEA): ICD-10-CM

## 2019-07-12 PROCEDURE — 95811 POLYSOM 6/>YRS CPAP 4/> PARM: CPT

## 2019-07-31 ENCOUNTER — TELEPHONE (OUTPATIENT)
Dept: SLEEP MEDICINE | Facility: HOSPITAL | Age: 54
End: 2019-07-31

## 2019-07-31 NOTE — TELEPHONE ENCOUNTER
Spoke with patient about sleep study results, sending orders to [facility] for stat set up. Follow up scheduled 9/10/19

## 2019-08-13 ENCOUNTER — HOSPITAL ENCOUNTER (OUTPATIENT)
Dept: CARDIOLOGY | Facility: HOSPITAL | Age: 54
Discharge: HOME | End: 2019-08-13
Admitting: PHYSICAL MEDICINE & REHABILITATION

## 2019-08-13 ENCOUNTER — TRANSCRIBE ORDERS (OUTPATIENT)
Dept: ADMINISTRATIVE | Facility: HOSPITAL | Age: 54
End: 2019-08-13

## 2019-08-13 ENCOUNTER — APPOINTMENT (OUTPATIENT)
Dept: CARDIOLOGY | Facility: HOSPITAL | Age: 54
End: 2019-08-13

## 2019-08-13 DIAGNOSIS — Z79.899 ENCOUNTER FOR LONG-TERM (CURRENT) USE OF MEDICATIONS: Primary | ICD-10-CM

## 2019-08-13 DIAGNOSIS — Z79.899 ENCOUNTER FOR LONG-TERM (CURRENT) USE OF MEDICATIONS: ICD-10-CM

## 2019-08-13 PROCEDURE — 93005 ELECTROCARDIOGRAM TRACING: CPT | Performed by: PHYSICAL MEDICINE & REHABILITATION

## 2019-08-13 PROCEDURE — 93010 ELECTROCARDIOGRAM REPORT: CPT | Performed by: INTERNAL MEDICINE

## 2019-08-23 ENCOUNTER — TELEPHONE (OUTPATIENT)
Dept: SLEEP MEDICINE | Facility: HOSPITAL | Age: 54
End: 2019-08-23

## 2019-08-23 NOTE — TELEPHONE ENCOUNTER
Patients wife called stating he was still snoring, they have been having difficultied  Getting in contact with [facility] for mask fit. I contacted [facility], they are calling them today and I will give download to Dr Brown to review to see if pressure needs increased for snoring

## 2019-08-27 ENCOUNTER — TELEPHONE (OUTPATIENT)
Dept: SLEEP MEDICINE | Facility: HOSPITAL | Age: 54
End: 2019-08-27

## 2019-08-27 NOTE — TELEPHONE ENCOUNTER
"Contacted pot today about pressure change recommended by Dr. Brown to address pt's snoring.  Per pt report, no one ever conta cted him about mask fit, but he did received two medium cushions in the mail last week.  Pt states these are still the \"wrong thing\".  Tech left message for pt liason at [facility], Jerrod Ortega, asking for return call to verfiy that pt is still slated for mask fit upcoming.  Pressure change via modem to APAP 8-12cm H20. jbo  "

## 2019-09-10 ENCOUNTER — OFFICE VISIT (OUTPATIENT)
Dept: SLEEP MEDICINE | Facility: HOSPITAL | Age: 54
End: 2019-09-10

## 2019-09-10 VITALS
HEART RATE: 86 BPM | HEIGHT: 72 IN | DIASTOLIC BLOOD PRESSURE: 80 MMHG | BODY MASS INDEX: 40.5 KG/M2 | WEIGHT: 299 LBS | OXYGEN SATURATION: 97 % | SYSTOLIC BLOOD PRESSURE: 149 MMHG

## 2019-09-10 DIAGNOSIS — G47.33 OBSTRUCTIVE SLEEP APNEA, ADULT: Primary | ICD-10-CM

## 2019-09-10 PROCEDURE — G0463 HOSPITAL OUTPT CLINIC VISIT: HCPCS

## 2019-09-10 NOTE — PROGRESS NOTES
[facility]- Sleep Disorders Center                          Chief Complaint:   Follow up for obstructive sleep apnea    History of present illness:   Subjective   Summary:  Patient is a 54 y.o. male patient with a history of HTN and DERIK diagnosed around , treated with CPAP until the machine got broken.  He recently had left PCA/posterior MCA ischemic infarct prompting reevaluation for sleep apnea.  He complains about loud snoring and excessive daytime fatigue.    DERIK:  Diagnosed in  and treated with CPAP until his machine stopped working.  Reevaluated again with split-night polysomnography on 2019 following a stroke and it showed AHI 17.  During Pap titration, optimal Pap was 8 cm H2O.  Radha FFM was used during the titration and prescribed but he is currently not tolerating it.  He stated that he got the wrong size and the air is leaking around his face and also beside his nose.  He would like to use a different full facemask and apparently he has used the Dreamwear FFM in the past and liked it better.  His wife was present in the clinic today, stated that he continues to snore with the current mask.    Sleep schedule:  -Bedtime: 10 PM  -Sleep latency: Few minutes  -Wake up time: 9 AM, does not feel refreshed  -Nocturnal awakenin-2 times because of nocturia.  No difficulties going back to sleep.  -Perceived sleep hours: 5    Mask:Dreamwear FFM which does not fit well.  He has significant air leak and a lot of dry mouth  DME: [facility]    ESS: Total score: 6     Follow up HTN:  He takes his medications regularly denies any issues with side effects.  He stated that his blood pressure is well regulated    Follow-up stroke:  No new symptoms of weakness or numbness.  He still use a cane.    REVIEW OF SYSTEMS:   HEENT: No nasal congestion or postnasal drip   CARDIOVASCULAR: No chest pain, chest pressure or chest discomfort. No palpitations or edema.   RESPIRATORY: No  shortness of breath, cough or sputum.   GASTROINTESTINAL: No abdominal bloating or reflux   NEUROLOGICAL/PSYCHOATRY: No depression nor anxiety    Past Medical History:  Past Medical History:   Diagnosis Date   • Abdominal wall abscess 3/1/2014   • Acute ischemic left PCA stroke (CMS/HCC) 3/19/2019   • Anxiety disorder 3/24/2019   • Back pain 3/24/2019   • Diabetes mellitus (CMS/[facility]) 3/24/2019   • H/O hernia repair 3/24/2019   • HTN (hypertension) 3/24/2019   • Hyperlipidemia 3/24/2019   • Migraine 3/24/2019   • Morbid obesity (CMS/HCC) 3/24/2019   • Status post placement of implantable loop recorder 3/22/2019   ,   Past Surgical History:   Procedure Laterality Date   • HERNIA REPAIR     ,   Social History     Tobacco Use   • Smoking status: Never Smoker   • Smokeless tobacco: Never Used   Substance Use Topics   • Alcohol use: Yes     Comment: social, beer   • Drug use: No    and Allergies:  Patient has no known allergies.    Medication Review:     Current Outpatient Medications:   •  acetaminophen (TYLENOL) 325 MG tablet, Take 2 tablets by mouth Every 6 (Six) Hours As Needed for Mild Pain ., Disp: , Rfl:   •  ALPRAZolam (XANAX) 1 MG tablet, Take 0.5 tablets by mouth 3 (Three) Times a Day As Needed for Anxiety., Disp: , Rfl:   •  amLODIPine (NORVASC) 5 MG tablet, Take 1 tablet by mouth Daily., Disp: 30 tablet, Rfl: 1  •  aspirin  MG EC tablet, Take 1 tablet by mouth Daily., Disp: 30 tablet, Rfl: 3  •  atenolol (TENORMIN) 25 MG tablet, Take 1 tablet by mouth Every 12 (Twelve) Hours., Disp: 60 tablet, Rfl: 1  •  atorvastatin (LIPITOR) 80 MG tablet, Take 1 tablet by mouth Every Night., Disp: 30 tablet, Rfl: 1  •  clopidogrel (PLAVIX) 75 MG tablet, Take 1 tablet by mouth Daily., Disp: 30 tablet, Rfl: 3  •  dapagliflozin (FARXIGA) 5 MG tablet tablet, Take 5 mg by mouth Daily., Disp: , Rfl:   •  Dulaglutide 1.5 MG/0.5ML solution pen-injector, Inject 1.5 mg under the skin into the appropriate area as directed 1 (One)  "Time Per Week., Disp: 2 mL, Rfl: 1  •  folic acid-vit B6-vit B12 (FOLGARD) 2.2-25-1 MG tablet tablet, Take 1 tablet by mouth Daily., Disp: 30 tablet, Rfl: 3  •  insulin degludec (TRESIBA FLEXTOUCH) 100 UNIT/ML solution pen-injector injection, Inject 32 Units under the skin into the appropriate area as directed every night at bedtime., Disp: 5 pen, Rfl: 3  •  lisinopril (PRINIVIL,ZESTRIL) 20 MG tablet, Take 1 tablet by mouth Every 12 (Twelve) Hours., Disp: 60 tablet, Rfl: 1  •  metFORMIN (GLUCOPHAGE) 500 MG tablet, Take 500 mg by mouth 2 (Two) Times a Day With Meals., Disp: , Rfl:   •  oxyCODONE-acetaminophen (PERCOCET) 7.5-325 MG per tablet, Take 1 tablet by mouth Every 6 (Six) Hours As Needed., Disp: , Rfl:   •  PARoxetine (PAXIL) 10 MG tablet, Take 1 tablet by mouth Daily., Disp: 30 tablet, Rfl: 3      Objective   Vital Signs:  Vitals:    09/10/19 1422   BP: 149/80   Pulse: 86   SpO2: 97%   Weight: 136 kg (299 lb)   Height: 181.6 cm (71.5\")     Body mass index is 41.12 kg/m².          Physical Exam:   General Appearance:    Alert, cooperative, in no acute distress   ENMT:  Herrera score 3. Mallampati score 3. No oral thrush. Tonsils grade 1. Narrow distance in between the posterior pharyngeal pillars (<20 %). Large tongue.   Neck:  Large. Trachea midline. No thyromegaly.   Lungs:     Clear to auscultation,respirations regular, even and                  unlabored    Heart:    Regular rhythm and normal rate, normal S1 and S2, no            Murmur.   Abdomen:     Obese.  Soft.  No tenderness.  No HSM    Neuro:   Conscious, alert, oriented x3. Appropriate mood and affect.    Extremities:   Moves all extremities well, no edema, no cyanosis, no             Redness              Diagnostic data:  Polysomnography was performed on: 7/13/19  John SpO2 was 71 % with hypoxic burden of 9.3 min (7.4 % of TST).       Total Supine Non Supine REM   AHI 17 30 0 43         CPAP download showed:  Date: Last 2 weeks  Usage (days): 50 " %  Days used>4h: 7 %  AHI: 9/h  Leak 95%: 84   Usage: 2 h and 6 min  P 95% : 8.8  Auto CPAP    Assessment   1. Obstructive sleep apnea of unknown severity  2. Recent ischemic stroke,  left PCA/posterior MCA territory   3. Morbid obesity (BMI = 41)  4. Essential hypertension        PLAN:    Mask fitting in the sleep center today with a different FFM.  We will send in order to his [email or web address] company as well.  I discussed the result of the download with the patient.  He is not compliant with therapy due to intolerance to the current FFM.  He has mild residual apnea probably hypopnea from air leak.    Discussed the importance of compliance with CPAP therapy in the setting of HTN and stroke.  Continue current blood pressure meds.    Counseled for weight loss              This note was dictated utilizing Dragon dictation   no

## 2019-12-21 NOTE — PROGRESS NOTES
Inpatient Rehabilitation Plan of Care Note    Plan of Care  Updated Problems/Interventions  Mobility    [PT] Walk(Active)  Current Status(04/08/2019): 60 ft Min A of 1+ CGA of 1 at [facility]  Weekly Goal(04/15/2019): PT only  Discharge Goal: 80' CGA with appropriate AD    [PT] Bed/Chair/Wheelchair(Active)  Current Status(04/08/2019): Min-Mod A  Weekly Goal(04/15/2019): CGA-Min A  Discharge Goal: CGA    [PT] Bed Mobility(Active)  Current Status(04/08/2019): CGA- Min  Weekly Goal(04/15/2019): CGA  Discharge Goal: SBA    [PT] Wheelchair(Active)  Current Status(04/08/2019): 100ft supervision  Weekly Goal(04/15/2019): 150ft Mod I  Discharge Goal: 150ft Mod I    Signed by: Naomy Mendenhall, PT Student     - CoSigned By: Eliane Blackburn PT 4/8/2019 3:38:29 PM     [id number] Comprehensive

## 2020-10-03 NOTE — PROGRESS NOTES
Inpatient Rehabilitation Functional Measures Assessment    Functional Measures  VANITA Eating:  [facility] Grooming: [facility] Bathing:  [facility] Upper Body Dressing:  [facility] Lower Body Dressing:  [facility] Toileting:  [facility] Bladder Management  Level of Assistance:  [place]  Frequency/Number of Accidents this Shift:  [facility] Bowel Management  Level of Assistance: [place]  Frequency/Number of Accidents this Shift: [facility] Bed/Chair/Wheelchair Transfer:  Bed/chair/wheelchair Transfer Score = 4.  Patient performs 75% or more of effort and minimal assistance (little/incidental  help/lifting of one limb/steadying) for transferring to and from the  bed/chair/wheelchair, requiring: Contact guard. Patient requires the following  assistive device(s): Arm rest.  VANITA Toilet Transfer:  [facility] Tub/Shower Transfer:  [place]    Previously Documented Mode of Locomotion at Discharge: Field  VANITA Expected Mode of Locomotion at Discharge: [facility] Walk/Wheelchair:  WHEELCHAIR OBSERVATION   Activity was not observed.    WALK OBSERVATION   Walk Distance Scale = 2.  Distance walked is 50 -149 feet. Walk Score = 2.  Patient performs 75% or more of effort and requires minimal assistance.  Incidental assistance, contact guard or steadying was provided. Patient walked a  distance of 50 feet. Patient requires the following assistive device(s): Rolling  walker.  VANITA Stairs:  Stairs Score = 1.  Patient performs 75% or more of effort and  requires minimal assistance of two or more people for negotiating stairs. 2  people used for safety . Patient negotiated  4 stairs. Patient requires the  following assistive device(s): Handrail(s).    VANITA Comprehension:  [facility] Expression:  [facility] Social Interaction:  [facility] Problem Solving:  [facility] Memory:  [place]    Therapy Mode Minutes  Occupational Therapy: [place]  Physical Therapy: Individual: 60 minutes.  Speech Language Pathology:  [place]    Signed  Pt concerned if he will need central line replaced prior to discharge or if he will need it changed as outpatient. Dr. Amador notified about pt concern.    1415: Spoke with Dr. Palencia, who is covering for Dr. Amador, and notified him about pt concern regarding central line replacement. Per Dr. Palencia, he will be in this afternoon and speak with pt.   by: Naomy Mendenhall, PT Student     - CoSigned By: Eliane Blackburn PT 4/16/2019 3:08:20 PM

## 2024-04-11 NOTE — PROGRESS NOTES
Inpatient Rehabilitation Functional Measures Assessment    Functional Measures  VANITA Eating:  [facility] Grooming: [facility] Bathing:  [facility] Upper Body Dressing:  [facility] Lower Body Dressing:  [facility] Toileting:  [facility] Bladder Management  Level of Assistance:  [place]  Frequency/Number of Accidents this Shift:  [facility] Bowel Management  Level of Assistance: [place]  Frequency/Number of Accidents this Shift: [facility] Bed/Chair/Wheelchair Transfer:  [facility] Toilet Transfer:  [facility] Tub/Shower Transfer:  [place]    Previously Documented Mode of Locomotion at Discharge: Field  VANITA Expected Mode of Locomotion at Discharge: [facility] Walk/Wheelchair:  [facility] Stairs:  [facility] Comprehension:  Auditory comprehension is the usual mode. Comprehension  Score = 7, Independent.  Patient comprehends complex/abstract information in  their primary language.  Patient is completely independent for auditory  comprehension.  There are no activity limitations.  VANITA Expression:  Vocal expression is the usual mode. Expression Score = 6,  Modified Independent.  Patient expresses complex/abstract information in their  primary language, requiring:  [facility]ANITA Social Interaction:  Activity was not observed.  [facility]ANITA Problem Solving:  Activity was not observed.  VANITA Memory:  Memory Score = 6, Modified [place].  Patient is modified  independent for memory, requiring:    Therapy Mode Minutes  Occupational Therapy: [place]  Physical Therapy: [place]  Speech Language Pathology:  [place]    Signed by: Ana Thomas RN     English